# Patient Record
Sex: MALE | Race: WHITE | NOT HISPANIC OR LATINO | Employment: OTHER | URBAN - METROPOLITAN AREA
[De-identification: names, ages, dates, MRNs, and addresses within clinical notes are randomized per-mention and may not be internally consistent; named-entity substitution may affect disease eponyms.]

---

## 2020-12-16 ENCOUNTER — HOSPITAL ENCOUNTER (EMERGENCY)
Facility: HOSPITAL | Age: 70
Discharge: HOME | End: 2020-12-16
Attending: EMERGENCY MEDICINE | Admitting: EMERGENCY MEDICINE

## 2020-12-16 ENCOUNTER — APPOINTMENT (OUTPATIENT)
Dept: GENERAL RADIOLOGY | Facility: HOSPITAL | Age: 70
End: 2020-12-16

## 2020-12-16 VITALS
SYSTOLIC BLOOD PRESSURE: 162 MMHG | DIASTOLIC BLOOD PRESSURE: 95 MMHG | HEIGHT: 71 IN | RESPIRATION RATE: 18 BRPM | WEIGHT: 130 LBS | TEMPERATURE: 98.2 F | BODY MASS INDEX: 18.2 KG/M2 | HEART RATE: 66 BPM | OXYGEN SATURATION: 100 %

## 2020-12-16 DIAGNOSIS — E08.621 DIABETIC ULCER OF LEFT MIDFOOT ASSOCIATED WITH DIABETES MELLITUS DUE TO UNDERLYING CONDITION, LIMITED TO BREAKDOWN OF SKIN (HCC): ICD-10-CM

## 2020-12-16 DIAGNOSIS — R73.9 HYPERGLYCEMIA: ICD-10-CM

## 2020-12-16 DIAGNOSIS — L97.421 DIABETIC ULCER OF LEFT MIDFOOT ASSOCIATED WITH DIABETES MELLITUS DUE TO UNDERLYING CONDITION, LIMITED TO BREAKDOWN OF SKIN (HCC): ICD-10-CM

## 2020-12-16 DIAGNOSIS — Z91.199 MEDICAL NON-COMPLIANCE: Primary | ICD-10-CM

## 2020-12-16 LAB
ALBUMIN SERPL-MCNC: 4.4 G/DL (ref 3.5–5.2)
ALBUMIN/GLOB SERPL: 1.5 G/DL
ALP SERPL-CCNC: 110 U/L (ref 39–117)
ALT SERPL W P-5'-P-CCNC: 33 U/L (ref 1–41)
ANION GAP SERPL CALCULATED.3IONS-SCNC: 11 MMOL/L (ref 5–15)
AST SERPL-CCNC: 22 U/L (ref 1–40)
B-OH-BUTYR SERPL-SCNC: 0.1 MMOL/L (ref 0.02–0.27)
BASOPHILS # BLD AUTO: 0.03 10*3/MM3 (ref 0–0.2)
BASOPHILS NFR BLD AUTO: 0.4 % (ref 0–1.5)
BILIRUB SERPL-MCNC: 0.4 MG/DL (ref 0–1.2)
BILIRUB UR QL STRIP: NEGATIVE
BUN SERPL-MCNC: 14 MG/DL (ref 8–23)
BUN/CREAT SERPL: 15.6 (ref 7–25)
CALCIUM SPEC-SCNC: 9.8 MG/DL (ref 8.6–10.5)
CHLORIDE SERPL-SCNC: 96 MMOL/L (ref 98–107)
CLARITY UR: CLEAR
CO2 SERPL-SCNC: 26 MMOL/L (ref 22–29)
COLOR UR: YELLOW
CREAT SERPL-MCNC: 0.9 MG/DL (ref 0.76–1.27)
DEPRECATED RDW RBC AUTO: 43.8 FL (ref 37–54)
EOSINOPHIL # BLD AUTO: 0.13 10*3/MM3 (ref 0–0.4)
EOSINOPHIL NFR BLD AUTO: 1.6 % (ref 0.3–6.2)
ERYTHROCYTE [DISTWIDTH] IN BLOOD BY AUTOMATED COUNT: 13.2 % (ref 12.3–15.4)
GFR SERPL CREATININE-BSD FRML MDRD: 84 ML/MIN/1.73
GLOBULIN UR ELPH-MCNC: 3 GM/DL
GLUCOSE BLDC GLUCOMTR-MCNC: 284 MG/DL (ref 70–130)
GLUCOSE BLDC GLUCOMTR-MCNC: 398 MG/DL (ref 70–130)
GLUCOSE BLDC GLUCOMTR-MCNC: 404 MG/DL (ref 70–130)
GLUCOSE SERPL-MCNC: 306 MG/DL (ref 65–99)
GLUCOSE UR STRIP-MCNC: ABNORMAL MG/DL
HCT VFR BLD AUTO: 43.9 % (ref 37.5–51)
HGB BLD-MCNC: 14.5 G/DL (ref 13–17.7)
HGB UR QL STRIP.AUTO: NEGATIVE
HOLD SPECIMEN: NORMAL
HOLD SPECIMEN: NORMAL
IMM GRANULOCYTES # BLD AUTO: 0.06 10*3/MM3 (ref 0–0.05)
IMM GRANULOCYTES NFR BLD AUTO: 0.7 % (ref 0–0.5)
KETONES UR QL STRIP: NEGATIVE
LEUKOCYTE ESTERASE UR QL STRIP.AUTO: NEGATIVE
LYMPHOCYTES # BLD AUTO: 2.17 10*3/MM3 (ref 0.7–3.1)
LYMPHOCYTES NFR BLD AUTO: 26.2 % (ref 19.6–45.3)
MCH RBC QN AUTO: 30 PG (ref 26.6–33)
MCHC RBC AUTO-ENTMCNC: 33 G/DL (ref 31.5–35.7)
MCV RBC AUTO: 90.7 FL (ref 79–97)
MONOCYTES # BLD AUTO: 0.57 10*3/MM3 (ref 0.1–0.9)
MONOCYTES NFR BLD AUTO: 6.9 % (ref 5–12)
NEUTROPHILS NFR BLD AUTO: 5.32 10*3/MM3 (ref 1.7–7)
NEUTROPHILS NFR BLD AUTO: 64.2 % (ref 42.7–76)
NITRITE UR QL STRIP: NEGATIVE
NRBC BLD AUTO-RTO: 0 /100 WBC (ref 0–0.2)
PH UR STRIP.AUTO: 5.5 [PH] (ref 5–8)
PLATELET # BLD AUTO: 204 10*3/MM3 (ref 140–450)
PMV BLD AUTO: 9.9 FL (ref 6–12)
POTASSIUM SERPL-SCNC: 4 MMOL/L (ref 3.5–5.2)
PROT SERPL-MCNC: 7.4 G/DL (ref 6–8.5)
PROT UR QL STRIP: NEGATIVE
RBC # BLD AUTO: 4.84 10*6/MM3 (ref 4.14–5.8)
SODIUM SERPL-SCNC: 133 MMOL/L (ref 136–145)
SP GR UR STRIP: 1.03 (ref 1–1.03)
UROBILINOGEN UR QL STRIP: ABNORMAL
WBC # BLD AUTO: 8.28 10*3/MM3 (ref 3.4–10.8)
WHOLE BLOOD HOLD SPECIMEN: NORMAL
WHOLE BLOOD HOLD SPECIMEN: NORMAL

## 2020-12-16 PROCEDURE — 81003 URINALYSIS AUTO W/O SCOPE: CPT | Performed by: EMERGENCY MEDICINE

## 2020-12-16 PROCEDURE — 85025 COMPLETE CBC W/AUTO DIFF WBC: CPT | Performed by: EMERGENCY MEDICINE

## 2020-12-16 PROCEDURE — 82962 GLUCOSE BLOOD TEST: CPT

## 2020-12-16 PROCEDURE — 73630 X-RAY EXAM OF FOOT: CPT

## 2020-12-16 PROCEDURE — 99284 EMERGENCY DEPT VISIT MOD MDM: CPT

## 2020-12-16 PROCEDURE — 93005 ELECTROCARDIOGRAM TRACING: CPT | Performed by: EMERGENCY MEDICINE

## 2020-12-16 PROCEDURE — 80053 COMPREHEN METABOLIC PANEL: CPT

## 2020-12-16 PROCEDURE — 82010 KETONE BODYS QUAN: CPT

## 2020-12-16 RX ORDER — LIDOCAINE HYDROCHLORIDE 20 MG/ML
15 SOLUTION OROPHARYNGEAL ONCE
Status: COMPLETED | OUTPATIENT
Start: 2020-12-16 | End: 2020-12-16

## 2020-12-16 RX ORDER — SODIUM CHLORIDE 0.9 % (FLUSH) 0.9 %
10 SYRINGE (ML) INJECTION AS NEEDED
Status: DISCONTINUED | OUTPATIENT
Start: 2020-12-16 | End: 2020-12-16 | Stop reason: HOSPADM

## 2020-12-16 RX ORDER — ALUMINA, MAGNESIA, AND SIMETHICONE 2400; 2400; 240 MG/30ML; MG/30ML; MG/30ML
15 SUSPENSION ORAL ONCE
Status: COMPLETED | OUTPATIENT
Start: 2020-12-16 | End: 2020-12-16

## 2020-12-16 RX ADMIN — ALUMINUM HYDROXIDE, MAGNESIUM HYDROXIDE, AND DIMETHICONE 15 ML: 400; 400; 40 SUSPENSION ORAL at 20:20

## 2020-12-16 RX ADMIN — LIDOCAINE HYDROCHLORIDE 15 ML: 20 SOLUTION ORAL; TOPICAL at 20:20

## 2020-12-17 LAB
QT INTERVAL: 378 MS
QTC INTERVAL: 425 MS

## 2025-03-20 ENCOUNTER — APPOINTMENT (OUTPATIENT)
Dept: GENERAL RADIOLOGY | Facility: HOSPITAL | Age: 75
End: 2025-03-20
Payer: MEDICARE

## 2025-03-20 ENCOUNTER — HOSPITAL ENCOUNTER (INPATIENT)
Facility: HOSPITAL | Age: 75
LOS: 11 days | Discharge: HOME | End: 2025-04-02
Attending: EMERGENCY MEDICINE | Admitting: STUDENT IN AN ORGANIZED HEALTH CARE EDUCATION/TRAINING PROGRAM
Payer: MEDICARE

## 2025-03-20 DIAGNOSIS — R79.89 ELEVATED BRAIN NATRIURETIC PEPTIDE (BNP) LEVEL: ICD-10-CM

## 2025-03-20 DIAGNOSIS — N18.30 STAGE 3 CHRONIC KIDNEY DISEASE, UNSPECIFIED WHETHER STAGE 3A OR 3B CKD: ICD-10-CM

## 2025-03-20 DIAGNOSIS — J44.9 CHRONIC OBSTRUCTIVE PULMONARY DISEASE, UNSPECIFIED COPD TYPE: ICD-10-CM

## 2025-03-20 DIAGNOSIS — N28.9 ACUTE RENAL INSUFFICIENCY: ICD-10-CM

## 2025-03-20 DIAGNOSIS — I50.30 HEART FAILURE WITH PRESERVED LEFT VENTRICULAR FUNCTION (HFPEF): ICD-10-CM

## 2025-03-20 DIAGNOSIS — Z79.4 TYPE 2 DIABETES MELLITUS WITH HYPERGLYCEMIA, WITH LONG-TERM CURRENT USE OF INSULIN: ICD-10-CM

## 2025-03-20 DIAGNOSIS — I16.1 HYPERTENSIVE EMERGENCY: Primary | ICD-10-CM

## 2025-03-20 DIAGNOSIS — Z91.199 MEDICAL NON-COMPLIANCE: ICD-10-CM

## 2025-03-20 DIAGNOSIS — E44.0 MODERATE PROTEIN-CALORIE MALNUTRITION: ICD-10-CM

## 2025-03-20 DIAGNOSIS — Z72.0 TOBACCO ABUSE: ICD-10-CM

## 2025-03-20 DIAGNOSIS — E11.65 TYPE 2 DIABETES MELLITUS WITH HYPERGLYCEMIA, WITH LONG-TERM CURRENT USE OF INSULIN: ICD-10-CM

## 2025-03-20 DIAGNOSIS — I20.89 ATYPICAL ANGINA: ICD-10-CM

## 2025-03-20 DIAGNOSIS — I25.119 CORONARY ARTERY DISEASE INVOLVING NATIVE CORONARY ARTERY OF NATIVE HEART WITH ANGINA PECTORIS: ICD-10-CM

## 2025-03-20 PROBLEM — I16.0 HYPERTENSIVE URGENCY: Status: ACTIVE | Noted: 2025-03-20

## 2025-03-20 PROBLEM — E11.9 DM2 (DIABETES MELLITUS, TYPE 2): Status: ACTIVE | Noted: 2025-03-20

## 2025-03-20 PROBLEM — I25.10 CAD (CORONARY ARTERY DISEASE): Status: ACTIVE | Noted: 2025-03-20

## 2025-03-20 PROBLEM — R60.0 BILATERAL LOWER EXTREMITY EDEMA: Status: ACTIVE | Noted: 2025-03-20

## 2025-03-20 PROBLEM — N17.9 AKI (ACUTE KIDNEY INJURY): Status: ACTIVE | Noted: 2025-03-20

## 2025-03-20 PROBLEM — I73.9 PAD (PERIPHERAL ARTERY DISEASE): Status: ACTIVE | Noted: 2025-03-20

## 2025-03-20 PROBLEM — D64.9 ANEMIA: Status: ACTIVE | Noted: 2025-03-20

## 2025-03-20 PROBLEM — I10 HTN (HYPERTENSION): Status: ACTIVE | Noted: 2025-03-20

## 2025-03-20 LAB
ALBUMIN SERPL-MCNC: 4.1 G/DL (ref 3.5–5.2)
ALBUMIN/GLOB SERPL: 1.3 G/DL
ALP SERPL-CCNC: 148 U/L (ref 39–117)
ALT SERPL W P-5'-P-CCNC: 14 U/L (ref 1–41)
ANION GAP SERPL CALCULATED.3IONS-SCNC: 12 MMOL/L (ref 5–15)
AST SERPL-CCNC: 11 U/L (ref 1–40)
BACTERIA UR QL AUTO: ABNORMAL /HPF
BASOPHILS # BLD AUTO: 0.03 10*3/MM3 (ref 0–0.2)
BASOPHILS NFR BLD AUTO: 0.3 % (ref 0–1.5)
BILIRUB SERPL-MCNC: 0.2 MG/DL (ref 0–1.2)
BILIRUB UR QL STRIP: NEGATIVE
BUN SERPL-MCNC: 25 MG/DL (ref 8–23)
BUN/CREAT SERPL: 15 (ref 7–25)
CALCIUM SPEC-SCNC: 9 MG/DL (ref 8.6–10.5)
CHLORIDE SERPL-SCNC: 104 MMOL/L (ref 98–107)
CLARITY UR: CLEAR
CO2 SERPL-SCNC: 23 MMOL/L (ref 22–29)
COLOR UR: YELLOW
CREAT SERPL-MCNC: 1.67 MG/DL (ref 0.76–1.27)
DEPRECATED RDW RBC AUTO: 47.8 FL (ref 37–54)
EGFRCR SERPLBLD CKD-EPI 2021: 42.9 ML/MIN/1.73
EOSINOPHIL # BLD AUTO: 0.29 10*3/MM3 (ref 0–0.4)
EOSINOPHIL NFR BLD AUTO: 2.9 % (ref 0.3–6.2)
ERYTHROCYTE [DISTWIDTH] IN BLOOD BY AUTOMATED COUNT: 14.1 % (ref 12.3–15.4)
FERRITIN SERPL-MCNC: 315 NG/ML (ref 30–400)
GEN 5 1HR TROPONIN T REFLEX: 147 NG/L
GLOBULIN UR ELPH-MCNC: 3.1 GM/DL
GLUCOSE BLDC GLUCOMTR-MCNC: 250 MG/DL (ref 70–130)
GLUCOSE SERPL-MCNC: 326 MG/DL (ref 65–99)
GLUCOSE UR STRIP-MCNC: ABNORMAL MG/DL
HBA1C MFR BLD: 7.9 % (ref 4.8–5.6)
HCT VFR BLD AUTO: 34.2 % (ref 37.5–51)
HGB BLD-MCNC: 11 G/DL (ref 13–17.7)
HGB UR QL STRIP.AUTO: ABNORMAL
HOLD SPECIMEN: NORMAL
HYALINE CASTS UR QL AUTO: ABNORMAL /LPF
IMM GRANULOCYTES # BLD AUTO: 0.06 10*3/MM3 (ref 0–0.05)
IMM GRANULOCYTES NFR BLD AUTO: 0.6 % (ref 0–0.5)
IRON 24H UR-MRATE: 47 MCG/DL (ref 59–158)
IRON SATN MFR SERPL: 19 % (ref 20–50)
KETONES UR QL STRIP: NEGATIVE
LEUKOCYTE ESTERASE UR QL STRIP.AUTO: NEGATIVE
LYMPHOCYTES # BLD AUTO: 1.55 10*3/MM3 (ref 0.7–3.1)
LYMPHOCYTES NFR BLD AUTO: 15.3 % (ref 19.6–45.3)
MCH RBC QN AUTO: 29.4 PG (ref 26.6–33)
MCHC RBC AUTO-ENTMCNC: 32.2 G/DL (ref 31.5–35.7)
MCV RBC AUTO: 91.4 FL (ref 79–97)
MONOCYTES # BLD AUTO: 0.69 10*3/MM3 (ref 0.1–0.9)
MONOCYTES NFR BLD AUTO: 6.8 % (ref 5–12)
NEUTROPHILS NFR BLD AUTO: 7.5 10*3/MM3 (ref 1.7–7)
NEUTROPHILS NFR BLD AUTO: 74.1 % (ref 42.7–76)
NITRITE UR QL STRIP: NEGATIVE
NRBC BLD AUTO-RTO: 0 /100 WBC (ref 0–0.2)
NT-PROBNP SERPL-MCNC: 1321 PG/ML (ref 0–900)
PH UR STRIP.AUTO: 6.5 [PH] (ref 5–8)
PLATELET # BLD AUTO: 184 10*3/MM3 (ref 140–450)
PMV BLD AUTO: 10.3 FL (ref 6–12)
POTASSIUM SERPL-SCNC: 4.8 MMOL/L (ref 3.5–5.2)
PROT SERPL-MCNC: 7.2 G/DL (ref 6–8.5)
PROT UR QL STRIP: ABNORMAL
RBC # BLD AUTO: 3.74 10*6/MM3 (ref 4.14–5.8)
RBC # UR STRIP: ABNORMAL /HPF
REF LAB TEST METHOD: ABNORMAL
SODIUM SERPL-SCNC: 139 MMOL/L (ref 136–145)
SP GR UR STRIP: 1.02 (ref 1–1.03)
SQUAMOUS #/AREA URNS HPF: ABNORMAL /HPF
TIBC SERPL-MCNC: 243 MCG/DL (ref 298–536)
TRANSFERRIN SERPL-MCNC: 163 MG/DL (ref 200–360)
TROPONIN T % DELTA: -9
TROPONIN T NUMERIC DELTA: -15 NG/L
TROPONIN T SERPL HS-MCNC: 162 NG/L
UROBILINOGEN UR QL STRIP: ABNORMAL
WBC # UR STRIP: ABNORMAL /HPF
WBC NRBC COR # BLD AUTO: 10.12 10*3/MM3 (ref 3.4–10.8)
WHOLE BLOOD HOLD COAG: NORMAL
WHOLE BLOOD HOLD SPECIMEN: NORMAL

## 2025-03-20 PROCEDURE — 83540 ASSAY OF IRON: CPT | Performed by: PHYSICIAN ASSISTANT

## 2025-03-20 PROCEDURE — 71045 X-RAY EXAM CHEST 1 VIEW: CPT

## 2025-03-20 PROCEDURE — 82948 REAGENT STRIP/BLOOD GLUCOSE: CPT

## 2025-03-20 PROCEDURE — 36415 COLL VENOUS BLD VENIPUNCTURE: CPT

## 2025-03-20 PROCEDURE — 84466 ASSAY OF TRANSFERRIN: CPT | Performed by: PHYSICIAN ASSISTANT

## 2025-03-20 PROCEDURE — 80053 COMPREHEN METABOLIC PANEL: CPT | Performed by: EMERGENCY MEDICINE

## 2025-03-20 PROCEDURE — 99223 1ST HOSP IP/OBS HIGH 75: CPT | Performed by: PHYSICIAN ASSISTANT

## 2025-03-20 PROCEDURE — 82728 ASSAY OF FERRITIN: CPT | Performed by: PHYSICIAN ASSISTANT

## 2025-03-20 PROCEDURE — 25810000003 SODIUM CHLORIDE 0.9 % SOLUTION 250 ML FLEX CONT: Performed by: EMERGENCY MEDICINE

## 2025-03-20 PROCEDURE — G0378 HOSPITAL OBSERVATION PER HR: HCPCS

## 2025-03-20 PROCEDURE — 25010000002 HEPARIN (PORCINE) PER 1000 UNITS: Performed by: PHYSICIAN ASSISTANT

## 2025-03-20 PROCEDURE — 99291 CRITICAL CARE FIRST HOUR: CPT

## 2025-03-20 PROCEDURE — 63710000001 INSULIN LISPRO (HUMAN) PER 5 UNITS: Performed by: PHYSICIAN ASSISTANT

## 2025-03-20 PROCEDURE — 82746 ASSAY OF FOLIC ACID SERUM: CPT | Performed by: PHYSICIAN ASSISTANT

## 2025-03-20 PROCEDURE — 83036 HEMOGLOBIN GLYCOSYLATED A1C: CPT | Performed by: PHYSICIAN ASSISTANT

## 2025-03-20 PROCEDURE — 85025 COMPLETE CBC W/AUTO DIFF WBC: CPT | Performed by: EMERGENCY MEDICINE

## 2025-03-20 PROCEDURE — 81001 URINALYSIS AUTO W/SCOPE: CPT | Performed by: EMERGENCY MEDICINE

## 2025-03-20 PROCEDURE — 25010000002 NICARDIPINE 2.5 MG/ML SOLUTION 10 ML VIAL: Performed by: EMERGENCY MEDICINE

## 2025-03-20 PROCEDURE — 82607 VITAMIN B-12: CPT | Performed by: PHYSICIAN ASSISTANT

## 2025-03-20 PROCEDURE — 84484 ASSAY OF TROPONIN QUANT: CPT | Performed by: EMERGENCY MEDICINE

## 2025-03-20 PROCEDURE — 83880 ASSAY OF NATRIURETIC PEPTIDE: CPT | Performed by: EMERGENCY MEDICINE

## 2025-03-20 PROCEDURE — 93005 ELECTROCARDIOGRAM TRACING: CPT | Performed by: EMERGENCY MEDICINE

## 2025-03-20 RX ORDER — BISACODYL 5 MG/1
5 TABLET, DELAYED RELEASE ORAL DAILY PRN
Status: DISCONTINUED | OUTPATIENT
Start: 2025-03-20 | End: 2025-03-28 | Stop reason: SDUPTHER

## 2025-03-20 RX ORDER — OXYCODONE AND ACETAMINOPHEN 7.5; 325 MG/1; MG/1
1 TABLET ORAL 4 TIMES DAILY PRN
COMMUNITY

## 2025-03-20 RX ORDER — IBUPROFEN 600 MG/1
1 TABLET ORAL
Status: DISCONTINUED | OUTPATIENT
Start: 2025-03-20 | End: 2025-04-02 | Stop reason: HOSPADM

## 2025-03-20 RX ORDER — ACETAMINOPHEN 160 MG/5ML
650 SOLUTION ORAL EVERY 4 HOURS PRN
Status: DISCONTINUED | OUTPATIENT
Start: 2025-03-20 | End: 2025-04-02 | Stop reason: HOSPADM

## 2025-03-20 RX ORDER — SODIUM CHLORIDE 0.9 % (FLUSH) 0.9 %
10 SYRINGE (ML) INJECTION AS NEEDED
Status: DISCONTINUED | OUTPATIENT
Start: 2025-03-20 | End: 2025-04-02 | Stop reason: HOSPADM

## 2025-03-20 RX ORDER — HEPARIN SODIUM 5000 [USP'U]/ML
5000 INJECTION, SOLUTION INTRAVENOUS; SUBCUTANEOUS EVERY 12 HOURS SCHEDULED
Status: DISCONTINUED | OUTPATIENT
Start: 2025-03-20 | End: 2025-04-02 | Stop reason: HOSPADM

## 2025-03-20 RX ORDER — ATORVASTATIN CALCIUM 40 MG/1
40 TABLET, FILM COATED ORAL NIGHTLY
Status: ON HOLD | COMMUNITY
End: 2025-04-01

## 2025-03-20 RX ORDER — ONDANSETRON 2 MG/ML
4 INJECTION INTRAMUSCULAR; INTRAVENOUS EVERY 6 HOURS PRN
Status: DISCONTINUED | OUTPATIENT
Start: 2025-03-20 | End: 2025-04-02

## 2025-03-20 RX ORDER — FAMOTIDINE 20 MG/1
20 TABLET, FILM COATED ORAL 2 TIMES DAILY
COMMUNITY
End: 2025-04-02 | Stop reason: HOSPADM

## 2025-03-20 RX ORDER — GABAPENTIN 400 MG/1
400 CAPSULE ORAL 2 TIMES DAILY PRN
COMMUNITY

## 2025-03-20 RX ORDER — NICOTINE 21 MG/24HR
1 PATCH, TRANSDERMAL 24 HOURS TRANSDERMAL
Status: DISCONTINUED | OUTPATIENT
Start: 2025-03-20 | End: 2025-04-02 | Stop reason: HOSPADM

## 2025-03-20 RX ORDER — ASPIRIN 81 MG/1
81 TABLET, CHEWABLE ORAL DAILY
Status: ON HOLD | COMMUNITY
End: 2025-04-01

## 2025-03-20 RX ORDER — INSULIN GLARGINE 100 [IU]/ML
22 INJECTION, SOLUTION SUBCUTANEOUS NIGHTLY
Status: ON HOLD | COMMUNITY
End: 2025-04-01

## 2025-03-20 RX ORDER — DEXTROSE MONOHYDRATE 25 G/50ML
25 INJECTION, SOLUTION INTRAVENOUS
Status: DISCONTINUED | OUTPATIENT
Start: 2025-03-20 | End: 2025-04-02 | Stop reason: HOSPADM

## 2025-03-20 RX ORDER — NICOTINE POLACRILEX 4 MG
15 LOZENGE BUCCAL
Status: DISCONTINUED | OUTPATIENT
Start: 2025-03-20 | End: 2025-04-02 | Stop reason: HOSPADM

## 2025-03-20 RX ORDER — ACETAMINOPHEN 325 MG/1
650 TABLET ORAL EVERY 4 HOURS PRN
Status: DISCONTINUED | OUTPATIENT
Start: 2025-03-20 | End: 2025-04-02 | Stop reason: HOSPADM

## 2025-03-20 RX ORDER — SODIUM CHLORIDE 0.9 % (FLUSH) 0.9 %
10 SYRINGE (ML) INJECTION EVERY 12 HOURS SCHEDULED
Status: DISCONTINUED | OUTPATIENT
Start: 2025-03-20 | End: 2025-04-02 | Stop reason: HOSPADM

## 2025-03-20 RX ORDER — HYDRALAZINE HYDROCHLORIDE 25 MG/1
25 TABLET, FILM COATED ORAL 3 TIMES DAILY
COMMUNITY
End: 2025-04-02 | Stop reason: HOSPADM

## 2025-03-20 RX ORDER — ACETAMINOPHEN 650 MG/1
650 SUPPOSITORY RECTAL EVERY 4 HOURS PRN
Status: DISCONTINUED | OUTPATIENT
Start: 2025-03-20 | End: 2025-04-02 | Stop reason: HOSPADM

## 2025-03-20 RX ORDER — AMOXICILLIN 250 MG
2 CAPSULE ORAL 2 TIMES DAILY PRN
Status: DISCONTINUED | OUTPATIENT
Start: 2025-03-20 | End: 2025-03-28 | Stop reason: SDUPTHER

## 2025-03-20 RX ORDER — POLYETHYLENE GLYCOL 3350 17 G/17G
17 POWDER, FOR SOLUTION ORAL DAILY PRN
Status: DISCONTINUED | OUTPATIENT
Start: 2025-03-20 | End: 2025-03-28 | Stop reason: SDUPTHER

## 2025-03-20 RX ORDER — SODIUM CHLORIDE 9 MG/ML
40 INJECTION, SOLUTION INTRAVENOUS AS NEEDED
Status: DISCONTINUED | OUTPATIENT
Start: 2025-03-20 | End: 2025-04-02 | Stop reason: HOSPADM

## 2025-03-20 RX ORDER — INSULIN LISPRO 100 [IU]/ML
2-7 INJECTION, SOLUTION INTRAVENOUS; SUBCUTANEOUS
Status: DISCONTINUED | OUTPATIENT
Start: 2025-03-20 | End: 2025-04-02 | Stop reason: HOSPADM

## 2025-03-20 RX ORDER — BISACODYL 10 MG
10 SUPPOSITORY, RECTAL RECTAL DAILY PRN
Status: DISCONTINUED | OUTPATIENT
Start: 2025-03-20 | End: 2025-03-28 | Stop reason: SDUPTHER

## 2025-03-20 RX ADMIN — SODIUM CHLORIDE 5 MG/HR: 9 INJECTION, SOLUTION INTRAVENOUS at 18:26

## 2025-03-20 RX ADMIN — HEPARIN SODIUM 5000 UNITS: 5000 INJECTION INTRAVENOUS; SUBCUTANEOUS at 21:55

## 2025-03-20 RX ADMIN — INSULIN LISPRO 4 UNITS: 100 INJECTION, SOLUTION INTRAVENOUS; SUBCUTANEOUS at 21:54

## 2025-03-20 RX ADMIN — NICOTINE 1 PATCH: 21 PATCH TRANSDERMAL at 18:36

## 2025-03-20 NOTE — ED PROVIDER NOTES
Subjective   History of Present Illness  74-year-old male who presents to the ER for evaluation of elevated blood pressure.  The patient has a known history of high blood pressure.  He states that somebody attempted to improve his blood pressure with medication 1 month ago but he became pale, and therefore does not take that medication anymore.  He states that he receives weekly evaluation by home health.  Today's blood pressure was greater than 240 systolic he was advised to present here.  He denies any acute symptoms.  No chest pain or abdominal pain.  No nausea or vomiting.  No fever or infectious symptoms.  No other acute complaints.  He appears well with normal baseline mentation, and a normal neurologic exam      Review of Systems   Constitutional:  Negative for chills, fatigue and fever.   HENT:  Negative for congestion, ear pain, postnasal drip, sinus pressure and sore throat.    Eyes:  Negative for pain, redness and visual disturbance.   Respiratory:  Negative for cough, chest tightness and shortness of breath.    Cardiovascular:  Negative for chest pain, palpitations and leg swelling.   Gastrointestinal:  Negative for abdominal pain, anal bleeding, blood in stool, diarrhea, nausea and vomiting.   Endocrine: Negative for polydipsia and polyuria.   Genitourinary:  Negative for difficulty urinating, dysuria, frequency and urgency.   Musculoskeletal:  Negative for arthralgias, back pain and neck pain.   Skin:  Negative for pallor and rash.   Allergic/Immunologic: Negative for environmental allergies and immunocompromised state.   Neurological:  Negative for dizziness, weakness and headaches.   Hematological:  Negative for adenopathy.   Psychiatric/Behavioral:  Negative for confusion, self-injury and suicidal ideas. The patient is not nervous/anxious.    All other systems reviewed and are negative.      Past Medical History:   Diagnosis Date    Back injury     Diabetes mellitus     History of angina      Hypertension        Allergies   Allergen Reactions    Penicillins Hives    Valium [Diazepam] Hives       Past Surgical History:   Procedure Laterality Date    CORONARY STENT PLACEMENT      EMBOLECTOMY      FASCIOTOMY         History reviewed. No pertinent family history.    Social History     Socioeconomic History    Marital status:    Tobacco Use    Smoking status: Every Day     Current packs/day: 1.00     Types: Cigarettes    Smokeless tobacco: Never    Tobacco comments:     pt report smoking since he was [age]   Vaping Use    Vaping status: Never Used   Substance and Sexual Activity    Alcohol use: Never    Drug use: Never    Sexual activity: Defer           Objective   Physical Exam  Vitals and nursing note reviewed.   Constitutional:       General: He is not in acute distress.     Appearance: Normal appearance. He is well-developed. He is not toxic-appearing or diaphoretic.   HENT:      Head: Normocephalic and atraumatic.      Right Ear: External ear normal.      Left Ear: External ear normal.      Nose: Nose normal.   Eyes:      General: Lids are normal.      Pupils: Pupils are equal, round, and reactive to light.   Neck:      Trachea: No tracheal deviation.   Cardiovascular:      Rate and Rhythm: Normal rate and regular rhythm.      Pulses: No decreased pulses.      Heart sounds: Normal heart sounds. No murmur heard.     No friction rub. No gallop.   Pulmonary:      Effort: Pulmonary effort is normal. No respiratory distress.      Breath sounds: Normal breath sounds. No decreased breath sounds, wheezing, rhonchi or rales.   Abdominal:      General: Bowel sounds are normal.      Palpations: Abdomen is soft.      Tenderness: There is no abdominal tenderness. There is no guarding or rebound.   Musculoskeletal:         General: No deformity. Normal range of motion.      Cervical back: Normal range of motion and neck supple.   Lymphadenopathy:      Cervical: No cervical adenopathy.   Skin:     General: Skin  is warm and dry.      Findings: No rash.   Neurological:      Mental Status: He is alert and oriented to person, place, and time.      Cranial Nerves: No cranial nerve deficit.      Sensory: No sensory deficit.   Psychiatric:         Speech: Speech normal.         Behavior: Behavior normal.         Thought Content: Thought content normal.         Judgment: Judgment normal.         Critical Care    Performed by: Jackeline Dale MD  Authorized by: Jackeline Dale MD    Critical care provider statement:     Critical care time (minutes):  35    Critical care time was exclusive of:  Separately billable procedures and treating other patients    Critical care was necessary to treat or prevent imminent or life-threatening deterioration of the following conditions:  Cardiac failure    Critical care was time spent personally by me on the following activities:  Development of treatment plan with patient or surrogate, discussions with consultants, evaluation of patient's response to treatment, blood draw for specimens, examination of patient, obtaining history from patient or surrogate, ordering and performing treatments and interventions, ordering and review of laboratory studies, ordering and review of radiographic studies, pulse oximetry, re-evaluation of patient's condition and review of old charts    I assumed direction of critical care for this patient from another provider in my specialty: no      Care discussed with: admitting provider               ED Course  ED Course as of 03/21/25 0806   Thu Mar 20, 2025   1821 The patient does not have clinical signs of CHF exacerbation with no significant lower extremity edema and no changes on chest x-ray.  Troponin is elevated, but trending down.  Blood pressure consistently stayed greater than 200 systolic.  Mentation is normal.  He states that he is not taking any current blood pressure medication but he took some a month ago when he became very pale and therefore  is not on a regimen at this time.  I was hoping to admit for blood pressure improvement and initiation of an acceptable tolerated outpatient regimen. [NS]      ED Course User Index  [NS] Jackeline Dale MD                                                       Medical Decision Making  Differential includes essential hypertension hypertensive urgency, hypertensive emergency, other unspecified etiology.    Labs show elevated troponin, it was trending down on repeat evaluation.  Urine does not show infection.  Elevated creatinine consistent with acute on chronic renal insufficiency.  No significant electrolyte abnormalities.  Normal white count.  Elevated BNP.    Accommodation elevated BNP and troponin gives concern for hypertensive emergency given current elevated blood pressures that have remained greater than 200.    Chest x-ray shows no acute disease.    I have initiated Cardene and I discussed the patient with the hospitalist who will consult for admission.    Problems Addressed:  Acute renal insufficiency: complicated acute illness or injury with systemic symptoms  Elevated brain natriuretic peptide (BNP) level: complicated acute illness or injury with systemic symptoms that poses a threat to life or bodily functions  Hypertensive emergency: complicated acute illness or injury with systemic symptoms that poses a threat to life or bodily functions    Amount and/or Complexity of Data Reviewed  External Data Reviewed: labs, radiology and ECG.  Labs: ordered. Decision-making details documented in ED Course.  Radiology: ordered and independent interpretation performed. Decision-making details documented in ED Course.  ECG/medicine tests: ordered and independent interpretation performed. Decision-making details documented in ED Course.     Details: EKG performed 3/20/2025 at 1629 interpreted by me shows sinus rhythm, normal QTc, normal QRS, no acute ischemic changes.    Risk  OTC drugs.  Prescription drug  management.  Decision regarding hospitalization.        Final diagnoses:   Hypertensive emergency   Elevated brain natriuretic peptide (BNP) level   Acute renal insufficiency       ED Disposition  ED Disposition       ED Disposition   Decision to Admit    Condition   --    Comment   Level of Care: Telemetry [5]   Diagnosis: HTN (hypertension) [113145]   Admitting Physician: DANE KIDD [[id number]]                 No follow-up provider specified.       Medication List      No changes were made to your prescriptions during this visit.            Jackeline Dale MD  03/21/25 0884

## 2025-03-20 NOTE — H&P
"    [facility] Medicine Services  HISTORY AND PHYSICAL    Patient Name: Thomas Perez  : 1950  MRN: [id number]  Primary Care Physician: Provider, No Known  Date of admission: 3/20/2025    Subjective   Subjective     Chief Complaint:  Elevated blood pressure    HPI:  Thomas Perez is a 74 y.o. male with a past medical history significant for hypertension, HLD, CAD w/ RCA stent (), PAD s/p embolectomy and fasciotomy, COPD, poorly controlled DM, CKD, medical non compliance and chronic pain. Patient presents today with complaints of elevated blood pressure. States a home health nurse checked his BP today and it was elevated to > 250 systolic. States he usually runs between 160 to 180 systolic. Nurse was concerned and called EMS. Upon arrival, BP was still elevated to 210/100. Patient reports he is \"between\" blood pressure medications but whatever he's taking right now makes him sick. Apparently has not taken antihypertensives consistently for about 1 month. He currently complaints of progressively worsening BLE edema. States he cannot fit any of his shoes. Is otherwise asymptomatic.  Denies fever, cough, congestion, SOB, or chest pain. No Syncope. No headache or focal weakness/numbness/tingling. Will admit to observation.        Review of Systems   Constitutional:  Negative for chills, fatigue and fever.   HENT:  Negative for congestion and trouble swallowing.    Eyes:  Negative for photophobia and visual disturbance.   Respiratory:  Negative for cough and shortness of breath.    Cardiovascular:  Positive for leg swelling. Negative for chest pain.   Gastrointestinal:  Negative for abdominal pain, diarrhea, nausea and vomiting.   Endocrine: Negative for cold intolerance and heat intolerance.   Genitourinary:  Negative for dysuria and flank pain.   Musculoskeletal:  Negative for back pain and gait problem.   Skin:  Negative for pallor and rash.   Allergic/Immunologic: Positive for " immunocompromised state.   Neurological:  Positive for weakness. Negative for dizziness and headaches.   Hematological:  Negative for adenopathy.   Psychiatric/Behavioral:  Negative for agitation and confusion.             Personal History     Past Medical History:   Diagnosis Date    Back injury     Diabetes mellitus     History of angina     Hypertension              Past Surgical History:   Procedure Laterality Date    CORONARY STENT PLACEMENT      EMBOLECTOMY      FASCIOTOMY         Family History: family history is not on file.     Social History:  reports that he has been smoking cigarettes. He has never used smokeless tobacco. He reports that he does not drink alcohol and does not use drugs.  Social History     Social History Narrative    Not on file       Medications:       Allergies   Allergen Reactions    Penicillins Hives    Valium [Diazepam] Hives       Objective   Objective     Vital Signs:   Temp:  [97.7 °F (36.5 °C)] 97.7 °F (36.5 °C)  Heart Rate:  [60-68] 68  Resp:  [16] 16  BP: (167-250)/() 170/71    Physical Exam   Constitutional: Awake, alert  Eyes: PERRLA, sclerae anicteric, no conjunctival injection  HENT: NCAT, mucous membranes moist  Neck: Supple, no thyromegaly, no lymphadenopathy, trachea midline  Respiratory: Clear to auscultation bilaterally, nonlabored respirations   Cardiovascular: RRR, no murmurs, rubs, or gallops, palpable pedal pulses bilaterally  Gastrointestinal: Positive bowel sounds, soft, nontender, nondistended  Musculoskeletal: No bilateral ankle edema, no clubbing or cyanosis to extremities  BLE edema +1, no pitting. S/P embolectomy, fasciotomy scar left lower extremity  Psychiatric: Appropriate affect, cooperative  Neurologic: Oriented x 3, strength symmetric in all extremities, Cranial Nerves grossly intact to confrontation, speech clear  Skin: No rashes      Result Review:  I have personally reviewed the results from the time of this admission to 3/20/2025 19:45 EDT  and agree with these findings:  [x]  Laboratory list / accordion  []  Microbiology  []  Radiology  []  EKG/Telemetry   []  Cardiology/Vascular   []  Pathology  [x]  Old records  []  Other:  Most notable findings include: /100. Vitals otherwise stable. Trop 162 then 147. Cr 1.65, BUN 25. H/H 11 and 34.2. Cxr clear.    LAB RESULTS:      Lab 03/20/25  1626   WBC 10.12   HEMOGLOBIN 11.0*   HEMATOCRIT 34.2*   PLATELETS 184   NEUTROS ABS 7.50*   IMMATURE GRANS (ABS) 0.06*   LYMPHS ABS 1.55   MONOS ABS 0.69   EOS ABS 0.29   MCV 91.4         Lab 03/20/25  1626   SODIUM 139   POTASSIUM 4.8   CHLORIDE 104   CO2 23.0   ANION GAP 12.0   BUN 25*   CREATININE 1.67*   EGFR 42.9*   GLUCOSE 326*   CALCIUM 9.0   HEMOGLOBIN A1C 7.90*         Lab 03/20/25  1626 03/14/25  1711   TOTAL PROTEIN 7.2  --    ALBUMIN 4.1  --    GLOBULIN 3.1  --    ALT (SGPT) 14  --    AST (SGOT) 11  --    BILIRUBIN 0.2  --    ALK PHOS 148*  --    LIPASE  --  32         Lab 03/20/25  1726 03/20/25  1626   PROBNP  --  1,321.0*   HSTROP T 147* 162*                 Brief Urine Lab Results  (Last result in the past 365 days)        Color   Clarity   Blood   Leuk Est   Nitrite   Protein   CREAT   Urine HCG        03/20/25 1637 Yellow   Clear   Small (1+)   Negative   Negative   >=300 mg/dL (3+)                 Microbiology Results (last 10 days)       ** No results found for the last 240 hours. **            XR Chest 1 View  Result Date: 3/20/2025  XR CHEST 1 VW Date of Exam: 3/20/2025 5:50 PM EDT Indication: fatigue Comparison: None available. Findings: Lungs are adequately expanded and appear clear. Cardiomediastinal contours appear within normal limits.     Impression: Impression: No acute cardiopulmonary abnormality is identified. Electronically Signed: Madison Duque  3/20/2025 6:15 PM EDT  Workstation ID: [id number]          Assessment & Plan   Assessment & Plan       Hypertensive urgency    MAGUI (acute kidney injury)    Bilateral lower extremity edema     DM2 (diabetes mellitus, type 2)    CAD (coronary artery disease)    Anemia    PAD (peripheral artery disease)    Medical non-compliance    COPD (chronic obstructive pulmonary disease)    Tobacco abuse    74 y.o. male with a past medical history significant for hypertension, HLD, CAD w/ RCA stent (2010), PAD s/p embolectomy and fasciotomy, COPD, poorly controlled DM, CKD, medical non compliance and chronic pain here with hypertensive urgency 2/2 medication non compliance and BLE edema.    Hypertensive Emergency  - BP elevated as high as 250 on arrival  - supposed to be on Valsartan- HCTZ, Norvasc, Imdur  - plan to reconcile meds and discharge on new regimen  - started on Cardene gtt. Continue for now  - Trop 162 > 147. Suspect demand ischemia. Continue to trend. No EKG changes  - close monitor on telemetry  - am labs    MAGUI on CKD  - baseline Cr. 0.9 to 1.1  - elevated to 1.67, BUN 25 eGFR 42.9  - obtain UA  - strict I&O  - hold off on fluids for now with LE edema  - avoid additional nephrotoxins    BLE edema  - obtain echocardiogram  - doppler BLE     Anemia  - H/H 11 and 34.2 respectively  - check iron studies, b12. folate    Coronary Artery Disease  HLD  PAD s/p multiple interventions  - RCA stent 2010  - Not currently on antiplatelet, likely needs to start ASA  - continue statin    Insulin Dependent Diabetes Mellitus  - Hb A1c 3/205 7.9  - was on lantus 20 units daily plus lispro 7 units TID. Confirm with pharmacy  - CC2 diet  - SSI with scheduled accu checks    Chronic back pain and chronic BLE pain   - follows pain clinic on [address]  - continue home meds    COPD  Tobacco use  - not on supplemental oxygen  - stable  - as needed pulmonary toilet  - smokes 1-2 PPD for many decades  - Counseled on smoking cessation including high risk for heart attack and stroke. He is in pre-contemplative stage of change  - nicotine patch as needed    Impaired mobility and self care deficit   - walks with a walker due to  BLE foot drop, brace LLE  - PT/OT recommend home with assistance, outpatient PT/OT      DVT prophylaxis:  heparin    CODE STATUS:  full code  Code Status (Patient has no pulse and is not breathing): CPR (Attempt to Resuscitate)  Medical Interventions (Patient has pulse or is breathing): Full Support  Level Of Support Discussed With: Patient      Expected Discharge  TBD      This note has been completed as part of a split-shared workflow.     Signature: Electronically signed by Gregg Ramos PA-C, 03/20/25, 7:37 PM EDT

## 2025-03-20 NOTE — Clinical Note
Level of Care: Telemetry [5]   Diagnosis: HTN (hypertension) [293478]   Admitting Physician: DANE KIDD [[id number]]   Is patient appropriate for Inpatient Observation Unit?: Yes [1]

## 2025-03-20 NOTE — ED NOTES
Thomas Perez    Nursing Report ED to Floor:  Mental status: AOx4  Ambulatory status: Walker  Oxygen Therapy:  RA  Cardiac Rhythm: NSR  Admitted from: Home  Safety Concerns:  Fall risk  Precautions: None  Social Issues: None  ED Room #:  15    ED Nurse Phone Extension - [phone] or may call [phone].      HPI:   Chief Complaint   Patient presents with    Hypertension       Past Medical History:  Past Medical History:   Diagnosis Date    Back injury     Diabetes mellitus     History of angina     Hypertension         Past Surgical History:  Past Surgical History:   Procedure Laterality Date    CORONARY STENT PLACEMENT          Admitting Doctor:   Castro Bermudez MD    Consulting Provider(s):  Consults       No orders found from 2/19/2025 to 3/21/2025.             Admitting Diagnosis:   The primary encounter diagnosis was Hypertensive emergency. Diagnoses of Elevated brain natriuretic peptide (BNP) level and Acute renal insufficiency were also pertinent to this visit.    Most Recent Vitals:   Vitals:    03/20/25 1829 03/20/25 1858 03/20/25 1908 03/20/25 1918   BP: (!) 211/79 180/78 167/77 170/71   BP Location:       Patient Position:       Pulse: 61 66 63 68   Resp:       Temp:       TempSrc:       SpO2: 99% 98% 98% 98%   Weight:       Height:           Active LDAs/IV Access:   Lines, Drains & Airways       Active LDAs       Name Placement date Placement time Site Days    Peripheral IV 03/20/25 1629 Right Antecubital 03/20/25  1629  Antecubital  less than 1                    Labs (abnormal labs have a star):   Labs Reviewed   COMPREHENSIVE METABOLIC PANEL - Abnormal; Notable for the following components:       Result Value    Glucose 326 (*)     BUN 25 (*)     Creatinine 1.67 (*)     Alkaline Phosphatase 148 (*)     eGFR 42.9 (*)     All other components within normal limits    Narrative:     GFR Categories in Chronic Kidney Disease (CKD)      GFR Category          GFR (mL/min/1.73)    Interpretation  G1                      90 or greater         Normal or high (1)  G2                      60-89                Mild decrease (1)  G3a                   45-59                Mild to moderate decrease  G3b                   30-44                Moderate to severe decrease  G4                    15-29                Severe decrease  G5                    14 or less           Kidney failure          (1)In the absence of evidence of kidney disease, neither GFR category G1 or G2 fulfill the criteria for CKD.    eGFR calculation 2021 CKD-EPI creatinine equation, which does not include race as a factor   BNP (IN-HOUSE) - Abnormal; Notable for the following components:    proBNP 1,321.0 (*)     All other components within normal limits    Narrative:     This assay is used as an aid in the diagnosis of individuals suspected of having heart failure. It can be used as an aid in the diagnosis of acute decompensated heart failure (ADHF) in patients presenting with signs and symptoms of ADHF to the emergency department (ED). In addition, NT-proBNP of <300 pg/mL indicates ADHF is not likely.    Age Range Result Interpretation  NT-proBNP Concentration (pg/mL:      <50             Positive            >450                   Gray                 300-450                    Negative             <300    50-75           Positive            >900                  Gray                300-900                  Negative            <300      >75             Positive            >1800                  Gray                300-1800                  Negative            <300   TROPONIN - Abnormal; Notable for the following components:    HS Troponin T 162 (*)     All other components within normal limits    Narrative:     High Sensitive Troponin T Reference Range:  <14.0 ng/L- Negative Female for AMI  <22.0 ng/L- Negative Male for AMI  >=14 - Abnormal Female indicating possible myocardial injury.  >=22 - Abnormal Male indicating possible myocardial injury.   Clinicians would  have to utilize clinical acumen, EKG, Troponin, and serial changes to determine if it is an Acute Myocardial Infarction or myocardial injury due to an underlying chronic condition.        URINALYSIS W/ MICROSCOPIC IF INDICATED (NO CULTURE) - Abnormal; Notable for the following components:    Glucose, UA >=1000 mg/dL (3+) (*)     Blood, UA Small (1+) (*)     Protein, UA >=300 mg/dL (3+) (*)     All other components within normal limits   CBC WITH AUTO DIFFERENTIAL - Abnormal; Notable for the following components:    RBC 3.74 (*)     Hemoglobin 11.0 (*)     Hematocrit 34.2 (*)     Lymphocyte % 15.3 (*)     Immature Grans % 0.6 (*)     Neutrophils, Absolute 7.50 (*)     Immature Grans, Absolute 0.06 (*)     All other components within normal limits   URINALYSIS, MICROSCOPIC ONLY - Abnormal; Notable for the following components:    RBC, UA 6-10 (*)     All other components within normal limits   HIGH SENSITIVITIY TROPONIN T 1HR - Abnormal; Notable for the following components:    HS Troponin T 147 (*)     All other components within normal limits    Narrative:     High Sensitive Troponin T Reference Range:  <14.0 ng/L- Negative Female for AMI  <22.0 ng/L- Negative Male for AMI  >=14 - Abnormal Female indicating possible myocardial injury.  >=22 - Abnormal Male indicating possible myocardial injury.   Clinicians would have to utilize clinical acumen, EKG, Troponin, and serial changes to determine if it is an Acute Myocardial Infarction or myocardial injury due to an underlying chronic condition.        HEMOGLOBIN A1C - Abnormal; Notable for the following components:    Hemoglobin A1C 7.90 (*)     All other components within normal limits    Narrative:     Hemoglobin A1C Ranges:    Increased Risk for Diabetes  5.7% to 6.4%  Diabetes                     >= 6.5%  Diabetic Goal                < 7.0%   RAINBOW DRAW    Narrative:     The following orders were created for panel order Todd Draw.  Procedure                                Abnormality         Status                     ---------                               -----------         ------                     Green Top (Gel)[[id number]]                                  Final result               Lavender Top[[id number]]                                     Final result               Gold Top - SST[[id number]]                                   Final result               [place] Top[[id number]]                                         Final result               Light Blue Top[[id number]]                                   Final result                 Please view results for these tests on the individual orders.   IRON PROFILE   FERRITIN   FOLATE   VITAMIN B12   POCT GLUCOSE FINGERSTICK   POCT GLUCOSE FINGERSTICK   POCT GLUCOSE FINGERSTICK   POCT GLUCOSE FINGERSTICK   GREEN TOP   LAVENDER TOP   GOLD TOP - SST   GRAY TOP   LIGHT BLUE TOP   CBC AND DIFFERENTIAL    Narrative:     The following orders were created for panel order CBC & Differential.  Procedure                               Abnormality         Status                     ---------                               -----------         ------                     CBC Auto Differential[[id number]]        Abnormal            Final result                 Please view results for these tests on the individual orders.       Meds Given in ED:   Medications   sodium chloride 0.9 % flush 10 mL (has no administration in time range)   sodium chloride 0.9 % flush 10 mL (has no administration in time range)   niCARdipine (CARDENE) 25mg in 250mL NS infusion (5 mg/hr Intravenous Currently Infusing 3/20/25 1841)   nicotine (NICODERM CQ) 21 MG/24HR patch 1 patch (1 patch Transdermal Medication Applied 3/20/25 1836)   dextrose (GLUTOSE) oral gel 15 g (has no administration in time range)   dextrose (D50W) (25 g/50 mL) IV injection 25 g (has no administration in time range)   glucagon (GLUCAGEN) injection 1 mg (has no administration in time range)   Insulin  Lispro (humaLOG) injection 2-7 Units (has no administration in time range)     niCARdipine, 5-15 mg/hr, Last Rate: 5 mg/hr (03/20/25 1841)         Last NIH score:                                                          Dysphagia screening results:  Patient Factors Component (Dysphagia:Stroke or Rule-out)  Best Eye Response: 4-->(E4) spontaneous (03/20/25 1615)  Best Motor Response: 6-->(M6) obeys commands (03/20/25 1615)  Best Verbal Response: 5-->(V5) oriented (03/20/25 1615)  [place] Coma Scale Score: 15 (03/20/25 1615)     [place] Coma Scale:  No data recorded     CIWA:        Restraint Type:            Isolation Status:  No active isolations

## 2025-03-21 ENCOUNTER — APPOINTMENT (OUTPATIENT)
Dept: CARDIOLOGY | Facility: HOSPITAL | Age: 75
End: 2025-03-21
Payer: MEDICARE

## 2025-03-21 LAB
ALBUMIN SERPL-MCNC: 3.6 G/DL (ref 3.5–5.2)
ALBUMIN/GLOB SERPL: 1.4 G/DL
ALP SERPL-CCNC: 117 U/L (ref 39–117)
ALT SERPL W P-5'-P-CCNC: 12 U/L (ref 1–41)
ANION GAP SERPL CALCULATED.3IONS-SCNC: 12 MMOL/L (ref 5–15)
AORTIC DIMENSIONLESS INDEX: 0.81 (DI)
ASCENDING AORTA: 2.9 CM
AST SERPL-CCNC: 12 U/L (ref 1–40)
AV MEAN PRESS GRAD SYS DOP V1V2: 4 MMHG
AV VMAX SYS DOP: 126 CM/SEC
BASOPHILS # BLD AUTO: 0.02 10*3/MM3 (ref 0–0.2)
BASOPHILS NFR BLD AUTO: 0.2 % (ref 0–1.5)
BH CV ECHO MEAS - AO MAX PG: 6.4 MMHG
BH CV ECHO MEAS - AO ROOT DIAM: 3.3 CM
BH CV ECHO MEAS - AO V2 VTI: 28.9 CM
BH CV ECHO MEAS - AVA(I,D): 2.5 CM2
BH CV ECHO MEAS - EDV(CUBED): 79.5 ML
BH CV ECHO MEAS - EDV(MOD-SP2): 87.7 ML
BH CV ECHO MEAS - EDV(MOD-SP4): 75.7 ML
BH CV ECHO MEAS - EF(MOD-SP2): 62.7 %
BH CV ECHO MEAS - EF(MOD-SP4): 72.7 %
BH CV ECHO MEAS - ESV(CUBED): 27 ML
BH CV ECHO MEAS - ESV(MOD-SP2): 32.7 ML
BH CV ECHO MEAS - ESV(MOD-SP4): 20.7 ML
BH CV ECHO MEAS - FS: 30.2 %
BH CV ECHO MEAS - IVS/LVPW: 1 CM
BH CV ECHO MEAS - IVSD: 1.1 CM
BH CV ECHO MEAS - LA DIMENSION: 3.8 CM
BH CV ECHO MEAS - LAT PEAK E' VEL: 9.3 CM/SEC
BH CV ECHO MEAS - LV MASS(C)D: 162.9 GRAMS
BH CV ECHO MEAS - LV MAX PG: 4.6 MMHG
BH CV ECHO MEAS - LV MEAN PG: 2.5 MMHG
BH CV ECHO MEAS - LV V1 MAX: 107.5 CM/SEC
BH CV ECHO MEAS - LV V1 VTI: 23.5 CM
BH CV ECHO MEAS - LVIDD: 4.3 CM
BH CV ECHO MEAS - LVIDS: 3 CM
BH CV ECHO MEAS - LVOT AREA: 3.1 CM2
BH CV ECHO MEAS - LVOT DIAM: 2 CM
BH CV ECHO MEAS - LVPWD: 1.1 CM
BH CV ECHO MEAS - MED PEAK E' VEL: 6.1 CM/SEC
BH CV ECHO MEAS - MV A MAX VEL: 129 CM/SEC
BH CV ECHO MEAS - MV DEC SLOPE: 737 CM/SEC2
BH CV ECHO MEAS - MV DEC TIME: 0.15 SEC
BH CV ECHO MEAS - MV E MAX VEL: 111 CM/SEC
BH CV ECHO MEAS - MV E/A: 0.86
BH CV ECHO MEAS - MV MAX PG: 9 MMHG
BH CV ECHO MEAS - MV MEAN PG: 4 MMHG
BH CV ECHO MEAS - MV P1/2T: 58.4 MSEC
BH CV ECHO MEAS - MV V2 VTI: 34.4 CM
BH CV ECHO MEAS - MVA(P1/2T): 3.8 CM2
BH CV ECHO MEAS - MVA(VTI): 2.14 CM2
BH CV ECHO MEAS - PA ACC TIME: 0.12 SEC
BH CV ECHO MEAS - PA V2 MAX: 140.5 CM/SEC
BH CV ECHO MEAS - SV(LVOT): 73.7 ML
BH CV ECHO MEAS - SV(MOD-SP2): 55 ML
BH CV ECHO MEAS - SV(MOD-SP4): 55 ML
BH CV ECHO MEAS - TAPSE (>1.6): 2.34 CM
BH CV ECHO MEASUREMENTS AVERAGE E/E' RATIO: 14.42
BH CV LOWER VASCULAR LEFT COMMON FEMORAL AUGMENT: NORMAL
BH CV LOWER VASCULAR LEFT COMMON FEMORAL COMPRESS: NORMAL
BH CV LOWER VASCULAR LEFT COMMON FEMORAL PHASIC: NORMAL
BH CV LOWER VASCULAR LEFT COMMON FEMORAL SPONT: NORMAL
BH CV LOWER VASCULAR LEFT DISTAL FEMORAL AUGMENT: NORMAL
BH CV LOWER VASCULAR LEFT DISTAL FEMORAL COMPRESS: NORMAL
BH CV LOWER VASCULAR LEFT DISTAL FEMORAL PHASIC: NORMAL
BH CV LOWER VASCULAR LEFT DISTAL FEMORAL SPONT: NORMAL
BH CV LOWER VASCULAR LEFT GASTRONEMIUS COMPRESS: NORMAL
BH CV LOWER VASCULAR LEFT GREATER SAPH AK COMPRESS: NORMAL
BH CV LOWER VASCULAR LEFT GREATER SAPH BK COMPRESS: NORMAL
BH CV LOWER VASCULAR LEFT LESSER SAPH COMPRESS: NORMAL
BH CV LOWER VASCULAR LEFT MID FEMORAL AUGMENT: NORMAL
BH CV LOWER VASCULAR LEFT MID FEMORAL COMPRESS: NORMAL
BH CV LOWER VASCULAR LEFT MID FEMORAL PHASIC: NORMAL
BH CV LOWER VASCULAR LEFT MID FEMORAL SPONT: NORMAL
BH CV LOWER VASCULAR LEFT PERONEAL COMPRESS: NORMAL
BH CV LOWER VASCULAR LEFT POPLITEAL AUGMENT: NORMAL
BH CV LOWER VASCULAR LEFT POPLITEAL COMPRESS: NORMAL
BH CV LOWER VASCULAR LEFT POPLITEAL PHASIC: NORMAL
BH CV LOWER VASCULAR LEFT POPLITEAL SPONT: NORMAL
BH CV LOWER VASCULAR LEFT POSTERIOR TIBIAL COMPRESS: NORMAL
BH CV LOWER VASCULAR LEFT PROFUNDA FEMORAL COMPRESS: NORMAL
BH CV LOWER VASCULAR LEFT PROXIMAL FEMORAL AUGMENT: NORMAL
BH CV LOWER VASCULAR LEFT PROXIMAL FEMORAL COMPRESS: NORMAL
BH CV LOWER VASCULAR LEFT PROXIMAL FEMORAL PHASIC: NORMAL
BH CV LOWER VASCULAR LEFT PROXIMAL FEMORAL SPONT: NORMAL
BH CV LOWER VASCULAR LEFT SAPHENOFEMORAL JUNCTION AUGMENT: NORMAL
BH CV LOWER VASCULAR LEFT SAPHENOFEMORAL JUNCTION COMPRESS: NORMAL
BH CV LOWER VASCULAR LEFT SAPHENOFEMORAL JUNCTION PHASIC: NORMAL
BH CV LOWER VASCULAR LEFT SAPHENOFEMORAL JUNCTION SPONT: NORMAL
BH CV LOWER VASCULAR RIGHT COMMON FEMORAL AUGMENT: NORMAL
BH CV LOWER VASCULAR RIGHT COMMON FEMORAL COMPRESS: NORMAL
BH CV LOWER VASCULAR RIGHT COMMON FEMORAL PHASIC: NORMAL
BH CV LOWER VASCULAR RIGHT COMMON FEMORAL SPONT: NORMAL
BH CV LOWER VASCULAR RIGHT DISTAL FEMORAL AUGMENT: NORMAL
BH CV LOWER VASCULAR RIGHT DISTAL FEMORAL COMPRESS: NORMAL
BH CV LOWER VASCULAR RIGHT DISTAL FEMORAL PHASIC: NORMAL
BH CV LOWER VASCULAR RIGHT DISTAL FEMORAL SPONT: NORMAL
BH CV LOWER VASCULAR RIGHT GASTRONEMIUS COMPRESS: NORMAL
BH CV LOWER VASCULAR RIGHT GREATER SAPH AK COMPRESS: NORMAL
BH CV LOWER VASCULAR RIGHT GREATER SAPH BK COMPRESS: NORMAL
BH CV LOWER VASCULAR RIGHT LESSER SAPH COMPRESS: NORMAL
BH CV LOWER VASCULAR RIGHT MID FEMORAL AUGMENT: NORMAL
BH CV LOWER VASCULAR RIGHT MID FEMORAL COMPRESS: NORMAL
BH CV LOWER VASCULAR RIGHT MID FEMORAL PHASIC: NORMAL
BH CV LOWER VASCULAR RIGHT MID FEMORAL SPONT: NORMAL
BH CV LOWER VASCULAR RIGHT PERONEAL COMPRESS: NORMAL
BH CV LOWER VASCULAR RIGHT POPLITEAL AUGMENT: NORMAL
BH CV LOWER VASCULAR RIGHT POPLITEAL COMPRESS: NORMAL
BH CV LOWER VASCULAR RIGHT POPLITEAL PHASIC: NORMAL
BH CV LOWER VASCULAR RIGHT POPLITEAL SPONT: NORMAL
BH CV LOWER VASCULAR RIGHT POSTERIOR TIBIAL COMPRESS: NORMAL
BH CV LOWER VASCULAR RIGHT PROFUNDA FEMORAL COMPRESS: NORMAL
BH CV LOWER VASCULAR RIGHT PROXIMAL FEMORAL AUGMENT: NORMAL
BH CV LOWER VASCULAR RIGHT PROXIMAL FEMORAL COMPRESS: NORMAL
BH CV LOWER VASCULAR RIGHT PROXIMAL FEMORAL PHASIC: NORMAL
BH CV LOWER VASCULAR RIGHT PROXIMAL FEMORAL SPONT: NORMAL
BH CV LOWER VASCULAR RIGHT SAPHENOFEMORAL JUNCTION AUGMENT: NORMAL
BH CV LOWER VASCULAR RIGHT SAPHENOFEMORAL JUNCTION COMPRESS: NORMAL
BH CV LOWER VASCULAR RIGHT SAPHENOFEMORAL JUNCTION PHASIC: NORMAL
BH CV LOWER VASCULAR RIGHT SAPHENOFEMORAL JUNCTION SPONT: NORMAL
BH CV VAS BP RIGHT ARM: NORMAL MMHG
BH CV XLRA - RV BASE: 3.1 CM
BH CV XLRA - RV LENGTH: 8.4 CM
BH CV XLRA - RV MID: 2.3 CM
BH CV XLRA - TDI S': 17.3 CM/SEC
BILIRUB SERPL-MCNC: 0.2 MG/DL (ref 0–1.2)
BUN SERPL-MCNC: 19 MG/DL (ref 8–23)
BUN/CREAT SERPL: 10.4 (ref 7–25)
CALCIUM SPEC-SCNC: 8.9 MG/DL (ref 8.6–10.5)
CHLORIDE SERPL-SCNC: 107 MMOL/L (ref 98–107)
CO2 SERPL-SCNC: 21 MMOL/L (ref 22–29)
CREAT SERPL-MCNC: 1.83 MG/DL (ref 0.76–1.27)
DEPRECATED RDW RBC AUTO: 48.2 FL (ref 37–54)
EGFRCR SERPLBLD CKD-EPI 2021: 38.5 ML/MIN/1.73
EOSINOPHIL # BLD AUTO: 0.27 10*3/MM3 (ref 0–0.4)
EOSINOPHIL NFR BLD AUTO: 2.7 % (ref 0.3–6.2)
ERYTHROCYTE [DISTWIDTH] IN BLOOD BY AUTOMATED COUNT: 14.3 % (ref 12.3–15.4)
FOLATE SERPL-MCNC: 8.3 NG/ML (ref 4.78–24.2)
GLOBULIN UR ELPH-MCNC: 2.5 GM/DL
GLUCOSE BLDC GLUCOMTR-MCNC: 199 MG/DL (ref 70–130)
GLUCOSE BLDC GLUCOMTR-MCNC: 227 MG/DL (ref 70–130)
GLUCOSE BLDC GLUCOMTR-MCNC: 251 MG/DL (ref 70–130)
GLUCOSE BLDC GLUCOMTR-MCNC: 253 MG/DL (ref 70–130)
GLUCOSE SERPL-MCNC: 177 MG/DL (ref 65–99)
HCT VFR BLD AUTO: 30.4 % (ref 37.5–51)
HGB BLD-MCNC: 9.8 G/DL (ref 13–17.7)
IMM GRANULOCYTES # BLD AUTO: 0.06 10*3/MM3 (ref 0–0.05)
IMM GRANULOCYTES NFR BLD AUTO: 0.6 % (ref 0–0.5)
IVRT: 61 MS
LEFT ATRIUM VOLUME INDEX: 25.5 ML/M2
LV EF BIPLANE MOD: 62.7 %
LYMPHOCYTES # BLD AUTO: 1.68 10*3/MM3 (ref 0.7–3.1)
LYMPHOCYTES NFR BLD AUTO: 17.1 % (ref 19.6–45.3)
MCH RBC QN AUTO: 29.7 PG (ref 26.6–33)
MCHC RBC AUTO-ENTMCNC: 32.2 G/DL (ref 31.5–35.7)
MCV RBC AUTO: 92.1 FL (ref 79–97)
MONOCYTES # BLD AUTO: 0.68 10*3/MM3 (ref 0.1–0.9)
MONOCYTES NFR BLD AUTO: 6.9 % (ref 5–12)
NEUTROPHILS NFR BLD AUTO: 7.11 10*3/MM3 (ref 1.7–7)
NEUTROPHILS NFR BLD AUTO: 72.5 % (ref 42.7–76)
NRBC BLD AUTO-RTO: 0 /100 WBC (ref 0–0.2)
PLATELET # BLD AUTO: 177 10*3/MM3 (ref 140–450)
PMV BLD AUTO: 10.7 FL (ref 6–12)
POTASSIUM SERPL-SCNC: 5.2 MMOL/L (ref 3.5–5.2)
PROT SERPL-MCNC: 6.1 G/DL (ref 6–8.5)
RBC # BLD AUTO: 3.3 10*6/MM3 (ref 4.14–5.8)
SODIUM SERPL-SCNC: 140 MMOL/L (ref 136–145)
TSH SERPL DL<=0.05 MIU/L-ACNC: 1.79 UIU/ML (ref 0.27–4.2)
VIT B12 BLD-MCNC: 366 PG/ML (ref 211–946)
WBC NRBC COR # BLD AUTO: 9.82 10*3/MM3 (ref 3.4–10.8)

## 2025-03-21 PROCEDURE — 63710000001 INSULIN GLARGINE PER 5 UNITS: Performed by: STUDENT IN AN ORGANIZED HEALTH CARE EDUCATION/TRAINING PROGRAM

## 2025-03-21 PROCEDURE — 25810000003 SODIUM CHLORIDE 0.9 % SOLUTION 250 ML FLEX CONT: Performed by: EMERGENCY MEDICINE

## 2025-03-21 PROCEDURE — 63710000001 INSULIN LISPRO (HUMAN) PER 5 UNITS: Performed by: PHYSICIAN ASSISTANT

## 2025-03-21 PROCEDURE — 97162 PT EVAL MOD COMPLEX 30 MIN: CPT

## 2025-03-21 PROCEDURE — 25010000002 ONDANSETRON PER 1 MG: Performed by: PHYSICIAN ASSISTANT

## 2025-03-21 PROCEDURE — 93306 TTE W/DOPPLER COMPLETE: CPT | Performed by: INTERNAL MEDICINE

## 2025-03-21 PROCEDURE — 93970 EXTREMITY STUDY: CPT | Performed by: INTERNAL MEDICINE

## 2025-03-21 PROCEDURE — 85025 COMPLETE CBC W/AUTO DIFF WBC: CPT | Performed by: PHYSICIAN ASSISTANT

## 2025-03-21 PROCEDURE — 25010000002 HEPARIN (PORCINE) PER 1000 UNITS: Performed by: PHYSICIAN ASSISTANT

## 2025-03-21 PROCEDURE — 80053 COMPREHEN METABOLIC PANEL: CPT | Performed by: PHYSICIAN ASSISTANT

## 2025-03-21 PROCEDURE — 25810000003 LACTATED RINGERS SOLUTION: Performed by: STUDENT IN AN ORGANIZED HEALTH CARE EDUCATION/TRAINING PROGRAM

## 2025-03-21 PROCEDURE — 97535 SELF CARE MNGMENT TRAINING: CPT

## 2025-03-21 PROCEDURE — 93970 EXTREMITY STUDY: CPT

## 2025-03-21 PROCEDURE — G0378 HOSPITAL OBSERVATION PER HR: HCPCS

## 2025-03-21 PROCEDURE — 99232 SBSQ HOSP IP/OBS MODERATE 35: CPT | Performed by: STUDENT IN AN ORGANIZED HEALTH CARE EDUCATION/TRAINING PROGRAM

## 2025-03-21 PROCEDURE — 82948 REAGENT STRIP/BLOOD GLUCOSE: CPT

## 2025-03-21 PROCEDURE — 25010000002 NICARDIPINE 2.5 MG/ML SOLUTION 10 ML VIAL: Performed by: EMERGENCY MEDICINE

## 2025-03-21 PROCEDURE — 97166 OT EVAL MOD COMPLEX 45 MIN: CPT

## 2025-03-21 PROCEDURE — 84443 ASSAY THYROID STIM HORMONE: CPT | Performed by: PHYSICIAN ASSISTANT

## 2025-03-21 PROCEDURE — 93306 TTE W/DOPPLER COMPLETE: CPT

## 2025-03-21 RX ORDER — CALCIUM CARBONATE 500 MG/1
2 TABLET, CHEWABLE ORAL 3 TIMES DAILY PRN
Status: DISCONTINUED | OUTPATIENT
Start: 2025-03-21 | End: 2025-04-02 | Stop reason: HOSPADM

## 2025-03-21 RX ORDER — ASPIRIN 81 MG/1
81 TABLET ORAL DAILY
Status: DISCONTINUED | OUTPATIENT
Start: 2025-03-22 | End: 2025-04-02 | Stop reason: HOSPADM

## 2025-03-21 RX ORDER — HYDRALAZINE HYDROCHLORIDE 25 MG/1
25 TABLET, FILM COATED ORAL EVERY 8 HOURS SCHEDULED
Status: DISCONTINUED | OUTPATIENT
Start: 2025-03-21 | End: 2025-03-22

## 2025-03-21 RX ORDER — FAMOTIDINE 20 MG/1
20 TABLET, FILM COATED ORAL
Status: DISCONTINUED | OUTPATIENT
Start: 2025-03-21 | End: 2025-03-21

## 2025-03-21 RX ORDER — VALSARTAN 160 MG/1
160 TABLET ORAL
Status: DISCONTINUED | OUTPATIENT
Start: 2025-03-21 | End: 2025-04-02 | Stop reason: HOSPADM

## 2025-03-21 RX ORDER — HYDROCHLOROTHIAZIDE 25 MG/1
12.5 TABLET ORAL DAILY
Status: DISCONTINUED | OUTPATIENT
Start: 2025-03-21 | End: 2025-03-22

## 2025-03-21 RX ORDER — ISOSORBIDE DINITRATE 20 MG/1
10 TABLET ORAL
Status: DISCONTINUED | OUTPATIENT
Start: 2025-03-21 | End: 2025-03-22

## 2025-03-21 RX ORDER — FAMOTIDINE 20 MG/1
20 TABLET, FILM COATED ORAL DAILY
Status: DISCONTINUED | OUTPATIENT
Start: 2025-03-22 | End: 2025-03-22

## 2025-03-21 RX ADMIN — ONDANSETRON 4 MG: 2 INJECTION INTRAMUSCULAR; INTRAVENOUS at 01:51

## 2025-03-21 RX ADMIN — INSULIN GLARGINE 5 UNITS: 100 INJECTION, SOLUTION SUBCUTANEOUS at 20:25

## 2025-03-21 RX ADMIN — INSULIN LISPRO 3 UNITS: 100 INJECTION, SOLUTION INTRAVENOUS; SUBCUTANEOUS at 08:17

## 2025-03-21 RX ADMIN — HYDRALAZINE HYDROCHLORIDE 25 MG: 25 TABLET ORAL at 22:33

## 2025-03-21 RX ADMIN — NICOTINE 1 PATCH: 21 PATCH TRANSDERMAL at 08:17

## 2025-03-21 RX ADMIN — CALCIUM CARBONATE (ANTACID) CHEW TAB 500 MG 2 TABLET: 500 CHEW TAB at 03:57

## 2025-03-21 RX ADMIN — Medication 10 ML: at 20:26

## 2025-03-21 RX ADMIN — FAMOTIDINE 20 MG: 20 TABLET, FILM COATED ORAL at 08:33

## 2025-03-21 RX ADMIN — ISOSORBIDE DINITRATE 10 MG: 20 TABLET ORAL at 17:20

## 2025-03-21 RX ADMIN — HYDROCHLOROTHIAZIDE 12.5 MG: 25 TABLET ORAL at 11:55

## 2025-03-21 RX ADMIN — INSULIN LISPRO 4 UNITS: 100 INJECTION, SOLUTION INTRAVENOUS; SUBCUTANEOUS at 11:23

## 2025-03-21 RX ADMIN — CALCIUM CARBONATE (ANTACID) CHEW TAB 500 MG 2 TABLET: 500 CHEW TAB at 14:41

## 2025-03-21 RX ADMIN — HEPARIN SODIUM 5000 UNITS: 5000 INJECTION INTRAVENOUS; SUBCUTANEOUS at 08:17

## 2025-03-21 RX ADMIN — SODIUM CHLORIDE 2.5 MG/HR: 9 INJECTION, SOLUTION INTRAVENOUS at 03:42

## 2025-03-21 RX ADMIN — SODIUM CHLORIDE, POTASSIUM CHLORIDE, SODIUM LACTATE AND CALCIUM CHLORIDE 500 ML: 600; 310; 30; 20 INJECTION, SOLUTION INTRAVENOUS at 17:21

## 2025-03-21 RX ADMIN — HEPARIN SODIUM 5000 UNITS: 5000 INJECTION INTRAVENOUS; SUBCUTANEOUS at 20:25

## 2025-03-21 RX ADMIN — HYDRALAZINE HYDROCHLORIDE 25 MG: 25 TABLET ORAL at 14:41

## 2025-03-21 RX ADMIN — INSULIN LISPRO 2 UNITS: 100 INJECTION, SOLUTION INTRAVENOUS; SUBCUTANEOUS at 16:41

## 2025-03-21 RX ADMIN — INSULIN LISPRO 4 UNITS: 100 INJECTION, SOLUTION INTRAVENOUS; SUBCUTANEOUS at 20:26

## 2025-03-21 NOTE — PLAN OF CARE
Problem: Adult Inpatient Plan of Care  Goal: Plan of Care Review  Flowsheets  Taken 3/21/2025 1802 by Nicci Diaz RN  Plan of Care Reviewed    Goal Outcome Evaluation:BP meds began dcd gtt ivp sl bolus 500 ml x 1 pain controlled ACHS with sliding scale will continue to monitor

## 2025-03-21 NOTE — PROGRESS NOTES
[facility] Medicine Services  PROGRESS NOTE    Patient Name: Thomas Perez  : 1950  MRN: [id number]    Date of Admission: 3/20/2025  Primary Care Physician: Provider, No Known    Subjective   Subjective     CC:  HTN    HPI:  Pt feeling better this AM. Denies CP. Tired, did not sleep.       Objective   Objective     Vital Signs:   Temp:  [97.7 °F (36.5 °C)-98.2 °F (36.8 °C)] 98.2 °F (36.8 °C)  Heart Rate:  [60-87] 87  Resp:  [16-18] 18  BP: (131-250)/() 166/72     Physical Exam  Constitutional:       General: He is not in acute distress.  Cardiovascular:      Rate and Rhythm: Normal rate and regular rhythm.      Heart sounds: Normal heart sounds.   Pulmonary:      Effort: Pulmonary effort is normal.      Breath sounds: Normal breath sounds.   Abdominal:      General: There is no distension.      Palpations: Abdomen is soft.      Tenderness: There is no abdominal tenderness.   Neurological:      General: No focal deficit present.      Mental Status: He is alert.          Results Reviewed:  LAB RESULTS:      Lab 25  0905 25  1726 25  1626   WBC 9.82  --  10.12   HEMOGLOBIN 9.8*  --  11.0*   HEMATOCRIT 30.4*  --  34.2*   PLATELETS 177  --  184   NEUTROS ABS 7.11*  --  7.50*   IMMATURE GRANS (ABS) 0.06*  --  0.06*   LYMPHS ABS 1.68  --  1.55   MONOS ABS 0.68  --  0.69   EOS ABS 0.27  --  0.29   MCV 92.1  --  91.4   HSTROP T  --  147* 162*         Lab 25  0905 25  1626   SODIUM 140 139   POTASSIUM 5.2 4.8   CHLORIDE 107 104   CO2 21.0* 23.0   ANION GAP 12.0 12.0   BUN 19 25*   CREATININE 1.83* 1.67*   EGFR 38.5* 42.9*   GLUCOSE 177* 326*   CALCIUM 8.9 9.0   HEMOGLOBIN A1C  --  7.90*   TSH 1.790  --          Lab 25  0905 25  1626 25  1711   TOTAL PROTEIN 6.1 7.2  --    ALBUMIN 3.6 4.1  --    GLOBULIN 2.5 3.1  --    ALT (SGPT) 12 14  --    AST (SGOT) 12 11  --    BILIRUBIN 0.2 0.2  --    ALK PHOS 117 148*  --    LIPASE  --   --  32          Lab 03/20/25  1726 03/20/25  1626   PROBNP  --  1,321.0*   HSTROP T 147* 162*             Lab 03/20/25  1726 03/20/25  1626   IRON 47*  --    IRON SATURATION (TSAT) 19*  --    TIBC 243*  --    TRANSFERRIN 163*  --    FERRITIN 315.00  --    FOLATE  --  8.30   VITAMIN B 12  --  366         Brief Urine Lab Results  (Last result in the past 365 days)        Color   Clarity   Blood   Leuk Est   Nitrite   Protein   CREAT   Urine HCG        03/20/25 1637 Yellow   Clear   Small (1+)   Negative   Negative   >=300 mg/dL (3+)                   Microbiology Results Abnormal       None            Duplex Venous Lower Extremity - Bilateral CAR  Result Date: 3/21/2025    Normal bilateral lower extremity venous duplex scan.     XR Chest 1 View  Result Date: 3/20/2025  XR CHEST 1 VW Date of Exam: 3/20/2025 5:50 PM EDT Indication: fatigue Comparison: None available. Findings: Lungs are adequately expanded and appear clear. Cardiomediastinal contours appear within normal limits.     Impression: Impression: No acute cardiopulmonary abnormality is identified. Electronically Signed: Mdaison Duque  3/20/2025 6:15 PM EDT  Workstation ID: [id number]          Current medications:  Scheduled Meds:[START ON 3/22/2025] aspirin, 81 mg, Oral, Daily  [START ON 3/22/2025] famotidine, 20 mg, Oral, Daily  heparin (porcine), 5,000 Units, Subcutaneous, Q12H  hydrALAZINE, 25 mg, Oral, Q8H  hydroCHLOROthiazide Oral, 12.5 mg, Oral, Daily  insulin glargine, 5 Units, Subcutaneous, Nightly  insulin lispro, 2-7 Units, Subcutaneous, 4x Daily AC & at Bedtime  isosorbide dinitrate, 10 mg, Oral, BID - Nitrates  lactated ringers, 500 mL, Intravenous, Once  nicotine, 1 patch, Transdermal, Q24H  sodium chloride, 10 mL, Intravenous, Q12H  [Held by provider] valsartan, 160 mg, Oral, Q24H      Continuous Infusions:niCARdipine, 5-15 mg/hr, Last Rate: 2.5 mg/hr (03/21/25 1111)      PRN Meds:.  acetaminophen **OR** acetaminophen **OR** acetaminophen    senna-docusate  sodium **AND** polyethylene glycol **AND** bisacodyl **AND** bisacodyl    calcium carbonate    dextrose    dextrose    glucagon (human recombinant)    melatonin    ondansetron    sodium chloride    [COMPLETED] Insert Peripheral IV **AND** sodium chloride    sodium chloride    sodium chloride    Assessment & Plan   Assessment & Plan     Active Hospital Problems    Diagnosis  POA    **Hypertensive urgency [I16.0]  Yes    DM2 (diabetes mellitus, type 2) [E11.9]  Yes    Tobacco abuse [Z72.0]  Yes    CAD (coronary artery disease) [I25.10]  Yes    Anemia [D64.9]  Yes    MAGUI (acute kidney injury) [N17.9]  Yes    PAD (peripheral artery disease) [I73.9]  Yes    Medical non-compliance [Z91.199]  Not Applicable    COPD (chronic obstructive pulmonary disease) [J44.9]  Yes    Bilateral lower extremity edema [R60.0]  Yes      Resolved Hospital Problems   No resolved problems to display.        Brief Hospital Course to date:  Thomas Perez is a 74 y.o. male with hypertension, hyperlipidemia, CAD status post RCA stent in 2010, PAD status post embolectomy and fasciotomy, COPD, poorly controlled type 2 diabetes, CKD, medical noncompliance and chronic pain who presented due to elevated blood pressure found incidentally by home health nurse.    Hypertensive emergency  -Patient off of the Cardene drip this afternoon, have restarted his HCTZ and hydralazine  -denies CP  -Patient unsure what medications he takes.  Spoke to his daughter who knows that he was on valsartan but is unsure what other medications he is on.  -Based on fill history it appears patient was on valsartan-HCTZ, Norvasc and hydralazine  -Restart HCTZ, Norvasc and hydralazine.  Holding on valsartan due to worsening of his MAGUI  -titrate meds as appropriate     MAGUI on CKD  -baseline Cr around 0.9-1.1  -worsening MAGUI today to 1.8  -will trial small bolus of IVF   -holding nephrotoxic medications     BLE edema   -LE Dopplers negative   -ECHO pending     CAD s/p stent  Chronic  chest pain  HLD  PAD s/p embolectomy and fasciotomy   -daughter reports he is suppose to be on ASA but has been refusing all meds for some time, will restart   -no change in his baseline chest pain, currently pain free  -continue statin   -continue isordil     Insulin dependent T2DM  -lantus 20, lispro 7 TID at home  -currently on SSI, will add lantus 5 and adjust from there, pt not eating well      COPD  Ongoing tobacco use  -not in acute exacerbation   -NRT    Updated pts daughter over the phone today     Expected Discharge Location and Transportation: home with assist, lived with daughter   Expected Discharge   Expected Discharge Date: 3/22/2025; Expected Discharge Time:      VTE Prophylaxis:  Pharmacologic VTE prophylaxis orders are present.         AM-PAC 6 Clicks Score (PT): 22 (03/21/25 1204)    CODE STATUS:   Code Status and Medical Interventions: CPR (Attempt to Resuscitate); Full Support   Ordered at: 03/20/25 9634     Code Status (Patient has no pulse and is not breathing):    CPR (Attempt to Resuscitate)     Medical Interventions (Patient has pulse or is breathing):    Full Support     Level Of Support Discussed With:    Patient       Lilia Leroy MD  03/21/25

## 2025-03-21 NOTE — THERAPY DISCHARGE NOTE
Patient Name: Thomas Perez  : 1950    MRN: [id number]                              Today's Date: 3/21/2025       Admit Date: 3/20/2025    Visit Dx:     ICD-10-CM ICD-9-CM   1. Hypertensive emergency  I16.1 401.9   2. Elevated brain natriuretic peptide (BNP) level  R79.89 790.99   3. Acute renal insufficiency  N28.9 593.9     Patient Active Problem List   Diagnosis    Hypertensive urgency    DM2 (diabetes mellitus, type 2)    Tobacco abuse    CAD (coronary artery disease)    Anemia    MAGUI (acute kidney injury)    PAD (peripheral artery disease)    Medical non-compliance    COPD (chronic obstructive pulmonary disease)    Bilateral lower extremity edema     Past Medical History:   Diagnosis Date    Back injury     Diabetes mellitus     History of angina     Hypertension      Past Surgical History:   Procedure Laterality Date    CORONARY STENT PLACEMENT      EMBOLECTOMY      FASCIOTOMY        General Information       Row Name 25 1155          Physical Therapy Time and Intention    Document Type evaluation  -SC     Mode of Treatment physical therapy  -SC       Row Name 25 1155          General Information    Patient Profile Reviewed yes  -SC     Prior Level of Function independent:;gait;transfer;all household mobility;bed mobility  uses a walker at baseline  -SC     Existing Precautions/Restrictions other (see comments)  at baseline he has chronic foot drop B LE  -SC     Barriers to Rehab previous functional deficit  -SC       Row Name 25 1155          Living Environment    Current Living Arrangements home  -SC     People in Home child(petra), adult  -SC       Row Name 25 1155          Home Main Entrance    Number of Stairs, Main Entrance none  -SC       Row Name 25 1155          Stairs Within Home, Primary    Number of Stairs, Within Home, Primary none  -SC       Row Name 25 1155          Cognition    Orientation Status (Cognition) oriented x 4  -SC       Row Name 25 1159           Safety Issues/Impairments Affecting Functional Mobility    Impairments Affecting Function (Mobility) strength  -SC     Comment, Safety Issues/Impairments (Mobility) alert , following commands, chronic B foot drop, toe amputations B LE  -SC               User Key  (r) = Recorded By, (t) = Taken By, (c) = Cosigned By      Initials Name Provider Type    SC Juan Maldonado, PT Physical Therapist                   Mobility       Row Name 03/21/25 1157          Bed Mobility    Bed Mobility bed mobility (all) activities  -SC     All Activities, [place] (Bed Mobility) independent  -SC       Row Name 03/21/25 1157          Sit-Stand Transfer    Sit-Stand [place] (Transfers) modified independence  -SC     Assistive Device (Sit-Stand Transfers) walker, front-wheeled  -SC     Comment, (Sit-Stand Transfer) good technique observed  -[facility] Name 03/21/25 1157          Gait/Stairs (Locomotion)    [place] Level (Gait) standby assist;1 person assist  -SC     Assistive Device (Gait) walker, front-wheeled  -SC     Distance in Feet (Gait) 200  -SC     Deviations/Abnormal Patterns (Gait) gait speed decreased  -SC     Bilateral Gait Deviations foot drop/toe drag;forward flexed posture  -SC     Comment, (Gait/Stairs) Patient ambulated in hallway  with no heel strike due to Bilateral foot drop from weak dorsiflexion.  He demonstratd good control of walker without LOB.  States this is his baseline function  -SC               User Key  (r) = Recorded By, (t) = Taken By, (c) = Cosigned By      Initials Name Provider Type    SC Juan Maldonado, PT Physical Therapist                   Obj/Interventions       Row Name 03/21/25 1159          Range of Motion Comprehensive    General Range of Motion no range of motion deficits identified  -[facility] Name 03/21/25 1159          Strength Comprehensive (MMT)    General Manual Muscle Testing (MMT) Assessment lower extremity strength deficits identified  -SC     Comment,  General Manual Muscle Testing (MMT) Assessment Bilateral tib ant 0/5, EHL 0/5  B quads 4/5  -[facility] Name 03/21/25 1159          Balance    Balance Assessment standing dynamic balance  -SC     Dynamic Standing Balance 1-person assist;contact guard  -SC     Position/Device Used, Standing Balance walker, rolling  -SC     Comment, Balance nio LOB  -SC       Row Name 03/21/25 1159          Sensory Assessment (Somatosensory)    Sensory Assessment (Somatosensory) other (see comments)  B feet with dorsal numbness  -SC               User Key  (r) = Recorded By, (t) = Taken By, (c) = Cosigned By      Initials Name Provider Type    SC Juan Maldonado, PT Physical Therapist                   Goals/Plan    No documentation.                  Clinical Impression       [facility] Name 03/21/25 1201          Pain    Pretreatment Pain Rating 0/10 - no pain  -SC     Posttreatment Pain Rating 0/10 - no pain  -[facility] Name 03/21/25 1201          Plan of Care Review    Plan of Care Reviewed With patient  -SC     Outcome Evaluation Patient was able to ambulate in hallway with walker. He has chronic foot drop and is not a candidate for foot orthortics due to numbness and swelling in feet at baseline. His BP was stable at 142/63 after mobility . NO further skilled PT recommended at this point. He will be discharge home with his daughter's help when medicaly stable.  -[facility] Name 03/21/25 1201          Therapy Assessment/Plan (PT)    Patient/Family Therapy Goals Statement (PT) get better  -SC     Criteria for Skilled Interventions Met (PT) no problems identified which require skilled intervention  -SC     Therapy Frequency (PT) evaluation only  -SC       Row Name 03/21/25 1201          Vital Signs    Pre Systolic BP Rehab 130  -SC     Pre Treatment Diastolic BP 98  -SC     Post Systolic BP Rehab 142  -SC     Post Treatment Diastolic BP 63  -[facility] Name 03/21/25 1201          Positioning and Restraints    Pre-Treatment  Position in bed  -SC     Post Treatment Position bed  -SC     In Bed exit alarm on;sitting EOB  -SC               User Key  (r) = Recorded By, (t) = Taken By, (c) = Cosigned By      Initials Name Provider Type    Juan Lindo PT Physical Therapist                   Outcome Measures       Row Name 03/21/25 1204 03/21/25 0853       How much help from another person do you currently need...    Turning from your back to your side while in flat bed without using bedrails? 4  -SC 4  -MM    Moving from lying on back to sitting on the side of a flat bed without bedrails? 4  -SC 4  -MM    Moving to and from a bed to a chair (including a wheelchair)? 4  -SC 3  -MM    Standing up from a chair using your arms (e.g., wheelchair, bedside chair)? 4  -SC 3  -MM    Climbing 3-5 steps with a railing? 3  -SC 2  -MM    To walk in hospital room? 3  -SC 3  -MM    AM-PAC 6 Clicks Score (PT) 22  -SC 19  -MM    Highest Level of Mobility Goal 7 --> Walk 25 feet or more  -SC 6 --> Walk 10 steps or more  -MM      Row Name 03/21/25 1204          Functional Assessment    Outcome Measure Options AM-PAC 6 Clicks Basic Mobility (PT)  -SC               User Key  (r) = Recorded By, (t) = Taken By, (c) = Cosigned By      Initials Name Provider Type    Juan Lindo PT Physical Therapist    Nicci Jackson RN Registered Nurse                  Physical Therapy Education       Title: PT OT SLP Therapies (Done)       Topic: Physical Therapy (Done)       Point: Mobility training (Done)       Learning Progress Summary            Patient GARCIA Pressley VU by SC at 3/21/2025 1205    Comment: reviewed benefits of activity                      Point: Home exercise program (Done)       Learning Progress Summary            Patient Eager, E, VU by SC at 3/21/2025 1205    Comment: reviewed benefits of activity                      Point: Body mechanics (Done)       Learning Progress Summary            Patient Eager, E, VU by SC at 3/21/2025 1205     Comment: reviewed benefits of activity                      Point: Precautions (Done)       Learning Progress Summary            Patient GARCIA Pressley VU by SC at 3/21/2025 1205    Comment: reviewed benefits of activity                                      User Key       Initials Effective Dates Name Provider Type Discipline    SC 02/03/23 -  Juan Maldonado PT Physical Therapist PT                  PT Recommendation and Plan     Outcome Evaluation: Patient was able to ambulate in hallway with walker. He has chronic foot drop and is not a candidate for foot orthortics due to numbness and swelling in feet at baseline. His BP was stable at 142/63 after mobility . NO further skilled PT recommended at this point. He will be discharge home with his daughter's help when medicaly stable.     Time Calculation:   PT Evaluation Complexity  History, PT Evaluation Complexity: 3 or more personal factors and/or comorbidities  Examination of Body Systems (PT Eval Complexity): total of 4 or more elements  Clinical Presentation (PT Evaluation Complexity): evolving  Clinical Decision Making (PT Evaluation Complexity): moderate complexity  Overall Complexity (PT Evaluation Complexity): moderate complexity     PT Charges       Row Name 03/21/25 1123             Time Calculation    Start Time 1123  -SC      PT Received On 03/21/25  -SC         Untimed Charges    PT Eval/Re-eval Minutes 49  -SC         Total Minutes    Untimed Charges Total Minutes 49  -SC       Total Minutes 49  -SC                User Key  (r) = Recorded By, (t) = Taken By, (c) = Cosigned By      Initials Name Provider Type    SC Juan Maldonado PT Physical Therapist                  Therapy Charges for Today       Code Description Service Date Service Provider Modifiers Qty    46447519219 HC PT EVAL MOD COMPLEXITY 4 3/21/2025 Juan Maldonado, PT GP 1            PT G-Codes  Outcome Measure Options: AM-PAC 6 Clicks Basic Mobility (PT)  AM-PAC 6 Clicks Score (PT): 22    PT  Discharge Summary  Anticipated Discharge Disposition (PT): home with assist    Juan Maldonado, PT  3/21/2025

## 2025-03-21 NOTE — THERAPY DISCHARGE NOTE
Acute Care - Occupational Therapy Discharge  [facility]    Patient Name: Thomas Perez  : 1950    MRN: [id number]                              Today's Date: 3/21/2025       Admit Date: 3/20/2025    Visit Dx:     ICD-10-CM ICD-9-CM   1. Hypertensive emergency  I16.1 401.9   2. Elevated brain natriuretic peptide (BNP) level  R79.89 790.99   3. Acute renal insufficiency  N28.9 593.9     Patient Active Problem List   Diagnosis    Hypertensive urgency    DM2 (diabetes mellitus, type 2)    Tobacco abuse    CAD (coronary artery disease)    Anemia    MAGUI (acute kidney injury)    PAD (peripheral artery disease)    Medical non-compliance    COPD (chronic obstructive pulmonary disease)    Bilateral lower extremity edema     Past Medical History:   Diagnosis Date    Back injury     Diabetes mellitus     History of angina     Hypertension      Past Surgical History:   Procedure Laterality Date    CORONARY STENT PLACEMENT      EMBOLECTOMY      FASCIOTOMY        General Information       Row Name 25 1451          OT Time and Intention    Subjective Information no complaints  -TB     Document Type discharge evaluation/summary;therapy note (daily note)  -TB     Mode of Treatment occupational therapy;individual therapy  -TB     Patient Effort adequate  -TB     Symptoms Noted During/After Treatment none  -TB       Row Name 25 1456          General Information    Patient Profile Reviewed yes  -TB     Prior Level of Function independent:;all household mobility;transfer;bed mobility;ADL's  RW at baseline  -TB     Existing Precautions/Restrictions other (see comments)  Chronic B foot drop. Has AFO for LLE that he hasn't been able to use due to edema. Remote toe amputations.  -TB     Barriers to Rehab medically complex  -TB       Row Name 25 1456          Occupational Profile    Reason for Services/Referral (Occupational Profile) Occupational decline  -TB     Environmental Supports and Barriers (Occupational  Profile) Pt lives with his daughter in a single story home with no steps to enter. Tub/shower with TTB. BSC over his standard commode. Uses RW AAT at baseline. Denies falls. Independent with self-care. Does not drive - daughter transports to appointments.  -TB       Row Name 03/21/25 1451          Living Environment    Current Living Arrangements home  -TB     People in Home child(petra), adult  Daughter  -TB       Row Name 03/21/25 1451          Home Main Entrance    Number of Stairs, Main Entrance none  -TB       Row Name 03/21/25 1451          Stairs Within Home, Primary    Number of Stairs, Within Home, Primary none  -TB       Row Name 03/21/25 1451          Cognition    Orientation Status (Cognition) oriented x 4  -TB       Row Name 03/21/25 1451          Safety Issues/Impairments Affecting Functional Mobility    Impairments Affecting Function (Mobility) strength;sensation/sensory awareness;range of motion (ROM)  -TB     Comment, Safety Issues/Impairments (Mobility) B foot drop and h/o toe amputations  -TB               User Key  (r) = Recorded By, (t) = Taken By, (c) = Cosigned By      Initials Name Provider Type    TB Rosalia Smalls OT Occupational Therapist                   Mobility/ADL's       Row Name 03/21/25 1457          Bed Mobility    Bed Mobility bed mobility (all) activities  -TB     All Activities, [place] (Bed Mobility) independent  -TB     Comment, (Bed Mobility) Pt transitions in/out of bed from flat surface without assist.  -TB       Row Name 03/21/25 1457          Transfers    Transfers sit-stand transfer;stand-sit transfer  -TB       Row Name 03/21/25 1457          Sit-Stand Transfer    Sit-Stand [place] (Transfers) standby assist  -TB     Assistive Device (Sit-Stand Transfers) walker, front-wheeled  -TB       Row Name 03/21/25 1457          Stand-Sit Transfer    Stand-Sit [place] (Transfers) standby assist  -TB     Assistive Device (Stand-Sit Transfers) walker,  front-wheeled  -TB       Row Name 03/21/25 1457          Functional Mobility    Functional Mobility- Ind. Level standby assist  -TB     Functional Mobility- Device walker, front-wheeled  -TB     Functional Mobility- Comment Pt is up in roombathroom. No LOB.  -TB     Patient was able to Ambulate yes  -TB       Row Name 03/21/25 1457          Activities of Daily Living    BADL Assessment/Intervention bathing;lower body dressing  -TB       Row Name 03/21/25 1457          Bathing Assessment/Intervention    [place] Level (Bathing) set up;distal lower extremities/feet  -TB     Assistive Devices (Bathing) long-handled sponge  -TB     Position (Bathing) unsupported sitting  -TB       Row Name 03/21/25 1457          Lower Body Dressing Assessment/Training    [place] Level (Lower Body Dressing) set up;don;shoes/slippers  -TB     Assistive Devices (Lower Body Dressing) long-handled shoe horn  -TB     Position (Lower Body Dressing) edge of bed sitting  -TB               User Key  (r) = Recorded By, (t) = Taken By, (c) = Cosigned By      Initials Name Provider Type    TB Rosalia Smalls, OT Occupational Therapist                   Obj/Interventions       Row Name 03/21/25 1506          Sensory Assessment (Somatosensory)    Sensory Assessment (Somatosensory) UE sensation intact  -TB       Row Name 03/21/25 1506          Range of Motion Comprehensive    General Range of Motion bilateral upper extremity ROM WFL  -TB     Comment, General Range of Motion BUE AROM intact for self-care performance  -TB       Row Name 03/21/25 1506          Strength Comprehensive (MMT)    Comment, General Manual Muscle Testing (MMT) Assessment Generalized weakness/deconditioned. BUE 4/5. h/o chronic B food drop.  -TB       Row Name 03/21/25 1506          Balance    Balance Assessment sitting static balance;sitting dynamic balance  -TB     Static Sitting Balance independent  -TB     Dynamic Sitting Balance independent  -TB     Position,  Sitting Balance unsupported;sitting edge of bed  -TB     Position/Device Used, Standing Balance --  -TB     Balance Interventions occupation based/functional task;UE activity with balance activity  -TB     Comment, Balance No LOB  -TB               User Key  (r) = Recorded By, (t) = Taken By, (c) = Cosigned By      Initials Name Provider Type    TB Rosalia Smalls, OT Occupational Therapist                   Goals/Plan    No documentation.                  Clinical Impression       Row Name 03/21/25 1509          Pain Assessment    Pretreatment Pain Rating 0/10 - no pain  -TB     Posttreatment Pain Rating 0/10 - no pain  -TB       Row Name 03/21/25 1509          Plan of Care Review    Plan of Care Reviewed With patient  -TB     Progress --  IE  -TB     Outcome Evaluation OT IE completed. Pt is A/Ox4 and participates in assessement with encouragement. Pt is up with RW support and SBA. No LOB. BUE AROM, strength, and sensation are WFL for self-care performance. Appropriate AE issued and teaching completed for use. Pt is at his baseline for ADLs and his home bathroom is modified to meet his needs. OT will d/c at this time. Recommend home with daughter to assist as needed when pt is medically ready.  -TB       Row Name 03/21/25 1509          Therapy Assessment/Plan (OT)    Therapy Frequency (OT) evaluation only  -TB       Row Name 03/21/25 1509          Therapy Plan Review/Discharge Plan (OT)    Anticipated Discharge Disposition (OT) home with assist  -TB       Row Name 03/21/25 1505          Vital Signs    Pre Systolic BP Rehab --  RN cleared OT  -TB     Intra Systolic BP Rehab 166  -TB     Intra Treatment Diastolic BP 72  -TB     O2 Delivery Pre Treatment room air  -TB       Row Name 03/21/25 1509          Positioning and Restraints    Pre-Treatment Position in bed  -TB     Post Treatment Position bed  -TB     In Bed notified nsg;sitting EOB  -TB               User Key  (r) = Recorded By, (t) = Taken By, (c) =  Cosigned By      Initials Name Provider Type     Rosalia Smalls OT Occupational Therapist                   Outcome Measures       Row Name 03/21/25 1204 03/21/25 0853       How much help from another person do you currently need...    Turning from your back to your side while in flat bed without using bedrails? 4  -SC 4  -MM    Moving from lying on back to sitting on the side of a flat bed without bedrails? 4  -SC 4  -MM    Moving to and from a bed to a chair (including a wheelchair)? 4  -SC 3  -MM    Standing up from a chair using your arms (e.g., wheelchair, bedside chair)? 4  -SC 3  -MM    Climbing 3-5 steps with a railing? 3  -SC 2  -MM    To walk in hospital room? 3  -SC 3  -MM    AM-PAC 6 Clicks Score (PT) 22  -SC 19  -MM    Highest Level of Mobility Goal 7 --> Walk 25 feet or more  -SC 6 --> Walk 10 steps or more  -MM      Row Name 03/21/25 1204          Functional Assessment    Outcome Measure Options AM-PAC 6 Clicks Basic Mobility (PT)  -SC               User Key  (r) = Recorded By, (t) = Taken By, (c) = Cosigned By      Initials Name Provider Type    SC Juan Maldonado, PT Physical Therapist    Nicci Jackson RN Registered Nurse                  Occupational Therapy Education       Title: PT OT SLP Therapies (In Progress)       Topic: Occupational Therapy (In Progress)       Point: ADL training (Done)       Learning Progress Summary            Patient Acceptance, E, VU by  at 3/21/2025 1512                                      User Key       Initials Effective Dates Name Provider Type Discipline     07/11/23 -  Rosalia Smalls OT Occupational Therapist OT                  OT Recommendation and Plan  Therapy Frequency (OT): evaluation only  Plan of Care Review  Plan of Care Reviewed With: patient  Progress:  (IE)  Outcome Evaluation: OT IE completed. Pt is A/Ox4 and participates in assessement with encouragement. Pt is up with RW support and SBA. No LOB. BUE AROM, strength, and  sensation are WFL for self-care performance. Appropriate AE issued and teaching completed for use. Pt is at his baseline for ADLs and his home bathroom is modified to meet his needs. OT will d/c at this time. Recommend home with daughter to assist as needed when pt is medically ready.  Plan of Care Reviewed With: patient  Outcome Evaluation: OT IE completed. Pt is A/Ox4 and participates in assessement with encouragement. Pt is up with RW support and SBA. No LOB. BUE AROM, strength, and sensation are WFL for self-care performance. Appropriate AE issued and teaching completed for use. Pt is at his baseline for ADLs and his home bathroom is modified to meet his needs. OT will d/c at this time. Recommend home with daughter to assist as needed when pt is medically ready.     Time Calculation:   Evaluation Complexity (OT)  Review Occupational Profile/Medical/Therapy History Complexity: expanded/moderate complexity  Assessment, Occupational Performance/Identification of Deficit Complexity: 3-5 performance deficits  Clinical Decision Making Complexity (OT): detailed assessment/moderate complexity  Overall Complexity of Evaluation (OT): moderate complexity     Time Calculation- OT       Row Name 03/21/25 1415             Time Calculation- OT    OT Start Time 1415  -TB      OT Received On 03/21/25  -TB         Timed Charges    94848 - OT Self Care/Mgmt Minutes 13  -TB         Untimed Charges    OT Eval/Re-eval Minutes 40  -TB         Total Minutes    Timed Charges Total Minutes 13  -TB      Untimed Charges Total Minutes 40  -TB       Total Minutes 53  -TB                User Key  (r) = Recorded By, (t) = Taken By, (c) = Cosigned By      Initials Name Provider Type    TB Rosalia Smalls OT Occupational Therapist                  Therapy Charges for Today       Code Description Service Date Service Provider Modifiers Qty    52975278880  OT EVAL MOD COMPLEXITY 3 3/21/2025 Rosalia Smalsl OT GO 1    [id number]  HC OT SELF CARE/MGMT/TRAIN EA 15 MIN 3/21/2025 Rosalia Smalls OT GO 1               OT Discharge Summary  Anticipated Discharge Disposition (OT): home with assist    Rosalia Smalls OT  3/21/2025

## 2025-03-21 NOTE — PLAN OF CARE
Problem: Adult Inpatient Plan of Care  Goal: Plan of Care Review  Recent Flowsheet Documentation  Taken 3/21/2025 3119 by Rosalia Smalls OT  Progress: (IE) --  Outcome Evaluation: OT IE completed. Pt is A/Ox4 and participates in assessement with encouragement. Pt is up with RW support and SBA. No LOB. BUE AROM, strength, and sensation are WFL for self-care performance. Appropriate AE issued and teaching completed for use. Pt is at his baseline for ADLs and his home bathroom is modified to meet his needs. OT will d/c at this time. Recommend home with daughter to assist as needed when pt is medically ready.

## 2025-03-21 NOTE — PLAN OF CARE
Problem: Adult Inpatient Plan of Care  Goal: Plan of Care Review  Outcome: Progressing  Goal: Patient-Specific Goal (Individualized)  Outcome: Progressing  Goal: Absence of Hospital-Acquired Illness or Injury  Outcome: Progressing  Intervention: Identify and Manage Fall Risk  Recent Flowsheet Documentation  Taken 3/21/2025 0407 by Carlos Eduardo Nguyen RN  Safety Promotion/Fall Prevention:   activity supervised   assistive device/personal items within reach   clutter free environment maintained   fall prevention program maintained   gait belt   mobility aid in reach   nonskid shoes/slippers when out of bed   room organization consistent   safety round/check completed  Taken 3/21/2025 0214 by Carlos Eduardo Nguyen RN  Safety Promotion/Fall Prevention:   activity supervised   assistive device/personal items within reach   clutter free environment maintained   fall prevention program maintained   gait belt   mobility aid in reach   nonskid shoes/slippers when out of bed   room organization consistent   safety round/check completed  Taken 3/21/2025 0000 by Carlos Eduardo Nguyen RN  Safety Promotion/Fall Prevention:   activity supervised   assistive device/personal items within reach   clutter free environment maintained   fall prevention program maintained   gait belt   mobility aid in reach   nonskid shoes/slippers when out of bed   room organization consistent   safety round/check completed  Taken 3/20/2025 2200 by Carlos Eduardo Nguyen RN  Safety Promotion/Fall Prevention:   activity supervised   assistive device/personal items within reach   clutter free environment maintained   fall prevention program maintained   gait belt   mobility aid in reach   nonskid shoes/slippers when out of bed   room organization consistent   safety round/check completed  Intervention: Prevent Skin Injury  Recent Flowsheet Documentation  Taken 3/21/2025 0407 by Carlos Eduardo Nguyen RN  Body Position: position changed independently  Skin Protection: incontinence  pads utilized  Taken 3/21/2025 0214 by Carlos Eduardo Nguyen RN  Body Position: position changed independently  Skin Protection: incontinence pads utilized  Taken 3/21/2025 0000 by Carlos Eduardo Nguyen RN  Body Position: position changed independently  Skin Protection: incontinence pads utilized  Taken 3/20/2025 2200 by Carlos Eduardo Nguyen RN  Body Position: sitting up in bed  Skin Protection: incontinence pads utilized  Intervention: Prevent and Manage VTE (Venous Thromboembolism) Risk  Recent Flowsheet Documentation  Taken 3/21/2025 0214 by Carlos Eduardo Nguyen RN  VTE Prevention/Management: (sq heparin) other (see comments)  Taken 3/21/2025 0000 by Carlos Eduardo Nguyen RN  VTE Prevention/Management: (sq heparin) other (see comments)  Intervention: Prevent Infection  Recent Flowsheet Documentation  Taken 3/21/2025 0407 by Carlos Eduardo Nguyen RN  Infection Prevention:   rest/sleep promoted   single patient room provided  Taken 3/21/2025 0214 by Carlos Eduardo Nguyen RN  Infection Prevention:   rest/sleep promoted   single patient room provided  Taken 3/21/2025 0000 by Carlos Eduardo Nguyen RN  Infection Prevention:   environmental surveillance performed   rest/sleep promoted   single patient room provided  Taken 3/20/2025 2200 by Carlos Eduardo Nguyen RN  Infection Prevention:   environmental surveillance performed   rest/sleep promoted   single patient room provided  Goal: Optimal Comfort and Wellbeing  Outcome: Progressing  Intervention: Monitor Pain and Promote Comfort  Recent Flowsheet Documentation  Taken 3/21/2025 0407 by Carlos Eduardo Nguyen RN  Pain Management Interventions: quiet environment facilitated  Taken 3/21/2025 0214 by Carlos Eduardo Nguyen RN  Pain Management Interventions: quiet environment facilitated  Taken 3/20/2025 2200 by Carlos Eduardo Nguyen RN  Pain Management Interventions:   no interventions per patient request   quiet environment facilitated  Intervention: Provide Person-Centered Care  Recent Flowsheet Documentation  Taken 3/21/2025 0214 by  Carlos Eduardo Nguyen RN  Trust Relationship/Rapport: care explained  Taken 3/20/2025 2200 by Carlos Eduardo Nguyen RN  Trust Relationship/Rapport:   care explained   choices provided   questions answered   questions encouraged  Goal: Readiness for Transition of Care  Outcome: Progressing  Intervention: Mutually Develop Transition Plan  Recent Flowsheet Documentation  Taken 3/20/2025 2213 by Carlos Eduardo Nguyen RN  Equipment Currently Used at Home:   wheelchair   walker, standard   shower chair  Taken 3/20/2025 2210 by Carlos Eduardo Nguyen RN  Equipment Currently Used at Home:   shower chair   walker, standard   wheelchair     Problem: Comorbidity Management  Goal: Blood Glucose Level Within Target Range  Outcome: Progressing  Intervention: Monitor and Manage Glycemia  Recent Flowsheet Documentation  Taken 3/20/2025 2200 by Carlos Eduardo Nguyen RN  Medication Review/Management: medications reviewed  Goal: Blood Pressure in Desired Range  Outcome: Progressing  Intervention: Maintain Blood Pressure Management  Recent Flowsheet Documentation  Taken 3/20/2025 2200 by Carlos Eduardo Nguyen RN  Medication Review/Management: medications reviewed   Goal Outcome Evaluation:

## 2025-03-21 NOTE — PLAN OF CARE
Problem: Adult Inpatient Plan of Care  Goal: Plan of Care Review  Recent Flowsheet Documentation  Taken 3/21/2025 1201 by Juan Maldonado, PT  Outcome Evaluation: Patient was able to ambulate in hallway with walker. He has chronic foot drop and is not a candidate for foot orthortics due to numbness and swelling in feet at baseline. His BP was stable at 142/63 after mobility . NO further skilled PT recommended at this point. He will be discharge home with his daughter's help when medicaly stable.  Plan of Care Reviewed With: patient   Goal Outcome Evaluation:  Plan of Care Reviewed With: patient           Outcome Evaluation: Patient was able to ambulate in hallway with walker. He has chronic foot drop and is not a candidate for foot orthortics due to numbness and swelling in feet at baseline. His BP was stable at 142/63 after mobility . NO further skilled PT recommended at this point. He will be discharge home with his daughter's help when medicaly stable.    Anticipated Discharge Disposition (PT): home with assist

## 2025-03-21 NOTE — PLAN OF CARE
Goal Outcome Evaluation:  Plan of Care Reviewed With: patient           Outcome Evaluation: Patient was able to ambulate in hallway with walker. He has chronic foot drop and is not a candidate for foot orthortics due to numbness and swelling in feet at baseline. His BP was stable at 142/63 after mobility . NO further skilled PT recommended at this point    Anticipated Discharge Disposition (PT): home with assist

## 2025-03-21 NOTE — CASE MANAGEMENT/SOCIAL WORK
"Discharge Planning Assessment  [facility]     Patient Name: Thomas Perez  MRN: [id number]  Today's Date: 3/21/2025    Admit Date: 3/20/2025    Plan: Home with daughter's assistance, New PCP arranged          Discharge Plan       Row Name 03/21/25 1214       Plan    Plan Home with daughter's assistance, New PCP arranged    Patient/Family in Agreement with Plan yes    Plan Comments Met with Mr. Perez, at the bedside, for discharge planning.    Mr. Perez lives with his daughter, Colletta, in [facility].   He states that prior to admission, he ambulated with a rolling walker.  He has a rolling walker, wheelchair and shower chair at home, and denies any DME needs.  He has been evaluated by PT and per notes, \"Patient was able to ambulate in hallway with walker. He has chronic foot drop and is not a candidate for foot orthortics due to numbness and swelling in feet at baseline. His BP was stable at 142/63 after mobility . NO further skilled PT recommended at this point.\"     Mr. Soto states that he is current with [facility] for SN andPT.  Since the pateint is OBS, he will not need new home health orders written at discharge.    Arranged for new PCP, Irma Jaime, through the [facility] and appointment is noted in the AVS.  Insurance is Medicare and [employer].  No ACP documents.    DC plan is to return home with his daughter's assistance.  Mr. Perez will be transported home by his daughter when discharged.    CM will continue to follow.    Final Discharge Disposition Code 06 - home with home health care                  Continued Care and Services - Admitted Since 3/20/2025       Home Medical Care       Service Provider Request Status Services Address Phone Fax Patient Preferred    [facility]  [facility] Nursing [address] [place] [address], [place] [address] [phone] --                      Alessandra Martin RN    "

## 2025-03-22 ENCOUNTER — APPOINTMENT (OUTPATIENT)
Dept: GENERAL RADIOLOGY | Facility: HOSPITAL | Age: 75
End: 2025-03-22
Payer: MEDICARE

## 2025-03-22 PROBLEM — I25.119 CORONARY ARTERY DISEASE INVOLVING NATIVE CORONARY ARTERY OF NATIVE HEART WITH ANGINA PECTORIS: Status: ACTIVE | Noted: 2025-03-20

## 2025-03-22 PROBLEM — R79.89 ELEVATED TROPONIN: Status: ACTIVE | Noted: 2025-03-22

## 2025-03-22 PROBLEM — I21.4 NSTEMI (NON-ST ELEVATED MYOCARDIAL INFARCTION): Status: ACTIVE | Noted: 2025-03-22

## 2025-03-22 PROBLEM — E11.65 TYPE 2 DIABETES MELLITUS WITH HYPERGLYCEMIA: Status: ACTIVE | Noted: 2025-03-20

## 2025-03-22 PROBLEM — I50.30 HEART FAILURE WITH PRESERVED LEFT VENTRICULAR FUNCTION (HFPEF): Status: RESOLVED | Noted: 2025-03-22 | Resolved: 2025-03-22

## 2025-03-22 PROBLEM — I16.1 HYPERTENSIVE EMERGENCY: Status: ACTIVE | Noted: 2025-03-22

## 2025-03-22 PROBLEM — N18.30 CKD (CHRONIC KIDNEY DISEASE) STAGE 3, GFR 30-59 ML/MIN: Status: ACTIVE | Noted: 2025-03-20

## 2025-03-22 PROBLEM — Z79.4 TYPE 2 DIABETES MELLITUS WITH HYPERGLYCEMIA, WITH LONG-TERM CURRENT USE OF INSULIN: Status: ACTIVE | Noted: 2025-03-20

## 2025-03-22 PROBLEM — I50.30 HEART FAILURE WITH PRESERVED LEFT VENTRICULAR FUNCTION (HFPEF): Status: ACTIVE | Noted: 2025-03-22

## 2025-03-22 PROBLEM — I16.0 HYPERTENSIVE URGENCY: Status: RESOLVED | Noted: 2025-03-20 | Resolved: 2025-03-22

## 2025-03-22 LAB
ALBUMIN SERPL-MCNC: 3.6 G/DL (ref 3.5–5.2)
ALBUMIN/GLOB SERPL: 1.3 G/DL
ALP SERPL-CCNC: 104 U/L (ref 39–117)
ALT SERPL W P-5'-P-CCNC: 12 U/L (ref 1–41)
ANION GAP SERPL CALCULATED.3IONS-SCNC: 12 MMOL/L (ref 5–15)
ANION GAP SERPL CALCULATED.3IONS-SCNC: 12 MMOL/L (ref 5–15)
AST SERPL-CCNC: 15 U/L (ref 1–40)
BASOPHILS # BLD AUTO: 0.03 10*3/MM3 (ref 0–0.2)
BASOPHILS NFR BLD AUTO: 0.3 % (ref 0–1.5)
BILIRUB SERPL-MCNC: 0.2 MG/DL (ref 0–1.2)
BUN SERPL-MCNC: 26 MG/DL (ref 8–23)
BUN SERPL-MCNC: 26 MG/DL (ref 8–23)
BUN/CREAT SERPL: 14.4 (ref 7–25)
BUN/CREAT SERPL: 14.4 (ref 7–25)
CALCIUM SPEC-SCNC: 9 MG/DL (ref 8.6–10.5)
CALCIUM SPEC-SCNC: 9 MG/DL (ref 8.6–10.5)
CHLORIDE SERPL-SCNC: 102 MMOL/L (ref 98–107)
CHLORIDE SERPL-SCNC: 102 MMOL/L (ref 98–107)
CHOLEST SERPL-MCNC: 137 MG/DL (ref 0–200)
CO2 SERPL-SCNC: 22 MMOL/L (ref 22–29)
CO2 SERPL-SCNC: 22 MMOL/L (ref 22–29)
CREAT SERPL-MCNC: 1.8 MG/DL (ref 0.76–1.27)
CREAT SERPL-MCNC: 1.8 MG/DL (ref 0.76–1.27)
D-LACTATE SERPL-SCNC: 0.9 MMOL/L (ref 0.5–2)
DEPRECATED RDW RBC AUTO: 47.8 FL (ref 37–54)
EGFRCR SERPLBLD CKD-EPI 2021: 39.3 ML/MIN/1.73
EGFRCR SERPLBLD CKD-EPI 2021: 39.3 ML/MIN/1.73
EOSINOPHIL # BLD AUTO: 0.31 10*3/MM3 (ref 0–0.4)
EOSINOPHIL NFR BLD AUTO: 2.9 % (ref 0.3–6.2)
ERYTHROCYTE [DISTWIDTH] IN BLOOD BY AUTOMATED COUNT: 14.1 % (ref 12.3–15.4)
GEN 5 1HR TROPONIN T REFLEX: 174 NG/L
GLOBULIN UR ELPH-MCNC: 2.8 GM/DL
GLUCOSE BLDC GLUCOMTR-MCNC: 182 MG/DL (ref 70–130)
GLUCOSE BLDC GLUCOMTR-MCNC: 210 MG/DL (ref 70–130)
GLUCOSE BLDC GLUCOMTR-MCNC: 228 MG/DL (ref 70–130)
GLUCOSE BLDC GLUCOMTR-MCNC: 303 MG/DL (ref 70–130)
GLUCOSE SERPL-MCNC: 191 MG/DL (ref 65–99)
GLUCOSE SERPL-MCNC: 191 MG/DL (ref 65–99)
HCT VFR BLD AUTO: 29.1 % (ref 37.5–51)
HDLC SERPL-MCNC: 32 MG/DL (ref 40–60)
HGB BLD-MCNC: 9.4 G/DL (ref 13–17.7)
IMM GRANULOCYTES # BLD AUTO: 0.04 10*3/MM3 (ref 0–0.05)
IMM GRANULOCYTES NFR BLD AUTO: 0.4 % (ref 0–0.5)
LDLC SERPL CALC-MCNC: 50 MG/DL (ref 0–100)
LDLC/HDLC SERPL: 0.99 {RATIO}
LYMPHOCYTES # BLD AUTO: 1.74 10*3/MM3 (ref 0.7–3.1)
LYMPHOCYTES NFR BLD AUTO: 16.1 % (ref 19.6–45.3)
MAGNESIUM SERPL-MCNC: 1.9 MG/DL (ref 1.6–2.4)
MCH RBC QN AUTO: 29.5 PG (ref 26.6–33)
MCHC RBC AUTO-ENTMCNC: 32.3 G/DL (ref 31.5–35.7)
MCV RBC AUTO: 91.2 FL (ref 79–97)
MONOCYTES # BLD AUTO: 0.9 10*3/MM3 (ref 0.1–0.9)
MONOCYTES NFR BLD AUTO: 8.3 % (ref 5–12)
NEUTROPHILS NFR BLD AUTO: 7.8 10*3/MM3 (ref 1.7–7)
NEUTROPHILS NFR BLD AUTO: 72 % (ref 42.7–76)
NRBC BLD AUTO-RTO: 0 /100 WBC (ref 0–0.2)
PLATELET # BLD AUTO: 165 10*3/MM3 (ref 140–450)
PMV BLD AUTO: 10.8 FL (ref 6–12)
POTASSIUM SERPL-SCNC: 5.1 MMOL/L (ref 3.5–5.2)
POTASSIUM SERPL-SCNC: 5.1 MMOL/L (ref 3.5–5.2)
PROT SERPL-MCNC: 6.4 G/DL (ref 6–8.5)
RBC # BLD AUTO: 3.19 10*6/MM3 (ref 4.14–5.8)
SODIUM SERPL-SCNC: 136 MMOL/L (ref 136–145)
SODIUM SERPL-SCNC: 136 MMOL/L (ref 136–145)
TRIGL SERPL-MCNC: 367 MG/DL (ref 0–150)
TROPONIN T % DELTA: 3
TROPONIN T NUMERIC DELTA: 5 NG/L
TROPONIN T SERPL HS-MCNC: 169 NG/L
VLDLC SERPL-MCNC: 55 MG/DL (ref 5–40)
WBC NRBC COR # BLD AUTO: 10.82 10*3/MM3 (ref 3.4–10.8)

## 2025-03-22 PROCEDURE — 71045 X-RAY EXAM CHEST 1 VIEW: CPT

## 2025-03-22 PROCEDURE — 63710000001 INSULIN LISPRO (HUMAN) PER 5 UNITS: Performed by: PHYSICIAN ASSISTANT

## 2025-03-22 PROCEDURE — 25010000002 HEPARIN (PORCINE) PER 1000 UNITS: Performed by: PHYSICIAN ASSISTANT

## 2025-03-22 PROCEDURE — 25810000003 SODIUM CHLORIDE 0.9 % SOLUTION 250 ML FLEX CONT: Performed by: EMERGENCY MEDICINE

## 2025-03-22 PROCEDURE — 99232 SBSQ HOSP IP/OBS MODERATE 35: CPT | Performed by: STUDENT IN AN ORGANIZED HEALTH CARE EDUCATION/TRAINING PROGRAM

## 2025-03-22 PROCEDURE — 84484 ASSAY OF TROPONIN QUANT: CPT | Performed by: FAMILY MEDICINE

## 2025-03-22 PROCEDURE — 82948 REAGENT STRIP/BLOOD GLUCOSE: CPT

## 2025-03-22 PROCEDURE — 80061 LIPID PANEL: CPT | Performed by: INTERNAL MEDICINE

## 2025-03-22 PROCEDURE — 93010 ELECTROCARDIOGRAM REPORT: CPT | Performed by: INTERNAL MEDICINE

## 2025-03-22 PROCEDURE — 83735 ASSAY OF MAGNESIUM: CPT | Performed by: FAMILY MEDICINE

## 2025-03-22 PROCEDURE — 25010000002 NICARDIPINE 2.5 MG/ML SOLUTION 10 ML VIAL: Performed by: EMERGENCY MEDICINE

## 2025-03-22 PROCEDURE — 25010000002 HYDROMORPHONE PER 4 MG: Performed by: STUDENT IN AN ORGANIZED HEALTH CARE EDUCATION/TRAINING PROGRAM

## 2025-03-22 PROCEDURE — 25810000003 LACTATED RINGERS PER 1000 ML: Performed by: STUDENT IN AN ORGANIZED HEALTH CARE EDUCATION/TRAINING PROGRAM

## 2025-03-22 PROCEDURE — 83605 ASSAY OF LACTIC ACID: CPT | Performed by: FAMILY MEDICINE

## 2025-03-22 PROCEDURE — 93005 ELECTROCARDIOGRAM TRACING: CPT | Performed by: STUDENT IN AN ORGANIZED HEALTH CARE EDUCATION/TRAINING PROGRAM

## 2025-03-22 PROCEDURE — 63710000001 INSULIN GLARGINE PER 5 UNITS: Performed by: STUDENT IN AN ORGANIZED HEALTH CARE EDUCATION/TRAINING PROGRAM

## 2025-03-22 PROCEDURE — 85025 COMPLETE CBC W/AUTO DIFF WBC: CPT | Performed by: FAMILY MEDICINE

## 2025-03-22 PROCEDURE — 80053 COMPREHEN METABOLIC PANEL: CPT | Performed by: STUDENT IN AN ORGANIZED HEALTH CARE EDUCATION/TRAINING PROGRAM

## 2025-03-22 PROCEDURE — 25010000002 ONDANSETRON PER 1 MG: Performed by: PHYSICIAN ASSISTANT

## 2025-03-22 RX ORDER — PANTOPRAZOLE SODIUM 40 MG/1
40 TABLET, DELAYED RELEASE ORAL
Status: DISCONTINUED | OUTPATIENT
Start: 2025-03-23 | End: 2025-04-02 | Stop reason: HOSPADM

## 2025-03-22 RX ORDER — AMLODIPINE BESYLATE 10 MG/1
10 TABLET ORAL
Status: DISCONTINUED | OUTPATIENT
Start: 2025-03-22 | End: 2025-04-02 | Stop reason: HOSPADM

## 2025-03-22 RX ORDER — NICARDIPINE HYDROCHLORIDE 2.5 MG/ML
INJECTION INTRAVENOUS
Status: ACTIVE
Start: 2025-03-22 | End: 2025-03-22

## 2025-03-22 RX ORDER — LIDOCAINE 4 G/G
1 PATCH TOPICAL
Status: DISCONTINUED | OUTPATIENT
Start: 2025-03-22 | End: 2025-04-02 | Stop reason: HOSPADM

## 2025-03-22 RX ORDER — HYDROMORPHONE HYDROCHLORIDE 1 MG/ML
0.5 INJECTION, SOLUTION INTRAMUSCULAR; INTRAVENOUS; SUBCUTANEOUS ONCE
Status: COMPLETED | OUTPATIENT
Start: 2025-03-22 | End: 2025-03-22

## 2025-03-22 RX ORDER — CARVEDILOL 6.25 MG/1
6.25 TABLET ORAL 2 TIMES DAILY WITH MEALS
Status: DISCONTINUED | OUTPATIENT
Start: 2025-03-22 | End: 2025-03-23

## 2025-03-22 RX ORDER — NITROGLYCERIN 0.4 MG/1
0.4 TABLET SUBLINGUAL
Status: DISCONTINUED | OUTPATIENT
Start: 2025-03-22 | End: 2025-04-02 | Stop reason: HOSPADM

## 2025-03-22 RX ORDER — SODIUM CHLORIDE, SODIUM LACTATE, POTASSIUM CHLORIDE, CALCIUM CHLORIDE 600; 310; 30; 20 MG/100ML; MG/100ML; MG/100ML; MG/100ML
75 INJECTION, SOLUTION INTRAVENOUS CONTINUOUS
Status: ACTIVE | OUTPATIENT
Start: 2025-03-22 | End: 2025-03-22

## 2025-03-22 RX ORDER — HYDRALAZINE HYDROCHLORIDE 50 MG/1
50 TABLET, FILM COATED ORAL EVERY 8 HOURS SCHEDULED
Status: DISCONTINUED | OUTPATIENT
Start: 2025-03-22 | End: 2025-03-22

## 2025-03-22 RX ADMIN — CARVEDILOL 6.25 MG: 6.25 TABLET, FILM COATED ORAL at 16:54

## 2025-03-22 RX ADMIN — Medication 10 ML: at 20:35

## 2025-03-22 RX ADMIN — LIDOCAINE 1 PATCH: 4 PATCH TOPICAL at 05:20

## 2025-03-22 RX ADMIN — HEPARIN SODIUM 5000 UNITS: 5000 INJECTION INTRAVENOUS; SUBCUTANEOUS at 20:34

## 2025-03-22 RX ADMIN — NICOTINE 1 PATCH: 21 PATCH TRANSDERMAL at 08:35

## 2025-03-22 RX ADMIN — HYDROMORPHONE HYDROCHLORIDE 0.5 MG: 1 INJECTION, SOLUTION INTRAMUSCULAR; INTRAVENOUS; SUBCUTANEOUS at 04:17

## 2025-03-22 RX ADMIN — Medication 10 ML: at 04:17

## 2025-03-22 RX ADMIN — ONDANSETRON 4 MG: 2 INJECTION INTRAMUSCULAR; INTRAVENOUS at 03:25

## 2025-03-22 RX ADMIN — HYDRALAZINE HYDROCHLORIDE: 25 TABLET ORAL at 21:39

## 2025-03-22 RX ADMIN — CALCIUM CARBONATE (ANTACID) CHEW TAB 500 MG 2 TABLET: 500 CHEW TAB at 02:10

## 2025-03-22 RX ADMIN — INSULIN LISPRO 2 UNITS: 100 INJECTION, SOLUTION INTRAVENOUS; SUBCUTANEOUS at 20:34

## 2025-03-22 RX ADMIN — CARVEDILOL 6.25 MG: 6.25 TABLET, FILM COATED ORAL at 18:44

## 2025-03-22 RX ADMIN — Medication 5 MG: at 02:10

## 2025-03-22 RX ADMIN — ASPIRIN 81 MG: 81 TABLET, COATED ORAL at 08:34

## 2025-03-22 RX ADMIN — HYDRALAZINE HYDROCHLORIDE 25 MG: 25 TABLET ORAL at 05:19

## 2025-03-22 RX ADMIN — HYDROCHLOROTHIAZIDE 12.5 MG: 25 TABLET ORAL at 08:35

## 2025-03-22 RX ADMIN — SODIUM CHLORIDE 5 MG/HR: 9 INJECTION, SOLUTION INTRAVENOUS at 03:53

## 2025-03-22 RX ADMIN — INSULIN LISPRO 3 UNITS: 100 INJECTION, SOLUTION INTRAVENOUS; SUBCUTANEOUS at 11:44

## 2025-03-22 RX ADMIN — INSULIN LISPRO 5 UNITS: 100 INJECTION, SOLUTION INTRAVENOUS; SUBCUTANEOUS at 16:54

## 2025-03-22 RX ADMIN — HEPARIN SODIUM 5000 UNITS: 5000 INJECTION INTRAVENOUS; SUBCUTANEOUS at 08:35

## 2025-03-22 RX ADMIN — CARVEDILOL 6.25 MG: 6.25 TABLET, FILM COATED ORAL at 11:05

## 2025-03-22 RX ADMIN — FAMOTIDINE 20 MG: 20 TABLET, FILM COATED ORAL at 08:35

## 2025-03-22 RX ADMIN — HYDRALAZINE HYDROCHLORIDE: 25 TABLET ORAL at 15:07

## 2025-03-22 RX ADMIN — NITROGLYCERIN 0.4 MG: 0.4 TABLET SUBLINGUAL at 03:43

## 2025-03-22 RX ADMIN — ISOSORBIDE DINITRATE 10 MG: 20 TABLET ORAL at 08:34

## 2025-03-22 RX ADMIN — INSULIN GLARGINE 5 UNITS: 100 INJECTION, SOLUTION SUBCUTANEOUS at 20:34

## 2025-03-22 RX ADMIN — SODIUM CHLORIDE, SODIUM LACTATE, POTASSIUM CHLORIDE, CALCIUM CHLORIDE 100 ML/HR: 20; 30; 600; 310 INJECTION, SOLUTION INTRAVENOUS at 11:06

## 2025-03-22 RX ADMIN — AMLODIPINE BESYLATE 10 MG: 10 TABLET ORAL at 08:35

## 2025-03-22 RX ADMIN — Medication 10 ML: at 03:25

## 2025-03-22 NOTE — PLAN OF CARE
Problem: Adult Inpatient Plan of Care  Goal: Absence of Hospital-Acquired Illness or Injury  Intervention: Identify and Manage Fall Risk  Recent Flowsheet Documentation  Taken 3/22/2025 0647 by Honey Daiz RN  Safety Promotion/Fall Prevention:   activity supervised   assistive device/personal items within reach   clutter free environment maintained   lighting adjusted   nonskid shoes/slippers when out of bed   room organization consistent   safety round/check completed  Taken 3/22/2025 0408 by Honey Diaz RN  Safety Promotion/Fall Prevention:   activity supervised   assistive device/personal items within reach   clutter free environment maintained   lighting adjusted   nonskid shoes/slippers when out of bed   room organization consistent   safety round/check completed  Taken 3/22/2025 0335 by Honey Diaz RN  Safety Promotion/Fall Prevention:   activity supervised   assistive device/personal items within reach   clutter free environment maintained   lighting adjusted   nonskid shoes/slippers when out of bed   room organization consistent   safety round/check completed  Taken 3/22/2025 0207 by Honey Diaz RN  Safety Promotion/Fall Prevention:   activity supervised   assistive device/personal items within reach   clutter free environment maintained   lighting adjusted   nonskid shoes/slippers when out of bed   room organization consistent   safety round/check completed  Taken 3/22/2025 0047 by Honey Diaz RN  Safety Promotion/Fall Prevention:   activity supervised   assistive device/personal items within reach   clutter free environment maintained   lighting adjusted   nonskid shoes/slippers when out of bed   safety round/check completed   room organization consistent  Taken 3/21/2025 2233 by Honey Diaz RN  Safety Promotion/Fall Prevention:   activity supervised   assistive device/personal items within reach   clutter free environment maintained   lighting adjusted   nonskid shoes/slippers when out of  bed   room organization consistent   safety round/check completed  Taken 3/21/2025 2028 by Honey Diaz RN  Safety Promotion/Fall Prevention:   activity supervised   assistive device/personal items within reach   clutter free environment maintained   lighting adjusted   nonskid shoes/slippers when out of bed   room organization consistent   safety round/check completed  Intervention: Prevent Skin Injury  Recent Flowsheet Documentation  Taken 3/22/2025 0647 by Honey Diaz RN  Body Position: position changed independently  Skin Protection: incontinence pads utilized  Taken 3/22/2025 0408 by Honey Diaz RN  Skin Protection: incontinence pads utilized  Taken 3/22/2025 0335 by Honey Diaz RN  Body Position: position changed independently  Taken 3/22/2025 0207 by Honey Diaz RN  Body Position: position changed independently  Skin Protection: incontinence pads utilized  Taken 3/22/2025 0047 by Honey Diaz RN  Body Position: position changed independently  Skin Protection:   incontinence pads utilized   skin sealant/moisture barrier applied  Taken 3/21/2025 2233 by Honey Diaz RN  Body Position: position changed independently  Skin Protection:   incontinence pads utilized   skin sealant/moisture barrier applied  Taken 3/21/2025 2028 by Honey Diaz RN  Body Position: position changed independently  Skin Protection: incontinence pads utilized  Intervention: Prevent and Manage VTE (Venous Thromboembolism) Risk  Recent Flowsheet Documentation  Taken 3/22/2025 0647 by Honey Diaz RN  VTE Prevention/Management: (heparion) other (see comments)  Taken 3/22/2025 0408 by Honey Diaz RN  VTE Prevention/Management: (HEPARIN) other (see comments)  Taken 3/22/2025 0335 by Honey Diaz RN  VTE Prevention/Management: (HEPARIN) other (see comments)  Taken 3/22/2025 0207 by Honey Diaz RN  VTE Prevention/Management: (heparin) other (see comments)  Taken 3/22/2025 0047 by Honey Diaz RN  VTE Prevention/Management:  (heparin) other (see comments)  Taken 3/21/2025 2233 by Honey Diaz RN  VTE Prevention/Management: (heparin) other (see comments)  Taken 3/21/2025 2028 by Honey Diaz RN  VTE Prevention/Management: (HEPARIN) other (see comments)  Intervention: Prevent Infection  Recent Flowsheet Documentation  Taken 3/22/2025 0647 by Honey Diaz RN  Infection Prevention:   environmental surveillance performed   hand hygiene promoted   rest/sleep promoted   single patient room provided  Taken 3/22/2025 0408 by Honey Diaz RN  Infection Prevention:   environmental surveillance performed   hand hygiene promoted   rest/sleep promoted   single patient room provided  Taken 3/22/2025 0335 by Honey Diaz RN  Infection Prevention:   environmental surveillance performed   hand hygiene promoted   rest/sleep promoted   single patient room provided  Taken 3/22/2025 0207 by Honey Diaz RN  Infection Prevention:   environmental surveillance performed   hand hygiene promoted   rest/sleep promoted   single patient room provided  Taken 3/22/2025 0047 by Honey Diaz RN  Infection Prevention:   environmental surveillance performed   hand hygiene promoted   rest/sleep promoted   single patient room provided  Taken 3/21/2025 2233 by Honey Diaz RN  Infection Prevention:   environmental surveillance performed   hand hygiene promoted   rest/sleep promoted   single patient room provided  Taken 3/21/2025 2028 by Honey Diaz RN  Infection Prevention:   environmental surveillance performed   hand hygiene promoted   rest/sleep promoted   single patient room provided  Goal: Optimal Comfort and Wellbeing  Intervention: Provide Person-Centered Care  Recent Flowsheet Documentation  Taken 3/21/2025 2028 by Honey Diaz RN  Trust Relationship/Rapport:   care explained   choices provided   emotional support provided   empathic listening provided   questions answered   questions encouraged   reassurance provided   thoughts/feelings acknowledged    Goal Outcome Evaluation:      C/O CP, SOA, N/V and diuresis. Medications given per order. Labs,xRAY, and EKG obtained. Pt rest quietly but continues to report chest pain with litle to no change. Dr Smooth douglass.

## 2025-03-22 NOTE — PAYOR COMM NOTE
"Estevan Perez (74 y.o. Male)       Date of Birth   1950    Social Security Number       Address   [address] [place] [address]    Home Phone   [phone]    MRN   [id number]       Gnosticist   None    Marital Status                               Admission Date   3/20/2025    Admission Type   Emergency    Admitting Provider   Lilia Leroy MD    Attending Provider   Lilia Leroy MD    Department, Room/Bed   [facility] 3G, S355/1       Discharge Date       Discharge Disposition       Discharge Destination                                 Attending Provider: Lilia Leroy MD    Allergies: Penicillins, Valium [Diazepam]    Isolation: None   Infection: None   Code Status: CPR    Ht: 180.3 cm (70.98\")   Wt: 69.4 kg (153 lb)    Admission Cmt: None   Principal Problem: Hypertensive urgency [I16.0]                   Active Insurance as of 3/20/2025       Primary Coverage       Payor Plan Insurance Group Employer/Plan Group    MEDICARE MEDICARE B ONLY        Payor Plan Address Payor Plan Phone Number Payor Plan Fax Number Effective Dates    [address] [address] [phone]  3/1/2016 - None Entered    [facility] [id number]         Subscriber Name Subscriber Birth Date Member ID       ESTEVAN PEREZ 1950 [id number]               Secondary Coverage       Payor Plan Insurance Group Employer/Plan Group    [place] [id number]       Payor Plan Address Payor Plan Phone Number Payor Plan Fax Number Effective Dates    [employer] [address] [address] [phone]  3/1/2024 - None Entered    [facility] [id number]         Subscriber Name Subscriber Birth Date Member ID       ESTEVAN PEREZ 1950 [id number]                     Emergency Contacts        (Rel.) Home Phone Work Phone Mobile Phone    ASHER,COLLETTA (Daughter) [phone] -- [phone]              [place]: NPI [id number] Tax ID [id number]  Insurance " "Information                  MEDICARE/MEDICARE B ONLY Phone: [phone]    Subscriber: Thomas Perez Subscriber#: [id number]    Group#: -- Precert#: --    Authorization#: -- Effective Date: --        [place] Phone: [phone]    Subscriber: Thomas Perez Subscriber#: [id number]    Group#: [id number] Precert#: --    Authorization#: [id number] Effective Date: --             History & Physical        Gregg Ramos PA-C at 25       Attestation signed by Castro Bermudez MD at 25    I have reviewed this documentation and agree.                      [facility] Medicine Services  HISTORY AND PHYSICAL    Patient Name: Thomas Perez  : 1950  MRN: [id number]  Primary Care Physician: Provider, No Known  Date of admission: 3/20/2025    Subjective  Subjective     Chief Complaint:  Elevated blood pressure    HPI:  Thomas Perez is a 74 y.o. male with a past medical history significant for hypertension, HLD, CAD w/ RCA stent (), PAD s/p embolectomy and fasciotomy, COPD, poorly controlled DM, CKD, medical non compliance and chronic pain. Patient presents today with complaints of elevated blood pressure. States a home health nurse checked his BP today and it was elevated to > 250 systolic. States he usually runs between 160 to 180 systolic. Nurse was concerned and called EMS. Upon arrival, BP was still elevated to 210/100. Patient reports he is \"between\" blood pressure medications but whatever he's taking right now makes him sick. Apparently has not taken antihypertensives consistently for about 1 month. He currently complaints of progressively worsening BLE edema. States he cannot fit any of his shoes. Is otherwise asymptomatic.  Denies fever, cough, congestion, SOB, or chest pain. No Syncope. No headache or focal weakness/numbness/tingling. Will admit to observation.        Review of Systems   Constitutional:  Negative for chills, fatigue and " fever.   HENT:  Negative for congestion and trouble swallowing.    Eyes:  Negative for photophobia and visual disturbance.   Respiratory:  Negative for cough and shortness of breath.    Cardiovascular:  Positive for leg swelling. Negative for chest pain.   Gastrointestinal:  Negative for abdominal pain, diarrhea, nausea and vomiting.   Endocrine: Negative for cold intolerance and heat intolerance.   Genitourinary:  Negative for dysuria and flank pain.   Musculoskeletal:  Negative for back pain and gait problem.   Skin:  Negative for pallor and rash.   Allergic/Immunologic: Positive for immunocompromised state.   Neurological:  Positive for weakness. Negative for dizziness and headaches.   Hematological:  Negative for adenopathy.   Psychiatric/Behavioral:  Negative for agitation and confusion.             Personal History     Past Medical History:   Diagnosis Date    Back injury     Diabetes mellitus     History of angina     Hypertension              Past Surgical History:   Procedure Laterality Date    CORONARY STENT PLACEMENT      EMBOLECTOMY      FASCIOTOMY         Family History: family history is not on file.     Social History:  reports that he has been smoking cigarettes. He has never used smokeless tobacco. He reports that he does not drink alcohol and does not use drugs.  Social History     Social History Narrative    Not on file       Medications:       Allergies   Allergen Reactions    Penicillins Hives    Valium [Diazepam] Hives       Objective  Objective     Vital Signs:   Temp:  [97.7 °F (36.5 °C)] 97.7 °F (36.5 °C)  Heart Rate:  [60-68] 68  Resp:  [16] 16  BP: (167-250)/() 170/71    Physical Exam   Constitutional: Awake, alert  Eyes: PERRLA, sclerae anicteric, no conjunctival injection  HENT: NCAT, mucous membranes moist  Neck: Supple, no thyromegaly, no lymphadenopathy, trachea midline  Respiratory: Clear to auscultation bilaterally, nonlabored respirations   Cardiovascular: RRR, no murmurs,  rubs, or gallops, palpable pedal pulses bilaterally  Gastrointestinal: Positive bowel sounds, soft, nontender, nondistended  Musculoskeletal: No bilateral ankle edema, no clubbing or cyanosis to extremities  BLE edema +1, no pitting. S/P embolectomy, fasciotomy scar left lower extremity  Psychiatric: Appropriate affect, cooperative  Neurologic: Oriented x 3, strength symmetric in all extremities, Cranial Nerves grossly intact to confrontation, speech clear  Skin: No rashes      Result Review:  I have personally reviewed the results from the time of this admission to 3/20/2025 19:45 EDT and agree with these findings:  [x]  Laboratory list / accordion  []  Microbiology  []  Radiology  []  EKG/Telemetry   []  Cardiology/Vascular   []  Pathology  [x]  Old records  []  Other:  Most notable findings include: /100. Vitals otherwise stable. Trop 162 then 147. Cr 1.65, BUN 25. H/H 11 and 34.2. Cxr clear.    LAB RESULTS:      Lab 03/20/25  1626   WBC 10.12   HEMOGLOBIN 11.0*   HEMATOCRIT 34.2*   PLATELETS 184   NEUTROS ABS 7.50*   IMMATURE GRANS (ABS) 0.06*   LYMPHS ABS 1.55   MONOS ABS 0.69   EOS ABS 0.29   MCV 91.4         Lab 03/20/25  1626   SODIUM 139   POTASSIUM 4.8   CHLORIDE 104   CO2 23.0   ANION GAP 12.0   BUN 25*   CREATININE 1.67*   EGFR 42.9*   GLUCOSE 326*   CALCIUM 9.0   HEMOGLOBIN A1C 7.90*         Lab 03/20/25  1626 03/14/25  1711   TOTAL PROTEIN 7.2  --    ALBUMIN 4.1  --    GLOBULIN 3.1  --    ALT (SGPT) 14  --    AST (SGOT) 11  --    BILIRUBIN 0.2  --    ALK PHOS 148*  --    LIPASE  --  32         Lab 03/20/25  1726 03/20/25  1626   PROBNP  --  1,321.0*   HSTROP T 147* 162*                 Brief Urine Lab Results  (Last result in the past 365 days)        Color   Clarity   Blood   Leuk Est   Nitrite   Protein   CREAT   Urine HCG        03/20/25 1637 Yellow   Clear   Small (1+)   Negative   Negative   >=300 mg/dL (3+)                 Microbiology Results (last 10 days)       ** No results found for  the last 240 hours. **            XR Chest 1 View  Result Date: 3/20/2025  XR CHEST 1 VW Date of Exam: 3/20/2025 5:50 PM EDT Indication: fatigue Comparison: None available. Findings: Lungs are adequately expanded and appear clear. Cardiomediastinal contours appear within normal limits.     Impression: Impression: No acute cardiopulmonary abnormality is identified. Electronically Signed: Madison Duque  3/20/2025 6:15 PM EDT  Workstation ID: [id number]          Assessment & Plan  Assessment & Plan       Hypertensive urgency    MAGUI (acute kidney injury)    Bilateral lower extremity edema    DM2 (diabetes mellitus, type 2)    CAD (coronary artery disease)    Anemia    PAD (peripheral artery disease)    Medical non-compliance    COPD (chronic obstructive pulmonary disease)    Tobacco abuse    74 y.o. male with a past medical history significant for hypertension, HLD, CAD w/ RCA stent (2010), PAD s/p embolectomy and fasciotomy, COPD, poorly controlled DM, CKD, medical non compliance and chronic pain here with hypertensive urgency 2/2 medication non compliance and BLE edema.    Hypertensive Emergency  - BP elevated as high as 250 on arrival  - supposed to be on Valsartan- HCTZ, Norvasc, Imdur  - plan to reconcile meds and discharge on new regimen  - started on Cardene gtt. Continue for now  - Trop 162 > 147. Suspect demand ischemia. Continue to trend. No EKG changes  - close monitor on telemetry  - am labs    MAGUI on CKD  - baseline Cr. 0.9 to 1.1  - elevated to 1.67, BUN 25 eGFR 42.9  - obtain UA  - strict I&O  - hold off on fluids for now with LE edema  - avoid additional nephrotoxins    BLE edema  - obtain echocardiogram  - doppler BLE     Anemia  - H/H 11 and 34.2 respectively  - check iron studies, b12. folate    Coronary Artery Disease  HLD  PAD s/p multiple interventions  - RCA stent 2010  - Not currently on antiplatelet, likely needs to start ASA  - continue statin    Insulin Dependent Diabetes Mellitus  - Hb A1c  3/205 7.9  - was on lantus 20 units daily plus lispro 7 units TID. Confirm with pharmacy  - CC2 diet  - SSI with scheduled accu checks    Chronic back pain and chronic BLE pain   - follows pain clinic on [address]  - continue home meds    COPD  Tobacco use  - not on supplemental oxygen  - stable  - as needed pulmonary toilet  - smokes 1-2 PPD for many decades  - Counseled on smoking cessation including high risk for heart attack and stroke. He is in pre-contemplative stage of change  - nicotine patch as needed    Impaired mobility and self care deficit   - walks with a walker due to BLE foot drop, brace LLE  - PT/OT recommend home with assistance, outpatient PT/OT      DVT prophylaxis:  heparin    CODE STATUS:  full code  Code Status (Patient has no pulse and is not breathing): CPR (Attempt to Resuscitate)  Medical Interventions (Patient has pulse or is breathing): Full Support  Level Of Support Discussed With: Patient      Expected Discharge  TBD      This note has been completed as part of a split-shared workflow.     Signature: Electronically signed by Gregg Ramos PA-C, 03/20/25, 7:37 PM EDT                Electronically signed by Castro Bermudez MD at 03/20/25 2024       Current Facility-Administered Medications   Medication Dose Route Frequency Provider Last Rate Last Admin    acetaminophen (TYLENOL) tablet 650 mg  650 mg Oral Q4H PRN Gregg Ramos PA-C        Or    acetaminophen (TYLENOL) 160 MG/5ML oral solution 650 mg  650 mg Oral Q4H PRN Gregg Ramos PA-C        Or    acetaminophen (TYLENOL) suppository 650 mg  650 mg Rectal Q4H PRN Gregg Ramos PA-C        amLODIPine (NORVASC) tablet 10 mg  10 mg Oral Q24H Lilia Leroy MD   10 mg at 03/22/25 0835    aspirin EC tablet 81 mg  81 mg Oral Daily Lilia Leroy MD   81 mg at 03/22/25 0834    sennosides-docusate (PERICOLACE) 8.6-50 MG per tablet 2 tablet  2 tablet Oral BID PRN Gregg Ramos  RED        And    polyethylene glycol (MIRALAX) packet 17 g  17 g Oral Daily PRN Gregg Ramos PA-C        And    bisacodyl (DULCOLAX) EC tablet 5 mg  5 mg Oral Daily PRN Gregg Ramos PA-C        And    bisacodyl (DULCOLAX) suppository 10 mg  10 mg Rectal Daily PRN Gregg Ramos PA-C        calcium carbonate (TUMS) chewable tablet 500 mg (200 mg elemental)  2 tablet Oral TID PRN Marlene Ruiz, APRN   2 tablet at 03/22/25 0210    carvedilol (COREG) tablet 6.25 mg  6.25 mg Oral BID With Meals Andrea Warren IV, MD        dextrose (D50W) (25 g/50 mL) IV injection 25 g  25 g Intravenous Q15 Min PRN Gregg Ramos PA-C        dextrose (GLUTOSE) oral gel 15 g  15 g Oral Q15 Min PRN Gregg Ramos PA-C        famotidine (PEPCID) tablet 20 mg  20 mg Oral Daily Lilia Leroy MD   20 mg at 03/22/25 0835    glucagon (GLUCAGEN) injection 1 mg  1 mg Intramuscular Q15 Min PRN Gregg Ramos PA-C        heparin (porcine) 5000 UNIT/ML injection 5,000 Units  5,000 Units Subcutaneous Q12H Gregg Ramos PA-C   5,000 Units at 03/22/25 0835    insulin glargine (LANTUS, SEMGLEE) injection 5 Units  5 Units Subcutaneous Nightly Lilia Leroy MD   5 Units at 03/21/25 2025    Insulin Lispro (humaLOG) injection 2-7 Units  2-7 Units Subcutaneous 4x Daily AC & at Bedtime Gregg Ramos PA-C   4 Units at 03/21/25 2026    isosorbide dinitrate (ISORDIL) 20 mg, hydrALAZINE (APRESOLINE) 37.5 mg for BIDIL 20-37.5   Oral Q8H Andrea Warren IV, MD        lactated ringers infusion  100 mL/hr Intravenous Continuous Lilia Leroy MD        Lidocaine 4 % 1 patch  1 patch Transdermal Q24H Grupo Rebollar MD   1 patch at 03/22/25 0520    melatonin tablet 5 mg  5 mg Oral Nightly PRN Gregg Ramos PA-C   5 mg at 03/22/25 0210    niCARdipine (CARDENE) 25mg in 250mL NS infusion  5-15 mg/hr Intravenous Titrated Jackeline Dale MD    Stopped at 03/22/25 0949    niCARdipine (CARDENE) injection             nicotine (NICODERM CQ) 21 MG/24HR patch 1 patch  1 patch Transdermal Q24H Jackeline Dale MD   1 patch at 03/22/25 0835    nitroglycerin (NITROSTAT) SL tablet 0.4 mg  0.4 mg Sublingual Q5 Min PRN Truman Brothers MD   0.4 mg at 03/22/25 0343    ondansetron (ZOFRAN) injection 4 mg  4 mg Intravenous Q6H PRN Gregg Ramos, PA-C   4 mg at 03/22/25 0325    sodium chloride 0.9 % flush 10 mL  10 mL Intravenous PRN Jackeline Dale MD        sodium chloride 0.9 % flush 10 mL  10 mL Intravenous PRN Jackeline Dale MD   10 mL at 03/22/25 0417    sodium chloride 0.9 % flush 10 mL  10 mL Intravenous Q12H Gregg Ramos PA-C   10 mL at 03/21/25 2026    sodium chloride 0.9 % flush 10 mL  10 mL Intravenous PRN Gregg Ramos, PA-C   10 mL at 03/22/25 0325    sodium chloride 0.9 % infusion 40 mL  40 mL Intravenous PRN Gregg Ramos, PA-C        [Held by provider] valsartan (DIOVAN) tablet 160 mg  160 mg Oral Q24H Lilia Leroy MD         Lab Results (last 24 hours)       Procedure Component Value Units Date/Time    Lipid Panel [[id number]] Collected: 03/22/25 0345    Specimen: Blood Updated: 03/22/25 1035    High Sensitivity Troponin T 1Hr [[id number]]  (Abnormal) Collected: 03/22/25 0939    Specimen: Blood Updated: 03/22/25 1026     HS Troponin T 174 ng/L      Troponin T Numeric Delta 5 ng/L      Troponin T % Delta 3    Narrative:      High Sensitive Troponin T Reference Range:  <14.0 ng/L- Negative Female for AMI  <22.0 ng/L- Negative Male for AMI  >=14 - Abnormal Female indicating possible myocardial injury.  >=22 - Abnormal Male indicating possible myocardial injury.   Clinicians would have to utilize clinical acumen, EKG, Troponin, and serial changes to determine if it is an Acute Myocardial Infarction or myocardial injury due to an underlying chronic condition.         High Sensitivity Troponin T  [[id number]]  (Abnormal) Collected: 03/22/25 0345    Specimen: Blood Updated: 03/22/25 0433     HS Troponin T 169 ng/L      Comment: Confirmed/called       Narrative:      High Sensitive Troponin T Reference Range:  <14.0 ng/L- Negative Female for AMI  <22.0 ng/L- Negative Male for AMI  >=14 - Abnormal Female indicating possible myocardial injury.  >=22 - Abnormal Male indicating possible myocardial injury.   Clinicians would have to utilize clinical acumen, EKG, Troponin, and serial changes to determine if it is an Acute Myocardial Infarction or myocardial injury due to an underlying chronic condition.         Basic Metabolic Panel [[id number]]  (Abnormal) Collected: 03/22/25 0345    Specimen: Blood Updated: 03/22/25 0426     Glucose 191 mg/dL      BUN 26 mg/dL      Creatinine 1.80 mg/dL      Sodium 136 mmol/L      Potassium 5.1 mmol/L      Chloride 102 mmol/L      CO2 22.0 mmol/L      Calcium 9.0 mg/dL      BUN/Creatinine Ratio 14.4     Anion Gap 12.0 mmol/L      eGFR 39.3 mL/min/1.73     Narrative:      GFR Categories in Chronic Kidney Disease (CKD)      GFR Category          GFR (mL/min/1.73)    Interpretation  G1                     90 or greater         Normal or high (1)  G2                      60-89                Mild decrease (1)  G3a                   45-59                Mild to moderate decrease  G3b                   30-44                Moderate to severe decrease  G4                    15-29                Severe decrease  G5                    14 or less           Kidney failure          (1)In the absence of evidence of kidney disease, neither GFR category G1 or G2 fulfill the criteria for CKD.    eGFR calculation 2021 CKD-EPI creatinine equation, which does not include race as a factor    Comprehensive Metabolic Panel [[id number]]  (Abnormal) Collected: 03/22/25 0345    Specimen: Blood Updated: 03/22/25 0426     Glucose 191 mg/dL      BUN 26 mg/dL      Creatinine 1.80 mg/dL      Sodium 136 mmol/L       Potassium 5.1 mmol/L      Chloride 102 mmol/L      CO2 22.0 mmol/L      Calcium 9.0 mg/dL      Total Protein 6.4 g/dL      Albumin 3.6 g/dL      ALT (SGPT) 12 U/L      AST (SGOT) 15 U/L      Alkaline Phosphatase 104 U/L      Total Bilirubin 0.2 mg/dL      Globulin 2.8 gm/dL      Comment: Calculated Result        A/G Ratio 1.3 g/dL      BUN/Creatinine Ratio 14.4     Anion Gap 12.0 mmol/L      eGFR 39.3 mL/min/1.73     Narrative:      GFR Categories in Chronic Kidney Disease (CKD)      GFR Category          GFR (mL/min/1.73)    Interpretation  G1                     90 or greater         Normal or high (1)  G2                      60-89                Mild decrease (1)  G3a                   45-59                Mild to moderate decrease  G3b                   30-44                Moderate to severe decrease  G4                    15-29                Severe decrease  G5                    14 or less           Kidney failure          (1)In the absence of evidence of kidney disease, neither GFR category G1 or G2 fulfill the criteria for CKD.    eGFR calculation 2021 CKD-EPI creatinine equation, which does not include race as a factor    Magnesium [[id number]]  (Normal) Collected: 03/22/25 0345    Specimen: Blood Updated: 03/22/25 0426     Magnesium 1.9 mg/dL     Lactic Acid, Plasma [[id number]]  (Normal) Collected: 03/22/25 0345    Specimen: Blood Updated: 03/22/25 0424     Lactate 0.9 mmol/L      Comment: Falsely depressed results may occur on samples drawn from patients receiving N-Acetylcysteine (NAC) or Metamizole.       CBC & Differential [[id number]]  (Abnormal) Collected: 03/22/25 0345    Specimen: Blood Updated: 03/22/25 0411    Narrative:      The following orders were created for panel order CBC & Differential.  Procedure                               Abnormality         Status                     ---------                               -----------         ------                     CBC Auto  Differential[[id number]]        Abnormal            Final result                 Please view results for these tests on the individual orders.    CBC Auto Differential [[id number]]  (Abnormal) Collected: 03/22/25 0345    Specimen: Blood Updated: 03/22/25 0411     WBC 10.82 10*3/mm3      RBC 3.19 10*6/mm3      Hemoglobin 9.4 g/dL      Hematocrit 29.1 %      MCV 91.2 fL      MCH 29.5 pg      MCHC 32.3 g/dL      RDW 14.1 %      RDW-SD 47.8 fl      MPV 10.8 fL      Platelets 165 10*3/mm3      Neutrophil % 72.0 %      Lymphocyte % 16.1 %      Monocyte % 8.3 %      Eosinophil % 2.9 %      Basophil % 0.3 %      Immature Grans % 0.4 %      Neutrophils, Absolute 7.80 10*3/mm3      Lymphocytes, Absolute 1.74 10*3/mm3      Monocytes, Absolute 0.90 10*3/mm3      Eosinophils, Absolute 0.31 10*3/mm3      Basophils, Absolute 0.03 10*3/mm3      Immature Grans, Absolute 0.04 10*3/mm3      nRBC 0.0 /100 WBC     POC Glucose Once [[id number]]  (Abnormal) Collected: 03/22/25 0341    Specimen: Blood Updated: 03/22/25 0343     Glucose 210 mg/dL     POC Glucose Once [[id number]]  (Abnormal) Collected: 03/21/25 1926    Specimen: Blood Updated: 03/21/25 1927     Glucose 251 mg/dL     POC Glucose Once [[id number]]  (Abnormal) Collected: 03/21/25 1605    Specimen: Blood Updated: 03/21/25 1606     Glucose 199 mg/dL     POC Glucose Once [[id number]]  (Abnormal) Collected: 03/21/25 1111    Specimen: Blood Updated: 03/21/25 1112     Glucose 253 mg/dL           Imaging Results (Last 24 Hours)       Procedure Component Value Units Date/Time    XR Chest 1 View [[id number]] Collected: 03/22/25 0502     Updated: 03/22/25 0506    Narrative:      XR CHEST 1 VW    Date of Exam: 3/22/2025 3:46 AM EDT    Indication: chest pain    Comparison: 3/20/2025.    Findings:  Questionable mild infiltrates within the bilateral lower lobes. No pleural fluid. No pneumothorax. The pulmonary vasculature appears within normal limits. The cardiac and mediastinal silhouette appear  unremarkable. No acute osseous abnormality   identified.      Impression:      Impression:  Questionable mild infiltrates within the bilateral lower lobes which which may be related to a mild pneumonia.          Electronically Signed: Olga Vicente MD    3/22/2025 5:02 AM EDT    Workstation ID: [id number]          Orders (last 24 hrs)        Start     Ordered    03/22/25 1400  hydrALAZINE (APRESOLINE) tablet 50 mg  Every 8 Hours Scheduled,   Status:  Discontinued         03/22/25 0752    03/22/25 1400  isosorbide dinitrate (ISORDIL) 20 mg, hydrALAZINE (APRESOLINE) 37.5 mg for BIDIL 20-37.5  Every 8 Hours Scheduled         03/22/25 0912    03/22/25 1100  lactated ringers infusion  Continuous         03/22/25 1000    03/22/25 1015  carvedilol (COREG) tablet 6.25 mg  2 Times Daily With Meals         03/22/25 0920    03/22/25 0914  Lipid Panel  Once         03/22/25 0913    03/22/25 0900  famotidine (PEPCID) tablet 20 mg  Daily         03/21/25 0750    03/22/25 0900  aspirin EC tablet 81 mg  Daily         03/21/25 1436    03/22/25 0900  amLODIPine (NORVASC) tablet 10 mg  Every 24 Hours Scheduled         03/22/25 0752    03/22/25 0823  Inpatient Admission  Once         03/22/25 0823    03/22/25 0754  Inpatient Cardiology Consult  Once        Specialty:  Cardiology  Provider:  Andrea Warren IV, MD    03/22/25 0753    03/22/25 0600  CBC & Differential  Morning Draw,   Status:  Canceled         03/21/25 1444    03/22/25 0600  Basic Metabolic Panel  Morning Draw         03/21/25 1444    03/22/25 0600  Magnesium  Morning Draw,   Status:  Canceled         03/21/25 1444    03/22/25 0600  CBC Auto Differential  PROCEDURE ONCE,   Status:  Canceled         03/21/25 2202    03/22/25 0545  Lidocaine 4 % 1 patch  Every 24 Hours Scheduled         03/22/25 0445    03/22/25 0445  HYDROmorphone (DILAUDID) injection 0.5 mg  Once         03/22/25 0350    03/22/25 0445  High Sensitivity Troponin T 1Hr  PROCEDURE ONCE          03/22/25 0433    03/22/25 0431  Initiate & Follow Hypercapnic Monitoring Guideline for Opioid Administration via EtCO2 and / or SpO2  Continuous        Comments: Follow Hypercapnic Monitoring Guideline As Outlined in Process Instructions (Open Order Report to View Full Instructions)    03/22/25 0431    03/22/25 0431  Opioid Administration - Document EtCO2 and / or SpO2 With Each Set of Vitals & Any Change in Patient Status  Per Order Details        Comments: With Each Set of Vitals & Any Change in Patient Status    03/22/25 0431    03/22/25 0431  Opioid Administration - Notify Provider Hypercapnic Monitoring  Continuous        Comments: Open Order Report to View Parameters Requiring Provider Notification    03/22/25 0431    03/22/25 0431  Opioid Administration - Continuous Pulse Oximetry (SpO2)  Continuous         03/22/25 0431    03/22/25 0352  ECG 12 Lead Chest Pain  Once         03/22/25 0353    03/22/25 0345  niCARdipine (CARDENE) injection        Note to Pharmacy: Created by cabinet override    03/22/25 0345    03/22/25 0344  POC Glucose Once  PROCEDURE ONCE        Comments: Complete no more than 45 minutes prior to patient eating      03/22/25 0341    03/22/25 0344  XR Chest 1 View  1 Time Imaging         03/22/25 0344    03/22/25 0342  nitroglycerin (NITROSTAT) SL tablet 0.4 mg  Every 5 Minutes PRN         03/22/25 0342    03/22/25 0341  High Sensitivity Troponin T  STAT         03/22/25 0341    03/22/25 0341  Comprehensive Metabolic Panel  STAT         03/22/25 0341    03/22/25 0341  CBC & Differential  STAT         03/22/25 0341    03/22/25 0341  Lactic Acid, Plasma  STAT         03/22/25 0341    03/22/25 0341  Magnesium  STAT         03/22/25 0341    03/22/25 0341  CBC Auto Differential  PROCEDURE ONCE         03/22/25 0341    03/22/25 0333  ECG 12 Lead Chest Pain  Once         03/22/25 0332    03/21/25 2100  insulin glargine (LANTUS, SEMGLEE) injection 5 Units  Nightly         03/21/25 1438    03/21/25  1928  POC Glucose Once  PROCEDURE ONCE        Comments: Complete no more than 45 minutes prior to patient eating      25 1926    25 1800  isosorbide dinitrate (ISORDIL) tablet 10 mg  2 Times Daily (Nitrates),   Status:  Discontinued         25 1442    25 1607  POC Glucose Once  PROCEDURE ONCE        Comments: Complete no more than 45 minutes prior to patient eating      25 1605    25 1516  OT Plan of Care Cert / Re-Cert  Once        Comments: Occupational Therapy Plan of Care  Initial Certification  Certification Period: 3/21/2025 - 2025    Patient Name: Thomas Perez  : 1950    (I16.1) Hypertensive emergency  (primary encounter diagnosis)  (R79.89) Elevated brain natriuretic peptide (BNP) level  (N28.9) Acute renal insufficiency                Assessment                     Plan    OT Plan  Therapy Frequency (OT): evaluation only  Outcome Evaluation: OT IE completed. Pt is A/Ox4 and participates in assessement with encouragement. Pt is up with RW support and SBA. No LOB. BUE AROM, strength, and sensation are WFL for self-care performance. Appropriate AE issued and teaching completed for use. Pt is at his baseline for ADLs and his home bathroom is modified to meet his needs. OT will d/c at this time. Recommend home with daughter to assist as needed when pt is medically ready.      Rosalia Smalls, OT  3/21/2025        By cosigning this order, either electronically or physically, I certify that the therapy services are furnished while this patient is under my care, the services outline above are required by this patient, and will be reviewed every 90 days.        M.D.:__________________________________________ Date: ______________    25 1515    25 1515  lactated ringers bolus 500 mL  Once         25 1420    25 1400  hydrALAZINE (APRESOLINE) tablet 25 mg  Every 8 Hours Scheduled,   Status:  Discontinued         25 1139    25 1230   [Held by provider]  valsartan (DIOVAN) tablet 160 mg  Every 24 Hours Scheduled        (On hold since yesterday at 1139 until manually unheld; held by Lilia Leroy MDHold Reason: Other (Comment Required))    25 1133    25 1230  hydroCHLOROthiazide tablet 12.5 mg  Daily,   Status:  Discontinued         25 1133    25 1209  PT Plan of Care Cert / Re-Cert  Once        Comments: Physical Therapy Plan of Care  Initial Certification  Certification Period: 3/21/2025 - 2025    Patient Name: Thomas Preez  : 1950    (I16.1) Hypertensive emergency  (primary encounter diagnosis)  (R79.89) Elevated brain natriuretic peptide (BNP) level  (N28.9) Acute renal insufficiency                  Assessment  PT Assessment  Criteria for Skilled Interventions Met (PT): no problems identified which require skilled intervention                    Plan  PT Plan  Therapy Frequency (PT): evaluation only  Outcome Evaluation: Patient was able to ambulate in hallway with walker. He has chronic foot drop and is not a candidate for foot orthortics due to numbness and swelling in feet at baseline. His BP was stable at 142/63 after mobility . NO further skilled PT recommended at this point. He will be discharge home with his daughter's help when medicaly stable.        Juan Maldonado, PT  3/21/2025            By cosigning this order, either electronically or physically, I certify that the therapy services are furnished while this patient is under my care, the services outline above are required by this patient, and will be reviewed every 90 days.        M.D.:__________________________________________ Date: ______________    25 1208    25 1113  POC Glucose Once  PROCEDURE ONCE        Comments: Complete no more than 45 minutes prior to patient eating      25 1111    25 0800  Oral Care  2 Times Daily       25 2118    25 0353  calcium carbonate (TUMS) chewable tablet  "500 mg (200 mg elemental)  3 Times Daily PRN         03/21/25 0353    03/21/25 0000  Vital Signs  Every 4 Hours       03/20/25 2118 03/20/25 2215  sodium chloride 0.9 % flush 10 mL  Every 12 Hours Scheduled         03/20/25 2118 03/20/25 2215  heparin (porcine) 5000 UNIT/ML injection 5,000 Units  Every 12 Hours Scheduled         03/20/25 2118 03/20/25 2200  Strict Intake & Output  Every Hour       03/20/25 2118 03/20/25 2200  POC Glucose 4x Daily Before Meals & at Bedtime  4 Times Daily Before Meals & at Bedtime      Comments: Complete no more than 45 minutes prior to patient eating      03/20/25 1932    03/20/25 2119  Intake & Output  Every Shift       03/20/25 2118 03/20/25 2119  Daily Weights  Daily       03/20/25 2118 03/20/25 2118  sodium chloride 0.9 % flush 10 mL  As Needed         03/20/25 2118 03/20/25 2118  sodium chloride 0.9 % infusion 40 mL  As Needed         03/20/25 2118 03/20/25 2118  acetaminophen (TYLENOL) tablet 650 mg  Every 4 Hours PRN        Placed in \"Or\" Linked Group    03/20/25 2118 03/20/25 2118  acetaminophen (TYLENOL) 160 MG/5ML oral solution 650 mg  Every 4 Hours PRN        Placed in \"Or\" Linked Group    03/20/25 2118 03/20/25 2118  acetaminophen (TYLENOL) suppository 650 mg  Every 4 Hours PRN        Placed in \"Or\" Linked Group    03/20/25 2118 03/20/25 2118  melatonin tablet 5 mg  Nightly PRN         03/20/25 2118 03/20/25 2118  sennosides-docusate (PERICOLACE) 8.6-50 MG per tablet 2 tablet  2 Times Daily PRN        Placed in \"And\" Linked Group    03/20/25 2118 03/20/25 2118  polyethylene glycol (MIRALAX) packet 17 g  Daily PRN        Placed in \"And\" Linked Group    03/20/25 2118 03/20/25 2118  bisacodyl (DULCOLAX) EC tablet 5 mg  Daily PRN        Placed in \"And\" Linked Group    03/20/25 2118 03/20/25 2118  bisacodyl (DULCOLAX) suppository 10 mg  Daily PRN        Placed in \"And\" Linked Group    03/20/25 2118 03/20/25 2118  ondansetron " "(ZOFRAN) injection 4 mg  Every 6 Hours PRN         03/20/25 2118 03/20/25 2100  Insulin Lispro (humaLOG) injection 2-7 Units  4 Times Daily Before Meals & Nightly         03/20/25 1932 03/20/25 1932  dextrose (GLUTOSE) oral gel 15 g  Every 15 Minutes PRN         03/20/25 1932 03/20/25 1932  dextrose (D50W) (25 g/50 mL) IV injection 25 g  Every 15 Minutes PRN         03/20/25 1932 03/20/25 1932  glucagon (GLUCAGEN) injection 1 mg  Every 15 Minutes PRN         03/20/25 1932 03/20/25 1837  nicotine (NICODERM CQ) 21 MG/24HR patch 1 patch  Every 24 Hours Scheduled         03/20/25 1821 03/20/25 1833  niCARdipine (CARDENE) 25mg in 250mL NS infusion  Titrated         03/20/25 1817    03/20/25 1620  sodium chloride 0.9 % flush 10 mL  As Needed        Placed in \"And\" Linked Group    03/20/25 1621    03/20/25 1616  sodium chloride 0.9 % flush 10 mL  As Needed         03/20/25 1616    Unscheduled  Follow Hypoglycemia Standing Orders For Blood Glucose <70 & Notify Provider of Treatment  As Needed      Comments: Follow Hypoglycemia Orders As Outlined in Process Instructions (Open Order Report to View Full Instructions)  Notify Provider Any Time Hypoglycemia Treatment is Administered    03/20/25 1932    --  insulin regular (humuLIN R,novoLIN R) 100 UNIT/ML injection  3 Times Daily Before Meals         03/20/25 2147    --  insulin glargine (LANTUS, SEMGLEE) 100 UNIT/ML injection  Nightly         03/20/25 2147    --  famotidine (PEPCID) 20 MG tablet  2 Times Daily         03/20/25 2147    --  melatonin 5 MG tablet tablet  Nightly PRN         03/20/25 2147    --  gabapentin (NEURONTIN) 400 MG capsule  2 Times Daily PRN         03/20/25 2147    --  oxyCODONE-acetaminophen (PERCOCET) 7.5-325 MG per tablet  4 Times Daily PRN         03/20/25 2147    --  hydrALAZINE (APRESOLINE) 25 MG tablet  3 Times Daily         03/20/25 2147    --  aspirin 81 MG chewable tablet  Daily         03/20/25 2147    --  atorvastatin " (LIPITOR) 40 MG tablet  Nightly         25 5373                     Physician Progress Notes (last 24 hours)        Lilia Leroy MD at 25 1418              [facility] Medicine Services  PROGRESS NOTE    Patient Name: Thomas Perez  : 1950  MRN: [id number]    Date of Admission: 3/20/2025  Primary Care Physician: Provider, No Known    Subjective   Subjective     CC:  HTN    HPI:  Pt feeling better this AM. Denies CP. Tired, did not sleep.       Objective   Objective     Vital Signs:   Temp:  [97.7 °F (36.5 °C)-98.2 °F (36.8 °C)] 98.2 °F (36.8 °C)  Heart Rate:  [60-87] 87  Resp:  [16-18] 18  BP: (131-250)/() 166/72     Physical Exam  Constitutional:       General: He is not in acute distress.  Cardiovascular:      Rate and Rhythm: Normal rate and regular rhythm.      Heart sounds: Normal heart sounds.   Pulmonary:      Effort: Pulmonary effort is normal.      Breath sounds: Normal breath sounds.   Abdominal:      General: There is no distension.      Palpations: Abdomen is soft.      Tenderness: There is no abdominal tenderness.   Neurological:      General: No focal deficit present.      Mental Status: He is alert.          Results Reviewed:  LAB RESULTS:      Lab 25  0905 25  1726 25  1626   WBC 9.82  --  10.12   HEMOGLOBIN 9.8*  --  11.0*   HEMATOCRIT 30.4*  --  34.2*   PLATELETS 177  --  184   NEUTROS ABS 7.11*  --  7.50*   IMMATURE GRANS (ABS) 0.06*  --  0.06*   LYMPHS ABS 1.68  --  1.55   MONOS ABS 0.68  --  0.69   EOS ABS 0.27  --  0.29   MCV 92.1  --  91.4   HSTROP T  --  147* 162*         Lab 25  0905 25  1626   SODIUM 140 139   POTASSIUM 5.2 4.8   CHLORIDE 107 104   CO2 21.0* 23.0   ANION GAP 12.0 12.0   BUN 19 25*   CREATININE 1.83* 1.67*   EGFR 38.5* 42.9*   GLUCOSE 177* 326*   CALCIUM 8.9 9.0   HEMOGLOBIN A1C  --  7.90*   TSH 1.790  --          Lab 25  0905 25  1626 25  1711   TOTAL PROTEIN  6.1 7.2  --    ALBUMIN 3.6 4.1  --    GLOBULIN 2.5 3.1  --    ALT (SGPT) 12 14  --    AST (SGOT) 12 11  --    BILIRUBIN 0.2 0.2  --    ALK PHOS 117 148*  --    LIPASE  --   --  32         Lab 03/20/25  1726 03/20/25  1626   PROBNP  --  1,321.0*   HSTROP T 147* 162*             Lab 03/20/25  1726 03/20/25  1626   IRON 47*  --    IRON SATURATION (TSAT) 19*  --    TIBC 243*  --    TRANSFERRIN 163*  --    FERRITIN 315.00  --    FOLATE  --  8.30   VITAMIN B 12  --  366         Brief Urine Lab Results  (Last result in the past 365 days)        Color   Clarity   Blood   Leuk Est   Nitrite   Protein   CREAT   Urine HCG        03/20/25 1637 Yellow   Clear   Small (1+)   Negative   Negative   >=300 mg/dL (3+)                   Microbiology Results Abnormal       None            Duplex Venous Lower Extremity - Bilateral CAR  Result Date: 3/21/2025    Normal bilateral lower extremity venous duplex scan.     XR Chest 1 View  Result Date: 3/20/2025  XR CHEST 1 VW Date of Exam: 3/20/2025 5:50 PM EDT Indication: fatigue Comparison: None available. Findings: Lungs are adequately expanded and appear clear. Cardiomediastinal contours appear within normal limits.     Impression: Impression: No acute cardiopulmonary abnormality is identified. Electronically Signed: Madison Duque  3/20/2025 6:15 PM EDT  Workstation ID: [id number]          Current medications:  Scheduled Meds:[START ON 3/22/2025] aspirin, 81 mg, Oral, Daily  [START ON 3/22/2025] famotidine, 20 mg, Oral, Daily  heparin (porcine), 5,000 Units, Subcutaneous, Q12H  hydrALAZINE, 25 mg, Oral, Q8H  hydroCHLOROthiazide Oral, 12.5 mg, Oral, Daily  insulin glargine, 5 Units, Subcutaneous, Nightly  insulin lispro, 2-7 Units, Subcutaneous, 4x Daily AC & at Bedtime  isosorbide dinitrate, 10 mg, Oral, BID - Nitrates  lactated ringers, 500 mL, Intravenous, Once  nicotine, 1 patch, Transdermal, Q24H  sodium chloride, 10 mL, Intravenous, Q12H  [Held by provider] valsartan, 160 mg, Oral,  Q24H      Continuous Infusions:niCARdipine, 5-15 mg/hr, Last Rate: 2.5 mg/hr (03/21/25 1111)      PRN Meds:.  acetaminophen **OR** acetaminophen **OR** acetaminophen    senna-docusate sodium **AND** polyethylene glycol **AND** bisacodyl **AND** bisacodyl    calcium carbonate    dextrose    dextrose    glucagon (human recombinant)    melatonin    ondansetron    sodium chloride    [COMPLETED] Insert Peripheral IV **AND** sodium chloride    sodium chloride    sodium chloride    Assessment & Plan   Assessment & Plan     Active Hospital Problems    Diagnosis  POA    **Hypertensive urgency [I16.0]  Yes    DM2 (diabetes mellitus, type 2) [E11.9]  Yes    Tobacco abuse [Z72.0]  Yes    CAD (coronary artery disease) [I25.10]  Yes    Anemia [D64.9]  Yes    MAGUI (acute kidney injury) [N17.9]  Yes    PAD (peripheral artery disease) [I73.9]  Yes    Medical non-compliance [Z91.199]  Not Applicable    COPD (chronic obstructive pulmonary disease) [J44.9]  Yes    Bilateral lower extremity edema [R60.0]  Yes      Resolved Hospital Problems   No resolved problems to display.        Brief Hospital Course to date:  Thomas Perez is a 74 y.o. male with hypertension, hyperlipidemia, CAD status post RCA stent in 2010, PAD status post embolectomy and fasciotomy, COPD, poorly controlled type 2 diabetes, CKD, medical noncompliance and chronic pain who presented due to elevated blood pressure found incidentally by home health nurse.    Hypertensive emergency  -Patient off of the Cardene drip this afternoon, have restarted his HCTZ and hydralazine  -denies CP  -Patient unsure what medications he takes.  Spoke to his daughter who knows that he was on valsartan but is unsure what other medications he is on.  -Based on fill history it appears patient was on valsartan-HCTZ, Norvasc and hydralazine  -Restart HCTZ, Norvasc and hydralazine.  Holding on valsartan due to worsening of his MAGUI  -titrate meds as appropriate     MAGUI on CKD  -baseline Cr around  0.9-1.1  -worsening MAGUI today to 1.8  -will trial small bolus of IVF   -holding nephrotoxic medications     BLE edema   -LE Dopplers negative   -ECHO pending     CAD s/p stent  Chronic chest pain  HLD  PAD s/p embolectomy and fasciotomy   -daughter reports he is suppose to be on ASA but has been refusing all meds for some time, will restart   -no change in his baseline chest pain, currently pain free  -continue statin   -continue isordil     Insulin dependent T2DM  -lantus 20, lispro 7 TID at home  -currently on SSI, will add lantus 5 and adjust from there, pt not eating well      COPD  Ongoing tobacco use  -not in acute exacerbation   -NRT    Updated pts daughter over the phone today     Expected Discharge Location and Transportation: home with assist, lived with daughter   Expected Discharge   Expected Discharge Date: 3/22/2025; Expected Discharge Time:      VTE Prophylaxis:  Pharmacologic VTE prophylaxis orders are present.         AM-PAC 6 Clicks Score (PT): 22 (03/21/25 1204)    CODE STATUS:   Code Status and Medical Interventions: CPR (Attempt to Resuscitate); Full Support   Ordered at: 03/20/25 1903     Code Status (Patient has no pulse and is not breathing):    CPR (Attempt to Resuscitate)     Medical Interventions (Patient has pulse or is breathing):    Full Support     Level Of Support Discussed With:    Patient       Lilia Leroy MD  03/21/25       Electronically signed by Lilia Leroy MD at 03/21/25 1444       Consult Notes (last 24 hours)  Notes from 03/21/25 1041 through 03/22/25 1041   No notes of this type exist for this encounter.

## 2025-03-22 NOTE — PROGRESS NOTES
[facility] Medicine Services  PROGRESS NOTE    Patient Name: Thomas Perez  : 1950  MRN: [id number]    Date of Admission: 3/20/2025  Primary Care Physician: Provider, No Known    Subjective   Subjective     CC:  HTN    HPI:  Overnight rapid response called for chest pain.  Patient's blood pressure elevated again was put back on the Cardene.  This morning patient back off the Cardene due to lower pressure.  His chest pain has resolved.       Objective   Objective     Vital Signs:   Temp:  [98.2 °F (36.8 °C)-98.3 °F (36.8 °C)] 98.3 °F (36.8 °C)  Heart Rate:  [66-87] 78  Resp:  [18-20] 20  BP: (114-227)/(54-99) 114/58  Flow (L/min) (Oxygen Therapy):  [2-3] 2     Physical Exam  Constitutional:       General: He is not in acute distress.  Cardiovascular:      Rate and Rhythm: Normal rate and regular rhythm.      Heart sounds: Normal heart sounds.   Pulmonary:      Effort: Pulmonary effort is normal.      Breath sounds: Normal breath sounds.   Abdominal:      General: There is no distension.      Palpations: Abdomen is soft.      Tenderness: There is no abdominal tenderness.   Neurological:      General: No focal deficit present.      Mental Status: He is alert.          Results Reviewed:  LAB RESULTS:      Lab 25  03425  0925  1726 25  1626   WBC 10.82* 9.82  --  10.12   HEMOGLOBIN 9.4* 9.8*  --  11.0*   HEMATOCRIT 29.1* 30.4*  --  34.2*   PLATELETS 165 177  --  184   NEUTROS ABS 7.80* 7.11*  --  7.50*   IMMATURE GRANS (ABS) 0.04 0.06*  --  0.06*   LYMPHS ABS 1.74 1.68  --  1.55   MONOS ABS 0.90 0.68  --  0.69   EOS ABS 0.31 0.27  --  0.29   MCV 91.2 92.1  --  91.4   LACTATE 0.9  --   --   --    HSTROP T 169*  --  147* 162*         Lab 25  0345 25  0905 25  1626   SODIUM 136  136 140 139   POTASSIUM 5.1  5.1 5.2 4.8   CHLORIDE 102  102 107 104   CO2 22.0  22.0 21.0* 23.0   ANION GAP 12.0  12.0 12.0 12.0   BUN 26*  26* 19 25*    CREATININE 1.80*  1.80* 1.83* 1.67*   EGFR 39.3*  39.3* 38.5* 42.9*   GLUCOSE 191*  191* 177* 326*   CALCIUM 9.0  9.0 8.9 9.0   MAGNESIUM 1.9  --   --    HEMOGLOBIN A1C  --   --  7.90*   TSH  --  1.790  --          Lab 03/22/25  0345 03/21/25  0905 03/20/25  1626   TOTAL PROTEIN 6.4 6.1 7.2   ALBUMIN 3.6 3.6 4.1   GLOBULIN 2.8 2.5 3.1   ALT (SGPT) 12 12 14   AST (SGOT) 15 12 11   BILIRUBIN 0.2 0.2 0.2   ALK PHOS 104 117 148*         Lab 03/22/25  0345 03/20/25  1726 03/20/25  1626   PROBNP  --   --  1,321.0*   HSTROP T 169* 147* 162*             Lab 03/20/25  1726 03/20/25  1626   IRON 47*  --    IRON SATURATION (TSAT) 19*  --    TIBC 243*  --    TRANSFERRIN 163*  --    FERRITIN 315.00  --    FOLATE  --  8.30   VITAMIN B 12  --  366         Brief Urine Lab Results  (Last result in the past 365 days)        Color   Clarity   Blood   Leuk Est   Nitrite   Protein   CREAT   Urine HCG        03/20/25 1637 Yellow   Clear   Small (1+)   Negative   Negative   >=300 mg/dL (3+)                   Microbiology Results Abnormal       None            XR Chest 1 View  Result Date: 3/22/2025  XR CHEST 1 VW Date of Exam: 3/22/2025 3:46 AM EDT Indication: chest pain Comparison: 3/20/2025. Findings: Questionable mild infiltrates within the bilateral lower lobes. No pleural fluid. No pneumothorax. The pulmonary vasculature appears within normal limits. The cardiac and mediastinal silhouette appear unremarkable. No acute osseous abnormality identified.     Impression: Impression: Questionable mild infiltrates within the bilateral lower lobes which which may be related to a mild pneumonia. Electronically Signed: Olga Vicente MD  3/22/2025 5:02 AM EDT  Workstation ID: [id number]    Duplex Venous Lower Extremity - Bilateral CAR  Result Date: 3/21/2025    Normal bilateral lower extremity venous duplex scan.     XR Chest 1 View  Result Date: 3/20/2025  XR CHEST 1 VW Date of Exam: 3/20/2025 5:50 PM EDT Indication: fatigue Comparison: None  available. Findings: Lungs are adequately expanded and appear clear. Cardiomediastinal contours appear within normal limits.     Impression: Impression: No acute cardiopulmonary abnormality is identified. Electronically Signed: Madison Duque  3/20/2025 6:15 PM EDT  Workstation ID: [id number]      Results for orders placed during the hospital encounter of 03/20/25    Adult Transthoracic Echo Complete w/ Color, Spectral and Contrast if necessary per protocol    Interpretation Summary    Left ventricular systolic function is normal. Calculated left ventricular EF = 62.7% Left ventricular ejection fraction appears to be 61 - 65%.    Normal left atrial size and volume noted.      Current medications:  Scheduled Meds:amLODIPine, 10 mg, Oral, Q24H  aspirin, 81 mg, Oral, Daily  famotidine, 20 mg, Oral, Daily  heparin (porcine), 5,000 Units, Subcutaneous, Q12H  hydrALAZINE, 50 mg, Oral, Q8H  hydroCHLOROthiazide Oral, 12.5 mg, Oral, Daily  insulin glargine, 5 Units, Subcutaneous, Nightly  insulin lispro, 2-7 Units, Subcutaneous, 4x Daily AC & at Bedtime  isosorbide dinitrate, 10 mg, Oral, BID - Nitrates  Lidocaine, 1 patch, Transdermal, Q24H  niCARdipine, , ,   nicotine, 1 patch, Transdermal, Q24H  sodium chloride, 10 mL, Intravenous, Q12H  [Held by provider] valsartan, 160 mg, Oral, Q24H      Continuous Infusions:niCARdipine, 5-15 mg/hr, Last Rate: 2.5 mg/hr (03/22/25 0708)      PRN Meds:.  acetaminophen **OR** acetaminophen **OR** acetaminophen    senna-docusate sodium **AND** polyethylene glycol **AND** bisacodyl **AND** bisacodyl    calcium carbonate    dextrose    dextrose    glucagon (human recombinant)    melatonin    niCARdipine    nitroglycerin    ondansetron    sodium chloride    [COMPLETED] Insert Peripheral IV **AND** sodium chloride    sodium chloride    sodium chloride    Assessment & Plan   Assessment & Plan     Active Hospital Problems    Diagnosis  POA    **Hypertensive urgency [I16.0]  Yes    DM2 (diabetes  mellitus, type 2) [E11.9]  Yes    Tobacco abuse [Z72.0]  Yes    CAD (coronary artery disease) [I25.10]  Yes    Anemia [D64.9]  Yes    MAGUI (acute kidney injury) [N17.9]  Yes    PAD (peripheral artery disease) [I73.9]  Yes    Medical non-compliance [Z91.199]  Not Applicable    COPD (chronic obstructive pulmonary disease) [J44.9]  Yes    Bilateral lower extremity edema [R60.0]  Yes      Resolved Hospital Problems   No resolved problems to display.        Brief Hospital Course to date:  Thomas Perez is a 74 y.o. male with hypertension, hyperlipidemia, CAD status post RCA stent in 2010, PAD status post embolectomy and fasciotomy, COPD, poorly controlled type 2 diabetes, CKD, medical noncompliance and chronic pain who presented due to elevated blood pressure found incidentally by home health nurse.    Hypertensive emergency  Acute on chronic chest pain  -Patient weaned back off the Cardene drip this morning  -Patient has chronic chest pain on Isordil.  Had acute episode overnight with diaphoresis and shortness of breath.  -Troponins elevated again this morning and trending up, likely secondary to hypertension however with the addition of chest pain and dyspnea will ask cardiology to evaluate  -Patient back on his amlodipine, will increase his hydralazine this morning, continue HCTZ.   -Renal function stable from yesterday but still elevated above baseline so continue holding valsartan for now  -Patient unsure what medications he takes.  Spoke to his daughter who knows that he was on valsartan but is unsure what other medications he is on.    MAGUI on CKD  -baseline Cr around 0.9-1.1  - MAGUI stable today to 1.8  -will trial some more fluids today    -holding nephrotoxic medications     BLE edema   -LE Dopplers negative   -ECHO unremarkable    CAD s/p stent  Chronic chest pain  HLD  PAD s/p embolectomy and fasciotomy   -daughter reports he is suppose to be on ASA but has been refusing all meds for some time, will restart    -continue statin   -continue isordil     Insulin dependent T2DM  -lantus 20, lispro 7 TID at home  -currently on SSI, will add lantus 5 and adjust from there, pt not eating well      COPD  Ongoing tobacco use  -not in acute exacerbation   -NRT        Expected Discharge Location and Transportation: home with assist, lived with daughter   Expected Discharge   Expected Discharge Date: 3/22/2025; Expected Discharge Time:      VTE Prophylaxis:  Pharmacologic VTE prophylaxis orders are present.         AM-PAC 6 Clicks Score (PT): 22 (03/21/25 2028)    CODE STATUS:   Code Status and Medical Interventions: CPR (Attempt to Resuscitate); Full Support   Ordered at: 03/20/25 1906     Code Status (Patient has no pulse and is not breathing):    CPR (Attempt to Resuscitate)     Medical Interventions (Patient has pulse or is breathing):    Full Support     Level Of Support Discussed With:    Patient       Lilia Leroy MD  03/22/25

## 2025-03-22 NOTE — PLAN OF CARE
Problem: Adult Inpatient Plan of Care  Goal: Absence of Hospital-Acquired Illness or Injury  Intervention: Identify and Manage Fall Risk  Recent Flowsheet Documentation  Taken 3/22/2025 0748 by Nicci Diaz RN  Safety Promotion/Fall Prevention:   activity supervised   fall prevention program maintained   gait belt   lighting adjusted   muscle strengthening facilitated   nonskid shoes/slippers when out of bed     Problem: Adult Inpatient Plan of Care  Goal: Absence of Hospital-Acquired Illness or Injury  Intervention: Prevent Skin Injury  Recent Flowsheet Documentation  Taken 3/22/2025 1500 by Nicci Diaz, RN  Body Position: position maintained  Taken 3/22/2025 1308 by Nicci Diaz RN  Body Position:   turned   left  Taken 3/22/2025 0748 by Nicci Diaz RN  Body Position:   position changed independently   position maintained  Skin Protection: incontinence pads utilized   Goal Outcome Evaluation:  Plan of Care Reviewed With: patient        Progress: no change

## 2025-03-22 NOTE — CONSULTS
"Inpatient Cardiology Consult  Consult performed by: Andrea Warren IV, MD  Consult ordered by: Lilia Leroy MD  Reason for consult: Hypertensive emergency, elevated troponin            Cardiology Consult         IDENTIFICATION: 74-year-old gentleman who resides in [facility] Problems    Diagnosis  POA    **Hypertensive emergency [I16.1]  Yes     Priority: High     Echo (3/21/2025): LVEF 62%.  No significant valvular abnormality  Renal artery duplex (3/23/2025): No renal artery stenosis      Atypical angina [I20.89]  Yes     Priority: High    Elevated troponin [R79.89]  Yes     Priority: Medium     Echo (3/21/2025): LVEF 62%.  No significant valvular abnormality      Medical non-compliance [Z91.199]  Not Applicable     Priority: Medium    Type 2 diabetes mellitus with hyperglycemia, with long-term current use of insulin [E11.65, Z79.4]  Not Applicable    Tobacco abuse [Z72.0]  Yes    Coronary artery disease involving native coronary artery of native heart with angina pectoris [I25.119]  Yes     PCI of RCA, 2010  S/p coronary angiography in May 2012, which demonstrated disease best managed medically.  Nuclear stress test (1/23/2023): Normal perfusion.  Normal LVEF      Anemia [D64.9]  Yes    CKD (chronic kidney disease) stage 3, GFR 30-59 ml/min [N18.30]  Yes    PAD (peripheral artery disease) [I73.9]  Yes    COPD (chronic obstructive pulmonary disease) [J44.9]  Yes    Bilateral lower extremity edema [R60.0]  Yes              74-year-old gentleman who was referred by home health nurse to [facility] for severely elevated blood pressure (reported >250 mmHg).  On arrival to ER, /100 mmHg.  He has been noncompliant with multiple medications including his antihypertensives.  He states that one of his blood pressure medicines \"makes him sick\".    Since admission, he has been receiving amlodipine, BiDil, and carvedilol.  He states that 1 of these medicines seems to be " making him sick.    The patient reports left-sided chest pain which is worse when he pushes on his chest wall.  He states that he had a heart stent placed years ago for the same chest pain and the pain did not improve.  He also reports chest and back discomfort that occurs when he is walking but also occurs at rest.    Allergies   Allergen Reactions    Penicillins Hives    Valium [Diazepam] Hives       Prior to Admission medications    Medication Sig Start Date End Date Taking? Authorizing Provider   aspirin 81 MG chewable tablet Chew 1 tablet Daily. OTC   Yes Leeanne Jo MD   atorvastatin (LIPITOR) 40 MG tablet Take 1 tablet by mouth Every Night.   Yes Leeanne Jo MD   famotidine (PEPCID) 20 MG tablet Take 1 tablet by mouth 2 (Two) Times a Day. OTC   Yes Leeanne Jo MD   gabapentin (NEURONTIN) 400 MG capsule Take 1 capsule by mouth 2 (Two) Times a Day As Needed (As Needed).   Yes Leeanne Jo MD   hydrALAZINE (APRESOLINE) 25 MG tablet Take 1 tablet by mouth 3 (Three) Times a Day.   Yes Leeanne Jo MD   insulin glargine (LANTUS, SEMGLEE) 100 UNIT/ML injection Inject 22 Units under the skin into the appropriate area as directed Every Night.   Yes Leeanne Jo MD   insulin regular (humuLIN R,novoLIN R) 100 UNIT/ML injection Inject 7 Units under the skin into the appropriate area as directed 3 (Three) Times a Day Before Meals.   Yes Leeanne Jo MD   melatonin 5 MG tablet tablet Take 2 tablets by mouth At Night As Needed (Sleep). OTC   Yes Leeanne Jo MD   oxyCODONE-acetaminophen (PERCOCET) 7.5-325 MG per tablet Take 1 tablet by mouth 4 (Four) Times a Day As Needed for Moderate Pain (For Pain).   Yes Leeanne Jo MD       Past Medical History:   Diagnosis Date    Back injury     Diabetes mellitus     History of angina     Hypertension        Past Surgical History:   Procedure Laterality Date    CORONARY STENT PLACEMENT       EMBOLECTOMY      FASCIOTOMY         History reviewed. No pertinent family history.    Social History     Tobacco Use   Smoking Status Every Day    Current packs/day: 1.00    Types: Cigarettes   Smokeless Tobacco Never   Tobacco Comments    pt report smoking since he was [age]       Social History     Substance and Sexual Activity   Alcohol Use Never         Review of Systems:   Review of Systems   Cardiovascular:  Positive for chest pain and leg swelling.            Vital Sign Min/Max for last 24 hours  Temp  Min: 98.2 °F (36.8 °C)  Max: 98.8 °F (37.1 °C)   BP  Min: 126/71  Max: 168/65   Pulse  Min: 69  Max: 79   Resp  Min: 16  Max: 18   SpO2  Min: 90 %  Max: 96 %   No data recorded      Intake/Output Summary (Last 24 hours) at 3/23/2025 1335  Last data filed at 3/23/2025 1245  Gross per 24 hour   Intake --   Output 1250 ml   Net -1250 ml           Tele: Sinus    Constitutional:       Appearance: Chronically ill-appearing.   Eyes:      General: No scleral icterus.  Neck:      Thyroid: No thyroid mass.      Vascular: No carotid bruit or JVD. JVD normal.   Pulmonary:      Effort: Pulmonary effort is normal.      Breath sounds: Normal breath sounds.   Cardiovascular:      Normal rate. Regular rhythm.      Murmurs: There is no murmur.      No gallop.    Edema:     Peripheral edema present.  Skin:     General: Skin is warm. There is no cyanosis.   Neurological:      General: No focal deficit present.      Mental Status: Alert.   Psychiatric:         Attention and Perception: Attention normal.              DATA REVIEW:    EKG (3/20/2025): Sinus rhythm.  Nonspecific ST and T wave abnormality    Echo (3/21/2025): LVEF 62%.  No significant valvular abnormality    Renal duplex (3/23/2025): No renal artery stenosis      Results from last 7 days   Lab Units 03/23/25  0736 03/22/25  0345 03/21/25  0905 03/20/25  1626   SODIUM mmol/L 138 136  136 140 139   POTASSIUM mmol/L 5.0 5.1  5.1 5.2 4.8   CHLORIDE mmol/L 105 102  102  107 104   BUN mg/dL 31* 26*  26* 19 25*   CREATININE mg/dL 2.10* 1.80*  1.80* 1.83* 1.67*   MAGNESIUM mg/dL 1.9 1.9  --   --      Results from last 7 days   Lab Units 03/22/25  0939 03/22/25  0345 03/20/25  1726   HSTROP T ng/L 174* 169* 147*     Results from last 7 days   Lab Units 03/23/25  0736 03/22/25  0345 03/21/25  0905   WBC 10*3/mm3 9.76 10.82* 9.82   HEMOGLOBIN g/dL 8.3* 9.4* 9.8*   HEMATOCRIT % 25.4* 29.1* 30.4*   PLATELETS 10*3/mm3 144 165 177     Lab Results   Component Value Date    HGBA1C 7.90 (H) 03/20/2025     Lab Results   Component Value Date    CHOL 137 03/22/2025    TRIG 367 (H) 03/22/2025    HDL 32 (L) 03/22/2025    LDL 50 03/22/2025    AST 15 03/22/2025    ALT 12 03/22/2025     Lab Results   Component Value Date    TROPONINT 174 (C) 03/22/2025    TROPONINT 169 (C) 03/22/2025    TROPONINT 147 (C) 03/20/2025         Lab 03/20/25  1626   PROBNP 1,321.0*            Atypical chest pain  History of RCA PCI  Normal ischemic evaluation 2023    Hypertensive emergency  Has not been taking BP meds  Blood pressure better controlled  Start carvedilol 6.25 mg twice daily given history of CAD  Attempt to reduce amlodipine dosing due to lower extremity swelling  Continue hydralazine and isosorbide in BiDil formulation  No ACE/ARB/ARNI due to renal insufficiency    Type II NSTEMI due to hypertensive emergency    Peripheral arterial disease  Status post previous interventions  No critical limb ischemia    Renal insufficiency  Unclear whether acute or chronic  Renal artery duplex shows no evidence of renal artery stenosis    Hyperlipidemia with target LDL <50  Obtain lipid profile    Medical noncompliance    Type 2 diabetes mellitus with hyperglycemia             Increase carvedilol to 12.5 mg  Change hydralazine and isosorbide to BiDil formulation  Continue amlodipine  Nuclear stress in a.m.      H. C. Cam Warren MD [facility], [facility]  Interventional and General Cardiology

## 2025-03-22 NOTE — PLAN OF CARE
Problem: Adult Inpatient Plan of Care  Goal: Absence of Hospital-Acquired Illness or Injury  Intervention: Identify and Manage Fall Risk  Recent Flowsheet Documentation  Taken 3/22/2025 0408 by Honey Diaz RN  Safety Promotion/Fall Prevention:   activity supervised   assistive device/personal items within reach   clutter free environment maintained   lighting adjusted   nonskid shoes/slippers when out of bed   room organization consistent   safety round/check completed  Taken 3/22/2025 0335 by Honey Diaz RN  Safety Promotion/Fall Prevention:   activity supervised   assistive device/personal items within reach   clutter free environment maintained   lighting adjusted   nonskid shoes/slippers when out of bed   room organization consistent   safety round/check completed  Taken 3/22/2025 0207 by Honey Diaz RN  Safety Promotion/Fall Prevention:   activity supervised   assistive device/personal items within reach   clutter free environment maintained   lighting adjusted   nonskid shoes/slippers when out of bed   room organization consistent   safety round/check completed  Taken 3/22/2025 0047 by Honey Diaz RN  Safety Promotion/Fall Prevention:   activity supervised   assistive device/personal items within reach   clutter free environment maintained   lighting adjusted   nonskid shoes/slippers when out of bed   safety round/check completed   room organization consistent  Taken 3/21/2025 2233 by Honey Diaz RN  Safety Promotion/Fall Prevention:   activity supervised   assistive device/personal items within reach   clutter free environment maintained   lighting adjusted   nonskid shoes/slippers when out of bed   room organization consistent   safety round/check completed  Taken 3/21/2025 2028 by Honey Diaz RN  Safety Promotion/Fall Prevention:   activity supervised   assistive device/personal items within reach   clutter free environment maintained   lighting adjusted   nonskid shoes/slippers when out of  bed   room organization consistent   safety round/check completed  Intervention: Prevent Skin Injury  Recent Flowsheet Documentation  Taken 3/22/2025 0408 by Honey Diaz RN  Skin Protection: incontinence pads utilized  Taken 3/22/2025 0335 by Honey Diaz RN  Body Position: position changed independently  Taken 3/22/2025 0207 by Honey Diaz RN  Body Position: position changed independently  Skin Protection: incontinence pads utilized  Taken 3/22/2025 0047 by Honey Diaz RN  Body Position: position changed independently  Skin Protection:   incontinence pads utilized   skin sealant/moisture barrier applied  Taken 3/21/2025 2233 by Honey Diaz RN  Body Position: position changed independently  Skin Protection:   incontinence pads utilized   skin sealant/moisture barrier applied  Taken 3/21/2025 2028 by Honey Diaz RN  Body Position: position changed independently  Skin Protection: incontinence pads utilized  Intervention: Prevent and Manage VTE (Venous Thromboembolism) Risk  Recent Flowsheet Documentation  Taken 3/22/2025 0408 by Honey Diaz RN  VTE Prevention/Management: (HEPARIN) other (see comments)  Taken 3/22/2025 0335 by Honey Diaz RN  VTE Prevention/Management: (HEPARIN) other (see comments)  Taken 3/22/2025 0207 by Honey Diaz RN  VTE Prevention/Management: (heparin) other (see comments)  Taken 3/22/2025 0047 by Honey Diaz RN  VTE Prevention/Management: (heparin) other (see comments)  Taken 3/21/2025 2233 by Honey Diaz RN  VTE Prevention/Management: (heparin) other (see comments)  Taken 3/21/2025 2028 by Honey Diaz RN  VTE Prevention/Management: (HEPARIN) other (see comments)  Intervention: Prevent Infection  Recent Flowsheet Documentation  Taken 3/22/2025 0408 by Honey Diaz RN  Infection Prevention:   environmental surveillance performed   hand hygiene promoted   rest/sleep promoted   single patient room provided  Taken 3/22/2025 0335 by Honey Diaz RN  Infection Prevention:    environmental surveillance performed   hand hygiene promoted   rest/sleep promoted   single patient room provided  Taken 3/22/2025 0207 by Honey Diaz, CORETTA  Infection Prevention:   environmental surveillance performed   hand hygiene promoted   rest/sleep promoted   single patient room provided  Taken 3/22/2025 0047 by Honey Diaz RN  Infection Prevention:   environmental surveillance performed   hand hygiene promoted   rest/sleep promoted   single patient room provided  Taken 3/21/2025 2233 by Honey Diaz RN  Infection Prevention:   environmental surveillance performed   hand hygiene promoted   rest/sleep promoted   single patient room provided  Taken 3/21/2025 2028 by Honey Diaz RN  Infection Prevention:   environmental surveillance performed   hand hygiene promoted   rest/sleep promoted   single patient room provided  Goal: Optimal Comfort and Wellbeing  Intervention: Provide Person-Centered Care  Recent Flowsheet Documentation  Taken 3/21/2025 2028 by Honey Diaz RN  Trust Relationship/Rapport:   care explained   choices provided   emotional support provided   empathic listening provided   questions answered   questions encouraged   reassurance provided   thoughts/feelings acknowledged   Goal Outcome Evaluation:

## 2025-03-22 NOTE — SIGNIFICANT NOTE
RRT chest pain    9/10 sharp pain left sternum and shoulder. States similar to prior. Cardene was weaned recently. Hypertensive 230s systolic. No acute changes on EKG. Did vomit x1. Marginally better with nitro. Will give dose of dilaudid, restart cardene, check CXR. Repeat EKG in 10-15 min to make sure no evolving changes. Lab panel drawn and pending.    Grupo Rebollar MD

## 2025-03-23 ENCOUNTER — APPOINTMENT (OUTPATIENT)
Dept: CARDIOLOGY | Facility: HOSPITAL | Age: 75
End: 2025-03-23
Payer: MEDICARE

## 2025-03-23 PROBLEM — I20.89 ATYPICAL ANGINA: Status: ACTIVE | Noted: 2025-03-23

## 2025-03-23 LAB
ANION GAP SERPL CALCULATED.3IONS-SCNC: 11 MMOL/L (ref 5–15)
BH CV ECHO MEAS - DIST REN A EDV LEFT: 14.2 CM/S
BH CV ECHO MEAS - DIST REN A PSV LEFT: 85.1 CM/S
BH CV ECHO MEAS - MID REN A EDV LEFT: 12.8 CM/S
BH CV ECHO MEAS - MID REN A PSV LEFT: 105.7 CM/S
BH CV ECHO MEAS - PROX REN A EDV LEFT: 15.1 CM/S
BH CV ECHO MEAS - PROX REN A PSV LEFT: 102 CM/S
BH CV VAS BP RIGHT ARM: NORMAL MMHG
BH CV VAS RENAL AORTIC MID PSV: 62.8 CM/S
BH CV VAS RENAL HILUM LEFT EDV: 14.9 CM/S
BH CV VAS RENAL HILUM LEFT PSV: 139.7 CM/S
BH CV VAS RENAL HILUM RIGHT EDV: 11.8 CM/S
BH CV VAS RENAL HILUM RIGHT PSV: 62.2 CM/S
BH CV XLRA MEAS - KID L LEFT: 11.6 CM
BH CV XLRA MEAS DIST REN A EDV RIGHT: 20.4 CM/S
BH CV XLRA MEAS DIST REN A PSV RIGHT: 80.4 CM/S
BH CV XLRA MEAS KID L RIGHT: 10.8 CM
BH CV XLRA MEAS MID REN A EDV RIGHT: 12.9 CM/S
BH CV XLRA MEAS MID REN A PSV RIGHT: 75.1 CM/S
BH CV XLRA MEAS PROX REN A EDV RIGHT: 17.2 CM/S
BH CV XLRA MEAS PROX REN A PSV RIGHT: 71.9 CM/S
BH CV XLRA MEAS RAR LEFT: 1.68
BH CV XLRA MEAS RAR RIGHT: 1.28
BH CV XLRA MEAS RENAL A ORG EDV LEFT: 16.9 CM/S
BH CV XLRA MEAS RENAL A ORG EDV RIGHT: 13 CM/S
BH CV XLRA MEAS RENAL A ORG PSV LEFT: 102.6 CM/S
BH CV XLRA MEAS RENAL A ORG PSV RIGHT: 67.8 CM/S
BUN SERPL-MCNC: 31 MG/DL (ref 8–23)
BUN/CREAT SERPL: 14.8 (ref 7–25)
CALCIUM SPEC-SCNC: 8.5 MG/DL (ref 8.6–10.5)
CHLORIDE SERPL-SCNC: 105 MMOL/L (ref 98–107)
CO2 SERPL-SCNC: 22 MMOL/L (ref 22–29)
CREAT SERPL-MCNC: 2.1 MG/DL (ref 0.76–1.27)
DEPRECATED RDW RBC AUTO: 47.9 FL (ref 37–54)
EGFRCR SERPLBLD CKD-EPI 2021: 32.6 ML/MIN/1.73
ERYTHROCYTE [DISTWIDTH] IN BLOOD BY AUTOMATED COUNT: 14.2 % (ref 12.3–15.4)
GLUCOSE BLDC GLUCOMTR-MCNC: 136 MG/DL (ref 70–130)
GLUCOSE BLDC GLUCOMTR-MCNC: 140 MG/DL (ref 70–130)
GLUCOSE BLDC GLUCOMTR-MCNC: 205 MG/DL (ref 70–130)
GLUCOSE BLDC GLUCOMTR-MCNC: 269 MG/DL (ref 70–130)
GLUCOSE SERPL-MCNC: 123 MG/DL (ref 65–99)
HCT VFR BLD AUTO: 25.4 % (ref 37.5–51)
HGB BLD-MCNC: 8.3 G/DL (ref 13–17.7)
LEFT RENAL UPPER PARENCHYMA MAX: 14.6 CM/S
LEFT RENAL UPPER PARENCHYMA MIN: 4.2 CM/S
LEFT RENAL UPPER PARENCHYMA RI: 0.71
MAGNESIUM SERPL-MCNC: 1.9 MG/DL (ref 1.6–2.4)
MCH RBC QN AUTO: 30 PG (ref 26.6–33)
MCHC RBC AUTO-ENTMCNC: 32.7 G/DL (ref 31.5–35.7)
MCV RBC AUTO: 91.7 FL (ref 79–97)
PLATELET # BLD AUTO: 144 10*3/MM3 (ref 140–450)
PMV BLD AUTO: 10.1 FL (ref 6–12)
POTASSIUM SERPL-SCNC: 5 MMOL/L (ref 3.5–5.2)
RBC # BLD AUTO: 2.77 10*6/MM3 (ref 4.14–5.8)
RIGHT RENAL UPPER PARENCHYMA MAX: 15.6 CM/S
RIGHT RENAL UPPER PARENCHYMA MIN: 5.8 CM/S
RIGHT RENAL UPPER PARENCHYMA RI: 0.63
SODIUM SERPL-SCNC: 138 MMOL/L (ref 136–145)
WBC NRBC COR # BLD AUTO: 9.76 10*3/MM3 (ref 3.4–10.8)

## 2025-03-23 PROCEDURE — 85027 COMPLETE CBC AUTOMATED: CPT | Performed by: STUDENT IN AN ORGANIZED HEALTH CARE EDUCATION/TRAINING PROGRAM

## 2025-03-23 PROCEDURE — 25010000002 ONDANSETRON PER 1 MG: Performed by: PHYSICIAN ASSISTANT

## 2025-03-23 PROCEDURE — 25010000002 HEPARIN (PORCINE) PER 1000 UNITS: Performed by: PHYSICIAN ASSISTANT

## 2025-03-23 PROCEDURE — 63710000001 INSULIN GLARGINE PER 5 UNITS: Performed by: STUDENT IN AN ORGANIZED HEALTH CARE EDUCATION/TRAINING PROGRAM

## 2025-03-23 PROCEDURE — 99232 SBSQ HOSP IP/OBS MODERATE 35: CPT | Performed by: STUDENT IN AN ORGANIZED HEALTH CARE EDUCATION/TRAINING PROGRAM

## 2025-03-23 PROCEDURE — 82948 REAGENT STRIP/BLOOD GLUCOSE: CPT

## 2025-03-23 PROCEDURE — 63710000001 INSULIN LISPRO (HUMAN) PER 5 UNITS: Performed by: PHYSICIAN ASSISTANT

## 2025-03-23 PROCEDURE — 93975 VASCULAR STUDY: CPT

## 2025-03-23 PROCEDURE — 80048 BASIC METABOLIC PNL TOTAL CA: CPT | Performed by: STUDENT IN AN ORGANIZED HEALTH CARE EDUCATION/TRAINING PROGRAM

## 2025-03-23 PROCEDURE — 99222 1ST HOSP IP/OBS MODERATE 55: CPT | Performed by: INTERNAL MEDICINE

## 2025-03-23 PROCEDURE — 83735 ASSAY OF MAGNESIUM: CPT | Performed by: STUDENT IN AN ORGANIZED HEALTH CARE EDUCATION/TRAINING PROGRAM

## 2025-03-23 RX ORDER — CARVEDILOL 12.5 MG/1
12.5 TABLET ORAL 2 TIMES DAILY WITH MEALS
Status: DISCONTINUED | OUTPATIENT
Start: 2025-03-23 | End: 2025-03-24

## 2025-03-23 RX ORDER — FERROUS SULFATE 325(65) MG
325 TABLET ORAL
Status: DISCONTINUED | OUTPATIENT
Start: 2025-03-23 | End: 2025-04-02 | Stop reason: HOSPADM

## 2025-03-23 RX ADMIN — CARVEDILOL 12.5 MG: 12.5 TABLET, FILM COATED ORAL at 16:46

## 2025-03-23 RX ADMIN — HEPARIN SODIUM 5000 UNITS: 5000 INJECTION INTRAVENOUS; SUBCUTANEOUS at 20:53

## 2025-03-23 RX ADMIN — AMLODIPINE BESYLATE 10 MG: 10 TABLET ORAL at 08:59

## 2025-03-23 RX ADMIN — INSULIN LISPRO 3 UNITS: 100 INJECTION, SOLUTION INTRAVENOUS; SUBCUTANEOUS at 20:52

## 2025-03-23 RX ADMIN — HYDRALAZINE HYDROCHLORIDE: 25 TABLET ORAL at 21:06

## 2025-03-23 RX ADMIN — HYDRALAZINE HYDROCHLORIDE: 25 TABLET ORAL at 12:49

## 2025-03-23 RX ADMIN — INSULIN LISPRO 4 UNITS: 100 INJECTION, SOLUTION INTRAVENOUS; SUBCUTANEOUS at 16:46

## 2025-03-23 RX ADMIN — CARVEDILOL 6.25 MG: 6.25 TABLET, FILM COATED ORAL at 08:59

## 2025-03-23 RX ADMIN — NICOTINE 1 PATCH: 21 PATCH TRANSDERMAL at 09:04

## 2025-03-23 RX ADMIN — ASPIRIN 81 MG: 81 TABLET, COATED ORAL at 09:11

## 2025-03-23 RX ADMIN — INSULIN GLARGINE 5 UNITS: 100 INJECTION, SOLUTION SUBCUTANEOUS at 20:53

## 2025-03-23 RX ADMIN — ONDANSETRON 4 MG: 2 INJECTION INTRAMUSCULAR; INTRAVENOUS at 12:45

## 2025-03-23 RX ADMIN — Medication 10 ML: at 21:06

## 2025-03-23 RX ADMIN — PANTOPRAZOLE SODIUM 40 MG: 40 TABLET, DELAYED RELEASE ORAL at 05:15

## 2025-03-23 RX ADMIN — Medication 10 ML: at 09:04

## 2025-03-23 RX ADMIN — HEPARIN SODIUM 5000 UNITS: 5000 INJECTION INTRAVENOUS; SUBCUTANEOUS at 12:53

## 2025-03-23 RX ADMIN — HYDRALAZINE HYDROCHLORIDE: 25 TABLET ORAL at 05:15

## 2025-03-23 RX ADMIN — FERROUS SULFATE TAB 325 MG (65 MG ELEMENTAL FE) 325 MG: 325 (65 FE) TAB at 12:45

## 2025-03-23 NOTE — PLAN OF CARE
Problem: Adult Inpatient Plan of Care  Goal: Absence of Hospital-Acquired Illness or Injury  Outcome: Progressing  Intervention: Identify and Manage Fall Risk  Recent Flowsheet Documentation  Taken 3/22/2025 2030 by Britany Centeno RN  Safety Promotion/Fall Prevention:   assistive device/personal items within reach   clutter free environment maintained   fall prevention program maintained   nonskid shoes/slippers when out of bed   room organization consistent   safety round/check completed  Intervention: Prevent Skin Injury  Recent Flowsheet Documentation  Taken 3/22/2025 2030 by Britany Centeno RN  Body Position: position changed independently  Skin Protection:   incontinence pads utilized   silicone foam dressing in place  Intervention: Prevent and Manage VTE (Venous Thromboembolism) Risk  Recent Flowsheet Documentation  Taken 3/22/2025 2030 by Britany Centeno RN  VTE Prevention/Management: (heparin) other (see comments)  Intervention: Prevent Infection  Recent Flowsheet Documentation  Taken 3/22/2025 2030 by Britany Centeno RN  Infection Prevention:   environmental surveillance performed   rest/sleep promoted   single patient room provided   Goal Outcome Evaluation:                                             normal...

## 2025-03-23 NOTE — PROGRESS NOTES
[facility] Medicine Services  PROGRESS NOTE    Patient Name: Thomas Perez  : 1950  MRN: [id number]    Date of Admission: 3/20/2025  Primary Care Physician: Provider, No Known    Subjective   Subjective     CC:  HTN    HPI:  Pt doing well this AM. BP better this AM. He had his renal duplex this AM.       Objective   Objective     Vital Signs:   Temp:  [98.2 °F (36.8 °C)-98.8 °F (37.1 °C)] 98.8 °F (37.1 °C)  Heart Rate:  [67-79] 77  Resp:  [16-18] 18  BP: (126-168)/(62-78) 163/62     Physical Exam  Constitutional:       General: He is not in acute distress.  Cardiovascular:      Rate and Rhythm: Normal rate and regular rhythm.      Heart sounds: Normal heart sounds.   Pulmonary:      Effort: Pulmonary effort is normal.      Breath sounds: Normal breath sounds.   Abdominal:      General: There is no distension.      Palpations: Abdomen is soft.      Tenderness: There is no abdominal tenderness.   Neurological:      General: No focal deficit present.      Mental Status: He is alert.          Results Reviewed:  LAB RESULTS:      Lab 25  0736 25  0939 25  0345 25  0905 25  1726 25  1626   WBC 9.76  --  10.82* 9.82  --  10.12   HEMOGLOBIN 8.3*  --  9.4* 9.8*  --  11.0*   HEMATOCRIT 25.4*  --  29.1* 30.4*  --  34.2*   PLATELETS 144  --  165 177  --  184   NEUTROS ABS  --   --  7.80* 7.11*  --  7.50*   IMMATURE GRANS (ABS)  --   --  0.04 0.06*  --  0.06*   LYMPHS ABS  --   --  1.74 1.68  --  1.55   MONOS ABS  --   --  0.90 0.68  --  0.69   EOS ABS  --   --  0.31 0.27  --  0.29   MCV 91.7  --  91.2 92.1  --  91.4   LACTATE  --   --  0.9  --   --   --    HSTROP T  --  174* 169*  --  147* 162*         Lab 25  0736 25  0345 25  0905 25  1626   SODIUM 138 136  136 140 139   POTASSIUM 5.0 5.1  5.1 5.2 4.8   CHLORIDE 105 102  102 107 104   CO2 22.0 22.0  22.0 21.0* 23.0   ANION GAP 11.0 12.0  12.0 12.0 12.0   BUN 31* 26*  26* 19 25*    CREATININE 2.10* 1.80*  1.80* 1.83* 1.67*   EGFR 32.6* 39.3*  39.3* 38.5* 42.9*   GLUCOSE 123* 191*  191* 177* 326*   CALCIUM 8.5* 9.0  9.0 8.9 9.0   MAGNESIUM 1.9 1.9  --   --    HEMOGLOBIN A1C  --   --   --  7.90*   TSH  --   --  1.790  --          Lab 03/22/25  0345 03/21/25  0905 03/20/25  1626   TOTAL PROTEIN 6.4 6.1 7.2   ALBUMIN 3.6 3.6 4.1   GLOBULIN 2.8 2.5 3.1   ALT (SGPT) 12 12 14   AST (SGOT) 15 12 11   BILIRUBIN 0.2 0.2 0.2   ALK PHOS 104 117 148*         Lab 03/22/25  0939 03/22/25  0345 03/20/25  1726 03/20/25  1626   PROBNP  --   --   --  1,321.0*   HSTROP T 174* 169* 147* 162*         Lab 03/22/25  0345   CHOLESTEROL 137   LDL CHOL 50   HDL CHOL 32*   TRIGLYCERIDES 367*         Lab 03/20/25  1726 03/20/25  1626   IRON 47*  --    IRON SATURATION (TSAT) 19*  --    TIBC 243*  --    TRANSFERRIN 163*  --    FERRITIN 315.00  --    FOLATE  --  8.30   VITAMIN B 12  --  366         Brief Urine Lab Results  (Last result in the past 365 days)        Color   Clarity   Blood   Leuk Est   Nitrite   Protein   CREAT   Urine HCG        03/20/25 1637 Yellow   Clear   Small (1+)   Negative   Negative   >=300 mg/dL (3+)                   Microbiology Results Abnormal       None            XR Chest 1 View  Result Date: 3/22/2025  XR CHEST 1 VW Date of Exam: 3/22/2025 3:46 AM EDT Indication: chest pain Comparison: 3/20/2025. Findings: Questionable mild infiltrates within the bilateral lower lobes. No pleural fluid. No pneumothorax. The pulmonary vasculature appears within normal limits. The cardiac and mediastinal silhouette appear unremarkable. No acute osseous abnormality identified.     Impression: Impression: Questionable mild infiltrates within the bilateral lower lobes which which may be related to a mild pneumonia. Electronically Signed: Olga Vicente MD  3/22/2025 5:02 AM EDT  Workstation ID: [id number]      Results for orders placed during the hospital encounter of 03/20/25    Adult Transthoracic Echo  Complete w/ Color, Spectral and Contrast if necessary per protocol    Interpretation Summary    Left ventricular systolic function is normal. Calculated left ventricular EF = 62.7% Left ventricular ejection fraction appears to be 61 - 65%.    Normal left atrial size and volume noted.      Current medications:  Scheduled Meds:amLODIPine, 10 mg, Oral, Q24H  aspirin, 81 mg, Oral, Daily  carvedilol, 6.25 mg, Oral, BID With Meals  heparin (porcine), 5,000 Units, Subcutaneous, Q12H  insulin glargine, 5 Units, Subcutaneous, Nightly  insulin lispro, 2-7 Units, Subcutaneous, 4x Daily AC & at Bedtime  isosorbide dinitrate (ISORDIL) 20 mg, hydrALAZINE (APRESOLINE) 37.5 mg for BIDIL 20-37.5, , Oral, Q8H  Lidocaine, 1 patch, Transdermal, Q24H  nicotine, 1 patch, Transdermal, Q24H  pantoprazole, 40 mg, Oral, Q AM  sodium chloride, 10 mL, Intravenous, Q12H  [Held by provider] valsartan, 160 mg, Oral, Q24H      Continuous Infusions:niCARdipine, 5-15 mg/hr, Last Rate: Stopped (03/22/25 0949)      PRN Meds:.  acetaminophen **OR** acetaminophen **OR** acetaminophen    senna-docusate sodium **AND** polyethylene glycol **AND** bisacodyl **AND** bisacodyl    calcium carbonate    dextrose    dextrose    glucagon (human recombinant)    melatonin    nitroglycerin    ondansetron    sodium chloride    [COMPLETED] Insert Peripheral IV **AND** sodium chloride    sodium chloride    sodium chloride    Assessment & Plan   Assessment & Plan     Active Hospital Problems    Diagnosis  POA    **Hypertensive emergency [I16.1]  Yes    Elevated troponin [R79.89]  Yes    Type 2 diabetes mellitus with hyperglycemia, with long-term current use of insulin [E11.65, Z79.4]  Not Applicable    Tobacco abuse [Z72.0]  Yes    Coronary artery disease involving native coronary artery of native heart with angina pectoris [I25.119]  Yes    Anemia [D64.9]  Yes    CKD (chronic kidney disease) stage 3, GFR 30-59 ml/min [N18.30]  Yes    PAD (peripheral artery disease)  [I73.9]  Yes    Medical non-compliance [Z91.199]  Not Applicable    COPD (chronic obstructive pulmonary disease) [J44.9]  Yes    Bilateral lower extremity edema [R60.0]  Yes      Resolved Hospital Problems    Diagnosis Date Resolved POA    Heart failure with preserved left ventricular function (HFpEF) [I50.30] 03/22/2025 Unknown    Hypertensive urgency [I16.0] 03/22/2025 Yes        Brief Hospital Course to date:  Thomas Perez is a 74 y.o. male with hypertension, hyperlipidemia, CAD status post RCA stent in 2010, PAD status post embolectomy and fasciotomy, COPD, poorly controlled type 2 diabetes, CKD, medical noncompliance and chronic pain who presented due to elevated blood pressure found incidentally by home health nurse.    Hypertensive emergency- resolved   Acute on chronic chest pain  NSTEMI II  -cardiology following, no acute ACS  -continue amlodipine and coreg  -switched to BiDil, will continue .   -Renal function worse from yesterday, continue holding valsartan for now  -Patient unsure what medications he takes. Pt nurse found out yesterday that he cannot read.  Spoke to his daughter who knows that he was on valsartan but is unsure what other medications he is on. He reports she helps manage his meds.    MAGUI on CKD  -baseline Cr around 0.9-1.1 in 2021  - could have a new baseline   -renal function worse today   -hold off on more fluids   -holding nephrotoxic medications   -trend  -may ask nephrology to evaluate in the AM.     Anemia   -worse today however pt has had IVF and all cell lines dropped so likely dilutional component  -no signs of bleeding   -iron studies most consistent with anemia of chronic disease, likely CKD  -will start iron supplementation    BLE edema   -LE Dopplers negative   -ECHO unremarkable  -stable     CAD s/p stent  Chronic chest pain  HLD  PAD s/p embolectomy and fasciotomy   -daughter reports he is suppose to be on ASA but has been refusing all meds for some time, will restart    -continue statin   -continue isordil     Insulin dependent T2DM  -lantus 20, lispro 7 TID at home  -currently on SSI, will add lantus 5 and adjust from there, pt not eating well      COPD  Ongoing tobacco use  -not in acute exacerbation   -NRT        Expected Discharge Location and Transportation: home with assist, lived with daughter   Expected Discharge   Expected Discharge Date: 3/22/2025; Expected Discharge Time:      VTE Prophylaxis:  Pharmacologic VTE prophylaxis orders are present.         AM-PAC 6 Clicks Score (PT): 22 (03/23/25 0904)    CODE STATUS:   Code Status and Medical Interventions: CPR (Attempt to Resuscitate); Full Support   Ordered at: 03/20/25 3263     Code Status (Patient has no pulse and is not breathing):    CPR (Attempt to Resuscitate)     Medical Interventions (Patient has pulse or is breathing):    Full Support     Level Of Support Discussed With:    Patient       Lilia Leroy MD  03/23/25

## 2025-03-23 NOTE — PLAN OF CARE
Goal Outcome Evaluation:  Plan of Care Reviewed With: patient        Progress: no change     AOx4, RA, VSS, NPO at midnight for stress test tomorrow      Problem: Adult Inpatient Plan of Care  Goal: Absence of Hospital-Acquired Illness or Injury  Intervention: Identify and Manage Fall Risk  Description: Perform standard risk assessment on admission using a validated tool or comprehensive approach appropriate to the patient; reassess fall risk frequently, with change in status or transfer to another level of care.Communicate risk to interprofessional healthcare team; ensure fall risk visible cue.Determine need for increased observation, equipment and environmental modification, as well as use of supportive, nonskid footwear.Adjust safety measures to individual needs and identified risk factors.Reinforce the importance of active participation with fall risk prevention, safety, and physical activity with the patient and family.Perform regular intentional rounding to assess need for position change, pain assessment and personal needs, including assistance with toileting.  Recent Flowsheet Documentation  Taken 3/23/2025 1607 by Lisa Acuña, RN  Safety Promotion/Fall Prevention:   safety round/check completed   room organization consistent   nonskid shoes/slippers when out of bed   muscle strengthening facilitated   mobility aid in reach   lighting adjusted   gait belt   fall prevention program maintained   elopement precautions   clutter free environment maintained   activity supervised   assistive device/personal items within reach  Taken 3/23/2025 1436 by Lisa Acuña, RN  Safety Promotion/Fall Prevention:   safety round/check completed   room organization consistent   toileting scheduled   nonskid shoes/slippers when out of bed   mobility aid in reach   lighting adjusted   gait belt   fall prevention program maintained   elopement precautions   clutter free environment maintained   activity supervised   assistive  device/personal items within reach  Taken 3/23/2025 1245 by Lisa Acuña, RN  Safety Promotion/Fall Prevention:   safety round/check completed   room organization consistent   toileting scheduled   nonskid shoes/slippers when out of bed   mobility aid in reach   lighting adjusted   gait belt   fall prevention program maintained   elopement precautions   clutter free environment maintained   activity supervised   assistive device/personal items within reach  Taken 3/23/2025 1030 by Lisa Acuña, RN  Safety Promotion/Fall Prevention:   safety round/check completed   toileting scheduled   room organization consistent   nonskid shoes/slippers when out of bed   mobility aid in reach   lighting adjusted   gait belt   fall prevention program maintained   elopement precautions   assistive device/personal items within reach   clutter free environment maintained   activity supervised  Taken 3/23/2025 0904 by Lisa Acuña, RN  Safety Promotion/Fall Prevention:   safety round/check completed   room organization consistent   toileting scheduled   nonskid shoes/slippers when out of bed   mobility aid in reach   muscle strengthening facilitated   lighting adjusted   elopement precautions   fall prevention program maintained   clutter free environment maintained   assistive device/personal items within reach   activity supervised

## 2025-03-24 ENCOUNTER — APPOINTMENT (OUTPATIENT)
Dept: CARDIOLOGY | Facility: HOSPITAL | Age: 75
End: 2025-03-24
Payer: MEDICARE

## 2025-03-24 PROBLEM — E44.0 MODERATE PROTEIN-CALORIE MALNUTRITION: Status: ACTIVE | Noted: 2025-03-24

## 2025-03-24 PROBLEM — R94.39 ABNORMAL NUCLEAR STRESS TEST: Status: ACTIVE | Noted: 2025-03-24

## 2025-03-24 PROBLEM — I20.89 ATYPICAL ANGINA: Status: RESOLVED | Noted: 2025-03-23 | Resolved: 2025-03-24

## 2025-03-24 LAB
ALBUMIN SERPL-MCNC: 3.6 G/DL (ref 3.5–5.2)
ALBUMIN SERPL-MCNC: 3.7 G/DL (ref 3.5–5.2)
ALBUMIN/GLOB SERPL: 1.4 G/DL
ALBUMIN/GLOB SERPL: 1.6 G/DL
ALP SERPL-CCNC: 100 U/L (ref 39–117)
ALP SERPL-CCNC: 97 U/L (ref 39–117)
ALT SERPL W P-5'-P-CCNC: 15 U/L (ref 1–41)
ALT SERPL W P-5'-P-CCNC: 17 U/L (ref 1–41)
ANION GAP SERPL CALCULATED.3IONS-SCNC: 12 MMOL/L (ref 5–15)
AST SERPL-CCNC: 16 U/L (ref 1–40)
AST SERPL-CCNC: 17 U/L (ref 1–40)
BH CV REST NUCLEAR ISOTOPE DOSE: 29.9 MCI
BH CV STRESS BP STAGE 2: NORMAL
BH CV STRESS BP STAGE 4: NORMAL
BH CV STRESS COMMENTS STAGE 1: NORMAL
BH CV STRESS DOSE REGADENOSON STAGE 1: 0.4
BH CV STRESS DURATION MIN STAGE 1: 1
BH CV STRESS DURATION MIN STAGE 2: 1
BH CV STRESS DURATION MIN STAGE 3: 1
BH CV STRESS DURATION MIN STAGE 4: 1
BH CV STRESS DURATION SEC STAGE 1: 0
BH CV STRESS DURATION SEC STAGE 2: 0
BH CV STRESS DURATION SEC STAGE 3: 0
BH CV STRESS DURATION SEC STAGE 4: 0
BH CV STRESS HR STAGE 1: 71
BH CV STRESS HR STAGE 2: 82
BH CV STRESS HR STAGE 3: 80
BH CV STRESS HR STAGE 4: 83
BH CV STRESS NUCLEAR ISOTOPE DOSE: 30 MCI
BH CV STRESS O2 STAGE 1: 99
BH CV STRESS O2 STAGE 2: 99
BH CV STRESS O2 STAGE 3: 96
BH CV STRESS O2 STAGE 4: 97
BH CV STRESS PROTOCOL 1: NORMAL
BH CV STRESS RECOVERY BP: NORMAL MMHG
BH CV STRESS RECOVERY HR: 79 BPM
BH CV STRESS RECOVERY O2: 97 %
BH CV STRESS STAGE 1: 1
BH CV STRESS STAGE 2: 2
BH CV STRESS STAGE 3: 3
BH CV STRESS STAGE 4: 4
BILIRUB SERPL-MCNC: 0.3 MG/DL (ref 0–1.2)
BILIRUB SERPL-MCNC: 0.3 MG/DL (ref 0–1.2)
BUN SERPL-MCNC: 31 MG/DL (ref 8–23)
BUN/CREAT SERPL: 13.5 (ref 7–25)
BUN/CREAT SERPL: 13.5 (ref 7–25)
BUN/CREAT SERPL: 14.4 (ref 7–25)
CALCIUM SPEC-SCNC: 8.3 MG/DL (ref 8.6–10.5)
CALCIUM SPEC-SCNC: 8.3 MG/DL (ref 8.6–10.5)
CALCIUM SPEC-SCNC: 8.7 MG/DL (ref 8.6–10.5)
CHLORIDE SERPL-SCNC: 104 MMOL/L (ref 98–107)
CHLORIDE SERPL-SCNC: 105 MMOL/L (ref 98–107)
CHLORIDE SERPL-SCNC: 105 MMOL/L (ref 98–107)
CO2 SERPL-SCNC: 21 MMOL/L (ref 22–29)
CO2 SERPL-SCNC: 21 MMOL/L (ref 22–29)
CO2 SERPL-SCNC: 23 MMOL/L (ref 22–29)
CREAT SERPL-MCNC: 2.16 MG/DL (ref 0.76–1.27)
CREAT SERPL-MCNC: 2.3 MG/DL (ref 0.76–1.27)
CREAT SERPL-MCNC: 2.3 MG/DL (ref 0.76–1.27)
DEPRECATED RDW RBC AUTO: 49 FL (ref 37–54)
EGFRCR SERPLBLD CKD-EPI 2021: 29.3 ML/MIN/1.73
EGFRCR SERPLBLD CKD-EPI 2021: 29.3 ML/MIN/1.73
EGFRCR SERPLBLD CKD-EPI 2021: 31.5 ML/MIN/1.73
ERYTHROCYTE [DISTWIDTH] IN BLOOD BY AUTOMATED COUNT: 14.5 % (ref 12.3–15.4)
GLOBULIN UR ELPH-MCNC: 2.3 GM/DL
GLOBULIN UR ELPH-MCNC: 2.7 GM/DL
GLUCOSE BLDC GLUCOMTR-MCNC: 160 MG/DL (ref 70–130)
GLUCOSE BLDC GLUCOMTR-MCNC: 161 MG/DL (ref 70–130)
GLUCOSE BLDC GLUCOMTR-MCNC: 189 MG/DL (ref 70–130)
GLUCOSE BLDC GLUCOMTR-MCNC: 208 MG/DL (ref 70–130)
GLUCOSE SERPL-MCNC: 143 MG/DL (ref 65–99)
GLUCOSE SERPL-MCNC: 143 MG/DL (ref 65–99)
GLUCOSE SERPL-MCNC: 145 MG/DL (ref 65–99)
HCT VFR BLD AUTO: 27.4 % (ref 37.5–51)
HGB BLD-MCNC: 8.9 G/DL (ref 13–17.7)
MAGNESIUM SERPL-MCNC: 2 MG/DL (ref 1.6–2.4)
MAXIMAL PREDICTED HEART RATE: 147 BPM
MCH RBC QN AUTO: 30 PG (ref 26.6–33)
MCHC RBC AUTO-ENTMCNC: 32.5 G/DL (ref 31.5–35.7)
MCV RBC AUTO: 92.3 FL (ref 79–97)
PERCENT MAX PREDICTED HR: 56.46 %
PLATELET # BLD AUTO: 160 10*3/MM3 (ref 140–450)
PMV BLD AUTO: 10.7 FL (ref 6–12)
POTASSIUM SERPL-SCNC: 4.5 MMOL/L (ref 3.5–5.2)
PROT SERPL-MCNC: 5.9 G/DL (ref 6–8.5)
PROT SERPL-MCNC: 6.4 G/DL (ref 6–8.5)
QT INTERVAL: 416 MS
QT INTERVAL: 428 MS
QTC INTERVAL: 455 MS
QTC INTERVAL: 471 MS
RBC # BLD AUTO: 2.97 10*6/MM3 (ref 4.14–5.8)
SODIUM SERPL-SCNC: 138 MMOL/L (ref 136–145)
SODIUM SERPL-SCNC: 138 MMOL/L (ref 136–145)
SODIUM SERPL-SCNC: 139 MMOL/L (ref 136–145)
SPECT HRT GATED+EF W RNC IV: 54 %
STRESS BASELINE BP: NORMAL MMHG
STRESS BASELINE HR: 67 BPM
STRESS O2 SAT REST: 93 %
STRESS PERCENT HR: 66 %
STRESS POST ESTIMATED WORKLOAD: 1 METS
STRESS POST EXERCISE DUR MIN: 4 MIN
STRESS POST EXERCISE DUR SEC: 0 SEC
STRESS POST O2 SAT PEAK: 99 %
STRESS POST PEAK BP: NORMAL MMHG
STRESS POST PEAK HR: 83 BPM
STRESS TARGET HR: 125 BPM
WBC NRBC COR # BLD AUTO: 9.24 10*3/MM3 (ref 3.4–10.8)

## 2025-03-24 PROCEDURE — 80053 COMPREHEN METABOLIC PANEL: CPT | Performed by: STUDENT IN AN ORGANIZED HEALTH CARE EDUCATION/TRAINING PROGRAM

## 2025-03-24 PROCEDURE — 93017 CV STRESS TEST TRACING ONLY: CPT

## 2025-03-24 PROCEDURE — 99232 SBSQ HOSP IP/OBS MODERATE 35: CPT

## 2025-03-24 PROCEDURE — 99232 SBSQ HOSP IP/OBS MODERATE 35: CPT | Performed by: STUDENT IN AN ORGANIZED HEALTH CARE EDUCATION/TRAINING PROGRAM

## 2025-03-24 PROCEDURE — 93018 CV STRESS TEST I&R ONLY: CPT | Performed by: INTERNAL MEDICINE

## 2025-03-24 PROCEDURE — 78431 MYOCRD IMG PET RST&STRS CT: CPT

## 2025-03-24 PROCEDURE — 78431 MYOCRD IMG PET RST&STRS CT: CPT | Performed by: INTERNAL MEDICINE

## 2025-03-24 PROCEDURE — 25010000002 HEPARIN (PORCINE) PER 1000 UNITS: Performed by: PHYSICIAN ASSISTANT

## 2025-03-24 PROCEDURE — A9555 RB82 RUBIDIUM: HCPCS | Performed by: INTERNAL MEDICINE

## 2025-03-24 PROCEDURE — 25010000002 ONDANSETRON PER 1 MG: Performed by: PHYSICIAN ASSISTANT

## 2025-03-24 PROCEDURE — 82948 REAGENT STRIP/BLOOD GLUCOSE: CPT

## 2025-03-24 PROCEDURE — 63710000001 INSULIN GLARGINE PER 5 UNITS: Performed by: STUDENT IN AN ORGANIZED HEALTH CARE EDUCATION/TRAINING PROGRAM

## 2025-03-24 PROCEDURE — 34310000005 RUBIDIUM CHLORIDE: Performed by: INTERNAL MEDICINE

## 2025-03-24 PROCEDURE — 93016 CV STRESS TEST SUPVJ ONLY: CPT | Performed by: INTERNAL MEDICINE

## 2025-03-24 PROCEDURE — 63710000001 INSULIN LISPRO (HUMAN) PER 5 UNITS: Performed by: PHYSICIAN ASSISTANT

## 2025-03-24 PROCEDURE — 25010000002 REGADENOSON 0.4 MG/5ML SOLUTION: Performed by: INTERNAL MEDICINE

## 2025-03-24 PROCEDURE — 85027 COMPLETE CBC AUTOMATED: CPT | Performed by: STUDENT IN AN ORGANIZED HEALTH CARE EDUCATION/TRAINING PROGRAM

## 2025-03-24 PROCEDURE — 83735 ASSAY OF MAGNESIUM: CPT | Performed by: STUDENT IN AN ORGANIZED HEALTH CARE EDUCATION/TRAINING PROGRAM

## 2025-03-24 RX ORDER — GABAPENTIN 400 MG/1
400 CAPSULE ORAL 2 TIMES DAILY PRN
Status: DISCONTINUED | OUTPATIENT
Start: 2025-03-24 | End: 2025-04-02 | Stop reason: HOSPADM

## 2025-03-24 RX ORDER — CARVEDILOL 12.5 MG/1
12.5 TABLET ORAL ONCE
Status: COMPLETED | OUTPATIENT
Start: 2025-03-24 | End: 2025-03-24

## 2025-03-24 RX ORDER — OXYCODONE AND ACETAMINOPHEN 7.5; 325 MG/1; MG/1
1 TABLET ORAL 4 TIMES DAILY PRN
Refills: 0 | Status: DISCONTINUED | OUTPATIENT
Start: 2025-03-24 | End: 2025-04-02 | Stop reason: HOSPADM

## 2025-03-24 RX ORDER — HYDRALAZINE HYDROCHLORIDE 25 MG/1
37.5 TABLET, FILM COATED ORAL ONCE
Status: COMPLETED | OUTPATIENT
Start: 2025-03-24 | End: 2025-03-24

## 2025-03-24 RX ORDER — CAFFEINE CITRATE 20 MG/ML
60 SOLUTION INTRAVENOUS ONCE
Status: COMPLETED | OUTPATIENT
Start: 2025-03-24 | End: 2025-03-24

## 2025-03-24 RX ORDER — ATORVASTATIN CALCIUM 40 MG/1
40 TABLET, FILM COATED ORAL NIGHTLY
Status: DISCONTINUED | OUTPATIENT
Start: 2025-03-24 | End: 2025-04-02 | Stop reason: HOSPADM

## 2025-03-24 RX ORDER — REGADENOSON 0.08 MG/ML
0.4 INJECTION, SOLUTION INTRAVENOUS ONCE
Status: COMPLETED | OUTPATIENT
Start: 2025-03-24 | End: 2025-03-24

## 2025-03-24 RX ORDER — CARVEDILOL 12.5 MG/1
25 TABLET ORAL 2 TIMES DAILY WITH MEALS
Status: DISCONTINUED | OUTPATIENT
Start: 2025-03-24 | End: 2025-04-02 | Stop reason: HOSPADM

## 2025-03-24 RX ADMIN — AMLODIPINE BESYLATE 10 MG: 10 TABLET ORAL at 13:45

## 2025-03-24 RX ADMIN — Medication 5 MG: at 22:18

## 2025-03-24 RX ADMIN — Medication 10 ML: at 08:30

## 2025-03-24 RX ADMIN — HYDRALAZINE HYDROCHLORIDE 37.5 MG: 25 TABLET ORAL at 06:15

## 2025-03-24 RX ADMIN — ACETAMINOPHEN 650 MG: 325 TABLET, FILM COATED ORAL at 22:18

## 2025-03-24 RX ADMIN — HEPARIN SODIUM 5000 UNITS: 5000 INJECTION INTRAVENOUS; SUBCUTANEOUS at 20:40

## 2025-03-24 RX ADMIN — NICOTINE 1 PATCH: 21 PATCH TRANSDERMAL at 13:44

## 2025-03-24 RX ADMIN — CARVEDILOL 12.5 MG: 12.5 TABLET, FILM COATED ORAL at 13:45

## 2025-03-24 RX ADMIN — RUBIDIUM CHLORIDE RB-82 1 DOSE: 150 INJECTION, SOLUTION INTRAVENOUS at 09:53

## 2025-03-24 RX ADMIN — INSULIN LISPRO 2 UNITS: 100 INJECTION, SOLUTION INTRAVENOUS; SUBCUTANEOUS at 20:41

## 2025-03-24 RX ADMIN — ONDANSETRON 4 MG: 2 INJECTION INTRAMUSCULAR; INTRAVENOUS at 17:03

## 2025-03-24 RX ADMIN — LIDOCAINE 1 PATCH: 4 PATCH TOPICAL at 08:30

## 2025-03-24 RX ADMIN — ATORVASTATIN CALCIUM 40 MG: 40 TABLET, FILM COATED ORAL at 20:40

## 2025-03-24 RX ADMIN — FERROUS SULFATE TAB 325 MG (65 MG ELEMENTAL FE) 325 MG: 325 (65 FE) TAB at 08:29

## 2025-03-24 RX ADMIN — RUBIDIUM CHLORIDE RB-82 1 DOSE: 150 INJECTION, SOLUTION INTRAVENOUS at 10:05

## 2025-03-24 RX ADMIN — ASPIRIN 81 MG: 81 TABLET, COATED ORAL at 08:46

## 2025-03-24 RX ADMIN — INSULIN GLARGINE 5 UNITS: 100 INJECTION, SOLUTION SUBCUTANEOUS at 20:41

## 2025-03-24 RX ADMIN — CAFFEINE CITRATE 60 MG: 20 INJECTION, SOLUTION INTRAVENOUS at 10:14

## 2025-03-24 RX ADMIN — REGADENOSON 0.4 MG: 0.08 INJECTION, SOLUTION INTRAVENOUS at 10:02

## 2025-03-24 RX ADMIN — INSULIN LISPRO 2 UNITS: 100 INJECTION, SOLUTION INTRAVENOUS; SUBCUTANEOUS at 17:04

## 2025-03-24 RX ADMIN — ISOSORBIDE DINITRATE: 20 TABLET ORAL at 20:40

## 2025-03-24 RX ADMIN — HEPARIN SODIUM 5000 UNITS: 5000 INJECTION INTRAVENOUS; SUBCUTANEOUS at 08:29

## 2025-03-24 RX ADMIN — CARVEDILOL 25 MG: 12.5 TABLET, FILM COATED ORAL at 17:04

## 2025-03-24 RX ADMIN — Medication 10 ML: at 20:42

## 2025-03-24 RX ADMIN — DOCUSATE SODIUM 50MG AND SENNOSIDES 8.6MG 2 TABLET: 8.6; 5 TABLET, FILM COATED ORAL at 08:29

## 2025-03-24 RX ADMIN — PANTOPRAZOLE SODIUM 40 MG: 40 TABLET, DELAYED RELEASE ORAL at 05:45

## 2025-03-24 RX ADMIN — GABAPENTIN 400 MG: 400 CAPSULE ORAL at 23:06

## 2025-03-24 RX ADMIN — OXYCODONE AND ACETAMINOPHEN 1 TABLET: 7.5; 325 TABLET ORAL at 23:06

## 2025-03-24 RX ADMIN — ISOSORBIDE DINITRATE: 20 TABLET ORAL at 13:44

## 2025-03-24 NOTE — CONSULTS
"          Clinical Nutrition Assessment   Patient Name: Thomas Perez  YOB: 1950  MRN: [id number]  Date of Encounter: 03/24/25 11:05 EDT  Admission date: 3/20/2025  Reason for Visit: Consult, Reduced oral intake    Assessment   Nutrition Assessment   Admission Diagnosis:  HTN (hypertension) [I10]  Hypertensive emergency [I16.1]    Problem List:    Hypertensive emergency    Type 2 diabetes mellitus with hyperglycemia, with long-term current use of insulin    Tobacco abuse    Coronary artery disease involving native coronary artery of native heart with angina pectoris    Anemia    CKD (chronic kidney disease) stage 3, GFR 30-59 ml/min    PAD (peripheral artery disease)    Medical non-compliance    COPD (chronic obstructive pulmonary disease)    Bilateral lower extremity edema    Elevated troponin    Atypical angina      PMH:   He  has a past medical history of Back injury, Diabetes mellitus, History of angina, and Hypertension.    PSH:  He  has a past surgical history that includes Coronary stent placement; Embolectomy; and Fasciotomy.    Applicable Nutrition History:   CKD3  COPD  PAD  CAD s/p RCA 2010  T2DM  Tobacco abuse  H/o medical noncompliance    Anthropometrics   Height: Height: 180.3 cm (70.98\")  Last Filed Weight: Weight: 71.1 kg (156 lb 11.2 oz) (03/24/25 0600)  Method: Weight Method: Bed scale  BMI: BMI (Calculated): 21.9    UBW:  1/11/25 135#  12/8/24 147#  Weight change:  UTD 2/2 fluid status  2-3+ edema noted    Nutrition Focused Physical Exam    Date: 03/24/25     Patient meets criteria for malnutrition diagnosis, see MSA note.     Subjective   Reported/Observed/Food/Nutrition Related History:   03/24/25  Nutrition consult received for chronic poor intake. Presented for elevated BP. Noted to have NSTEMI. Reported decrease in appetite and poor PO intake. Stated he has been eating lousy for the past 4 years 2/2 dysgeusia. However, noted PO intake has been adequate since admit. Denied any " recent weight loss. No weight loss noted in EMR, however, unable to determine as patient with edema. Declined any chewing or swallowing difficulties. However, reported he gets nauseous when eating meat? Agreeable to trial ONS. NKFA.    Current Nutrition Prescription   PO: NPO Diet NPO Type: Sips with Meds, Ice Chips  Oral Nutrition Supplement: n/a  Intake: 3/23: B 100%; 3/21: B 50, L/D 75% PO intake documented    Assessment & Plan   Nutrition Diagnosis   Date:  03/24/25  Updated:  Problem Malnutrition, chronic moderate   Etiology Inability to consume adequate energy intake 2/2 dysgeusia   Signs/Symptoms <75% of EEN x >/= 1 month, mild/moderate muscle wasting, mild/moderate subcutaneous fat loss, and severe edema   Status: New    Goal:   Nutrition to support treatment and Increase intake, Continue positive trend    Nutrition Intervention      Follow treatment progress, Care plan reviewed, Interview for preferences, Encourage intake, Supplement provided    Nutrition POC  Patient meets malnutrition criteria, see MSA for full assessment  Encourage adequate PO intake as able  Ordering boost plus BID    Monitoring/Evaluation:   Per protocol, PO intake, Supplement intake, Weight, Symptoms, POC/GOC    Julia Bennett, MS,RD,LD  Time Spent: 30min

## 2025-03-24 NOTE — PROGRESS NOTES
[facility] Medicine Services  PROGRESS NOTE    Patient Name: Thomas Perez  : 1950  MRN: [id number]    Date of Admission: 3/20/2025  Primary Care Physician: Provider, No Known    Subjective   Subjective     CC:  HTN    HPI:  Patient feeling better this morning.  Up at the side of the bed.  In good spirits.  Underwent stress test today.      Objective   Objective     Vital Signs:   Temp:  [97.7 °F (36.5 °C)-98.6 °F (37 °C)] 97.8 °F (36.6 °C)  Heart Rate:  [62-85] 85  Resp:  [16-18] 18  BP: (132-161)/(66-84) 150/69     Physical Exam  Constitutional:       General: He is not in acute distress.  Cardiovascular:      Rate and Rhythm: Normal rate and regular rhythm.      Heart sounds: Normal heart sounds.   Pulmonary:      Effort: Pulmonary effort is normal.      Breath sounds: Normal breath sounds.   Abdominal:      General: There is no distension.      Palpations: Abdomen is soft.      Tenderness: There is no abdominal tenderness.   Neurological:      General: No focal deficit present.      Mental Status: He is alert.          Results Reviewed:  LAB RESULTS:      Lab 25  1059 25  0736 25  0939 25  0345 25  0905 25  1726 25  1626   WBC 9.24 9.76  --  10.82* 9.82  --  10.12   HEMOGLOBIN 8.9* 8.3*  --  9.4* 9.8*  --  11.0*   HEMATOCRIT 27.4* 25.4*  --  29.1* 30.4*  --  34.2*   PLATELETS 160 144  --  165 177  --  184   NEUTROS ABS  --   --   --  7.80* 7.11*  --  7.50*   IMMATURE GRANS (ABS)  --   --   --  0.04 0.06*  --  0.06*   LYMPHS ABS  --   --   --  1.74 1.68  --  1.55   MONOS ABS  --   --   --  0.90 0.68  --  0.69   EOS ABS  --   --   --  0.31 0.27  --  0.29   MCV 92.3 91.7  --  91.2 92.1  --  91.4   LACTATE  --   --   --  0.9  --   --   --    HSTROP T  --   --  174* 169*  --  147* 162*         Lab 25  1239 25  1059 25  0736 25  0345 25  0905 25  1626   SODIUM 139 138  138 138 136  136 140 139   POTASSIUM  4.5 4.5  4.5 5.0 5.1  5.1 5.2 4.8   CHLORIDE 104 105  105 105 102  102 107 104   CO2 23.0 21.0*  21.0* 22.0 22.0  22.0 21.0* 23.0   ANION GAP 12.0 12.0  12.0 11.0 12.0  12.0 12.0 12.0   BUN 31* 31*  31* 31* 26*  26* 19 25*   CREATININE 2.16* 2.30*  2.30* 2.10* 1.80*  1.80* 1.83* 1.67*   EGFR 31.5* 29.3*  29.3* 32.6* 39.3*  39.3* 38.5* 42.9*   GLUCOSE 145* 143*  143* 123* 191*  191* 177* 326*   CALCIUM 8.7 8.3*  8.3* 8.5* 9.0  9.0 8.9 9.0   MAGNESIUM  --  2.0 1.9 1.9  --   --    HEMOGLOBIN A1C  --   --   --   --   --  7.90*   TSH  --   --   --   --  1.790  --          Lab 03/24/25  1239 03/24/25  1059 03/22/25  0345 03/21/25  0905 03/20/25  1626   TOTAL PROTEIN 6.4 5.9* 6.4 6.1 7.2   ALBUMIN 3.7 3.6 3.6 3.6 4.1   GLOBULIN 2.7 2.3 2.8 2.5 3.1   ALT (SGPT) 17 15 12 12 14   AST (SGOT) 17 16 15 12 11   BILIRUBIN 0.3 0.3 0.2 0.2 0.2   ALK PHOS 100 97 104 117 148*         Lab 03/22/25  0939 03/22/25  0345 03/20/25  1726 03/20/25  1626   PROBNP  --   --   --  1,321.0*   HSTROP T 174* 169* 147* 162*         Lab 03/22/25  0345   CHOLESTEROL 137   LDL CHOL 50   HDL CHOL 32*   TRIGLYCERIDES 367*         Lab 03/20/25  1726 03/20/25  1626   IRON 47*  --    IRON SATURATION (TSAT) 19*  --    TIBC 243*  --    TRANSFERRIN 163*  --    FERRITIN 315.00  --    FOLATE  --  8.30   VITAMIN B 12  --  366         Brief Urine Lab Results  (Last result in the past 365 days)        Color   Clarity   Blood   Leuk Est   Nitrite   Protein   CREAT   Urine HCG        03/20/25 1637 Yellow   Clear   Small (1+)   Negative   Negative   >=300 mg/dL (3+)                   Microbiology Results Abnormal       None            Stress Test With Pet Myocardial Perfusion  Result Date: 3/24/2025    Myocardial perfusion imaging indicates a small-to-moderate-sized, moderately severe area of ischemia located in the anterior wall and apex.   Left ventricular ejection fraction is normal (Calculated EF = 54%).   Heavy coronary artery calcification  noted on CT attenuation correction images     Duplex Renal Artery - Bilateral Complete CAR  Result Date: 3/23/2025  DUPLEX RENAL ARTERY BILATERAL COMPLETE CAR Date of Exam: 3/23/2025 10:17 AM EDT Indication: chronic kidney disease renovascular hypertension. Comparison: No comparisons available. Technique: Grayscale, color-flow, Doppler spectral waveform analysis was performed of the kidneys, renal arteries, and aorta. Findings: Exam was technically difficult due to bowel gas and difficulties in positioning. The abdominal aorta measures a maximum of 1.5 cm in the visualized proximal portion. Peak flow velocity in the suprarenal aorta 63 cm/s. On the right, there is no elevation flow velocity in the renal artery. Peak flow velocity is 80 cm/s. Acceleration time is 25 ms. Right renal aortic ratio is 1.3. Resistive index is within normal limits and up to 0.6. Right renal vein is patent. Limited grayscale images of the right kidney demonstrate no hydronephrosis or shadowing calculus. Right kidney measures 10.4 cm in length. On the left, there is no significant elevation flow velocity. Peak flow velocity is 106 cm/s. Left renal or aortic ratio is 1.7. Acceleration time is 31 ms. Resistive indices are within normal limits at up to 0.7. Left renal vein is patent. Limited grayscale images of the left kidney demonstrate no hydronephrosis or shadowing calculus. Left kidney measures 11.1 cm in length.     Impression: Impression: No evidence of hemodynamically significant renal artery stenosis on either side. Electronically Signed: Keanu Cool MD  3/23/2025 3:46 PM EDT  Workstation ID: [id number]      Results for orders placed during the hospital encounter of 03/20/25    Adult Transthoracic Echo Complete w/ Color, Spectral and Contrast if necessary per protocol    Interpretation Summary    Left ventricular systolic function is normal. Calculated left ventricular EF = 62.7% Left ventricular ejection fraction appears to be 61 -  65%.    Normal left atrial size and volume noted.      Current medications:  Scheduled Meds:amLODIPine, 10 mg, Oral, Q24H  aspirin, 81 mg, Oral, Daily  atorvastatin, 40 mg, Oral, Nightly  carvedilol, 12.5 mg, Oral, Once  carvedilol, 25 mg, Oral, BID With Meals  ferrous sulfate, 325 mg, Oral, Daily With Breakfast  heparin (porcine), 5,000 Units, Subcutaneous, Q12H  insulin glargine, 5 Units, Subcutaneous, Nightly  insulin lispro, 2-7 Units, Subcutaneous, 4x Daily AC & at Bedtime  isosorbide dinitrate (ISORDIL) 20 mg, hydrALAZINE (APRESOLINE) 37.5 mg for BIDIL 20-37.5, , Oral, Q8H  Lidocaine, 1 patch, Transdermal, Q24H  nicotine, 1 patch, Transdermal, Q24H  pantoprazole, 40 mg, Oral, Q AM  sodium chloride, 10 mL, Intravenous, Q12H  [Held by provider] valsartan, 160 mg, Oral, Q24H      Continuous Infusions:     PRN Meds:.  acetaminophen **OR** acetaminophen **OR** acetaminophen    senna-docusate sodium **AND** polyethylene glycol **AND** bisacodyl **AND** bisacodyl    calcium carbonate    dextrose    dextrose    glucagon (human recombinant)    melatonin    nitroglycerin    ondansetron    sodium chloride    [COMPLETED] Insert Peripheral IV **AND** sodium chloride    sodium chloride    sodium chloride    Assessment & Plan   Assessment & Plan     Active Hospital Problems    Diagnosis  POA    **Hypertensive emergency [I16.1]  Yes    Abnormal nuclear stress test [R94.39]  Yes    Elevated troponin [R79.89]  Yes    Type 2 diabetes mellitus with hyperglycemia, with long-term current use of insulin [E11.65, Z79.4]  Not Applicable    Tobacco abuse [Z72.0]  Yes    Coronary artery disease involving native coronary artery of native heart with angina pectoris [I25.119]  Yes    Anemia [D64.9]  Yes    CKD (chronic kidney disease) stage 3, GFR 30-59 ml/min [N18.30]  Yes    PAD (peripheral artery disease) [I73.9]  Yes    Medical non-compliance [Z91.199]  Not Applicable    COPD (chronic obstructive pulmonary disease) [J44.9]  Yes     Bilateral lower extremity edema [R60.0]  Yes      Resolved Hospital Problems    Diagnosis Date Resolved POA    Atypical angina [I20.89] 03/24/2025 Yes    Heart failure with preserved left ventricular function (HFpEF) [I50.30] 03/22/2025 Unknown    Hypertensive urgency [I16.0] 03/22/2025 Yes        Brief Hospital Course to date:  Thomas Perez is a 74 y.o. male with hypertension, hyperlipidemia, CAD status post RCA stent in 2010, PAD status post embolectomy and fasciotomy, COPD, poorly controlled type 2 diabetes, CKD, medical noncompliance and chronic pain who presented due to elevated blood pressure found incidentally by home health nurse.    Hypertensive emergency- resolved   Acute on chronic chest pain  NSTEMI II  -cardiology following, no acute ACS  -continue amlodipine and coreg, increased today   -switched to BiDil, will continue .   -Renal function grossly stable, continue holding valsartan for now  -Abnormal stress test done today  -N.p.o. at midnight for left heart cath tomorrow    MAGUI on CKD  -baseline Cr around 0.9-1.1 in 2021  - could have a new baseline   -renal function stable today at 2.16  -hold off on more fluids   -holding nephrotoxic medications   -trend    Anemia   -worse today however pt has had IVF and all cell lines dropped so likely dilutional component  -no signs of bleeding   -iron studies most consistent with anemia of chronic disease, likely CKD  -will start iron supplementation    BLE edema   -LE Dopplers negative   -ECHO unremarkable  -stable     CAD s/p stent  Chronic chest pain  HLD  PAD s/p embolectomy and fasciotomy   -daughter reports he is suppose to be on ASA but has been refusing all meds for some time, will restart   -continue statin   -continue isordil     Insulin dependent T2DM  -lantus 20, lispro 7 TID at home  -currently on SSI, will add lantus 5 and adjust from there, pt not eating well      COPD  Ongoing tobacco use  -not in acute exacerbation   -NRT    Medication  noncompliance  -Patient unsure what medications he takes.   -pt cannot read which complicates this.    -Spoke to his daughter who is also uncertain of exactly what he takes. He reports she helps manage his meds.    **Our pharmacist has arrange for him to get prepackaged pill packs from his pharmacy after discharge.  Medication should go through [facility] pharmacy at d/c as ours does not have prepackaged medications like this.    Expected Discharge Location and Transportation: home with assist, lived with daughter   Expected Discharge   Expected Discharge Date: 3/27/2025; Expected Discharge Time:      VTE Prophylaxis:  Pharmacologic VTE prophylaxis orders are present.         AM-PAC 6 Clicks Score (PT): 22 (03/24/25 8765)    CODE STATUS:   Code Status and Medical Interventions: CPR (Attempt to Resuscitate); Full Support   Ordered at: 03/20/25 9099     Code Status (Patient has no pulse and is not breathing):    CPR (Attempt to Resuscitate)     Medical Interventions (Patient has pulse or is breathing):    Full Support     Level Of Support Discussed With:    Patient       Lilia Leroy MD  03/24/25

## 2025-03-24 NOTE — PROGRESS NOTES
Malnutrition Severity Assessment    Patient Name:  Thomas Perez  YOB: 1950  MRN: [id number]  Admit Date:  3/20/2025    Patient meets criteria for : Moderate (non-severe) Malnutrition    Malnutrition Severity Assessment  Malnutrition Type: Chronic Disease - Related Malnutrition  Malnutrition Type (Last 8 Hours)       Malnutrition Severity Assessment       Row Name 03/24/25 1331       Malnutrition Severity Assessment    Malnutrition Type Chronic Disease - Related Malnutrition      Row Name 03/24/25 1331       Insufficient Energy Intake     Insufficient Energy Intake Findings Moderate    Insufficient Energy Intake  <75% of est. energy requirement for > or equal to 1 month      Row Name 03/24/25 1331       Muscle Loss    Loss of Muscle Mass Findings Moderate    Christianity Region Moderate - slight depression    Clavicle Bone Region Moderate - some protrusion in females, visible in males    Scapular Bone Region Moderate - mild depression, bones may show slightly    Dorsal Hand Region Moderate - slight depression    Patellar Region Moderate - patella more prominent, less muscle definition around patella    Anterior Thigh Region Moderate - mild depression on inner thigh    Posterior Calf Region Moderate - some roundness, slight firmness      Row Name 03/24/25 1331       Fat Loss    Subcutaneous Fat Loss Findings Moderate    Orbital Region  Moderate -  somewhat hollowness, slightly dark circles    Upper Arm Region Moderate - some fat tissue, not ample      Row Name 03/24/25 1331       Fluid Accumulation (Edema)    Fluid Acumulation Findings Moderate    Fluid Accumulation  Severe equals 3+ or 4+ pitting edema      Row Name 03/24/25 1331       Declining Functional Status    Declining Functional Status Findings N/A      Row Name 03/24/25 1331       Criteria Met (Must meet criteria for severity in at least 2 of these categories: M Wasting, Fat Loss, Fluid, Secondary Signs, Wt. Status, Intake)    Patient meets criteria  for  Moderate (non-severe) Malnutrition                    Electronically signed by:  Julia Bennett MS,RD,LD  03/24/25 13:33 EDT

## 2025-03-24 NOTE — PLAN OF CARE
Goal Outcome Evaluation:  Plan of Care Reviewed With: patient        Progress: no change     AOx4, RA, NPO @ midnight for [facility] 3/25, standby assist, multiple BM and adequate UOP         Problem: Adult Inpatient Plan of Care  Goal: Absence of Hospital-Acquired Illness or Injury  Intervention: Identify and Manage Fall Risk  Description: Perform standard risk assessment on admission using a validated tool or comprehensive approach appropriate to the patient; reassess fall risk frequently, with change in status or transfer to another level of care.Communicate risk to interprofessional healthcare team; ensure fall risk visible cue.Determine need for increased observation, equipment and environmental modification, as well as use of supportive, nonskid footwear.Adjust safety measures to individual needs and identified risk factors.Reinforce the importance of active participation with fall risk prevention, safety, and physical activity with the patient and family.Perform regular intentional rounding to assess need for position change, pain assessment and personal needs, including assistance with toileting.  Recent Flowsheet Documentation  Taken 3/24/2025 1815 by Lisa Acuña, RN  Safety Promotion/Fall Prevention:   safety round/check completed   room organization consistent   muscle strengthening facilitated   nonskid shoes/slippers when out of bed   mobility aid in reach   lighting adjusted   fall prevention program maintained   assistive device/personal items within reach   clutter free environment maintained   activity supervised  Taken 3/24/2025 1645 by Lisa Acuña, RN  Safety Promotion/Fall Prevention:   safety round/check completed   room organization consistent   nonskid shoes/slippers when out of bed   muscle strengthening facilitated   mobility aid in reach   lighting adjusted   gait belt   fall prevention program maintained   elopement precautions   clutter free environment maintained   assistive  device/personal items within reach   activity supervised  Taken 3/24/2025 1401 by Lisa Acuña, RN  Safety Promotion/Fall Prevention:   safety round/check completed   room organization consistent   toileting scheduled   nonskid shoes/slippers when out of bed   mobility aid in reach   lighting adjusted   gait belt   fall prevention program maintained   elopement precautions   clutter free environment maintained   assistive device/personal items within reach   activity supervised  Taken 3/24/2025 1200 by Lisa Acuña, RN  Safety Promotion/Fall Prevention:   safety round/check completed   room organization consistent   nonskid shoes/slippers when out of bed   mobility aid in reach   lighting adjusted   gait belt   fall prevention program maintained   elopement precautions   clutter free environment maintained   assistive device/personal items within reach   activity supervised  Taken 3/24/2025 1037 by Lisa Acuña, RN  Safety Promotion/Fall Prevention:   safety round/check completed   room organization consistent   nonskid shoes/slippers when out of bed   muscle strengthening facilitated   mobility aid in reach   lighting adjusted   gait belt   fall prevention program maintained   elopement precautions   clutter free environment maintained   assistive device/personal items within reach   activity supervised  Taken 3/24/2025 0826 by Lisa Acuña, RN  Safety Promotion/Fall Prevention:   safety round/check completed   room organization consistent   toileting scheduled   nonskid shoes/slippers when out of bed   mobility aid in reach   gait belt   lighting adjusted   fall prevention program maintained   elopement precautions   clutter free environment maintained   assistive device/personal items within reach   activity supervised

## 2025-03-24 NOTE — PROGRESS NOTES
"[facility] Progress Note     LOS: 2 days   Patient Care Team:  Provider, No Known as PCP - General  PCP:  Provider, No Known    Chief Complaint:  Hypertensive emergency, elevated troponin     Subjective: Patient sitting on edge of the bed playing on iPad this afternoon.  Took multiple attempts to engage with the patient and ultimately had to open the blinds and turned the overhead lights on.  Discussed patient's stress test results and ultimate need for LHC.  Discussed that patient's renal function was continuing to worsen and depending on patient's kidney function this afternoon would ultimately depend on if he were to go for an LHC today if he were to agree.  Patient ultimately agreed to an LHC if lab work was appropriate.  Unfortunately renal function is continuing to worsen therefore LHC will have to be postponed until renal function improves.  Discussed the importance of medical compliance and the reason for adequate blood pressure control.      OBJECTIVE  REVIEW OF SYSTEMS:  @Cardiovascular ROS: positive for - chest pain, dyspnea on exertion, and edema@        Vital Sign Min/Max for last 24 hours  Temp  Min: 97.7 °F (36.5 °C)  Max: 98.8 °F (37.1 °C)   BP  Min: 132/66  Max: 165/78   Pulse  Min: 62  Max: 77   Resp  Min: 16  Max: 18   SpO2  Min: 95 %  Max: 95 %   No data recorded   Weight  Min: 71.1 kg (156 lb 11.2 oz)  Max: 71.1 kg (156 lb 11.2 oz)     Telemetry: Normal sinus rhythm      I&O/ Weights:     Intake/Output Summary (Last 24 hours) at 3/24/2025 0807  Last data filed at 3/24/2025 0801  Gross per 24 hour   Intake 250 ml   Output 1200 ml   Net -950 ml         03/23/25  0500 03/24/25  0600   Weight: 71.7 kg (158 lb) 71.1 kg (156 lb 11.2 oz)     Flowsheet Rows      Flowsheet Row First Filed Value   Admission Height 180.3 cm (71\") Documented at 03/20/2025 1611   Admission Weight 67.1 kg (148 lb) Documented at 03/20/2025 1611               Physical " Exam:  Vitals reviewed.   Constitutional:       Appearance: Chronically ill-appearing.      Comments: Difficult to maintain patient's attention, was playing on iPad.  Overall appearance with poor hygiene   Neck:      Vascular: JVD normal.   Pulmonary:      Effort: Pulmonary effort is normal.      Breath sounds: Normal breath sounds.   Cardiovascular:      Normal rate. Regular rhythm.      Murmurs: There is no murmur.   Pulses:     Intact distal pulses.   Edema:     Peripheral edema present.     Ankle: bilateral 1+ pitting edema of the ankle.     Feet: bilateral 1+ pitting edema of the feet.  Skin:     General: Skin is warm and dry.   Neurological:      Comments: Easily distracted, had to reorient patient to conversation multiple times            Labs:   Results from last 7 days   Lab Units 03/23/25  0736 03/22/25  0345 03/21/25  0905   WBC 10*3/mm3 9.76 10.82* 9.82   HEMOGLOBIN g/dL 8.3* 9.4* 9.8*   HEMATOCRIT % 25.4* 29.1* 30.4*   PLATELETS 10*3/mm3 144 165 177     Lab Results   Lab Value Date/Time    TROPONINT 174 (C) 03/22/2025 0939    TROPONINT 169 (C) 03/22/2025 0345    TROPONINT 147 (C) 03/20/2025 1726    TROPONINT 162 (C) 03/20/2025 1626    TROPONINT 94 (H) 12/08/2024 1321    TROPONINT 101 (H) 12/15/2023 1848    TROPONINT 192 (C) 01/19/2023 1652    TROPONINT 1 01/19/2023 1652    TROPONINT 88 (H) 12/31/2022 1621    TROPONINT -15 12/31/2022 1621         Results from last 7 days   Lab Units 03/23/25  0736 03/22/25  0345 03/21/25  0905 03/20/25  1626   SODIUM mmol/L 138 136  136 140 139   POTASSIUM mmol/L 5.0 5.1  5.1 5.2 4.8   CHLORIDE mmol/L 105 102  102 107 104   CO2 mmol/L 22.0 22.0  22.0 21.0* 23.0   BUN mg/dL 31* 26*  26* 19 25*   CREATININE mg/dL 2.10* 1.80*  1.80* 1.83* 1.67*   CALCIUM mg/dL 8.5* 9.0  9.0 8.9 9.0   BILIRUBIN mg/dL  --  0.2 0.2 0.2   ALK PHOS U/L  --  104 117 148*   ALT (SGPT) U/L  --  12 12 14   AST (SGOT) U/L  --  15 12 11   GLUCOSE mg/dL 123* 191*  191* 177* 326*     Results  from last 7 days   Lab Units 03/20/25  1626   HEMOGLOBIN A1C % 7.90*     Results from last 7 days   Lab Units 03/22/25  0345   CHOLESTEROL mg/dL 137   TRIGLYCERIDES mg/dL 367*   HDL CHOL mg/dL 32*   LDL CHOL mg/dL 50     Results from last 7 days   Lab Units 03/21/25  0905   TSH uIU/mL 1.790             Imaging/Diagnostics:     MOST RECENT ECHO IMPRESSION: 3/21/25     Left ventricular systolic function is normal. Calculated left ventricular EF = 62.7% Left ventricular ejection fraction appears to be 61 - 65%.    Normal left atrial size and volume noted.    MOST RECENT CARDIAC STRESS TESTING IMPRESSION: 3/24/25     Myocardial perfusion imaging indicates a small-to-moderate-sized, moderately severe area of ischemia located in the anterior wall and apex.    Left ventricular ejection fraction is normal (Calculated EF = 54%).    Heavy coronary artery calcification noted on CT attenuation correction images    Medication Review:   amLODIPine, 10 mg, Oral, Q24H  aspirin, 81 mg, Oral, Daily  carvedilol, 12.5 mg, Oral, BID With Meals  ferrous sulfate, 325 mg, Oral, Daily With Breakfast  heparin (porcine), 5,000 Units, Subcutaneous, Q12H  insulin glargine, 5 Units, Subcutaneous, Nightly  insulin lispro, 2-7 Units, Subcutaneous, 4x Daily AC & at Bedtime  isosorbide dinitrate (ISORDIL) 20 mg, hydrALAZINE (APRESOLINE) 37.5 mg for BIDIL 20-37.5, , Oral, Q8H  Lidocaine, 1 patch, Transdermal, Q24H  nicotine, 1 patch, Transdermal, Q24H  pantoprazole, 40 mg, Oral, Q AM  sodium chloride, 10 mL, Intravenous, Q12H  [Held by provider] valsartan, 160 mg, Oral, Q24H             Problem List:     Active Hospital Problems:  Active Hospital Problems    Diagnosis  POA    **Hypertensive emergency [I16.1]  Yes    Atypical angina [I20.89]  Yes    Elevated troponin [R79.89]  Yes    Type 2 diabetes mellitus with hyperglycemia, with long-term current use of insulin [E11.65, Z79.4]  Not Applicable    Tobacco abuse [Z72.0]  Yes    Coronary artery  disease involving native coronary artery of native heart with angina pectoris [I25.119]  Yes    Anemia [D64.9]  Yes    CKD (chronic kidney disease) stage 3, GFR 30-59 ml/min [N18.30]  Yes    PAD (peripheral artery disease) [I73.9]  Yes    Medical non-compliance [Z91.199]  Not Applicable    COPD (chronic obstructive pulmonary disease) [J44.9]  Yes    Bilateral lower extremity edema [R60.0]  Yes      Resolved Hospital Problems    Diagnosis Date Resolved POA    Heart failure with preserved left ventricular function (HFpEF) [I50.30] 03/22/2025 Unknown    Hypertensive urgency [I16.0] 03/22/2025 Yes         Assessment:  Chronic atypical chest pain  CAD  History of RCA PCI  Normal ischemic evaluation 2023  ASA, statin     Type II NSTEMI due to hypertensive emergency  Hypertensive emergency secondary to medical noncompliance  Has not been taking BP meds  Carvedilol 25 BID   Attempt to reduce amlodipine dosing due to lower extremity swelling  Continue hydralazine and isosorbide in BiDil formulation  No ACE/ARB/ARNI due to renal insufficiency        Peripheral arterial disease  Status post previous interventions (embolectomy and fasciotomy)   No critical limb ischemia  Continue ASA, statin  Daughter reports patient has been refusing ASA     Renal insufficiency/ MAGUI, goal SBP less than 140  Unclear whether acute or chronic  Renal artery duplex shows no evidence of renal artery stenosis  Creatinine continuing to trend up with admission creatinine 1.67 to  2.10 today    Anemia  Daily iron supplementation  H&H continuing to downtrend with admission hemoglobin 11 and today 8.3   Suspect dilutional as all cell lines have dropped    Hyperlipidemia with target LDL <50  Hypertriglyceridemia  LDL 50, triglycerides 367, HDL 32  Would benefit from a fenofibrate and improved diet and lifestyle     Type 2 diabetes mellitus with hyperglycemia  A1c 7.9 on admission    COPD    Plan:   PET stress indicating severe area of ischemia located in  the anterior wall and apex, patient will ultimately need LHC.  Unfortunately creatinine continuing to trend up.  Will discuss with Dr. Warren regarding timing of LHC in regards to renal function.   + 1 pitting BLE, seems to be improving with lower doses of CCB  SBPs running in the 150s, will increase Coreg to 25 for BP reduction and improve renal blood flow.  Restarting home high intensity statin         TYLER Billy  I spent >35 minutes on this encounter before, during and after the visit, examining the patient, reviewing labs and imaging, writing orders and formulating my assessment and plan.

## 2025-03-24 NOTE — CASE MANAGEMENT/SOCIAL WORK
Discharge Planning Assessment  [facility]     Patient Name: Thomas Perez  MRN: [id number]  Today's Date: 3/24/2025    Admit Date: 3/20/2025    Plan: Home with daughter's assistance, [facility], and new PCP arranged through the [facility]          Discharge Plan       Row Name 03/24/25 1606       Plan    Plan Home with daughter's assistance, [facility], and new PCP arranged through the [facility]    Patient/Family in Agreement with Plan yes    Plan Comments Mr. Perez had a stress test today.    DC plan continues to be to return home with his daughter's assistance and [facility] for SN and PT.  Medications will  be acquired through [facility] to assist with medication management.    New PCP arranged through the [facility] and appointment noted in AVS.    The patient's daughter can transport him home when discharged.    CM will continue to follow.                  Continued Care and Services - Admitted Since 3/20/2025       Home Medical Care       Service Provider Request Status Services Address Phone Fax Patient Preferred    [facility]  [facility] Nursing [address] [place] [address], [place] [phone] --                  Expected Discharge Date and Time       Expected Discharge Date Expected Discharge Time    Mar 27, 2025             Alessandra Martin RN

## 2025-03-24 NOTE — PLAN OF CARE
Problem: Adult Inpatient Plan of Care  Goal: Absence of Hospital-Acquired Illness or Injury  Outcome: Progressing  Intervention: Identify and Manage Fall Risk  Recent Flowsheet Documentation  Taken 3/23/2025 2050 by Britany Centeno RN  Safety Promotion/Fall Prevention:   assistive device/personal items within reach   clutter free environment maintained   fall prevention program maintained   nonskid shoes/slippers when out of bed   room organization consistent   safety round/check completed  Intervention: Prevent Skin Injury  Recent Flowsheet Documentation  Taken 3/23/2025 2050 by Britany Centeno RN  Body Position: position changed independently  Skin Protection: incontinence pads utilized  Intervention: Prevent and Manage VTE (Venous Thromboembolism) Risk  Recent Flowsheet Documentation  Taken 3/23/2025 2050 by Britany Centeno RN  VTE Prevention/Management: (heparin) other (see comments)  Intervention: Prevent Infection  Recent Flowsheet Documentation  Taken 3/23/2025 2050 by Britany Centeno RN  Infection Prevention:   environmental surveillance performed   rest/sleep promoted   single patient room provided   Goal Outcome Evaluation:

## 2025-03-25 ENCOUNTER — APPOINTMENT (OUTPATIENT)
Dept: ULTRASOUND IMAGING | Facility: HOSPITAL | Age: 75
End: 2025-03-25
Payer: MEDICARE

## 2025-03-25 LAB
ALBUMIN SERPL-MCNC: 3.3 G/DL (ref 3.5–5.2)
ALBUMIN/GLOB SERPL: 1.3 G/DL
ALP SERPL-CCNC: 93 U/L (ref 39–117)
ALT SERPL W P-5'-P-CCNC: 14 U/L (ref 1–41)
ANION GAP SERPL CALCULATED.3IONS-SCNC: 11 MMOL/L (ref 5–15)
AST SERPL-CCNC: 15 U/L (ref 1–40)
BILIRUB SERPL-MCNC: 0.2 MG/DL (ref 0–1.2)
BUN SERPL-MCNC: 32 MG/DL (ref 8–23)
BUN/CREAT SERPL: 13 (ref 7–25)
CALCIUM SPEC-SCNC: 8 MG/DL (ref 8.6–10.5)
CHLORIDE SERPL-SCNC: 102 MMOL/L (ref 98–107)
CK SERPL-CCNC: 281 U/L (ref 20–200)
CO2 SERPL-SCNC: 22 MMOL/L (ref 22–29)
CREAT SERPL-MCNC: 2.47 MG/DL (ref 0.76–1.27)
CREAT UR-MCNC: 116.6 MG/DL
DEPRECATED RDW RBC AUTO: 49.5 FL (ref 37–54)
EGFRCR SERPLBLD CKD-EPI 2021: 26.9 ML/MIN/1.73
EOSINOPHIL SPEC QL MICRO: 0 % EOS/100 CELLS (ref 0–0)
ERYTHROCYTE [DISTWIDTH] IN BLOOD BY AUTOMATED COUNT: 14.5 % (ref 12.3–15.4)
GLOBULIN UR ELPH-MCNC: 2.5 GM/DL
GLUCOSE BLDC GLUCOMTR-MCNC: 171 MG/DL (ref 70–130)
GLUCOSE BLDC GLUCOMTR-MCNC: 218 MG/DL (ref 70–130)
GLUCOSE BLDC GLUCOMTR-MCNC: 263 MG/DL (ref 70–130)
GLUCOSE BLDC GLUCOMTR-MCNC: 267 MG/DL (ref 70–130)
GLUCOSE SERPL-MCNC: 152 MG/DL (ref 65–99)
HCT VFR BLD AUTO: 25.7 % (ref 37.5–51)
HGB BLD-MCNC: 8 G/DL (ref 13–17.7)
LDH SERPL-CCNC: 241 U/L (ref 135–225)
MCH RBC QN AUTO: 29.1 PG (ref 26.6–33)
MCHC RBC AUTO-ENTMCNC: 31.1 G/DL (ref 31.5–35.7)
MCV RBC AUTO: 93.5 FL (ref 79–97)
PLATELET # BLD AUTO: 156 10*3/MM3 (ref 140–450)
PMV BLD AUTO: 10.7 FL (ref 6–12)
POTASSIUM SERPL-SCNC: 4.6 MMOL/L (ref 3.5–5.2)
PROT ?TM UR-MCNC: 312.2 MG/DL
PROT SERPL-MCNC: 5.8 G/DL (ref 6–8.5)
RBC # BLD AUTO: 2.75 10*6/MM3 (ref 4.14–5.8)
SODIUM SERPL-SCNC: 135 MMOL/L (ref 136–145)
SODIUM UR-SCNC: 34 MMOL/L
URATE SERPL-MCNC: 5.8 MG/DL (ref 3.4–7)
WBC NRBC COR # BLD AUTO: 8.02 10*3/MM3 (ref 3.4–10.8)

## 2025-03-25 PROCEDURE — 82550 ASSAY OF CK (CPK): CPT | Performed by: INTERNAL MEDICINE

## 2025-03-25 PROCEDURE — 25010000002 HEPARIN (PORCINE) PER 1000 UNITS: Performed by: PHYSICIAN ASSISTANT

## 2025-03-25 PROCEDURE — 84550 ASSAY OF BLOOD/URIC ACID: CPT | Performed by: INTERNAL MEDICINE

## 2025-03-25 PROCEDURE — 82570 ASSAY OF URINE CREATININE: CPT | Performed by: INTERNAL MEDICINE

## 2025-03-25 PROCEDURE — 87205 SMEAR GRAM STAIN: CPT | Performed by: INTERNAL MEDICINE

## 2025-03-25 PROCEDURE — 80053 COMPREHEN METABOLIC PANEL: CPT | Performed by: STUDENT IN AN ORGANIZED HEALTH CARE EDUCATION/TRAINING PROGRAM

## 2025-03-25 PROCEDURE — 99232 SBSQ HOSP IP/OBS MODERATE 35: CPT | Performed by: NURSE PRACTITIONER

## 2025-03-25 PROCEDURE — 83615 LACTATE (LD) (LDH) ENZYME: CPT | Performed by: INTERNAL MEDICINE

## 2025-03-25 PROCEDURE — 84300 ASSAY OF URINE SODIUM: CPT | Performed by: INTERNAL MEDICINE

## 2025-03-25 PROCEDURE — 63710000001 INSULIN GLARGINE PER 5 UNITS: Performed by: STUDENT IN AN ORGANIZED HEALTH CARE EDUCATION/TRAINING PROGRAM

## 2025-03-25 PROCEDURE — 99232 SBSQ HOSP IP/OBS MODERATE 35: CPT | Performed by: INTERNAL MEDICINE

## 2025-03-25 PROCEDURE — 82948 REAGENT STRIP/BLOOD GLUCOSE: CPT

## 2025-03-25 PROCEDURE — 76775 US EXAM ABDO BACK WALL LIM: CPT

## 2025-03-25 PROCEDURE — 63710000001 INSULIN LISPRO (HUMAN) PER 5 UNITS: Performed by: PHYSICIAN ASSISTANT

## 2025-03-25 PROCEDURE — 85027 COMPLETE CBC AUTOMATED: CPT | Performed by: STUDENT IN AN ORGANIZED HEALTH CARE EDUCATION/TRAINING PROGRAM

## 2025-03-25 PROCEDURE — 84156 ASSAY OF PROTEIN URINE: CPT | Performed by: INTERNAL MEDICINE

## 2025-03-25 RX ADMIN — Medication 10 ML: at 20:58

## 2025-03-25 RX ADMIN — OXYCODONE AND ACETAMINOPHEN 1 TABLET: 7.5; 325 TABLET ORAL at 21:01

## 2025-03-25 RX ADMIN — INSULIN LISPRO 3 UNITS: 100 INJECTION, SOLUTION INTRAVENOUS; SUBCUTANEOUS at 09:22

## 2025-03-25 RX ADMIN — HEPARIN SODIUM 5000 UNITS: 5000 INJECTION INTRAVENOUS; SUBCUTANEOUS at 09:23

## 2025-03-25 RX ADMIN — FERROUS SULFATE TAB 325 MG (65 MG ELEMENTAL FE) 325 MG: 325 (65 FE) TAB at 09:23

## 2025-03-25 RX ADMIN — ISOSORBIDE DINITRATE: 20 TABLET ORAL at 09:23

## 2025-03-25 RX ADMIN — ISOSORBIDE DINITRATE: 20 TABLET ORAL at 21:04

## 2025-03-25 RX ADMIN — INSULIN GLARGINE 5 UNITS: 100 INJECTION, SOLUTION SUBCUTANEOUS at 20:57

## 2025-03-25 RX ADMIN — CARVEDILOL 25 MG: 12.5 TABLET, FILM COATED ORAL at 17:02

## 2025-03-25 RX ADMIN — PANTOPRAZOLE SODIUM 40 MG: 40 TABLET, DELAYED RELEASE ORAL at 09:23

## 2025-03-25 RX ADMIN — NICOTINE 1 PATCH: 21 PATCH TRANSDERMAL at 09:24

## 2025-03-25 RX ADMIN — AMLODIPINE BESYLATE 10 MG: 10 TABLET ORAL at 09:23

## 2025-03-25 RX ADMIN — HEPARIN SODIUM 5000 UNITS: 5000 INJECTION INTRAVENOUS; SUBCUTANEOUS at 20:58

## 2025-03-25 RX ADMIN — ISOSORBIDE DINITRATE: 20 TABLET ORAL at 13:42

## 2025-03-25 RX ADMIN — ASPIRIN 81 MG: 81 TABLET, COATED ORAL at 09:23

## 2025-03-25 RX ADMIN — OXYCODONE AND ACETAMINOPHEN 1 TABLET: 7.5; 325 TABLET ORAL at 11:51

## 2025-03-25 RX ADMIN — ATORVASTATIN CALCIUM 40 MG: 40 TABLET, FILM COATED ORAL at 20:58

## 2025-03-25 RX ADMIN — INSULIN LISPRO 4 UNITS: 100 INJECTION, SOLUTION INTRAVENOUS; SUBCUTANEOUS at 17:02

## 2025-03-25 RX ADMIN — INSULIN LISPRO 4 UNITS: 100 INJECTION, SOLUTION INTRAVENOUS; SUBCUTANEOUS at 20:57

## 2025-03-25 RX ADMIN — INSULIN LISPRO 2 UNITS: 100 INJECTION, SOLUTION INTRAVENOUS; SUBCUTANEOUS at 11:51

## 2025-03-25 RX ADMIN — CARVEDILOL 25 MG: 12.5 TABLET, FILM COATED ORAL at 09:23

## 2025-03-25 NOTE — CONSULTS
Referring Provider: Bina Cooper APRN   Reason for Consultation: MAGUI    Subjective     Chief complaint Elevated BP     History of present illness:  This This is a 74-year-old male with a medical history of hypertension, hyperlipidemia, coronary artery disease (CAD) status post right coronary artery (RCA) stent in 2010, peripheral artery disease (PAD) status post embolectomy and fasciotomy, chronic obstructive pulmonary disease (COPD), poorly controlled type 2 diabetes, and chronic kidney disease (CKD). He presents with elevated blood pressure (210/100) that was incidentally detected by his home health nurse, who was concerned and called EMS. The patient reports typically having a systolic BP between 160-180, but he has been noncompliant with antihypertensive medications for about a month due to side effects. He currently complains of progressively worsening bilateral lower extremity edema, stating he can no longer fit into his shoes. He denies fever, cough, congestion, shortness of breath, chest pain, syncope, headache, or any neurological symptoms such as focal weakness, numbness, or tingling.  Denies any fever, chills, rigors, rash, hematuria or hemoptysis. No family hx of kidney disease or autoimmune disease. Denies any hx of NSAID use or kidney stones. Nephrology service has been consulted for MAGUI/CKD management       History  Past Medical History:   Diagnosis Date    Back injury     Diabetes mellitus     History of angina     Hypertension    ,   Past Surgical History:   Procedure Laterality Date    CORONARY STENT PLACEMENT      EMBOLECTOMY      FASCIOTOMY     , History reviewed. No pertinent family history.,   Social History     Socioeconomic History    Marital status:    Tobacco Use    Smoking status: Every Day     Current packs/day: 1.00     Types: Cigarettes    Smokeless tobacco: Never    Tobacco comments:     pt report smoking since he was [age]   Vaping Use    Vaping status: Never Used    Substance and Sexual Activity    Alcohol use: Never    Drug use: Never    Sexual activity: Defer     E-cigarette/Vaping    E-cigarette/Vaping Use Never User      E-cigarette/Vaping Substances     E-cigarette/Vaping Devices         ,   Medications Prior to Admission   Medication Sig Dispense Refill Last Dose/Taking    aspirin 81 MG chewable tablet Chew 1 tablet Daily. OTC   3/20/2025 Morning    atorvastatin (LIPITOR) 40 MG tablet Take 1 tablet by mouth Every Night.   Past Week Bedtime    famotidine (PEPCID) 20 MG tablet Take 1 tablet by mouth 2 (Two) Times a Day. OTC   3/20/2025    gabapentin (NEURONTIN) 400 MG capsule Take 1 capsule by mouth 2 (Two) Times a Day As Needed (As Needed).   3/20/2025    hydrALAZINE (APRESOLINE) 25 MG tablet Take 1 tablet by mouth 3 (Three) Times a Day.   Past Month    insulin glargine (LANTUS, SEMGLEE) 100 UNIT/ML injection Inject 22 Units under the skin into the appropriate area as directed Every Night.   Past Week Bedtime    insulin regular (humuLIN R,novoLIN R) 100 UNIT/ML injection Inject 7 Units under the skin into the appropriate area as directed 3 (Three) Times a Day Before Meals.   3/20/2025    melatonin 5 MG tablet tablet Take 2 tablets by mouth At Night As Needed (Sleep). OTC   Past Week Bedtime    oxyCODONE-acetaminophen (PERCOCET) 7.5-325 MG per tablet Take 1 tablet by mouth 4 (Four) Times a Day As Needed for Moderate Pain (For Pain).   3/20/2025   , Scheduled Meds:  amLODIPine, 10 mg, Oral, Q24H  aspirin, 81 mg, Oral, Daily  atorvastatin, 40 mg, Oral, Nightly  carvedilol, 25 mg, Oral, BID With Meals  ferrous sulfate, 325 mg, Oral, Daily With Breakfast  heparin (porcine), 5,000 Units, Subcutaneous, Q12H  insulin glargine, 5 Units, Subcutaneous, Nightly  insulin lispro, 2-7 Units, Subcutaneous, 4x Daily AC & at Bedtime  isosorbide dinitrate (ISORDIL) 20 mg, hydrALAZINE (APRESOLINE) 37.5 mg for BIDIL 20-37.5, , Oral, Q8H  Lidocaine, 1 patch, Transdermal, Q24H  nicotine, 1  patch, Transdermal, Q24H  pantoprazole, 40 mg, Oral, Q AM  sodium chloride, 10 mL, Intravenous, Q12H  [Held by provider] valsartan, 160 mg, Oral, Q24H   , Continuous Infusions:   , PRN Meds:    acetaminophen **OR** acetaminophen **OR** acetaminophen    senna-docusate sodium **AND** polyethylene glycol **AND** bisacodyl **AND** bisacodyl    calcium carbonate    dextrose    dextrose    gabapentin    glucagon (human recombinant)    melatonin    nitroglycerin    ondansetron    oxyCODONE-acetaminophen    sodium chloride    [COMPLETED] Insert Peripheral IV **AND** sodium chloride    sodium chloride    sodium chloride, and Allergies:  Penicillins and Valium [diazepam]    Review of Systems  Pertinent items are noted in HPI    Objective     Vital Signs  Temp:  [97.6 °F (36.4 °C)-98.2 °F (36.8 °C)] 97.6 °F (36.4 °C)  Heart Rate:  [55-85] 55  Resp:  [16-18] 16  BP: (133-170)/(57-76) 144/70    No intake/output data recorded.  I/O last 3 completed shifts:  In: 360 [P.O.:360]  Out: 1200 [Urine:1200]    Physical Exam:  General Appearance:    Alert, cooperative, in no acute distress   Head:    Normocephalic, without obvious abnormality, atraumatic   Eyes:            Conjunctivae and sclerae normal, no   icterus, no pallor, corneas clear, PERRLA           Neck:   Supple, trachea midline, no thyromegaly,  no JVD       Lungs:     Clear to auscultation,respirations regular, even and               unlabored    Heart:    Regular rhythm and normal rate, normal S1 and S2, no       murmur, no gallop, no rub, no click       Abdomen:     Normal bowel sounds,soft, non-tender, non-distended, no guarding, no rebound tenderness       Extremities:   Moves all extremities well, + edema, no cyanosis, no         redness   Pulses:   Pulses palpable and equal bilaterally           Neurologic:   Cranial nerves 2 - 12 grossly intact, no focal deficit         Results Review:   I reviewed the patient's new clinical results.    WBC WBC   Date Value Ref  "Range Status   03/25/2025 8.02 3.40 - 10.80 10*3/mm3 Final   03/24/2025 9.24 3.40 - 10.80 10*3/mm3 Final   03/23/2025 9.76 3.40 - 10.80 10*3/mm3 Final      HGB Hemoglobin   Date Value Ref Range Status   03/25/2025 8.0 (L) 13.0 - 17.7 g/dL Final   03/24/2025 8.9 (L) 13.0 - 17.7 g/dL Final   03/23/2025 8.3 (L) 13.0 - 17.7 g/dL Final      HCT Hematocrit   Date Value Ref Range Status   03/25/2025 25.7 (L) 37.5 - 51.0 % Final   03/24/2025 27.4 (L) 37.5 - 51.0 % Final   03/23/2025 25.4 (L) 37.5 - 51.0 % Final      Platlets No results found for: \"LABPLAT\"   MCV MCV   Date Value Ref Range Status   03/25/2025 93.5 79.0 - 97.0 fL Final   03/24/2025 92.3 79.0 - 97.0 fL Final   03/23/2025 91.7 79.0 - 97.0 fL Final          Sodium Sodium   Date Value Ref Range Status   03/25/2025 135 (L) 136 - 145 mmol/L Final   03/24/2025 139 136 - 145 mmol/L Final   03/24/2025 138 136 - 145 mmol/L Final   03/24/2025 138 136 - 145 mmol/L Final   03/23/2025 138 136 - 145 mmol/L Final      Potassium Potassium   Date Value Ref Range Status   03/25/2025 4.6 3.5 - 5.2 mmol/L Final   03/24/2025 4.5 3.5 - 5.2 mmol/L Final   03/24/2025 4.5 3.5 - 5.2 mmol/L Final   03/24/2025 4.5 3.5 - 5.2 mmol/L Final   03/23/2025 5.0 3.5 - 5.2 mmol/L Final      Chloride Chloride   Date Value Ref Range Status   03/25/2025 102 98 - 107 mmol/L Final   03/24/2025 104 98 - 107 mmol/L Final   03/24/2025 105 98 - 107 mmol/L Final   03/24/2025 105 98 - 107 mmol/L Final   03/23/2025 105 98 - 107 mmol/L Final      CO2 CO2   Date Value Ref Range Status   03/25/2025 22.0 22.0 - 29.0 mmol/L Final   03/24/2025 23.0 22.0 - 29.0 mmol/L Final   03/24/2025 21.0 (L) 22.0 - 29.0 mmol/L Final   03/24/2025 21.0 (L) 22.0 - 29.0 mmol/L Final   03/23/2025 22.0 22.0 - 29.0 mmol/L Final      BUN BUN   Date Value Ref Range Status   03/25/2025 32 (H) 8 - 23 mg/dL Final   03/24/2025 31 (H) 8 - 23 mg/dL Final   03/24/2025 31 (H) 8 - 23 mg/dL Final   03/24/2025 31 (H) 8 - 23 mg/dL Final   03/23/2025 " "31 (H) 8 - 23 mg/dL Final      Creatinine Creatinine   Date Value Ref Range Status   03/25/2025 2.47 (H) 0.76 - 1.27 mg/dL Final   03/24/2025 2.16 (H) 0.76 - 1.27 mg/dL Final   03/24/2025 2.30 (H) 0.76 - 1.27 mg/dL Final   03/24/2025 2.30 (H) 0.76 - 1.27 mg/dL Final   03/23/2025 2.10 (H) 0.76 - 1.27 mg/dL Final      Calcium Calcium   Date Value Ref Range Status   03/25/2025 8.0 (L) 8.6 - 10.5 mg/dL Final   03/24/2025 8.7 8.6 - 10.5 mg/dL Final   03/24/2025 8.3 (L) 8.6 - 10.5 mg/dL Final   03/24/2025 8.3 (L) 8.6 - 10.5 mg/dL Final   03/23/2025 8.5 (L) 8.6 - 10.5 mg/dL Final      PO4 No results found for: \"CAPO4\"   Albumin Albumin   Date Value Ref Range Status   03/25/2025 3.3 (L) 3.5 - 5.2 g/dL Final   03/24/2025 3.7 3.5 - 5.2 g/dL Final   03/24/2025 3.6 3.5 - 5.2 g/dL Final      Magnesium Magnesium   Date Value Ref Range Status   03/24/2025 2.0 1.6 - 2.4 mg/dL Final   03/23/2025 1.9 1.6 - 2.4 mg/dL Final      Uric Acid No results found for: \"URICACID\"         amLODIPine, 10 mg, Oral, Q24H  aspirin, 81 mg, Oral, Daily  atorvastatin, 40 mg, Oral, Nightly  carvedilol, 25 mg, Oral, BID With Meals  ferrous sulfate, 325 mg, Oral, Daily With Breakfast  heparin (porcine), 5,000 Units, Subcutaneous, Q12H  insulin glargine, 5 Units, Subcutaneous, Nightly  insulin lispro, 2-7 Units, Subcutaneous, 4x Daily AC & at Bedtime  isosorbide dinitrate (ISORDIL) 20 mg, hydrALAZINE (APRESOLINE) 37.5 mg for BIDIL 20-37.5, , Oral, Q8H  Lidocaine, 1 patch, Transdermal, Q24H  nicotine, 1 patch, Transdermal, Q24H  pantoprazole, 40 mg, Oral, Q AM  sodium chloride, 10 mL, Intravenous, Q12H  [Held by provider] valsartan, 160 mg, Oral, Q24H      Results from last 7 days   Lab Units 03/25/25  0346 03/24/25  1239 03/24/25  1059 03/23/25  0736 03/22/25  0345   SODIUM mmol/L 135* 139 138  138 138 136  136   POTASSIUM mmol/L 4.6 4.5 4.5  4.5 5.0 5.1  5.1   CHLORIDE mmol/L 102 104 105  105 105 102  102   CO2 mmol/L 22.0 23.0 21.0*  21.0* 22.0 " 22.0  22.0   BUN mg/dL 32* 31* 31*  31* 31* 26*  26*   CREATININE mg/dL 2.47* 2.16* 2.30*  2.30* 2.10* 1.80*  1.80*   CALCIUM mg/dL 8.0* 8.7 8.3*  8.3* 8.5* 9.0  9.0   ALBUMIN g/dL 3.3* 3.7 3.6  --  3.6   WBC 10*3/mm3 8.02  --  9.24 9.76 10.82*   HEMOGLOBIN g/dL 8.0*  --  8.9* 8.3* 9.4*   PLATELETS 10*3/mm3 156  --  160 144 165   GLUCOSE mg/dL 152* 145* 143*  143* 123* 191*  191*       Intake/Output Summary (Last 24 hours) at 3/25/2025 1025  Last data filed at 3/25/2025 0300  Gross per 24 hour   Intake 360 ml   Output 500 ml   Net -140 ml                Assessment & Plan       Hypertensive emergency    Type 2 diabetes mellitus with hyperglycemia, with long-term current use of insulin    Tobacco abuse    Coronary artery disease involving native coronary artery of native heart with angina pectoris    Anemia    CKD (chronic kidney disease) stage 3, GFR 30-59 ml/min    PAD (peripheral artery disease)    Medical non-compliance    COPD (chronic obstructive pulmonary disease)    Bilateral lower extremity edema    Elevated troponin    Abnormal nuclear stress test    Moderate protein-calorie malnutrition      1- MAGUI on CKD (baseline Scr 1.0-1.2 range) - non oliguric - At presentation Scr 1.6 trending up to 2.4 today. Started on Valsartan and received couple of dose of chlorthalidone now off both.   2- Hypertension urgency - controlled   3- Anemia - Tsat 19%  4- DM   5-  Edema   6- hx of CAD   7- hx of PAD   8- COPD     Plan:  - Renal artery duplex   - Agree with holding Valsartan   - Continue with current antihypertensive meds - BP controlled.   - Monitor I/O   - Avoid nephrotoxic agents.   - Renal diet   - Adjust meds per renal function   - No emergent need of RRT   - Monitor H/H and transfuse for Hgb less than 7.0   - Urine lytes     I discussed the patients findings and my recommendations with patient and nursing staff    Jennifer Cotton MD  03/25/25

## 2025-03-25 NOTE — CONSULTS
Diabetes Education    Patient Name:  Thomas Perez  YOB: 1950  MRN: [id number]  Admit Date:  3/20/2025    Consult for diabetes education received per A1C >7%. Chart reviewed. Pt was seen at bedside today. Permission given for visit.     Attempted to see patient this morning but he was being attended to by staff. Attempted again around noon and his family asked to come back at a different time because he just fell asleep. I was able to speak with him this afternoon.    Patient's current A1C is 7.9%. Patient has had type 2 diabetes since 2001. Home medications consist of lantus 22 units and humulin R 7 units TID as per chart review. Patient states he does not know the dosage of his medications or the names of the medications. Patient has not been monitoring his blood sugar due to not having test strips. When asked what meter he uses patient states his daughter is the one who checks his blood sugar and administers his insulin injections. Discussed target range of  fasting and  postprandial and goal of A1C <7% to reduce complications like heart disease and stroke.    Permission granted to call his daughter coletta. Colletta states she has not been testing the last few weeks due to running out of strips. Stressed the importance of monitoring glucose especially while taking insulin. She states that he is going to be prescribed a dexcom CGM. Reviewed that having a working meter and supplies is still needed when using a CGM incase the CGM reads inaccurately high or low. Daughter verbalized understanding and states she will get test strips from [facility] or ask his PCP for testing strips.    Total time spent reviewing chart, preparing education/materials, providing education at bedside, and coordinating care approx 30 minutes.    Thank you for this consult.    Electronically signed by:  Neha Turner RN  03/25/25 14:24 EDT

## 2025-03-25 NOTE — PROGRESS NOTES
[facility] Medicine Services  PROGRESS NOTE    Patient Name: Thomas Perez  : 1950  MRN: [id number]    Date of Admission: 3/20/2025  Primary Care Physician: Provider, No Known    Subjective   Subjective     CC: f/u chest pain    HPI: Up in bed with daughter in room. C/O some chest pressure. Wants food.      Objective   Objective     Vital Signs:   Temp:  [97.6 °F (36.4 °C)-98.2 °F (36.8 °C)] 97.6 °F (36.4 °C)  Heart Rate:  [55-85] 55  Resp:  [16-18] 16  BP: (133-170)/(57-76) 144/70     Physical Exam:  Constitutional: No acute distress, awake, alert. Chronically ill appearing  HENT: NCAT, mucous membranes moist  Respiratory: Clear to auscultation bilaterally, respiratory effort normal   Cardiovascular: RRR, no murmurs, rubs, or gallops  Gastrointestinal: Positive bowel sounds, soft, nontender, nondistended  Musculoskeletal: No bilateral ankle edema  Psychiatric: Appropriate affect, cooperative  Neurologic: Oriented x 3, strength symmetric in all extremities, Cranial Nerves grossly intact to confrontation, speech clear  Skin: No rashes     Results Reviewed:  LAB RESULTS:      Lab 25  0346 25  1059 25  0736 25  0939 25  0345 25  0905 25  1726 25  1626   WBC 8.02 9.24 9.76  --  10.82* 9.82  --  10.12   HEMOGLOBIN 8.0* 8.9* 8.3*  --  9.4* 9.8*  --  11.0*   HEMATOCRIT 25.7* 27.4* 25.4*  --  29.1* 30.4*  --  34.2*   PLATELETS 156 160 144  --  165 177  --  184   NEUTROS ABS  --   --   --   --  7.80* 7.11*  --  7.50*   IMMATURE GRANS (ABS)  --   --   --   --  0.04 0.06*  --  0.06*   LYMPHS ABS  --   --   --   --  1.74 1.68  --  1.55   MONOS ABS  --   --   --   --  0.90 0.68  --  0.69   EOS ABS  --   --   --   --  0.31 0.27  --  0.29   MCV 93.5 92.3 91.7  --  91.2 92.1  --  91.4   LACTATE  --   --   --   --  0.9  --   --   --    HSTROP T  --   --   --  174* 169*  --  147* 162*         Lab 25  0346 25  1239 25  8983  03/23/25  0736 03/22/25  0345 03/21/25  0905 03/20/25  1626   SODIUM 135* 139 138  138 138 136  136 140 139   POTASSIUM 4.6 4.5 4.5  4.5 5.0 5.1  5.1 5.2 4.8   CHLORIDE 102 104 105  105 105 102  102 107 104   CO2 22.0 23.0 21.0*  21.0* 22.0 22.0  22.0 21.0* 23.0   ANION GAP 11.0 12.0 12.0  12.0 11.0 12.0  12.0 12.0 12.0   BUN 32* 31* 31*  31* 31* 26*  26* 19 25*   CREATININE 2.47* 2.16* 2.30*  2.30* 2.10* 1.80*  1.80* 1.83* 1.67*   EGFR 26.9* 31.5* 29.3*  29.3* 32.6* 39.3*  39.3* 38.5* 42.9*   GLUCOSE 152* 145* 143*  143* 123* 191*  191* 177* 326*   CALCIUM 8.0* 8.7 8.3*  8.3* 8.5* 9.0  9.0 8.9 9.0   MAGNESIUM  --   --  2.0 1.9 1.9  --   --    HEMOGLOBIN A1C  --   --   --   --   --   --  7.90*   TSH  --   --   --   --   --  1.790  --          Lab 03/25/25  0346 03/24/25  1239 03/24/25  1059 03/22/25  0345 03/21/25  0905   TOTAL PROTEIN 5.8* 6.4 5.9* 6.4 6.1   ALBUMIN 3.3* 3.7 3.6 3.6 3.6   GLOBULIN 2.5 2.7 2.3 2.8 2.5   ALT (SGPT) 14 17 15 12 12   AST (SGOT) 15 17 16 15 12   BILIRUBIN 0.2 0.3 0.3 0.2 0.2   ALK PHOS 93 100 97 104 117         Lab 03/22/25  0939 03/22/25  0345 03/20/25  1726 03/20/25  1626   PROBNP  --   --   --  1,321.0*   HSTROP T 174* 169* 147* 162*         Lab 03/22/25  0345   CHOLESTEROL 137   LDL CHOL 50   HDL CHOL 32*   TRIGLYCERIDES 367*         Lab 03/20/25  1726 03/20/25  1626   IRON 47*  --    IRON SATURATION (TSAT) 19*  --    TIBC 243*  --    TRANSFERRIN 163*  --    FERRITIN 315.00  --    FOLATE  --  8.30   VITAMIN B 12  --  366         Brief Urine Lab Results  (Last result in the past 365 days)        Color   Clarity   Blood   Leuk Est   Nitrite   Protein   CREAT   Urine HCG        03/20/25 1637 Yellow   Clear   Small (1+)   Negative   Negative   >=300 mg/dL (3+)                   Microbiology Results Abnormal       None            Stress Test With Pet Myocardial Perfusion  Result Date: 3/24/2025    Myocardial perfusion imaging indicates a small-to-moderate-sized,  moderately severe area of ischemia located in the anterior wall and apex.   Left ventricular ejection fraction is normal (Calculated EF = 54%).   Heavy coronary artery calcification noted on CT attenuation correction images     Duplex Renal Artery - Bilateral Complete CAR  Result Date: 3/23/2025  DUPLEX RENAL ARTERY BILATERAL COMPLETE CAR Date of Exam: 3/23/2025 10:17 AM EDT Indication: chronic kidney disease renovascular hypertension. Comparison: No comparisons available. Technique: Grayscale, color-flow, Doppler spectral waveform analysis was performed of the kidneys, renal arteries, and aorta. Findings: Exam was technically difficult due to bowel gas and difficulties in positioning. The abdominal aorta measures a maximum of 1.5 cm in the visualized proximal portion. Peak flow velocity in the suprarenal aorta 63 cm/s. On the right, there is no elevation flow velocity in the renal artery. Peak flow velocity is 80 cm/s. Acceleration time is 25 ms. Right renal aortic ratio is 1.3. Resistive index is within normal limits and up to 0.6. Right renal vein is patent. Limited grayscale images of the right kidney demonstrate no hydronephrosis or shadowing calculus. Right kidney measures 10.4 cm in length. On the left, there is no significant elevation flow velocity. Peak flow velocity is 106 cm/s. Left renal or aortic ratio is 1.7. Acceleration time is 31 ms. Resistive indices are within normal limits at up to 0.7. Left renal vein is patent. Limited grayscale images of the left kidney demonstrate no hydronephrosis or shadowing calculus. Left kidney measures 11.1 cm in length.     Impression: Impression: No evidence of hemodynamically significant renal artery stenosis on either side. Electronically Signed: Keanu Cool MD  3/23/2025 3:46 PM EDT  Workstation ID: [id number]      Results for orders placed during the hospital encounter of 03/20/25    Adult Transthoracic Echo Complete w/ Color, Spectral and Contrast if necessary  per protocol    Interpretation Summary    Left ventricular systolic function is normal. Calculated left ventricular EF = 62.7% Left ventricular ejection fraction appears to be 61 - 65%.    Normal left atrial size and volume noted.      Current medications:  Scheduled Meds:amLODIPine, 10 mg, Oral, Q24H  aspirin, 81 mg, Oral, Daily  atorvastatin, 40 mg, Oral, Nightly  carvedilol, 25 mg, Oral, BID With Meals  ferrous sulfate, 325 mg, Oral, Daily With Breakfast  heparin (porcine), 5,000 Units, Subcutaneous, Q12H  insulin glargine, 5 Units, Subcutaneous, Nightly  insulin lispro, 2-7 Units, Subcutaneous, 4x Daily AC & at Bedtime  isosorbide dinitrate (ISORDIL) 20 mg, hydrALAZINE (APRESOLINE) 37.5 mg for BIDIL 20-37.5, , Oral, Q8H  Lidocaine, 1 patch, Transdermal, Q24H  nicotine, 1 patch, Transdermal, Q24H  pantoprazole, 40 mg, Oral, Q AM  sodium chloride, 10 mL, Intravenous, Q12H  [Held by provider] valsartan, 160 mg, Oral, Q24H      Continuous Infusions:   PRN Meds:.  acetaminophen **OR** acetaminophen **OR** acetaminophen    senna-docusate sodium **AND** polyethylene glycol **AND** bisacodyl **AND** bisacodyl    calcium carbonate    dextrose    dextrose    gabapentin    glucagon (human recombinant)    melatonin    nitroglycerin    ondansetron    oxyCODONE-acetaminophen    sodium chloride    [COMPLETED] Insert Peripheral IV **AND** sodium chloride    sodium chloride    sodium chloride    Assessment & Plan   Assessment & Plan     Active Hospital Problems    Diagnosis  POA    **Hypertensive emergency [I16.1]  Yes    Abnormal nuclear stress test [R94.39]  Yes    Moderate protein-calorie malnutrition [E44.0]  Yes    Elevated troponin [R79.89]  Yes    Type 2 diabetes mellitus with hyperglycemia, with long-term current use of insulin [E11.65, Z79.4]  Not Applicable    Tobacco abuse [Z72.0]  Yes    Coronary artery disease involving native coronary artery of native heart with angina pectoris [I25.119]  Yes    Anemia [D64.9]  Yes     CKD (chronic kidney disease) stage 3, GFR 30-59 ml/min [N18.30]  Yes    PAD (peripheral artery disease) [I73.9]  Yes    Medical non-compliance [Z91.199]  Not Applicable    COPD (chronic obstructive pulmonary disease) [J44.9]  Yes    Bilateral lower extremity edema [R60.0]  Yes      Resolved Hospital Problems    Diagnosis Date Resolved POA    Atypical angina [I20.89] 03/24/2025 Yes    Heart failure with preserved left ventricular function (HFpEF) [I50.30] 03/22/2025 Unknown    Hypertensive urgency [I16.0] 03/22/2025 Yes        Brief Hospital Course to date:  Thomas Perez is a 74 y.o. male with hypertension, hyperlipidemia, CAD status post RCA stent in 2010, PAD status post embolectomy and fasciotomy, COPD, poorly controlled type 2 diabetes, CKD, medical noncompliance and chronic pain who presented due to elevated blood pressure found incidentally by home health nurse.     Hypertensive emergency- resolved   Acute on chronic chest pain  NSTEMI II  -cardiology following, no acute ACS - d/w APRN today, needs renal failure optimized prior to [facility].  -continue amlodipine and coreg, increased today   -switched to BiDil, will continue .   -continue holding valsartan for now     MAGUI on CKD  -baseline Cr around 0.9-1.1 in 2021 but no labs since then. SCr has worsened from 1.6 on arrival to now 2.5.  -continue holding valsartan.  -IVF  -urine lytes, renal US  -NAL consulted.  -trend     Anemia   -worse today however pt has had IVF and all cell lines dropped so likely dilutional component  -no signs of bleeding   -iron studies most consistent with anemia of chronic disease, likely CKD  -will start iron supplementation     BLE edema   -LE Dopplers negative   -ECHO unremarkable  -stable      CAD s/p stent  Chronic chest pain  HLD  PAD s/p embolectomy and fasciotomy   -daughter reports he is suppose to be on ASA but has been refusing all meds for some time, will restart   -continue statin   -continue isordil      Insulin dependent  T2DM  -lantus 20, lispro 7 TID at home  -currently on SSI, will add lantus 5 and adjust from there, pt not eating well       COPD  Ongoing tobacco use  -not in acute exacerbation   -NRT     Medication noncompliance  -Patient unsure what medications he takes.   -pt cannot read which complicates this.    -Spoke to his daughter who is also uncertain of exactly what he takes. He reports she helps manage his meds.     **Our pharmacist has arrange for him to get prepackaged pill packs from his pharmacy after discharge.  Medication should go through [facility] pharmacy at d/c as ours does not have prepackaged medications like this.    Expected Discharge Location and Transportation:   Expected Discharge   Expected Discharge Date: 3/27/2025; Expected Discharge Time:      VTE Prophylaxis:  Pharmacologic VTE prophylaxis orders are present.         AM-PAC 6 Clicks Score (PT): 22 (03/24/25 2055)    CODE STATUS:   Code Status and Medical Interventions: CPR (Attempt to Resuscitate); Full Support   Ordered at: 03/20/25 1903     Code Status (Patient has no pulse and is not breathing):    CPR (Attempt to Resuscitate)     Medical Interventions (Patient has pulse or is breathing):    Full Support     Level Of Support Discussed With:    Patient       Chaparrita Davila II, DO  03/25/25

## 2025-03-25 NOTE — PROGRESS NOTES
Cardiology Progress Note      Reason for visit:    Hypertensive emergency    IDENTIFICATION: 74-year-old gentleman who resides with his daughter in [place], [place].    Active Hospital Problems    Diagnosis  POA    **Hypertensive emergency [I16.1]  Yes     Priority: Low     Echo (3/21/2025): LVEF 62%.  No significant valvular abnormality  Renal artery duplex (3/23/2025): No renal artery stenosis      Abnormal nuclear stress test [R94.39]  Yes     Priority: Low     Nuclear stress (3/24/2025): Mild to moderate size, moderate ischemia and mid to distal anterior and apex.  LVEF normal.      Moderate protein-calorie malnutrition [E44.0]  Yes     Priority: Low    Elevated troponin [R79.89]  Yes     Priority: Low     Echo (3/21/2025): LVEF 62%.  No significant valvular abnormality      Type 2 diabetes mellitus with hyperglycemia, with long-term current use of insulin [E11.65, Z79.4]  Not Applicable     Priority: Low    Tobacco abuse [Z72.0]  Yes     Priority: Low    Coronary artery disease involving native coronary artery of native heart with angina pectoris [I25.119]  Yes     Priority: Low     PCI of RCA, 2010  S/p coronary angiography in May 2012, which demonstrated disease best managed medically.  Nuclear stress test (1/23/2023): Normal perfusion.  Normal LVEF  Nuclear stress (3/24/2025): Mild to moderate size, moderate ischemia and mid to distal anterior and apex.  LVEF normal.      Anemia [D64.9]  Yes     Priority: Low    CKD (chronic kidney disease) stage 3, GFR 30-59 ml/min [N18.30]  Yes     Priority: Low    PAD (peripheral artery disease) [I73.9]  Yes     Priority: Low    Medical non-compliance [Z91.199]  Not Applicable     Priority: Low    COPD (chronic obstructive pulmonary disease) [J44.9]  Yes     Priority: Low    Bilateral lower extremity edema [R60.0]  Yes     Priority: Low            Patient lying flat in bed sleeping and breathing easy on room air.  He has not had any arrhythmias.  He reports left-sided  discomfort when I press on his chest.  Blood pressures have improved           Vital Sign Min/Max for last 24 hours  Temp  Min: 97.6 °F (36.4 °C)  Max: 98.2 °F (36.8 °C)   BP  Min: 133/62  Max: 170/74   Pulse  Min: 56  Max: 85   Resp  Min: 16  Max: 18   SpO2  Min: 92 %  Max: 96 %   No data recorded      Intake/Output Summary (Last 24 hours) at 3/25/2025 0821  Last data filed at 3/25/2025 0300  Gross per 24 hour   Intake 360 ml   Output 700 ml   Net -340 ml           Physical Exam  Constitutional:       General: He is awake.   Cardiovascular:      Rate and Rhythm: Normal rate and regular rhythm.   Pulmonary:      Effort: Pulmonary effort is normal.      Breath sounds: Normal breath sounds.   Skin:     General: Skin is warm and dry.   Neurological:      Mental Status: He is alert and oriented to person, place, and time.   Psychiatric:         Behavior: Behavior is cooperative.         Tele: Normal sinus rhythm    Results Review (reviewed the patient's recent labs in the electronic medical record):     EKG (3/22/2025): Normal sinus rhythm    CXR (3/22/2025): Mild infiltrates within bilateral lower lobes may be mild pneumonia    Echo (3/21/2025): LVEF 62%    Results from last 7 days   Lab Units 03/25/25  0346 03/24/25  1239 03/24/25  1059 03/23/25  0736 03/22/25  0345 03/21/25  0905 03/20/25  1626   SODIUM mmol/L 135* 139 138  138 138 136  136 140 139   POTASSIUM mmol/L 4.6 4.5 4.5  4.5 5.0 5.1  5.1 5.2 4.8   CHLORIDE mmol/L 102 104 105  105 105 102  102 107 104   BUN mg/dL 32* 31* 31*  31* 31* 26*  26* 19 25*   CREATININE mg/dL 2.47* 2.16* 2.30*  2.30* 2.10* 1.80*  1.80* 1.83* 1.67*   MAGNESIUM mg/dL  --   --  2.0 1.9 1.9  --   --        Results from last 7 days   Lab Units 03/22/25  0939 03/22/25  0345 03/20/25  1726   HSTROP T ng/L 174* 169* 147*       Results from last 7 days   Lab Units 03/25/25  0346 03/24/25  1059 03/23/25  0736   WBC 10*3/mm3 8.02 9.24 9.76   HEMOGLOBIN g/dL 8.0* 8.9* 8.3*   HEMATOCRIT  % 25.7* 27.4* 25.4*   PLATELETS 10*3/mm3 156 160 144       Lab Results   Component Value Date    HGBA1C 7.90 (H) 03/20/2025       Lab Results   Component Value Date    CHOL 137 03/22/2025    CHLPL 189 12/09/2022    TRIG 367 (H) 03/22/2025    HDL 32 (L) 03/22/2025    LDL 50 03/22/2025                  Coronary artery disease with atypical angina  History of RCA PCI  Normal ischemic evaluation 2023  Stress test this admission abnormal  Aspirin 81 mg daily     Hypertensive emergency  Has not been taking BP meds  Renal duplex no significant renal artery stenosis bilaterally  Carvedilol 25 mg twice daily  Amlodipine 10 mg daily  Isordil 20/37.5 mg every 8 hours  Valsartan on hold due to renal insufficiency       Type II NSTEMI due to hypertensive emergency  Troponins elevated at 162, 147, 169 and 174.  EKGs no acute ischemia  Likely type II NSTEMI due to hypertensive emergency      Peripheral arterial disease  Status post previous interventions  No critical limb ischemia     Renal insufficiency  Unclear whether acute or chronic  Renal artery duplex shows no evidence of renal artery stenosis  Current creatinine 2.47     Hyperlipidemia with target LDL <50  Total cholesterol 137, triglycerides 367, HDL 32, LDL 50  Lipitor 40 mg daily     Medical noncompliance     Type 2 diabetes mellitus with hyperglycemia  No SGLT2 inhibitor due to elevated creatinine       Tobacco abuse  Nicotine patch         Defer cardiac catheterization allow patient to have renal diet  Consult nephrology for worsening creatinine  Aspirin and statin therapy for CAD      Electronically signed by TYLER Burgos, 03/25/25, 8:21 AM EDT.

## 2025-03-25 NOTE — PLAN OF CARE
Goal Outcome Evaluation:  Plan of Care Reviewed With: patient        Progress: no change     24hr urine initiated at 1330, otherwise no changes this shift, awaiting plan, AOX4, VSS, RA          Problem: Adult Inpatient Plan of Care  Goal: Absence of Hospital-Acquired Illness or Injury  Intervention: Identify and Manage Fall Risk  Description: Perform standard risk assessment on admission using a validated tool or comprehensive approach appropriate to the patient; reassess fall risk frequently, with change in status or transfer to another level of care.Communicate risk to interprofessional healthcare team; ensure fall risk visible cue.Determine need for increased observation, equipment and environmental modification, as well as use of supportive, nonskid footwear.Adjust safety measures to individual needs and identified risk factors.Reinforce the importance of active participation with fall risk prevention, safety, and physical activity with the patient and family.Perform regular intentional rounding to assess need for position change, pain assessment and personal needs, including assistance with toileting.  Recent Flowsheet Documentation  Taken 3/25/2025 1634 by Lisa Acuña, RN  Safety Promotion/Fall Prevention:   safety round/check completed   room organization consistent   nonskid shoes/slippers when out of bed   mobility aid in reach   lighting adjusted   gait belt   fall prevention program maintained   elopement precautions   clutter free environment maintained   assistive device/personal items within reach   activity supervised  Taken 3/25/2025 1151 by Lisa Acuña, RN  Safety Promotion/Fall Prevention:   safety round/check completed   room organization consistent   nonskid shoes/slippers when out of bed   mobility aid in reach   lighting adjusted   fall prevention program maintained   elopement precautions   clutter free environment maintained   assistive device/personal items within reach   activity  supervised  Taken 3/25/2025 1030 by Lisa Acuña, RN  Safety Promotion/Fall Prevention:   safety round/check completed   room organization consistent   nonskid shoes/slippers when out of bed   mobility aid in reach   lighting adjusted   gait belt   fall prevention program maintained   elopement precautions   clutter free environment maintained   assistive device/personal items within reach   activity supervised  Taken 3/25/2025 0908 by Lisa Acuña, RN  Safety Promotion/Fall Prevention:   safety round/check completed   room organization consistent   nonskid shoes/slippers when out of bed   mobility aid in reach   lighting adjusted   gait belt   fall prevention program maintained   elopement precautions   clutter free environment maintained   assistive device/personal items within reach   activity supervised

## 2025-03-26 LAB
ALBUMIN SERPL-MCNC: 3.3 G/DL (ref 3.5–5.2)
ALBUMIN/GLOB SERPL: 1.2 G/DL
ALP SERPL-CCNC: 102 U/L (ref 39–117)
ALT SERPL W P-5'-P-CCNC: 14 U/L (ref 1–41)
ANION GAP SERPL CALCULATED.3IONS-SCNC: 12 MMOL/L (ref 5–15)
AST SERPL-CCNC: 13 U/L (ref 1–40)
BILIRUB SERPL-MCNC: 0.2 MG/DL (ref 0–1.2)
BUN SERPL-MCNC: 35 MG/DL (ref 8–23)
BUN/CREAT SERPL: 15.4 (ref 7–25)
CALCIUM SPEC-SCNC: 8.1 MG/DL (ref 8.6–10.5)
CHLORIDE SERPL-SCNC: 101 MMOL/L (ref 98–107)
CO2 SERPL-SCNC: 21 MMOL/L (ref 22–29)
CREAT SERPL-MCNC: 2.27 MG/DL (ref 0.76–1.27)
DEPRECATED RDW RBC AUTO: 47.9 FL (ref 37–54)
EGFRCR SERPLBLD CKD-EPI 2021: 29.7 ML/MIN/1.73
ERYTHROCYTE [DISTWIDTH] IN BLOOD BY AUTOMATED COUNT: 14.4 % (ref 12.3–15.4)
GLOBULIN UR ELPH-MCNC: 2.7 GM/DL
GLUCOSE BLDC GLUCOMTR-MCNC: 189 MG/DL (ref 70–130)
GLUCOSE BLDC GLUCOMTR-MCNC: 201 MG/DL (ref 70–130)
GLUCOSE BLDC GLUCOMTR-MCNC: 249 MG/DL (ref 70–130)
GLUCOSE BLDC GLUCOMTR-MCNC: 268 MG/DL (ref 70–130)
GLUCOSE SERPL-MCNC: 214 MG/DL (ref 65–99)
HCT VFR BLD AUTO: 25 % (ref 37.5–51)
HGB BLD-MCNC: 8.1 G/DL (ref 13–17.7)
MCH RBC QN AUTO: 29.7 PG (ref 26.6–33)
MCHC RBC AUTO-ENTMCNC: 32.4 G/DL (ref 31.5–35.7)
MCV RBC AUTO: 91.6 FL (ref 79–97)
PLATELET # BLD AUTO: 154 10*3/MM3 (ref 140–450)
PMV BLD AUTO: 10.3 FL (ref 6–12)
POTASSIUM SERPL-SCNC: 5 MMOL/L (ref 3.5–5.2)
PROT SERPL-MCNC: 6 G/DL (ref 6–8.5)
QT INTERVAL: 438 MS
QT INTERVAL: 446 MS
QTC INTERVAL: 459 MS
QTC INTERVAL: 460 MS
RBC # BLD AUTO: 2.73 10*6/MM3 (ref 4.14–5.8)
SODIUM SERPL-SCNC: 134 MMOL/L (ref 136–145)
WBC NRBC COR # BLD AUTO: 7.11 10*3/MM3 (ref 3.4–10.8)

## 2025-03-26 PROCEDURE — 82570 ASSAY OF URINE CREATININE: CPT | Performed by: INTERNAL MEDICINE

## 2025-03-26 PROCEDURE — 81050 URINALYSIS VOLUME MEASURE: CPT | Performed by: INTERNAL MEDICINE

## 2025-03-26 PROCEDURE — 93010 ELECTROCARDIOGRAM REPORT: CPT | Performed by: INTERNAL MEDICINE

## 2025-03-26 PROCEDURE — 80053 COMPREHEN METABOLIC PANEL: CPT | Performed by: STUDENT IN AN ORGANIZED HEALTH CARE EDUCATION/TRAINING PROGRAM

## 2025-03-26 PROCEDURE — 63710000001 INSULIN GLARGINE PER 5 UNITS: Performed by: INTERNAL MEDICINE

## 2025-03-26 PROCEDURE — 63710000001 INSULIN LISPRO (HUMAN) PER 5 UNITS: Performed by: PHYSICIAN ASSISTANT

## 2025-03-26 PROCEDURE — 25010000002 ONDANSETRON PER 1 MG: Performed by: PHYSICIAN ASSISTANT

## 2025-03-26 PROCEDURE — 93005 ELECTROCARDIOGRAM TRACING: CPT | Performed by: INTERNAL MEDICINE

## 2025-03-26 PROCEDURE — 99232 SBSQ HOSP IP/OBS MODERATE 35: CPT | Performed by: NURSE PRACTITIONER

## 2025-03-26 PROCEDURE — 25010000002 HEPARIN (PORCINE) PER 1000 UNITS: Performed by: PHYSICIAN ASSISTANT

## 2025-03-26 PROCEDURE — 85027 COMPLETE CBC AUTOMATED: CPT | Performed by: STUDENT IN AN ORGANIZED HEALTH CARE EDUCATION/TRAINING PROGRAM

## 2025-03-26 PROCEDURE — 82948 REAGENT STRIP/BLOOD GLUCOSE: CPT

## 2025-03-26 PROCEDURE — 99232 SBSQ HOSP IP/OBS MODERATE 35: CPT | Performed by: INTERNAL MEDICINE

## 2025-03-26 RX ADMIN — Medication 10 ML: at 08:05

## 2025-03-26 RX ADMIN — CARVEDILOL 25 MG: 12.5 TABLET, FILM COATED ORAL at 17:06

## 2025-03-26 RX ADMIN — CARVEDILOL 25 MG: 12.5 TABLET, FILM COATED ORAL at 08:00

## 2025-03-26 RX ADMIN — AMLODIPINE BESYLATE 10 MG: 10 TABLET ORAL at 08:00

## 2025-03-26 RX ADMIN — NICOTINE 1 PATCH: 21 PATCH TRANSDERMAL at 08:02

## 2025-03-26 RX ADMIN — INSULIN GLARGINE 10 UNITS: 100 INJECTION, SOLUTION SUBCUTANEOUS at 20:38

## 2025-03-26 RX ADMIN — ISOSORBIDE DINITRATE: 20 TABLET ORAL at 14:16

## 2025-03-26 RX ADMIN — PANTOPRAZOLE SODIUM 40 MG: 40 TABLET, DELAYED RELEASE ORAL at 05:15

## 2025-03-26 RX ADMIN — INSULIN LISPRO 4 UNITS: 100 INJECTION, SOLUTION INTRAVENOUS; SUBCUTANEOUS at 17:05

## 2025-03-26 RX ADMIN — ATORVASTATIN CALCIUM 40 MG: 40 TABLET, FILM COATED ORAL at 20:37

## 2025-03-26 RX ADMIN — ISOSORBIDE DINITRATE: 20 TABLET ORAL at 05:15

## 2025-03-26 RX ADMIN — INSULIN LISPRO 3 UNITS: 100 INJECTION, SOLUTION INTRAVENOUS; SUBCUTANEOUS at 20:38

## 2025-03-26 RX ADMIN — ISOSORBIDE DINITRATE: 20 TABLET ORAL at 20:40

## 2025-03-26 RX ADMIN — ONDANSETRON 4 MG: 2 INJECTION INTRAMUSCULAR; INTRAVENOUS at 14:20

## 2025-03-26 RX ADMIN — FERROUS SULFATE TAB 325 MG (65 MG ELEMENTAL FE) 325 MG: 325 (65 FE) TAB at 08:00

## 2025-03-26 RX ADMIN — HEPARIN SODIUM 5000 UNITS: 5000 INJECTION INTRAVENOUS; SUBCUTANEOUS at 08:00

## 2025-03-26 RX ADMIN — INSULIN LISPRO 3 UNITS: 100 INJECTION, SOLUTION INTRAVENOUS; SUBCUTANEOUS at 08:00

## 2025-03-26 RX ADMIN — Medication 10 ML: at 20:39

## 2025-03-26 RX ADMIN — Medication 5 MG: at 20:37

## 2025-03-26 RX ADMIN — OXYCODONE AND ACETAMINOPHEN 1 TABLET: 7.5; 325 TABLET ORAL at 06:23

## 2025-03-26 RX ADMIN — ASPIRIN 81 MG: 81 TABLET, COATED ORAL at 08:00

## 2025-03-26 RX ADMIN — HEPARIN SODIUM 5000 UNITS: 5000 INJECTION INTRAVENOUS; SUBCUTANEOUS at 20:38

## 2025-03-26 NOTE — CASE MANAGEMENT/SOCIAL WORK
Continued Stay Note  [facility]     Patient Name: Thomas Perez  MRN: [id number]  Today's Date: 3/26/2025    Admit Date: 3/20/2025    Plan: Home with daughter's assistance, [facility] for SN & PT   Discharge Plan       Row Name 03/26/25 1316       Plan    Plan Home with daughter's assistance, [facility] for SN & PT    Patient/Family in Agreement with Plan yes    Plan Comments CM spoke with patient at bedside regarding Dc planning. Per cards PN, Will reevaluate creatinine in the a.m for potential heart cath. DC plan continues to be to return home with his daughter's assistance and [facility] for SN and PT. Daughter will provide transportation to home @ discharge. CM will continue to follow.    Final Discharge Disposition Code 06 - home with home health care                   Discharge Codes    No documentation.                 Expected Discharge Date and Time       Expected Discharge Date Expected Discharge Time    Mar 27, 2025               Michelle Michaud RN

## 2025-03-26 NOTE — PROGRESS NOTES
" LOS: 4 days   Patient Care Team:  Deysi, No Known as PCP - General    Chief Complaint: HTN emergency     Subjective     Stable renal function.       History taken from: patient    Objective     Vital Sign Min/Max for last 24 hours  Temp  Min: 96.6 °F (35.9 °C)  Max: 97.8 °F (36.6 °C)   BP  Min: 119/52  Max: 142/62   Pulse  Min: 54  Max: 60   Resp  Min: 16  Max: 16   SpO2  Min: 92 %  Max: 95 %   No data recorded   Weight  Min: 70.5 kg (155 lb 8 oz)  Max: 70.5 kg (155 lb 8 oz)     Flowsheet Rows      Flowsheet Row First Filed Value   Admission Height 180.3 cm (71\") Documented at 03/20/2025 1611   Admission Weight 67.1 kg (148 lb) Documented at 03/20/2025 1611            I/O this shift:  In: 325 [P.O.:325]  Out: 200 [Urine:200]  I/O last 3 completed shifts:  In: 705 [P.O.:705]  Out: 1000 [Urine:1000]    Objective:  General Appearance:  Comfortable.    Vital signs: (most recent): Blood pressure 119/52, pulse 60, temperature 97.3 °F (36.3 °C), temperature source Axillary, resp. rate 16, height 180.3 cm (70.98\"), weight 70.5 kg (155 lb 8 oz), SpO2 92%.  Vital signs are normal.    Output: Producing urine.    HEENT: Normal HEENT exam.    Lungs:  Normal effort and normal respiratory rate.  Breath sounds clear to auscultation.  He is not in respiratory distress.  No decreased breath sounds or wheezes.    Heart: Normal rate.  Regular rhythm.  S1 normal and S2 normal.  No murmur or gallop.   Abdomen: Abdomen is soft.  Bowel sounds are normal.     Extremities: Normal range of motion.  There is no effusion or dependent edema.                Results Review:     I reviewed the patient's new clinical results.    WBC WBC   Date Value Ref Range Status   03/26/2025 7.11 3.40 - 10.80 10*3/mm3 Final   03/25/2025 8.02 3.40 - 10.80 10*3/mm3 Final   03/24/2025 9.24 3.40 - 10.80 10*3/mm3 Final      HGB Hemoglobin   Date Value Ref Range Status   03/26/2025 8.1 (L) 13.0 - 17.7 g/dL Final   03/25/2025 8.0 (L) 13.0 - 17.7 g/dL Final " "  03/24/2025 8.9 (L) 13.0 - 17.7 g/dL Final      HCT Hematocrit   Date Value Ref Range Status   03/26/2025 25.0 (L) 37.5 - 51.0 % Final   03/25/2025 25.7 (L) 37.5 - 51.0 % Final   03/24/2025 27.4 (L) 37.5 - 51.0 % Final      Platlets No results found for: \"LABPLAT\"   MCV MCV   Date Value Ref Range Status   03/26/2025 91.6 79.0 - 97.0 fL Final   03/25/2025 93.5 79.0 - 97.0 fL Final   03/24/2025 92.3 79.0 - 97.0 fL Final          Sodium Sodium   Date Value Ref Range Status   03/26/2025 134 (L) 136 - 145 mmol/L Final   03/25/2025 135 (L) 136 - 145 mmol/L Final   03/24/2025 139 136 - 145 mmol/L Final   03/24/2025 138 136 - 145 mmol/L Final   03/24/2025 138 136 - 145 mmol/L Final      Potassium Potassium   Date Value Ref Range Status   03/26/2025 5.0 3.5 - 5.2 mmol/L Final   03/25/2025 4.6 3.5 - 5.2 mmol/L Final   03/24/2025 4.5 3.5 - 5.2 mmol/L Final   03/24/2025 4.5 3.5 - 5.2 mmol/L Final   03/24/2025 4.5 3.5 - 5.2 mmol/L Final      Chloride Chloride   Date Value Ref Range Status   03/26/2025 101 98 - 107 mmol/L Final   03/25/2025 102 98 - 107 mmol/L Final   03/24/2025 104 98 - 107 mmol/L Final   03/24/2025 105 98 - 107 mmol/L Final   03/24/2025 105 98 - 107 mmol/L Final      CO2 CO2   Date Value Ref Range Status   03/26/2025 21.0 (L) 22.0 - 29.0 mmol/L Final   03/25/2025 22.0 22.0 - 29.0 mmol/L Final   03/24/2025 23.0 22.0 - 29.0 mmol/L Final   03/24/2025 21.0 (L) 22.0 - 29.0 mmol/L Final   03/24/2025 21.0 (L) 22.0 - 29.0 mmol/L Final      BUN BUN   Date Value Ref Range Status   03/26/2025 35 (H) 8 - 23 mg/dL Final   03/25/2025 32 (H) 8 - 23 mg/dL Final   03/24/2025 31 (H) 8 - 23 mg/dL Final   03/24/2025 31 (H) 8 - 23 mg/dL Final   03/24/2025 31 (H) 8 - 23 mg/dL Final      Creatinine Creatinine   Date Value Ref Range Status   03/26/2025 2.27 (H) 0.76 - 1.27 mg/dL Final   03/25/2025 2.47 (H) 0.76 - 1.27 mg/dL Final   03/24/2025 2.16 (H) 0.76 - 1.27 mg/dL Final   03/24/2025 2.30 (H) 0.76 - 1.27 mg/dL Final   03/24/2025 " "2.30 (H) 0.76 - 1.27 mg/dL Final      Calcium Calcium   Date Value Ref Range Status   03/26/2025 8.1 (L) 8.6 - 10.5 mg/dL Final   03/25/2025 8.0 (L) 8.6 - 10.5 mg/dL Final   03/24/2025 8.7 8.6 - 10.5 mg/dL Final   03/24/2025 8.3 (L) 8.6 - 10.5 mg/dL Final   03/24/2025 8.3 (L) 8.6 - 10.5 mg/dL Final      PO4 No results found for: \"CAPO4\"   Albumin Albumin   Date Value Ref Range Status   03/26/2025 3.3 (L) 3.5 - 5.2 g/dL Final   03/25/2025 3.3 (L) 3.5 - 5.2 g/dL Final   03/24/2025 3.7 3.5 - 5.2 g/dL Final   03/24/2025 3.6 3.5 - 5.2 g/dL Final      Magnesium Magnesium   Date Value Ref Range Status   03/24/2025 2.0 1.6 - 2.4 mg/dL Final      Uric Acid Uric Acid   Date Value Ref Range Status   03/25/2025 5.8 3.4 - 7.0 mg/dL Final     Comment:     Falsely depressed results may occur on samples drawn from patients receiving N-Acetylcysteine (NAC) or Metamizole.        Medication Review: Yes    Assessment & Plan       Hypertensive emergency    Type 2 diabetes mellitus with hyperglycemia, with long-term current use of insulin    Tobacco abuse    Coronary artery disease involving native coronary artery of native heart with angina pectoris    Anemia    CKD (chronic kidney disease) stage 3, GFR 30-59 ml/min    PAD (peripheral artery disease)    Medical non-compliance    COPD (chronic obstructive pulmonary disease)    Bilateral lower extremity edema    Likely type II NSTEMI from hypertensive emergency    Abnormal nuclear stress test    Moderate protein-calorie malnutrition      Assessment & Plan    1-MAGUI on CKD stage III: Last known stable cr ~ 0.8-1.1mg/dl. Cr 1.6 on this admission with uptrend likely due to hemodynamic variation.  2- Hypertension urgency - controlled. Duplex negative for renal artery stenosis.   3- Anemia - Tsat 19%  4- DM   5-  Edema   6- hx of CAD   7- hx of PAD   8- COPD   9. Proteinuria: Suspect diabetic nephropathy     Plan:  Hemodynamic variation in renal function. So far renal function stable. ARB " currently on hold. In the long term patient will benefit from raas suppression  w ACEi/Arb, SGLT-2in or MRA. for sravan-protection. Change in serum cr with initiation of ACE/ARB is usually suggestive of underlying hyperfiltration injury noted in diabetics, expect improvement/stabilization of serum cr and GFR overtime.      I discussed the patients findings and my recommendations with patient and nursing staff       Lavell Ross MD  03/26/25  17:30 EDT

## 2025-03-26 NOTE — PROGRESS NOTES
Cardiology Progress Note      Reason for visit:    Hypertensive emergency  Elevated troponin/likely type II NSTEMI    IDENTIFICATION: 74-year-old gentleman who resides with his daughter in [place], [place]    Active Hospital Problems    Diagnosis  POA    **Hypertensive emergency [I16.1]  Yes     Priority: Medium     Echo (3/21/2025): LVEF 62%.  No significant valvular abnormality  Renal artery duplex (3/23/2025): No renal artery stenosis      Likely type II NSTEMI from hypertensive emergency [I21.4]  Yes     Priority: High     Echo (3/21/2025): LVEF 62%.  No significant valvular abnormality      Type 2 diabetes mellitus with hyperglycemia, with long-term current use of insulin [E11.65, Z79.4]  Not Applicable     Priority: High    Coronary artery disease involving native coronary artery of native heart with angina pectoris [I25.119]  Yes     Priority: High     PCI of RCA, 2010  S/p coronary angiography in May 2012, which demonstrated disease best managed medically.  Nuclear stress test (1/23/2023): Normal perfusion.  Normal LVEF  Nuclear stress (3/24/2025): Mild to moderate size, moderate ischemia and mid to distal anterior and apex.  LVEF normal.      CKD (chronic kidney disease) stage 3, GFR 30-59 ml/min [N18.30]  Yes     Priority: High    Abnormal nuclear stress test [R94.39]  Yes     Priority: Medium     Nuclear stress (3/24/2025): Mild to moderate size, moderate ischemia and mid to distal anterior and apex.  LVEF normal.      Anemia [D64.9]  Yes     Priority: Medium    Medical non-compliance [Z91.199]  Not Applicable     Priority: Medium    Moderate protein-calorie malnutrition [E44.0]  Yes     Priority: Low    Tobacco abuse [Z72.0]  Yes     Priority: Low    PAD (peripheral artery disease) [I73.9]  Yes     Priority: Low    COPD (chronic obstructive pulmonary disease) [J44.9]  Yes     Priority: Low    Bilateral lower extremity edema [R60.0]  Yes     Priority: Low            Patient lying flat in bed.  No  family is at bedside.  He is breathing easy on 2 L oxygen by nasal cannula.  He reports left-sided chest discomfort mainly provoked when palpating.  His creatinine is still elevated over 2 but down from yesterday.  Nephrology is consulting           Vital Sign Min/Max for last 24 hours  Temp  Min: 96.6 °F (35.9 °C)  Max: 98.3 °F (36.8 °C)   BP  Min: 120/61  Max: 149/65   Pulse  Min: 55  Max: 69   Resp  Min: 16  Max: 16   SpO2  Min: 93 %  Max: 97 %   No data recorded      Intake/Output Summary (Last 24 hours) at 3/26/2025 0814  Last data filed at 3/26/2025 0518  Gross per 24 hour   Intake 705 ml   Output 900 ml   Net -195 ml           Physical Exam  Constitutional:       General: He is sleeping.   Cardiovascular:      Rate and Rhythm: Normal rate and regular rhythm.   Pulmonary:      Effort: Pulmonary effort is normal.      Breath sounds: Decreased breath sounds present.   Skin:     General: Skin is warm and dry.   Psychiatric:         Behavior: Behavior is cooperative.         Tele: Sinus rhythm to sinus bradycardia    Results Review (reviewed the patient's recent labs in the electronic medical record):     EKG (3/26/2025): Sinus bradycardia at 59 bpm no acute ischemia    CXR (3/22/2025): Mild infiltrates bilateral lower lobes may be related to mild pneumonia    Echo (3/21/2025): LVEF 61-65%    Results from last 7 days   Lab Units 03/26/25  0603 03/25/25  0346 03/24/25  1239 03/24/25  1059 03/23/25  0736 03/22/25  0345 03/21/25  0905   SODIUM mmol/L 134* 135* 139 138  138 138 136  136 140   POTASSIUM mmol/L 5.0 4.6 4.5 4.5  4.5 5.0 5.1  5.1 5.2   CHLORIDE mmol/L 101 102 104 105  105 105 102  102 107   BUN mg/dL 35* 32* 31* 31*  31* 31* 26*  26* 19   CREATININE mg/dL 2.27* 2.47* 2.16* 2.30*  2.30* 2.10* 1.80*  1.80* 1.83*   MAGNESIUM mg/dL  --   --   --  2.0 1.9 1.9  --        Results from last 7 days   Lab Units 03/22/25  0939 03/22/25  0345 03/20/25  1726   HSTROP T ng/L 174* 169* 147*       Results from  last 7 days   Lab Units 03/26/25  0603 03/25/25  0346 03/24/25  1059   WBC 10*3/mm3 7.11 8.02 9.24   HEMOGLOBIN g/dL 8.1* 8.0* 8.9*   HEMATOCRIT % 25.0* 25.7* 27.4*   PLATELETS 10*3/mm3 154 156 160       Lab Results   Component Value Date    HGBA1C 7.90 (H) 03/20/2025       Lab Results   Component Value Date    CHOL 137 03/22/2025    CHLPL 189 12/09/2022    TRIG 367 (H) 03/22/2025    HDL 32 (L) 03/22/2025    LDL 50 03/22/2025                  Coronary artery disease with atypical angina  History of RCA PCI  Normal ischemic evaluation 2023  Stress test this admission abnormal with ischemia anterior wall and apex.  Heavy coronary artery calcification noted on CT correction imaging  Echo this admission normal LVEF and no valvular abnormality  Aspirin 81 mg daily     Hypertensive emergency  Has not been taking BP meds  Renal duplex no significant renal artery stenosis bilaterally  Carvedilol 25 mg twice daily  Amlodipine 10 mg daily  Isordil 20/37.5 mg every 8 hours  Valsartan on hold due to elevated creatinine  Current /60        Type II NSTEMI due to hypertensive emergency  Troponins elevated at 162, 147, 169 and 174.  EKGs no acute ischemia  Likely type II NSTEMI due to hypertensive emergency        Peripheral arterial disease  Status post previous interventions  No critical limb ischemia  Aspirin and statin     Renal insufficiency  Unclear whether acute or chronic  Renal artery duplex shows no evidence of renal artery stenosis  Current creatinine 2.27  Nephrology consulted     Hyperlipidemia with target LDL <50  Total cholesterol 137, triglycerides 367, HDL 32, LDL 50  Lipitor 40 mg daily     Medical noncompliance  Patient had been refusing his medications at home and has not been eating and has had significant weight loss    Type 2 diabetes mellitus with hyperglycemia  No SGLT2 inhibitor due to elevated creatinine        Tobacco abuse  Nicotine patch       Malnutrition/weight loss      Anemia  Likely anemia  of chronic kidney disease  H&H low but stable 8.1 and 25    Tobacco abuse  Nicotine patch         Blood pressures improved with current medicines.  Continue current medical therapy  Keep patient n.p.o. after midnight and we will reevaluate creatinine in the a.m for potential heart catheterization    Electronically signed by TYLER Burgos, 03/26/25, 8:13 AM EDT.

## 2025-03-26 NOTE — PROGRESS NOTES
[facility] Medicine Services  PROGRESS NOTE    Patient Name: Thomas Perez  : 1950  MRN: [id number]    Date of Admission: 3/20/2025  Primary Care Physician: Provider, No Known    Subjective   Subjective     CC: f/u raghav    HPI: Up in bed with O2 off satting in low 80s and complaining he feels poorly.       Objective   Objective     Vital Signs:   Temp:  [96.6 °F (35.9 °C)-98.3 °F (36.8 °C)] 96.6 °F (35.9 °C)  Heart Rate:  [55-69] 60  Resp:  [16] 16  BP: (120-149)/(49-68) 138/60     Physical Exam:  Constitutional: No acute distress, awake, alert  HENT: NCAT, mucous membranes moist  Respiratory: Clear to auscultation bilaterally, respiratory effort normal   Cardiovascular: RRR, no murmurs, rubs, or gallops  Gastrointestinal: Positive bowel sounds, soft, nontender, nondistended  Musculoskeletal: No bilateral ankle edema  Psychiatric: Appropriate affect, cooperative  Neurologic: Oriented x 3, strength symmetric in all extremities, Cranial Nerves grossly intact to confrontation, speech clear  Skin: No rashes     Results Reviewed:  LAB RESULTS:      Lab 25  0603 25  1422 25  0346 25  1059 25  0736 25  0939 25  0345 25  0905 25  1726 25  1626   WBC 7.11  --  8.02 9.24 9.76  --  10.82* 9.82  --  10.12   HEMOGLOBIN 8.1*  --  8.0* 8.9* 8.3*  --  9.4* 9.8*  --  11.0*   HEMATOCRIT 25.0*  --  25.7* 27.4* 25.4*  --  29.1* 30.4*  --  34.2*   PLATELETS 154  --  156 160 144  --  165 177  --  184   NEUTROS ABS  --   --   --   --   --   --  7.80* 7.11*  --  7.50*   IMMATURE GRANS (ABS)  --   --   --   --   --   --  0.04 0.06*  --  0.06*   LYMPHS ABS  --   --   --   --   --   --  1.74 1.68  --  1.55   MONOS ABS  --   --   --   --   --   --  0.90 0.68  --  0.69   EOS ABS  --   --   --   --   --   --  0.31 0.27  --  0.29   MCV 91.6  --  93.5 92.3 91.7  --  91.2 92.1  --  91.4   LACTATE  --   --   --   --   --   --  0.9  --   --   --    LDH  --   241*  --   --   --   --   --   --   --   --    HSTROP T  --   --   --   --   --  174* 169*  --  147* 162*         Lab 03/26/25  0603 03/25/25  0346 03/24/25  1239 03/24/25  1059 03/23/25  0736 03/22/25  0345 03/21/25  0905 03/20/25  1626   SODIUM 134* 135* 139 138  138 138 136  136 140 139   POTASSIUM 5.0 4.6 4.5 4.5  4.5 5.0 5.1  5.1 5.2 4.8   CHLORIDE 101 102 104 105  105 105 102  102 107 104   CO2 21.0* 22.0 23.0 21.0*  21.0* 22.0 22.0  22.0 21.0* 23.0   ANION GAP 12.0 11.0 12.0 12.0  12.0 11.0 12.0  12.0 12.0 12.0   BUN 35* 32* 31* 31*  31* 31* 26*  26* 19 25*   CREATININE 2.27* 2.47* 2.16* 2.30*  2.30* 2.10* 1.80*  1.80* 1.83* 1.67*   EGFR 29.7* 26.9* 31.5* 29.3*  29.3* 32.6* 39.3*  39.3* 38.5* 42.9*   GLUCOSE 214* 152* 145* 143*  143* 123* 191*  191* 177* 326*   CALCIUM 8.1* 8.0* 8.7 8.3*  8.3* 8.5* 9.0  9.0 8.9 9.0   MAGNESIUM  --   --   --  2.0 1.9 1.9  --   --    HEMOGLOBIN A1C  --   --   --   --   --   --   --  7.90*   TSH  --   --   --   --   --   --  1.790  --          Lab 03/26/25  0603 03/25/25  0346 03/24/25  1239 03/24/25  1059 03/22/25  0345   TOTAL PROTEIN 6.0 5.8* 6.4 5.9* 6.4   ALBUMIN 3.3* 3.3* 3.7 3.6 3.6   GLOBULIN 2.7 2.5 2.7 2.3 2.8   ALT (SGPT) 14 14 17 15 12   AST (SGOT) 13 15 17 16 15   BILIRUBIN 0.2 0.2 0.3 0.3 0.2   ALK PHOS 102 93 100 97 104         Lab 03/22/25  0939 03/22/25  0345 03/20/25  1726 03/20/25  1626   PROBNP  --   --   --  1,321.0*   HSTROP T 174* 169* 147* 162*         Lab 03/22/25  0345   CHOLESTEROL 137   LDL CHOL 50   HDL CHOL 32*   TRIGLYCERIDES 367*         Lab 03/20/25  1726 03/20/25  1626   IRON 47*  --    IRON SATURATION (TSAT) 19*  --    TIBC 243*  --    TRANSFERRIN 163*  --    FERRITIN 315.00  --    FOLATE  --  8.30   VITAMIN B 12  --  366         Brief Urine Lab Results  (Last result in the past 365 days)        Color   Clarity   Blood   Leuk Est   Nitrite   Protein   CREAT   Urine HCG        03/25/25 1403             116.6                  Microbiology Results Abnormal       None            US Renal Bilateral  Result Date: 3/25/2025  US RENAL BILATERAL Date of Exam: 3/25/2025 9:47 AM EDT Indication: raghav, r/o hydro. Comparison: Renal artery duplex ultrasound 3/23/2025 Technique: Grayscale and color Doppler ultrasound evaluation of the kidneys and urinary bladder was performed. Findings: Right kidney measures 10.4 x 4.4 x 5.0 cm. Left kidney measures 10.4 x 5.2 x 5.0 cm. Each kidney maintains normal cortical thickness and cortical echotexture. Small left renal cortical cysts are present. Index cyst in the left lower renal pole measures 1.6 cm. No suspicious solid renal lesion on either side. No shadowing stone or hydronephrosis on either side. Urinary bladder has an unremarkable sonographic appearance.     Impression: Impression: No hydronephrosis. Small left renal cysts. Electronically Signed: Vane Hancock MD  3/25/2025 10:56 AM EDT  Workstation ID: [id number]    Stress Test With Pet Myocardial Perfusion  Result Date: 3/24/2025    Myocardial perfusion imaging indicates a small-to-moderate-sized, moderately severe area of ischemia located in the anterior wall and apex.   Left ventricular ejection fraction is normal (Calculated EF = 54%).   Heavy coronary artery calcification noted on CT attenuation correction images       Results for orders placed during the hospital encounter of 03/20/25    Adult Transthoracic Echo Complete w/ Color, Spectral and Contrast if necessary per protocol    Interpretation Summary    Left ventricular systolic function is normal. Calculated left ventricular EF = 62.7% Left ventricular ejection fraction appears to be 61 - 65%.    Normal left atrial size and volume noted.      Current medications:  Scheduled Meds:amLODIPine, 10 mg, Oral, Q24H  aspirin, 81 mg, Oral, Daily  atorvastatin, 40 mg, Oral, Nightly  carvedilol, 25 mg, Oral, BID With Meals  ferrous sulfate, 325 mg, Oral, Daily With Breakfast  heparin (porcine), 5,000  Units, Subcutaneous, Q12H  insulin glargine, 10 Units, Subcutaneous, Nightly  insulin lispro, 2-7 Units, Subcutaneous, 4x Daily AC & at Bedtime  isosorbide dinitrate (ISORDIL) 20 mg, hydrALAZINE (APRESOLINE) 37.5 mg for BIDIL 20-37.5, , Oral, Q8H  Lidocaine, 1 patch, Transdermal, Q24H  nicotine, 1 patch, Transdermal, Q24H  pantoprazole, 40 mg, Oral, Q AM  sodium chloride, 10 mL, Intravenous, Q12H  [Held by provider] valsartan, 160 mg, Oral, Q24H      Continuous Infusions:   PRN Meds:.  acetaminophen **OR** acetaminophen **OR** acetaminophen    senna-docusate sodium **AND** polyethylene glycol **AND** bisacodyl **AND** bisacodyl    calcium carbonate    dextrose    dextrose    gabapentin    glucagon (human recombinant)    melatonin    nitroglycerin    ondansetron    oxyCODONE-acetaminophen    sodium chloride    [COMPLETED] Insert Peripheral IV **AND** sodium chloride    sodium chloride    sodium chloride    Assessment & Plan   Assessment & Plan     Active Hospital Problems    Diagnosis  POA    **Hypertensive emergency [I16.1]  Yes    Abnormal nuclear stress test [R94.39]  Yes    Moderate protein-calorie malnutrition [E44.0]  Yes    Likely type II NSTEMI from hypertensive emergency [I21.4]  Yes    Type 2 diabetes mellitus with hyperglycemia, with long-term current use of insulin [E11.65, Z79.4]  Not Applicable    Tobacco abuse [Z72.0]  Yes    Coronary artery disease involving native coronary artery of native heart with angina pectoris [I25.119]  Yes    Anemia [D64.9]  Yes    CKD (chronic kidney disease) stage 3, GFR 30-59 ml/min [N18.30]  Yes    PAD (peripheral artery disease) [I73.9]  Yes    Medical non-compliance [Z91.199]  Not Applicable    COPD (chronic obstructive pulmonary disease) [J44.9]  Yes    Bilateral lower extremity edema [R60.0]  Yes      Resolved Hospital Problems    Diagnosis Date Resolved POA    Atypical angina [I20.89] 03/24/2025 Yes    Heart failure with preserved left ventricular function (HFpEF)  [I50.30] 03/22/2025 Unknown    Hypertensive urgency [I16.0] 03/22/2025 Yes        Brief Hospital Course to date:  Thomas Perez is a 74 y.o. male with hypertension, hyperlipidemia, CAD status post RCA stent in 2010, PAD status post embolectomy and fasciotomy, COPD, poorly controlled type 2 diabetes, CKD, medical noncompliance and chronic pain who presented due to elevated blood pressure found incidentally by home health nurse.     Hypertensive emergency- resolved   Acute on chronic chest pain  NSTEMI II  -cardiology following, no acute ACS - d/w APRN today, needs renal failure optimized prior to ischemic eval.  -continue amlodipine and coreg, increased today   -switched to BiDil, will continue .   -continue holding valsartan for now     MAGUI on CKD  -baseline Cr around 0.9-1.1 in 2021 but no labs since then. SCr has worsened from 1.6 on arrival to now 2.5. Rec'd IVF 25th and Scr better today.  -continue holding valsartan.  -urine lytes, renal US  -NAL follows.  -trend     Anemia   -worse today however pt has had IVF and all cell lines dropped so likely dilutional component  -no signs of bleeding   -iron studies most consistent with anemia of chronic disease, likely CKD  -will start iron supplementation     BLE edema   -LE Dopplers negative   -ECHO unremarkable  -stable      CAD s/p stent  Chronic chest pain  HLD  PAD s/p embolectomy and fasciotomy   -daughter reports he is suppose to be on ASA but has been refusing all meds for some time, will restart   -continue statin   -continue isordil      Insulin dependent T2DM  -lantus 20, lispro 7 TID at home  -currently on SSI, will add lantus 5 and adjust from there, pt not eating well       COPD  Ongoing tobacco use  -not in acute exacerbation   -NRT     Medication noncompliance  -Patient unsure what medications he takes.   -pt cannot read which complicates this.    -Spoke to his daughter who is also uncertain of exactly what he takes. He reports she helps manage his  meds.     **Our pharmacist has arrange for him to get prepackaged pill packs from his pharmacy after discharge.  Medication should go through [facility] pharmacy at d/c as ours does not have prepackaged medications like this.    Expected Discharge Location and Transportation:   Expected Discharge   Expected Discharge Date: 3/27/2025; Expected Discharge Time:      VTE Prophylaxis:  Pharmacologic VTE prophylaxis orders are present.         AM-PAC 6 Clicks Score (PT): 22 (03/25/25 2200)    CODE STATUS:   Code Status and Medical Interventions: CPR (Attempt to Resuscitate); Full Support   Ordered at: 03/20/25 1903     Code Status (Patient has no pulse and is not breathing):    CPR (Attempt to Resuscitate)     Medical Interventions (Patient has pulse or is breathing):    Full Support     Level Of Support Discussed With:    Patient       Chaparrita Davila II, DO  03/26/25

## 2025-03-26 NOTE — PLAN OF CARE
Problem: Adult Inpatient Plan of Care  Goal: Plan of Care Review  Outcome: Progressing  Goal: Patient-Specific Goal (Individualized)  Outcome: Progressing  Goal: Absence of Hospital-Acquired Illness or Injury  Outcome: Progressing  Intervention: Identify and Manage Fall Risk  Recent Flowsheet Documentation  Taken 3/26/2025 0610 by Carlos Eduardo Nguyen RN  Safety Promotion/Fall Prevention:   activity supervised   assistive device/personal items within reach   clutter free environment maintained   fall prevention program maintained   gait belt   mobility aid in reach   nonskid shoes/slippers when out of bed   room organization consistent   safety round/check completed  Taken 3/26/2025 0400 by Carlos Eduardo Nguyen RN  Safety Promotion/Fall Prevention:   activity supervised   assistive device/personal items within reach   clutter free environment maintained   fall prevention program maintained   gait belt   mobility aid in reach   nonskid shoes/slippers when out of bed   room organization consistent   safety round/check completed  Taken 3/26/2025 0200 by Carlos Eduardo Nguyen RN  Safety Promotion/Fall Prevention:   activity supervised   assistive device/personal items within reach   clutter free environment maintained   fall prevention program maintained   gait belt   mobility aid in reach   nonskid shoes/slippers when out of bed   room organization consistent   safety round/check completed  Taken 3/26/2025 0000 by Carlos Eduardo Nguyen RN  Safety Promotion/Fall Prevention:   activity supervised   assistive device/personal items within reach   clutter free environment maintained   fall prevention program maintained   gait belt   mobility aid in reach   nonskid shoes/slippers when out of bed   room organization consistent   safety round/check completed  Taken 3/25/2025 2200 by Carlos Eduardo Nguyen RN  Safety Promotion/Fall Prevention:   activity supervised   assistive device/personal items within reach   clutter free environment maintained    fall prevention program maintained   gait belt   mobility aid in reach   nonskid shoes/slippers when out of bed   room organization consistent   safety round/check completed  Taken 3/25/2025 2000 by Carlos Eduardo Nguyen RN  Safety Promotion/Fall Prevention:   activity supervised   assistive device/personal items within reach   clutter free environment maintained   fall prevention program maintained   gait belt   mobility aid in reach   nonskid shoes/slippers when out of bed   room organization consistent   safety round/check completed  Intervention: Prevent Skin Injury  Recent Flowsheet Documentation  Taken 3/26/2025 0610 by Carlos Eduardo Nguyen RN  Body Position: position changed independently  Skin Protection: incontinence pads utilized  Taken 3/26/2025 0400 by Carlos Eduardo Nguyen RN  Body Position: position changed independently  Skin Protection: incontinence pads utilized  Taken 3/26/2025 0200 by Carlos Eduardo Nguyen RN  Body Position: position changed independently  Skin Protection: incontinence pads utilized  Taken 3/26/2025 0000 by Carlos Eduardo Nguyen RN  Body Position: position changed independently  Skin Protection: incontinence pads utilized  Taken 3/25/2025 2200 by Carlos Eduardo Nguyen RN  Body Position: position changed independently  Skin Protection: incontinence pads utilized  Taken 3/25/2025 2000 by Carlos Eduardo Nguyen RN  Body Position: position changed independently  Skin Protection: incontinence pads utilized  Intervention: Prevent and Manage VTE (Venous Thromboembolism) Risk  Recent Flowsheet Documentation  Taken 3/26/2025 0610 by Carlos Eduardo Nguyen RN  VTE Prevention/Management: (sq heparin) other (see comments)  Taken 3/26/2025 0400 by Carlos Eduardo Nguyen RN  VTE Prevention/Management: (sq heparin) other (see comments)  Taken 3/26/2025 0200 by Carlos Eduardo Nguyen RN  VTE Prevention/Management: (sq heparin) other (see comments)  Taken 3/26/2025 0000 by Carlos Eduardo Nguyen RN  VTE Prevention/Management: (sq heparin) other (see comments)  Taken  3/25/2025 2200 by Carlos Eduardo Nguyen RN  VTE Prevention/Management: (sq heparin) other (see comments)  Taken 3/25/2025 2000 by Carlos Eduardo Nguyen RN  VTE Prevention/Management: (sq heparin) other (see comments)  Intervention: Prevent Infection  Recent Flowsheet Documentation  Taken 3/26/2025 0610 by Carlos Eduardo Nguyen RN  Infection Prevention:   rest/sleep promoted   single patient room provided  Taken 3/26/2025 0400 by Carlos Eduardo Nguyen RN  Infection Prevention:   rest/sleep promoted   single patient room provided  Taken 3/25/2025 2200 by Carlos Eduardo Nguyen RN  Infection Prevention:   rest/sleep promoted   single patient room provided  Taken 3/25/2025 2000 by Carlos Eduardo Nguyen RN  Infection Prevention:   rest/sleep promoted   single patient room provided  Goal: Optimal Comfort and Wellbeing  Outcome: Progressing  Intervention: Monitor Pain and Promote Comfort  Recent Flowsheet Documentation  Taken 3/26/2025 0400 by Carlos Eduardo Nguyen RN  Pain Management Interventions: quiet environment facilitated  Taken 3/25/2025 2200 by Carlos Eduardo Nguyen RN  Pain Management Interventions:   pain medication given   quiet environment facilitated  Taken 3/25/2025 2000 by Carlos Eduardo Nguyen RN  Pain Management Interventions: quiet environment facilitated  Intervention: Provide Person-Centered Care  Recent Flowsheet Documentation  Taken 3/25/2025 2000 by Carlos Eduardo Nguyen RN  Trust Relationship/Rapport: care explained  Goal: Readiness for Transition of Care  Outcome: Progressing     Problem: Comorbidity Management  Goal: Blood Glucose Level Within Target Range  Outcome: Progressing  Intervention: Monitor and Manage Glycemia  Recent Flowsheet Documentation  Taken 3/25/2025 2000 by Carlos Eduardo Nguyen RN  Medication Review/Management: medications reviewed  Goal: Blood Pressure in Desired Range  Outcome: Progressing  Intervention: Maintain Blood Pressure Management  Recent Flowsheet Documentation  Taken 3/25/2025 2000 by Carlos Eduardo Nguyen RN  Medication  Review/Management: medications reviewed     Problem: Fall Injury Risk  Goal: Absence of Fall and Fall-Related Injury  Outcome: Progressing  Intervention: Identify and Manage Contributors  Recent Flowsheet Documentation  Taken 3/26/2025 0000 by Carlos Eduardo Nguyen RN  Self-Care Promotion: independence encouraged  Taken 3/25/2025 2200 by Carlos Eduardo Nguyen RN  Self-Care Promotion: independence encouraged  Taken 3/25/2025 2000 by Carlos Eduardo Nguyen RN  Medication Review/Management: medications reviewed  Self-Care Promotion: independence encouraged  Intervention: Promote Injury-Free Environment  Recent Flowsheet Documentation  Taken 3/26/2025 0610 by Carlos Eduardo Nguyen RN  Safety Promotion/Fall Prevention:   activity supervised   assistive device/personal items within reach   clutter free environment maintained   fall prevention program maintained   gait belt   mobility aid in reach   nonskid shoes/slippers when out of bed   room organization consistent   safety round/check completed  Taken 3/26/2025 0400 by Carlos Eduardo Nguyen RN  Safety Promotion/Fall Prevention:   activity supervised   assistive device/personal items within reach   clutter free environment maintained   fall prevention program maintained   gait belt   mobility aid in reach   nonskid shoes/slippers when out of bed   room organization consistent   safety round/check completed  Taken 3/26/2025 0200 by Carlos Eduardo Nguyen RN  Safety Promotion/Fall Prevention:   activity supervised   assistive device/personal items within reach   clutter free environment maintained   fall prevention program maintained   gait belt   mobility aid in reach   nonskid shoes/slippers when out of bed   room organization consistent   safety round/check completed  Taken 3/26/2025 0000 by Carlos Eduardo Nguyen RN  Safety Promotion/Fall Prevention:   activity supervised   assistive device/personal items within reach   clutter free environment maintained   fall prevention program maintained   gait belt    mobility aid in reach   nonskid shoes/slippers when out of bed   room organization consistent   safety round/check completed  Taken 3/25/2025 2200 by Carlos Eduardo Nguyen RN  Safety Promotion/Fall Prevention:   activity supervised   assistive device/personal items within reach   clutter free environment maintained   fall prevention program maintained   gait belt   mobility aid in reach   nonskid shoes/slippers when out of bed   room organization consistent   safety round/check completed  Taken 3/25/2025 2000 by Carlos Eduardo Nguyen RN  Safety Promotion/Fall Prevention:   activity supervised   assistive device/personal items within reach   clutter free environment maintained   fall prevention program maintained   gait belt   mobility aid in reach   nonskid shoes/slippers when out of bed   room organization consistent   safety round/check completed     Problem: Skin Injury Risk Increased  Goal: Skin Health and Integrity  Outcome: Progressing  Intervention: Optimize Skin Protection  Recent Flowsheet Documentation  Taken 3/26/2025 0610 by Carlos Eduardo Nguyen RN  Activity Management: activity encouraged  Pressure Reduction Techniques: frequent weight shift encouraged  Head of Bed (HOB) Positioning: HOB elevated  Pressure Reduction Devices: pressure-redistributing mattress utilized  Skin Protection: incontinence pads utilized  Taken 3/26/2025 0400 by Carlos Eduardo Nguyen RN  Activity Management: activity minimized  Pressure Reduction Techniques: frequent weight shift encouraged  Head of Bed (HOB) Positioning: HOB elevated  Pressure Reduction Devices: pressure-redistributing mattress utilized  Skin Protection: incontinence pads utilized  Taken 3/26/2025 0200 by Carlos Eduardo Nguyen RN  Activity Management: activity minimized  Pressure Reduction Techniques: frequent weight shift encouraged  Head of Bed (HOB) Positioning: HOB elevated  Pressure Reduction Devices: pressure-redistributing mattress utilized  Skin Protection: incontinence pads  utilized  Taken 3/26/2025 0000 by Carlos Eduardo Nguyen RN  Activity Management: activity minimized  Pressure Reduction Techniques: frequent weight shift encouraged  Head of Bed (HOB) Positioning: [facility] elevated  Pressure Reduction Devices: pressure-redistributing mattress utilized  Skin Protection: incontinence pads utilized  Taken 3/25/2025 2200 by Carlos Eduardo Nguyen RN  Activity Management: activity minimized  Pressure Reduction Techniques: frequent weight shift encouraged  Head of Bed (HOB) Positioning: [facility] elevated  Pressure Reduction Devices: pressure-redistributing mattress utilized  Skin Protection: incontinence pads utilized  Taken 3/25/2025 2000 by Carlos Eduardo Nguyen RN  Activity Management: activity encouraged  Pressure Reduction Techniques: frequent weight shift encouraged  Head of Bed (HOB) Positioning: [facility] elevated  Pressure Reduction Devices: pressure-redistributing mattress utilized  Skin Protection: incontinence pads utilized   Goal Outcome Evaluation:

## 2025-03-27 PROBLEM — I21.A1 TYPE 2 MYOCARDIAL INFARCTION: Status: ACTIVE | Noted: 2025-03-27

## 2025-03-27 LAB
ALBUMIN SERPL-MCNC: 3.6 G/DL (ref 3.5–5.2)
ALBUMIN/GLOB SERPL: 1.2 G/DL
ALP SERPL-CCNC: 93 U/L (ref 39–117)
ALT SERPL W P-5'-P-CCNC: 15 U/L (ref 1–41)
ANION GAP SERPL CALCULATED.3IONS-SCNC: 11 MMOL/L (ref 5–15)
AST SERPL-CCNC: 13 U/L (ref 1–40)
BILIRUB SERPL-MCNC: 0.2 MG/DL (ref 0–1.2)
BUN SERPL-MCNC: 40 MG/DL (ref 8–23)
BUN/CREAT SERPL: 15.2 (ref 7–25)
CALCIUM SPEC-SCNC: 8.3 MG/DL (ref 8.6–10.5)
CHLORIDE SERPL-SCNC: 101 MMOL/L (ref 98–107)
CO2 SERPL-SCNC: 22 MMOL/L (ref 22–29)
COLLECT DURATION TIME UR: 24 HRS
CREAT SERPL-MCNC: 2.64 MG/DL (ref 0.76–1.27)
CREAT UR-MCNC: 128.7 MG/DL
CREATINE 24H UR-MRATE: 0.64 G/24 HR (ref 1–2.4)
DEPRECATED RDW RBC AUTO: 49 FL (ref 37–54)
EGFRCR SERPLBLD CKD-EPI 2021: 24.8 ML/MIN/1.73
ERYTHROCYTE [DISTWIDTH] IN BLOOD BY AUTOMATED COUNT: 14.3 % (ref 12.3–15.4)
GLOBULIN UR ELPH-MCNC: 2.9 GM/DL
GLUCOSE BLDC GLUCOMTR-MCNC: 175 MG/DL (ref 70–130)
GLUCOSE BLDC GLUCOMTR-MCNC: 179 MG/DL (ref 70–130)
GLUCOSE BLDC GLUCOMTR-MCNC: 181 MG/DL (ref 70–130)
GLUCOSE BLDC GLUCOMTR-MCNC: 194 MG/DL (ref 70–130)
GLUCOSE SERPL-MCNC: 151 MG/DL (ref 65–99)
HCT VFR BLD AUTO: 25.8 % (ref 37.5–51)
HGB BLD-MCNC: 8.2 G/DL (ref 13–17.7)
MCH RBC QN AUTO: 29.5 PG (ref 26.6–33)
MCHC RBC AUTO-ENTMCNC: 31.8 G/DL (ref 31.5–35.7)
MCV RBC AUTO: 92.8 FL (ref 79–97)
PLATELET # BLD AUTO: 173 10*3/MM3 (ref 140–450)
PMV BLD AUTO: 10.9 FL (ref 6–12)
POTASSIUM SERPL-SCNC: 5 MMOL/L (ref 3.5–5.2)
PROT SERPL-MCNC: 6.5 G/DL (ref 6–8.5)
QT INTERVAL: 464 MS
QTC INTERVAL: 459 MS
RBC # BLD AUTO: 2.78 10*6/MM3 (ref 4.14–5.8)
SODIUM SERPL-SCNC: 134 MMOL/L (ref 136–145)
SPECIMEN VOL 24H UR: 500 ML
WBC NRBC COR # BLD AUTO: 8.46 10*3/MM3 (ref 3.4–10.8)

## 2025-03-27 PROCEDURE — 85027 COMPLETE CBC AUTOMATED: CPT | Performed by: STUDENT IN AN ORGANIZED HEALTH CARE EDUCATION/TRAINING PROGRAM

## 2025-03-27 PROCEDURE — 25010000002 HEPARIN (PORCINE) PER 1000 UNITS: Performed by: PHYSICIAN ASSISTANT

## 2025-03-27 PROCEDURE — 99232 SBSQ HOSP IP/OBS MODERATE 35: CPT | Performed by: NURSE PRACTITIONER

## 2025-03-27 PROCEDURE — 80053 COMPREHEN METABOLIC PANEL: CPT | Performed by: STUDENT IN AN ORGANIZED HEALTH CARE EDUCATION/TRAINING PROGRAM

## 2025-03-27 PROCEDURE — 99232 SBSQ HOSP IP/OBS MODERATE 35: CPT | Performed by: STUDENT IN AN ORGANIZED HEALTH CARE EDUCATION/TRAINING PROGRAM

## 2025-03-27 PROCEDURE — 63710000001 INSULIN LISPRO (HUMAN) PER 5 UNITS: Performed by: PHYSICIAN ASSISTANT

## 2025-03-27 PROCEDURE — 82948 REAGENT STRIP/BLOOD GLUCOSE: CPT

## 2025-03-27 RX ORDER — AMLODIPINE BESYLATE 10 MG/1
10 TABLET ORAL
Qty: 90 TABLET | Refills: 1 | Status: SHIPPED | OUTPATIENT
Start: 2025-03-27 | End: 2025-04-01

## 2025-03-27 RX ORDER — NITROGLYCERIN 0.4 MG/1
0.4 TABLET SUBLINGUAL
Qty: 25 TABLET | Refills: 1 | Status: SHIPPED | OUTPATIENT
Start: 2025-03-27 | End: 2025-04-01

## 2025-03-27 RX ORDER — CARVEDILOL 25 MG/1
25 TABLET ORAL 2 TIMES DAILY WITH MEALS
Qty: 180 TABLET | Refills: 1 | Status: SHIPPED | OUTPATIENT
Start: 2025-03-27 | End: 2025-04-01

## 2025-03-27 RX ORDER — ISOSORBIDE DINITRATE AND HYDRALAZINE HYDROCHLORIDE 37.5; 2 MG/1; MG/1
1 TABLET ORAL 3 TIMES DAILY
Qty: 240 TABLET | Refills: 1 | Status: SHIPPED | OUTPATIENT
Start: 2025-03-27 | End: 2025-04-01

## 2025-03-27 RX ORDER — NICOTINE 21 MG/24HR
1 PATCH, TRANSDERMAL 24 HOURS TRANSDERMAL
Qty: 14 EACH | Refills: 0 | Status: SHIPPED | OUTPATIENT
Start: 2025-03-27 | End: 2025-04-01

## 2025-03-27 RX ADMIN — ISOSORBIDE DINITRATE: 20 TABLET ORAL at 10:53

## 2025-03-27 RX ADMIN — FERROUS SULFATE TAB 325 MG (65 MG ELEMENTAL FE) 325 MG: 325 (65 FE) TAB at 10:55

## 2025-03-27 RX ADMIN — OXYCODONE AND ACETAMINOPHEN 1 TABLET: 7.5; 325 TABLET ORAL at 10:57

## 2025-03-27 RX ADMIN — ASPIRIN 81 MG: 81 TABLET, COATED ORAL at 10:55

## 2025-03-27 RX ADMIN — AMLODIPINE BESYLATE 10 MG: 10 TABLET ORAL at 10:55

## 2025-03-27 RX ADMIN — Medication 10 ML: at 10:56

## 2025-03-27 NOTE — PROGRESS NOTES
" LOS: 5 days   Patient Care Team:  Deysi, Lakeisha Known as PCP - General    Chief Complaint: HTN emergency     Subjective     Continued worsening of renal function. Cr  2.64. Appetite is low.Poor po intake.       History taken from: patient    Objective     Vital Sign Min/Max for last 24 hours  Temp  Min: 97.4 °F (36.3 °C)  Max: 98.5 °F (36.9 °C)   BP  Min: 119/52  Max: 152/72   Pulse  Min: 59  Max: 63   Resp  Min: 16  Max: 18   SpO2  Min: 91 %  Max: 94 %   No data recorded   Weight  Min: 69.9 kg (154 lb 1.6 oz)  Max: 69.9 kg (154 lb 1.6 oz)     Flowsheet Rows      Flowsheet Row First Filed Value   Admission Height 180.3 cm (71\") Documented at 03/20/2025 1611   Admission Weight 67.1 kg (148 lb) Documented at 03/20/2025 1611            I/O this shift:  In: 150 [P.O.:150]  Out: 300 [Urine:300]  I/O last 3 completed shifts:  In: 325 [P.O.:325]  Out: 1070 [Urine:1070]    Objective:  General Appearance:  Comfortable.    Vital signs: (most recent): Blood pressure 119/52, pulse 60, temperature 98.2 °F (36.8 °C), temperature source Oral, resp. rate 18, height 180.3 cm (70.98\"), weight 69.9 kg (154 lb 1.6 oz), SpO2 91%.  Vital signs are normal.    Output: Producing urine.    HEENT: Normal HEENT exam.    Lungs:  Normal effort and normal respiratory rate.  Breath sounds clear to auscultation.  He is not in respiratory distress.  No decreased breath sounds or wheezes.    Heart: Normal rate.  Regular rhythm.  S1 normal and S2 normal.  No murmur or gallop.   Abdomen: Abdomen is soft.  Bowel sounds are normal.     Extremities: Normal range of motion.  There is no effusion or dependent edema.                Results Review:     I reviewed the patient's new clinical results.    WBC WBC   Date Value Ref Range Status   03/27/2025 8.46 3.40 - 10.80 10*3/mm3 Final   03/26/2025 7.11 3.40 - 10.80 10*3/mm3 Final   03/25/2025 8.02 3.40 - 10.80 10*3/mm3 Final      HGB Hemoglobin   Date Value Ref Range Status   03/27/2025 8.2 (L) 13.0 - 17.7 " "g/dL Final   03/26/2025 8.1 (L) 13.0 - 17.7 g/dL Final   03/25/2025 8.0 (L) 13.0 - 17.7 g/dL Final      HCT Hematocrit   Date Value Ref Range Status   03/27/2025 25.8 (L) 37.5 - 51.0 % Final   03/26/2025 25.0 (L) 37.5 - 51.0 % Final   03/25/2025 25.7 (L) 37.5 - 51.0 % Final      Platlets No results found for: \"LABPLAT\"   MCV MCV   Date Value Ref Range Status   03/27/2025 92.8 79.0 - 97.0 fL Final   03/26/2025 91.6 79.0 - 97.0 fL Final   03/25/2025 93.5 79.0 - 97.0 fL Final          Sodium Sodium   Date Value Ref Range Status   03/27/2025 134 (L) 136 - 145 mmol/L Final   03/26/2025 134 (L) 136 - 145 mmol/L Final   03/25/2025 135 (L) 136 - 145 mmol/L Final      Potassium Potassium   Date Value Ref Range Status   03/27/2025 5.0 3.5 - 5.2 mmol/L Final   03/26/2025 5.0 3.5 - 5.2 mmol/L Final   03/25/2025 4.6 3.5 - 5.2 mmol/L Final      Chloride Chloride   Date Value Ref Range Status   03/27/2025 101 98 - 107 mmol/L Final   03/26/2025 101 98 - 107 mmol/L Final   03/25/2025 102 98 - 107 mmol/L Final      CO2 CO2   Date Value Ref Range Status   03/27/2025 22.0 22.0 - 29.0 mmol/L Final   03/26/2025 21.0 (L) 22.0 - 29.0 mmol/L Final   03/25/2025 22.0 22.0 - 29.0 mmol/L Final      BUN BUN   Date Value Ref Range Status   03/27/2025 40 (H) 8 - 23 mg/dL Final   03/26/2025 35 (H) 8 - 23 mg/dL Final   03/25/2025 32 (H) 8 - 23 mg/dL Final      Creatinine Creatinine   Date Value Ref Range Status   03/27/2025 2.64 (H) 0.76 - 1.27 mg/dL Final   03/26/2025 2.27 (H) 0.76 - 1.27 mg/dL Final   03/25/2025 2.47 (H) 0.76 - 1.27 mg/dL Final      Calcium Calcium   Date Value Ref Range Status   03/27/2025 8.3 (L) 8.6 - 10.5 mg/dL Final   03/26/2025 8.1 (L) 8.6 - 10.5 mg/dL Final   03/25/2025 8.0 (L) 8.6 - 10.5 mg/dL Final      PO4 No results found for: \"CAPO4\"   Albumin Albumin   Date Value Ref Range Status   03/27/2025 3.6 3.5 - 5.2 g/dL Final   03/26/2025 3.3 (L) 3.5 - 5.2 g/dL Final   03/25/2025 3.3 (L) 3.5 - 5.2 g/dL Final      Magnesium No " "results found for: \"MG\"     Uric Acid Uric Acid   Date Value Ref Range Status   03/25/2025 5.8 3.4 - 7.0 mg/dL Final     Comment:     Falsely depressed results may occur on samples drawn from patients receiving N-Acetylcysteine (NAC) or Metamizole.        Medication Review: Yes    Assessment & Plan       Hypertensive emergency    Type 2 diabetes mellitus with hyperglycemia, with long-term current use of insulin    Tobacco abuse    Coronary artery disease involving native coronary artery of native heart with angina pectoris    Anemia    CKD (chronic kidney disease) stage 3, GFR 30-59 ml/min    PAD (peripheral artery disease)    Medical non-compliance    COPD (chronic obstructive pulmonary disease)    Bilateral lower extremity edema    Likely type II NSTEMI from hypertensive emergency    Abnormal nuclear stress test    Moderate protein-calorie malnutrition    Type 2 myocardial infarction      Assessment & Plan    1-MAGUI on CKD stage III: Last known stable cr ~ 0.8-1.1mg/dl. Cr 1.6 on this admission with uptrend likely due to hemodynamic variation.  2- Hypertension urgency - controlled. Duplex negative for renal artery stenosis.   3- Anemia - Tsat 19%  4- DM   5-  Edema   6- hx of CAD   7- hx of PAD   8- COPD   9. Proteinuria: Suspect diabetic nephropathy     Plan:  Hemodynamic variation in renal function. ARB currently on hold. In the long term patient will benefit from raas suppression  w ACEi/Arb, SGLT-2in or MRA. for sravan-protection. Will try fluid challenge NS 500cc bolus to determine renal response.   Would recommend avoiding contrast exposure. Plan for C on hold for now.      I discussed the patients findings and my recommendations with patient and nursing staff       Lavell Ross MD  03/27/25  17:23 EDT            "

## 2025-03-27 NOTE — PROGRESS NOTES
[facility]  PROGRESS NOTE    Patient Name: Thomas Perez  : 1950  MRN: [id number]    Date of Admission: 3/20/2025  Primary Care Physician: Provider, No Known    Subjective   Subjective     CC:  Hypertension, MAGUI    HPI:  Patient seen resting comfortably in bed no acute distress.  Reports minimal sleep overnight.  Reports mild left chest pain that is reproducible to palpation.  Mild pedal edema.      Objective   Objective     Vital Signs:   Temp:  [97.3 °F (36.3 °C)-98.5 °F (36.9 °C)] 98.2 °F (36.8 °C)  Heart Rate:  [59-63] 63  Resp:  [16] 16  BP: (119-152)/(52-77) 139/55     Physical Exam  Constitutional:       General: He is sleeping. He is not in acute distress.  Cardiovascular:      Rate and Rhythm: Normal rate.      Pulses: Normal pulses.   Pulmonary:      Effort: Pulmonary effort is normal. No respiratory distress.   Abdominal:      General: There is no distension.      Palpations: Abdomen is soft.      Tenderness: There is no abdominal tenderness. There is no guarding or rebound.   Musculoskeletal:      Right lower leg: Edema present.      Left lower leg: Edema present.   Neurological:      Mental Status: He is easily aroused.   Psychiatric:         Mood and Affect: Mood normal.         Behavior: Behavior normal.           Results Reviewed:  LAB RESULTS:      Lab 25  0407 25  0603 25  1422 25  0346 25  1059 25  0736 25  0939 25  0345 25  0905 25  1726 25  1626   WBC 8.46 7.11  --  8.02 9.24 9.76  --  10.82* 9.82  --  10.12   HEMOGLOBIN 8.2* 8.1*  --  8.0* 8.9* 8.3*  --  9.4* 9.8*  --  11.0*   HEMATOCRIT 25.8* 25.0*  --  25.7* 27.4* 25.4*  --  29.1* 30.4*  --  34.2*   PLATELETS 173 154  --  156 160 144  --  165 177  --  184   NEUTROS ABS  --   --   --   --   --   --   --  7.80* 7.11*  --  7.50*   IMMATURE GRANS (ABS)  --   --   --   --   --   --   --  0.04 0.06*  --  0.06*   LYMPHS ABS  --   --    --   --   --   --   --  1.74 1.68  --  1.55   MONOS ABS  --   --   --   --   --   --   --  0.90 0.68  --  0.69   EOS ABS  --   --   --   --   --   --   --  0.31 0.27  --  0.29   MCV 92.8 91.6  --  93.5 92.3 91.7  --  91.2 92.1  --  91.4   LACTATE  --   --   --   --   --   --   --  0.9  --   --   --    LDH  --   --  241*  --   --   --   --   --   --   --   --    HSTROP T  --   --   --   --   --   --  174* 169*  --  147* 162*         Lab 03/27/25  0407 03/26/25  0603 03/25/25  0346 03/24/25  1239 03/24/25  1059 03/23/25  0736 03/22/25  0345 03/21/25  0905 03/20/25  1626   SODIUM 134* 134* 135* 139 138  138 138 136  136 140 139   POTASSIUM 5.0 5.0 4.6 4.5 4.5  4.5 5.0 5.1  5.1 5.2 4.8   CHLORIDE 101 101 102 104 105  105 105 102  102 107 104   CO2 22.0 21.0* 22.0 23.0 21.0*  21.0* 22.0 22.0  22.0 21.0* 23.0   ANION GAP 11.0 12.0 11.0 12.0 12.0  12.0 11.0 12.0  12.0 12.0 12.0   BUN 40* 35* 32* 31* 31*  31* 31* 26*  26* 19 25*   CREATININE 2.64* 2.27* 2.47* 2.16* 2.30*  2.30* 2.10* 1.80*  1.80* 1.83* 1.67*   EGFR 24.8* 29.7* 26.9* 31.5* 29.3*  29.3* 32.6* 39.3*  39.3* 38.5* 42.9*   GLUCOSE 151* 214* 152* 145* 143*  143* 123* 191*  191* 177* 326*   CALCIUM 8.3* 8.1* 8.0* 8.7 8.3*  8.3* 8.5* 9.0  9.0 8.9 9.0   MAGNESIUM  --   --   --   --  2.0 1.9 1.9  --   --    HEMOGLOBIN A1C  --   --   --   --   --   --   --   --  7.90*   TSH  --   --   --   --   --   --   --  1.790  --          Lab 03/27/25  0407 03/26/25  0603 03/25/25  0346 03/24/25  1239 03/24/25  1059   TOTAL PROTEIN 6.5 6.0 5.8* 6.4 5.9*   ALBUMIN 3.6 3.3* 3.3* 3.7 3.6   GLOBULIN 2.9 2.7 2.5 2.7 2.3   ALT (SGPT) 15 14 14 17 15   AST (SGOT) 13 13 15 17 16   BILIRUBIN 0.2 0.2 0.2 0.3 0.3   ALK PHOS 93 102 93 100 97         Lab 03/22/25  0939 03/22/25  0345 03/20/25  1726 03/20/25  1626   PROBNP  --   --   --  1,321.0*   HSTROP T 174* 169* 147* 162*         Lab 03/22/25  0345   CHOLESTEROL 137   LDL CHOL 50   HDL CHOL 32*   TRIGLYCERIDES 367*          Lab 03/20/25  1726 03/20/25  1626   IRON 47*  --    IRON SATURATION (TSAT) 19*  --    TIBC 243*  --    TRANSFERRIN 163*  --    FERRITIN 315.00  --    FOLATE  --  8.30   VITAMIN B 12  --  366         Brief Urine Lab Results  (Last result in the past 365 days)        Color   Clarity   Blood   Leuk Est   Nitrite   Protein   CREAT   Urine HCG        03/26/25 1339             128.7                 Microbiology Results Abnormal       None            No radiology results from the last 24 hrs    Results for orders placed during the hospital encounter of 03/20/25    Adult Transthoracic Echo Complete w/ Color, Spectral and Contrast if necessary per protocol    Interpretation Summary    Left ventricular systolic function is normal. Calculated left ventricular EF = 62.7% Left ventricular ejection fraction appears to be 61 - 65%.    Normal left atrial size and volume noted.      Current medications:  Scheduled Meds:amLODIPine, 10 mg, Oral, Q24H  aspirin, 81 mg, Oral, Daily  atorvastatin, 40 mg, Oral, Nightly  carvedilol, 25 mg, Oral, BID With Meals  ferrous sulfate, 325 mg, Oral, Daily With Breakfast  heparin (porcine), 5,000 Units, Subcutaneous, Q12H  insulin glargine, 10 Units, Subcutaneous, Nightly  insulin lispro, 2-7 Units, Subcutaneous, 4x Daily AC & at Bedtime  isosorbide dinitrate (ISORDIL) 20 mg, hydrALAZINE (APRESOLINE) 37.5 mg for BIDIL 20-37.5, , Oral, Q8H  Lidocaine, 1 patch, Transdermal, Q24H  nicotine, 1 patch, Transdermal, Q24H  pantoprazole, 40 mg, Oral, Q AM  sodium chloride, 10 mL, Intravenous, Q12H  [Held by provider] valsartan, 160 mg, Oral, Q24H      Continuous Infusions:   PRN Meds:.  acetaminophen **OR** acetaminophen **OR** acetaminophen    senna-docusate sodium **AND** polyethylene glycol **AND** bisacodyl **AND** bisacodyl    calcium carbonate    dextrose    dextrose    gabapentin    glucagon (human recombinant)    melatonin    nitroglycerin    ondansetron    oxyCODONE-acetaminophen    sodium  chloride    [COMPLETED] Insert Peripheral IV **AND** sodium chloride    sodium chloride    sodium chloride    Assessment & Plan   Assessment & Plan     Active Hospital Problems    Diagnosis  POA    **Hypertensive emergency [I16.1]  Yes    Type 2 myocardial infarction [I21.A1]  Yes    Abnormal nuclear stress test [R94.39]  Yes    Moderate protein-calorie malnutrition [E44.0]  Yes    Likely type II NSTEMI from hypertensive emergency [I21.4]  Yes    Type 2 diabetes mellitus with hyperglycemia, with long-term current use of insulin [E11.65, Z79.4]  Not Applicable    Tobacco abuse [Z72.0]  Yes    Coronary artery disease involving native coronary artery of native heart with angina pectoris [I25.119]  Yes    Anemia [D64.9]  Yes    CKD (chronic kidney disease) stage 3, GFR 30-59 ml/min [N18.30]  Yes    PAD (peripheral artery disease) [I73.9]  Yes    Medical non-compliance [Z91.199]  Not Applicable    COPD (chronic obstructive pulmonary disease) [J44.9]  Yes    Bilateral lower extremity edema [R60.0]  Yes      Resolved Hospital Problems    Diagnosis Date Resolved POA    Atypical angina [I20.89] 03/24/2025 Yes    Heart failure with preserved left ventricular function (HFpEF) [I50.30] 03/22/2025 Unknown    Hypertensive urgency [I16.0] 03/22/2025 Yes        Brief Hospital Course to date:  Thomas Perez is a 74 y.o. male with a PMHx of hypertension, hyperlipidemia, CAD status post RCA stent in 2010, PAD status post embolectomy and fasciotomy, COPD, poorly controlled type 2 diabetes, CKD, medical noncompliance and chronic pain who presented due to elevated blood pressure found incidentally by home health nurse.  Cardiology consulted for elevated troponins, planning on outpatient follow-up for consideration of cardiac catheterization at that time.  Patient also found to have MAGUI, nephrology following.     Hypertensive emergency- resolved   Acute on chronic chest pain  NSTEMI II  -Cardiology following, no acute ACS. Plan for  outpatient follow-up in 2 to 3 weeks to continue discussion of possible outpatient cardiac catheterization at that time.  -Continue aspirin, statin, carvedilol, BiDil, amlodipine.  -Deferring ARB and SGLT2 to nephrology  -continue holding valsartan for now     MAGUI on CKD  -baseline Cr around 0.9-1.1 in 2021 but no labs since then. SCr has worsened from 1.6 on arrival to now 2.64.   -Renal ultrasound without hydronephrosis, small left renal cyst  -Renal artery duplex without evidence of hemodynamically significant renal artery stenosis  -s/p IVF  -Nephrology following  -Repeat labs in AM     Normocytic Anemia   -Possible dilutional component as patient had drop in all cell lines after IV fluids  -no signs of bleeding   -iron studies most consistent with anemia of chronic disease, likely CKD  -will start iron supplementation     BLE edema   -LE Dopplers negative   -ECHO unremarkable  -stable      CAD s/p stent  Chronic chest pain  HLD  PAD s/p embolectomy and fasciotomy   -daughter reports he is suppose to be on ASA but has been refusing all meds for some time, will restart   -Continue aspirin, statin, carvedilol, BiDil, amlodipine.     Insulin dependent T2DM  -lantus 20, lispro 7 TID at home  -currently on SSI, will add lantus 10 and adjust from there, pt not eating well       COPD  Ongoing tobacco use  -not in acute exacerbation   -NRT     Medication noncompliance  -Patient unsure what medications he takes.   -pt cannot read which complicates this.    -Spoke to his daughter who is also uncertain of exactly what he takes. He reports she helps manage his meds.     **Our pharmacist has arrange for him to get prepackaged pill packs from his pharmacy after discharge.  Medication should go through [facility] pharmacy at d/c as ours does not have prepackaged medications like this.    Expected Discharge Location and Transportation: Home  Expected Discharge   Expected Discharge Date: 3/27/2025;  Expected Discharge Time:      VTE Prophylaxis:  Pharmacologic VTE prophylaxis orders are present.         AM-PAC 6 Clicks Score (PT): 22 (03/27/25 0000)    CODE STATUS:   Code Status and Medical Interventions: CPR (Attempt to Resuscitate); Full Support   Ordered at: 03/20/25 0250     Code Status (Patient has no pulse and is not breathing):    CPR (Attempt to Resuscitate)     Medical Interventions (Patient has pulse or is breathing):    Full Support     Level Of Support Discussed With:    Patient       Seb Naqvi DO  03/27/25

## 2025-03-27 NOTE — PLAN OF CARE
Problem: Adult Inpatient Plan of Care  Goal: Plan of Care Review  Outcome: Progressing  Goal: Patient-Specific Goal (Individualized)  Outcome: Progressing  Goal: Absence of Hospital-Acquired Illness or Injury  Outcome: Progressing  Intervention: Identify and Manage Fall Risk  Recent Flowsheet Documentation  Taken 3/27/2025 0610 by Carlos Eduardo Nguyen RN  Safety Promotion/Fall Prevention:   activity supervised   assistive device/personal items within reach   clutter free environment maintained   fall prevention program maintained   gait belt   lighting adjusted   mobility aid in reach   nonskid shoes/slippers when out of bed   room organization consistent   safety round/check completed  Taken 3/27/2025 0359 by Carlos Eduardo Nguyen RN  Safety Promotion/Fall Prevention:   activity supervised   assistive device/personal items within reach   clutter free environment maintained   fall prevention program maintained   gait belt   mobility aid in reach   nonskid shoes/slippers when out of bed   room organization consistent   safety round/check completed  Taken 3/27/2025 0200 by Carlos Eduardo Nguyen RN  Safety Promotion/Fall Prevention:   activity supervised   assistive device/personal items within reach   clutter free environment maintained   fall prevention program maintained   gait belt   mobility aid in reach   nonskid shoes/slippers when out of bed   room organization consistent   safety round/check completed  Taken 3/27/2025 0000 by Carlos Eduardo Nguyen RN  Safety Promotion/Fall Prevention:   activity supervised   clutter free environment maintained   assistive device/personal items within reach   fall prevention program maintained   gait belt   mobility aid in reach   nonskid shoes/slippers when out of bed   room organization consistent   safety round/check completed  Taken 3/26/2025 2200 by Carlos Eduardo Nguyen RN  Safety Promotion/Fall Prevention:   activity supervised   assistive device/personal items within reach   clutter free  environment maintained   fall prevention program maintained   gait belt   mobility aid in reach   nonskid shoes/slippers when out of bed   room organization consistent   safety round/check completed  Taken 3/26/2025 2000 by Carlos Eduardo Nguyen RN  Safety Promotion/Fall Prevention:   activity supervised   assistive device/personal items within reach   clutter free environment maintained   fall prevention program maintained   gait belt   mobility aid in reach   nonskid shoes/slippers when out of bed   room organization consistent   safety round/check completed  Intervention: Prevent Skin Injury  Recent Flowsheet Documentation  Taken 3/27/2025 0610 by Carlos Eduardo Nguyen RN  Body Position: position changed independently  Taken 3/27/2025 0359 by Carlos Eduardo Nguyen RN  Body Position: position changed independently  Taken 3/27/2025 0200 by Carlos Eduardo Nguyen RN  Body Position: position changed independently  Skin Protection: incontinence pads utilized  Taken 3/27/2025 0000 by Carlos Eduardo Nguyen RN  Body Position: position changed independently  Skin Protection: incontinence pads utilized  Taken 3/26/2025 2200 by Carlos Eduardo Nguyen RN  Body Position: position changed independently  Skin Protection: incontinence pads utilized  Taken 3/26/2025 2000 by Carlos Eduardo Nguyen RN  Body Position: position changed independently  Skin Protection: incontinence pads utilized  Intervention: Prevent and Manage VTE (Venous Thromboembolism) Risk  Recent Flowsheet Documentation  Taken 3/27/2025 0610 by Carlos Eduardo Nguyen RN  VTE Prevention/Management: (sq heparin) other (see comments)  Taken 3/27/2025 0359 by Carlos Eduardo Nguyen RN  VTE Prevention/Management: (sq heparin) other (see comments)  Taken 3/27/2025 0200 by Carlos Eduardo Nguyen RN  VTE Prevention/Management: (sq heparin) other (see comments)  Taken 3/27/2025 0000 by Carlos Eduardo Nguyen RN  VTE Prevention/Management: (sq heparin) other (see comments)  Taken 3/26/2025 2200 by Carlos Eduardo Nguyen RN  VTE Prevention/Management:  (sq heparin) other (see comments)  Taken 3/26/2025 2000 by Carlos Eduardo Nguyen RN  VTE Prevention/Management: (sq heparin) other (see comments)  Intervention: Prevent Infection  Recent Flowsheet Documentation  Taken 3/27/2025 0610 by Carlos Eduardo Nguyen RN  Infection Prevention:   environmental surveillance performed   rest/sleep promoted   single patient room provided   hand hygiene promoted  Taken 3/27/2025 0359 by Carlos Eduardo Nguyen RN  Infection Prevention:   environmental surveillance performed   rest/sleep promoted   single patient room provided  Taken 3/27/2025 0200 by Carlos Eduardo Nguyen RN  Infection Prevention:   environmental surveillance performed   rest/sleep promoted   single patient room provided  Taken 3/27/2025 0000 by Carlos Eduardo Nguyen RN  Infection Prevention:   environmental surveillance performed   rest/sleep promoted   single patient room provided  Taken 3/26/2025 2200 by Carlos Eduardo Nguyen RN  Infection Prevention:   environmental surveillance performed   rest/sleep promoted   single patient room provided  Taken 3/26/2025 2000 by Carlos Eduardo Nguyen RN  Infection Prevention:   rest/sleep promoted   single patient room provided  Goal: Optimal Comfort and Wellbeing  Outcome: Progressing  Intervention: Monitor Pain and Promote Comfort  Recent Flowsheet Documentation  Taken 3/27/2025 0610 by Carlos Eduardo Nguyen RN  Pain Management Interventions: quiet environment facilitated  Taken 3/27/2025 0359 by Carlos Eduardo Nguyen RN  Pain Management Interventions: quiet environment facilitated  Taken 3/27/2025 0000 by Carlos Eduardo Nguyen RN  Pain Management Interventions: quiet environment facilitated  Intervention: Provide Person-Centered Care  Recent Flowsheet Documentation  Taken 3/27/2025 0610 by Carlos Eduardo Nguyen RN  Trust Relationship/Rapport: care explained  Taken 3/27/2025 0359 by Carlos Eduardo Nguyen RN  Trust Relationship/Rapport: care explained  Taken 3/27/2025 0000 by Carlos Eduardo Nguyen RN  Trust Relationship/Rapport:   care explained    reassurance provided  Taken 3/26/2025 2200 by Carlos Eduardo Nguyen RN  Trust Relationship/Rapport: care explained  Taken 3/26/2025 2000 by Carlos Eduardo Nguyen RN  Trust Relationship/Rapport:   care explained   questions answered   questions encouraged  Goal: Readiness for Transition of Care  Outcome: Progressing     Problem: Comorbidity Management  Goal: Blood Glucose Level Within Target Range  Outcome: Progressing  Intervention: Monitor and Manage Glycemia  Recent Flowsheet Documentation  Taken 3/26/2025 2000 by Carlos Eduardo Nguyen RN  Medication Review/Management: medications reviewed  Goal: Blood Pressure in Desired Range  Outcome: Progressing  Intervention: Maintain Blood Pressure Management  Recent Flowsheet Documentation  Taken 3/26/2025 2000 by Carlos Eduardo Nguyen RN  Medication Review/Management: medications reviewed     Problem: Fall Injury Risk  Goal: Absence of Fall and Fall-Related Injury  Outcome: Progressing  Intervention: Identify and Manage Contributors  Recent Flowsheet Documentation  Taken 3/27/2025 0610 by Carlos Eduardo Nguyen RN  Self-Care Promotion: independence encouraged  Taken 3/26/2025 2200 by Carlos Eduardo Nguyen RN  Self-Care Promotion: independence encouraged  Taken 3/26/2025 2000 by Carlos Eduardo Nguyen RN  Medication Review/Management: medications reviewed  Self-Care Promotion: independence encouraged  Intervention: Promote Injury-Free Environment  Recent Flowsheet Documentation  Taken 3/27/2025 0610 by Carlos Eduardo Nguyen RN  Safety Promotion/Fall Prevention:   activity supervised   assistive device/personal items within reach   clutter free environment maintained   fall prevention program maintained   gait belt   lighting adjusted   mobility aid in reach   nonskid shoes/slippers when out of bed   room organization consistent   safety round/check completed  Taken 3/27/2025 0359 by Carlos Eduardo Nguyen RN  Safety Promotion/Fall Prevention:   activity supervised   assistive device/personal items within reach   clutter free  environment maintained   fall prevention program maintained   gait belt   mobility aid in reach   nonskid shoes/slippers when out of bed   room organization consistent   safety round/check completed  Taken 3/27/2025 0200 by Carlos Eduardo Nguyen RN  Safety Promotion/Fall Prevention:   activity supervised   assistive device/personal items within reach   clutter free environment maintained   fall prevention program maintained   gait belt   mobility aid in reach   nonskid shoes/slippers when out of bed   room organization consistent   safety round/check completed  Taken 3/27/2025 0000 by Carlos Eduardo Nguyen RN  Safety Promotion/Fall Prevention:   activity supervised   clutter free environment maintained   assistive device/personal items within reach   fall prevention program maintained   gait belt   mobility aid in reach   nonskid shoes/slippers when out of bed   room organization consistent   safety round/check completed  Taken 3/26/2025 2200 by Carlos Eduardo Nguyen RN  Safety Promotion/Fall Prevention:   activity supervised   assistive device/personal items within reach   clutter free environment maintained   fall prevention program maintained   gait belt   mobility aid in reach   nonskid shoes/slippers when out of bed   room organization consistent   safety round/check completed  Taken 3/26/2025 2000 by Carlos Eduardo Nguyen RN  Safety Promotion/Fall Prevention:   activity supervised   assistive device/personal items within reach   clutter free environment maintained   fall prevention program maintained   gait belt   mobility aid in reach   nonskid shoes/slippers when out of bed   room organization consistent   safety round/check completed     Problem: Skin Injury Risk Increased  Goal: Skin Health and Integrity  Outcome: Progressing  Intervention: Optimize Skin Protection  Recent Flowsheet Documentation  Taken 3/27/2025 0610 by Carlos Eduardo Nguyen RN  Activity Management: sitting, edge of bed  Pressure Reduction Techniques: frequent weight  shift encouraged  Head of Bed (HOB) Positioning: [facility] elevated  Pressure Reduction Devices: pressure-redistributing mattress utilized  Taken 3/27/2025 0359 by Carlos Eduardo Nguyen RN  Activity Management: sitting, edge of bed  Pressure Reduction Techniques: frequent weight shift encouraged  Head of Bed (HOB) Positioning: [facility] elevated  Pressure Reduction Devices: pressure-redistributing mattress utilized  Taken 3/27/2025 0200 by Carlos Eduardo Nguyen RN  Activity Management: activity minimized  Pressure Reduction Techniques: frequent weight shift encouraged  Head of Bed (HOB) Positioning: [facility] elevated  Pressure Reduction Devices:   pressure-redistributing mattress utilized   positioning supports utilized  Skin Protection: incontinence pads utilized  Taken 3/27/2025 0000 by Carlos Eduardo Nguyen RN  Activity Management: sitting, edge of bed  Pressure Reduction Techniques: frequent weight shift encouraged  Head of Bed (HOB) Positioning: [facility] elevated  Pressure Reduction Devices: pressure-redistributing mattress utilized  Skin Protection: incontinence pads utilized  Taken 3/26/2025 2200 by Carlos Eduardo Nguyen RN  Activity Management: sitting, edge of bed  Pressure Reduction Techniques: frequent weight shift encouraged  Head of Bed (HOB) Positioning: [facility] elevated  Pressure Reduction Devices:   pressure-redistributing mattress utilized   positioning supports utilized  Skin Protection: incontinence pads utilized  Taken 3/26/2025 2000 by Carlos Eduardo Nguyen RN  Activity Management: sitting, edge of bed  Pressure Reduction Techniques: frequent weight shift encouraged  Head of Bed (HOB) Positioning: [facility] elevated  Pressure Reduction Devices: pressure-redistributing mattress utilized  Skin Protection: incontinence pads utilized     Problem: Hypertension Acute  Goal: Blood Pressure Within Desired Range  Outcome: Progressing  Intervention: Normalize Blood Pressure  Recent Flowsheet Documentation  Taken 3/26/2025 2000 by Carlos Eduardo Nguyen RN  Medication  Review/Management: medications reviewed   Goal Outcome Evaluation:

## 2025-03-27 NOTE — PROGRESS NOTES
Cardiology Progress Note      Reason for visit:    Hypertensive emergency  Elevated troponin likely type II NSTEMI    IDENTIFICATION: 74-year-old gentleman who resides with his daughter in [place], [place]    Active Hospital Problems    Diagnosis  POA    **Hypertensive emergency [I16.1]  Yes     Priority: Medium     Echo (3/21/2025): LVEF 62%.  No significant valvular abnormality  Renal artery duplex (3/23/2025): No renal artery stenosis      Likely type II NSTEMI from hypertensive emergency [I21.4]  Yes     Priority: High     Echo (3/21/2025): LVEF 62%.  No significant valvular abnormality      Type 2 diabetes mellitus with hyperglycemia, with long-term current use of insulin [E11.65, Z79.4]  Not Applicable     Priority: High    Coronary artery disease involving native coronary artery of native heart with angina pectoris [I25.119]  Yes     Priority: High     PCI of RCA, 2010  S/p coronary angiography in May 2012, which demonstrated disease best managed medically.  Nuclear stress test (1/23/2023): Normal perfusion.  Normal LVEF  Nuclear stress (3/24/2025): Mild to moderate size, moderate ischemia and mid to distal anterior and apex.  LVEF normal.      CKD (chronic kidney disease) stage 3, GFR 30-59 ml/min [N18.30]  Yes     Priority: High    Abnormal nuclear stress test [R94.39]  Yes     Priority: Medium     Nuclear stress (3/24/2025): Mild to moderate size, moderate ischemia and mid to distal anterior and apex.  LVEF normal.      Anemia [D64.9]  Yes     Priority: Medium    Medical non-compliance [Z91.199]  Not Applicable     Priority: Medium    Moderate protein-calorie malnutrition [E44.0]  Yes     Priority: Low    Tobacco abuse [Z72.0]  Yes     Priority: Low    PAD (peripheral artery disease) [I73.9]  Yes     Priority: Low    COPD (chronic obstructive pulmonary disease) [J44.9]  Yes     Priority: Low    Bilateral lower extremity edema [R60.0]  Yes     Priority: Low            Patient sitting on the side of the  bed.  He denies any chest pain.  He is breathing easy on room air with O2 saturations mid 90s.           Vital Sign Min/Max for last 24 hours  Temp  Min: 97.3 °F (36.3 °C)  Max: 98.5 °F (36.9 °C)   BP  Min: 119/52  Max: 152/72   Pulse  Min: 54  Max: 62   Resp  Min: 16  Max: 16   SpO2  Min: 92 %  Max: 95 %   No data recorded      Intake/Output Summary (Last 24 hours) at 3/27/2025 0826  Last data filed at 3/27/2025 0710  Gross per 24 hour   Intake 325 ml   Output 670 ml   Net -345 ml           Physical Exam  Constitutional:       General: He is awake.   Cardiovascular:      Rate and Rhythm: Normal rate and regular rhythm.      Heart sounds: No murmur heard.     Comments: Trace lower extremity edema  Pulmonary:      Effort: Pulmonary effort is normal.      Breath sounds: Normal breath sounds.   Skin:     General: Skin is warm and dry.   Neurological:      Mental Status: He is alert and oriented to person, place, and time.   Psychiatric:         Behavior: Behavior is cooperative.         Tele: Sinus rhythm to sinus bradycardia     Results Review (reviewed the patient's recent labs in the electronic medical record):      EKG (3/26/2025): Sinus bradycardia at 59 bpm no acute ischemia     CXR (3/22/2025): Mild infiltrates bilateral lower lobes may be related to mild pneumonia     Echo (3/21/2025): LVEF 61-65%    Results from last 7 days   Lab Units 03/27/25  0407 03/26/25  0603 03/25/25  0346 03/24/25  1239 03/24/25  1059 03/23/25  0736 03/22/25  0345   SODIUM mmol/L 134* 134* 135* 139 138  138 138 136  136   POTASSIUM mmol/L 5.0 5.0 4.6 4.5 4.5  4.5 5.0 5.1  5.1   CHLORIDE mmol/L 101 101 102 104 105  105 105 102  102   BUN mg/dL 40* 35* 32* 31* 31*  31* 31* 26*  26*   CREATININE mg/dL 2.64* 2.27* 2.47* 2.16* 2.30*  2.30* 2.10* 1.80*  1.80*   MAGNESIUM mg/dL  --   --   --   --  2.0 1.9 1.9       Results from last 7 days   Lab Units 03/22/25  0939 03/22/25  0345 03/20/25  1726   HSTROP T ng/L 174* 169* 147*        Results from last 7 days   Lab Units 03/27/25  0407 03/26/25  0603 03/25/25  0346   WBC 10*3/mm3 8.46 7.11 8.02   HEMOGLOBIN g/dL 8.2* 8.1* 8.0*   HEMATOCRIT % 25.8* 25.0* 25.7*   PLATELETS 10*3/mm3 173 154 156       Lab Results   Component Value Date    HGBA1C 7.90 (H) 03/20/2025       Lab Results   Component Value Date    CHOL 137 03/22/2025    CHLPL 189 12/09/2022    TRIG 367 (H) 03/22/2025    HDL 32 (L) 03/22/2025    LDL 50 03/22/2025                  Coronary artery disease with atypical angina  History of RCA PCI  Normal ischemic evaluation 2023  Stress test this admission abnormal with ischemia anterior wall and apex.  Heavy coronary artery calcification noted on CT correction imaging  Echo this admission normal LVEF and no valvular abnormality  Aspirin 81 mg daily     Hypertensive emergency  Has not been taking BP meds  Renal duplex no significant renal artery stenosis bilaterally  Carvedilol 25 mg twice daily  Amlodipine 10 mg daily  Isordil 20/37.5 mg every 8 hours  Valsartan on hold due to elevated creatinine  Current /72        Type II NSTEMI due to hypertensive emergency  Troponins elevated at 162, 147, 169 and 174.  EKGs no acute ischemia  Likely type II NSTEMI due to hypertensive emergency  Cardiac cath has been postponed due to worsening creatinine and anemia        Peripheral arterial disease  Status post previous interventions  No critical limb ischemia  Aspirin and statin     Renal insufficiency/chronic kidney disease  Unclear whether acute or chronic  Renal artery duplex shows no evidence of renal artery stenosis  Nephrology consulted  Creatinine has worsened overnight to 2.64     Hyperlipidemia with target LDL <50  Total cholesterol 137, triglycerides 367, HDL 32, LDL 50  Lipitor 40 mg daily     Medical noncompliance  Patient had been refusing his medications at home and has not been eating and has had significant weight loss     Type 2 diabetes mellitus with hyperglycemia  No SGLT2  inhibitor due to elevated creatinine        Tobacco abuse  Nicotine patch        Malnutrition/weight loss        Anemia  Likely anemia of chronic kidney disease  H&H low but stable 8.2 and 25.8             Defer cardiac catheterization this admission and treat medically.  Will reassess patient in the outpatient setting at follow-up in 2 to 3 weeks.  If he continues to have symptoms after being medically optimized we will consider outpatient cardiac catheterization at that time.  From cardiology perspective he can be discharged at any time.    Home on aspirin, statin, carvedilol, BiDil and amlodipine.  Meds to bed to fill medications  Will defer decision regarding ARB and SGLT2 inhibitor to nephrology.  Follow-up in 2 to 3 weeks with me.  Please call with any further questions.    Electronically signed by TYLER Burgos, 03/27/25, 8:26 AM EDT.

## 2025-03-27 NOTE — PROGRESS NOTES
"          Clinical Nutrition Assessment   Patient Name: Thomas Perez  YOB: 1950  MRN: [id number]  Date of Encounter: 03/27/25 09:55 EDT  Admission date: 3/20/2025  Reason for Visit: Follow-up protocol    Assessment   Nutrition Assessment   Admission Diagnosis:  HTN (hypertension) [I10]  Hypertensive emergency [I16.1]    Problem List:    Hypertensive emergency    Type 2 diabetes mellitus with hyperglycemia, with long-term current use of insulin    Tobacco abuse    Coronary artery disease involving native coronary artery of native heart with angina pectoris    Anemia    CKD (chronic kidney disease) stage 3, GFR 30-59 ml/min    PAD (peripheral artery disease)    Medical non-compliance    COPD (chronic obstructive pulmonary disease)    Bilateral lower extremity edema    Likely type II NSTEMI from hypertensive emergency    Abnormal nuclear stress test    Moderate protein-calorie malnutrition      PMH:   He  has a past medical history of Back injury, Diabetes mellitus, History of angina, and Hypertension.    PSH:  He  has a past surgical history that includes Coronary stent placement; Embolectomy; and Fasciotomy.    Applicable Nutrition History:   CKD3  COPD  PAD  CAD s/p RCA 2010  T2DM  Tobacco abuse  H/o medical noncompliance    Anthropometrics   Height: Height: 180.3 cm (70.98\")  Last Filed Weight: Weight: 69.9 kg (154 lb 1.6 oz) (03/27/25 0610)  Method: Weight Method: Bed scale  BMI: BMI (Calculated): 21.5    UBW:  1/11/25 135#  12/8/24 147#  Weight change:  UTD 2/2 fluid status  2-3+ edema noted    Nutrition Focused Physical Exam    Date: 03/24/25     Patient meets criteria for malnutrition diagnosis, see MSA note.     Subjective   Reported/Observed/Food/Nutrition Related History:   03/27/25  Spoke with patient and daughter at bedside. Daughter reported patient continues with poor PO intake. However, documented PO shows adequate intake. Attempted discussing food preferences with patient, however, unable " to provide much information. Did state he likes corn chips and daughter stated patient likes oatmeal. Doesn't like his ONS. Daughter bringing in equate strawberry ONS for patient. Spoke with assigned RN who reported patient eats sporadically.    03/24/25  Nutrition consult received for chronic poor intake. Presented for elevated BP. Noted to have NSTEMI. Reported decrease in appetite and poor PO intake. Stated he has been eating lousy for the past 4 years 2/2 dysgeusia. However, noted PO intake has been adequate since admit. Denied any recent weight loss. No weight loss noted in EMR, however, unable to determine as patient with edema. Declined any chewing or swallowing difficulties. However, reported he gets nauseous when eating meat? Agreeable to trial ONS. NKFA.    Current Nutrition Prescription   PO: NPO Diet NPO Type: Strict NPO   Oral Nutrition Supplement: BG TID  Intake: 3/24: L 0, D 25, Supp 100%; 3/25 B 100, L 75, D 100%; 3/26 B 0, L 100/0% PO intake documented    Assessment & Plan   Nutrition Diagnosis   Date:  03/24/25  Updated:  Problem Malnutrition, chronic moderate   Etiology Inability to consume adequate energy intake 2/2 dysgeusia   Signs/Symptoms <75% of EEN x >/= 1 month, mild/moderate muscle wasting, mild/moderate subcutaneous fat loss, and severe edema   Status: New    Goal:   Nutrition to support treatment and Increase intake, Continue positive trend    Nutrition Intervention      Follow treatment progress, Care plan reviewed, Interview for preferences, Encourage intake, Supplement provided    Nutrition POC  Encourage adequate PO intake as able  Will d/c current ONS as patient doesn't like and daughter providing ONS     Monitoring/Evaluation:   Per protocol, PO intake, Supplement intake, Weight, Symptoms, POC/GOC    Julia Bennett, MS,RD,LD  Time Spent: 25min

## 2025-03-28 PROCEDURE — 97530 THERAPEUTIC ACTIVITIES: CPT

## 2025-03-28 PROCEDURE — 99232 SBSQ HOSP IP/OBS MODERATE 35: CPT | Performed by: STUDENT IN AN ORGANIZED HEALTH CARE EDUCATION/TRAINING PROGRAM

## 2025-03-28 PROCEDURE — 97116 GAIT TRAINING THERAPY: CPT

## 2025-03-28 PROCEDURE — 97164 PT RE-EVAL EST PLAN CARE: CPT

## 2025-03-28 PROCEDURE — 97161 PT EVAL LOW COMPLEX 20 MIN: CPT

## 2025-03-28 RX ORDER — BISACODYL 10 MG
10 SUPPOSITORY, RECTAL RECTAL DAILY PRN
Status: DISCONTINUED | OUTPATIENT
Start: 2025-03-28 | End: 2025-04-02 | Stop reason: HOSPADM

## 2025-03-28 RX ORDER — AMOXICILLIN 250 MG
2 CAPSULE ORAL 2 TIMES DAILY
Status: DISCONTINUED | OUTPATIENT
Start: 2025-03-28 | End: 2025-04-02 | Stop reason: HOSPADM

## 2025-03-28 RX ORDER — BISACODYL 5 MG/1
5 TABLET, DELAYED RELEASE ORAL DAILY PRN
Status: DISCONTINUED | OUTPATIENT
Start: 2025-03-28 | End: 2025-04-02 | Stop reason: HOSPADM

## 2025-03-28 RX ORDER — POLYETHYLENE GLYCOL 3350 17 G/17G
17 POWDER, FOR SOLUTION ORAL DAILY PRN
Status: DISCONTINUED | OUTPATIENT
Start: 2025-03-28 | End: 2025-04-02 | Stop reason: HOSPADM

## 2025-03-28 NOTE — DISCHARGE PLACEMENT REQUEST
"Thomas Anthony (74 y.o. Male)       Date of Birth   1950    Social Security Number       Address   [address] [place] [id number]    Home Phone   [phone]    MRN   [id number]       Church   None    Marital Status                               Admission Date   3/20/2025    Admission Type   Emergency    Admitting Provider   Seb Naqvi DO    Attending Provider   Seb Naqvi DO    Department, Room/Bed   [facility] 3G, S355/1       Discharge Date       Discharge Disposition       Discharge Destination                                 Attending Provider: Seb Naqvi DO    Allergies: Penicillins, Valium [Diazepam]    Isolation: None   Infection: None   Code Status: CPR    Ht: 180.3 cm (70.98\")   Wt: 73.3 kg (161 lb 8 oz)    Admission Cmt: None   Principal Problem: Hypertensive emergency [I16.1]                   Active Insurance as of 3/20/2025       Primary Coverage       Payor Plan Insurance Group Employer/Plan Group    MEDICARE MEDICARE B ONLY        Payor Plan Address Payor Plan Phone Number Payor Plan Fax Number Effective Dates    [address] [address] [phone]  3/1/2016 - None Entered    [facility] [id number]         Subscriber Name Subscriber Birth Date Member ID       THOMAS ANTHONY 1950 [id number]               Secondary Coverage       Payor Plan Insurance Group Employer/Plan Group    [place] [id number]       Payor Plan Address Payor Plan Phone Number Payor Plan Fax Number Effective Dates    [employer] [address] [address] [phone]  3/1/2024 - None Entered    [facility] [id number]         Subscriber Name Subscriber Birth Date Member ID       THOMAS ANTHONY 1950 [id number]                     Emergency Contacts        (Rel.) Home Phone Work Phone Mobile Phone    ASHER,COLLETTA (Daughter) [phone] -- [phone]            [facility]G  [address] [facility] [address]  Phone:  [phone]  Fax:   Date: " Mar 28, 2025      Ambulatory Referral to Home Health     Patient:  Thomas Perez MRN:  [id number]   [address] [facility] [address] :  1950  SSN:    Phone: [phone] Sex:  M      INSURANCE PAYOR PLAN GROUP # SUBSCRIBER ID   Primary:  Secondary:    MEDICARE  [place] [id number]  [id number]    [id number]  [id number]      Referring Provider Information:  IVETH FRANCOIS Phone: [phone] Fax: [phone]       Referral Information:   # Visits:  999 Referral Type: Home Health [42]   Urgency:  Routine Referral Reason: Specialty Services Required   Start Date: Mar 28, 2025 End Date:  To be determined by Insurer   Diagnosis: Hypertensive emergency (I16.1 [ICD-10-CM] 401.9 [ICD-9-CM])  Acute renal insufficiency (N28.9 [ICD-10-CM] 593.9 [ICD-9-CM])  Moderate protein-calorie malnutrition (E44.0 [ICD-10-CM] 263.0 [ICD-9-CM])  Coronary artery disease involving native coronary artery of native heart with angina pectoris (I25.119 [ICD-10-CM] 414.01,413.9 [ICD-9-CM])  Tobacco abuse (Z72.0 [ICD-10-CM] 305.1 [ICD-9-CM])  Type 2 diabetes mellitus with hyperglycemia, with long-term current use of insulin (E11.65,Z79.4 [ICD-10-CM] 250.00,790.29,V58.67 [ICD-9-CM])  Medical non-compliance (Z91.199 [ICD-10-CM] V15.81 [ICD-9-CM])  Chronic obstructive pulmonary disease, unspecified COPD type (J44.9 [ICD-10-CM] 496 [ICD-9-CM])  Atypical angina (I20.89 [ICD-10-CM] 413.9 [ICD-9-CM])  Heart failure with preserved left ventricular function (HFpEF) (I50.30 [ICD-10-CM] 428.9 [ICD-9-CM])      Refer to Dept:   Refer to Provider:   Refer to Provider Phone:   Refer to Facility:       Face to Face Visit Date: 3/28/2025  Follow-up provider for Plan of Care? I treated the patient in an acute care facility and will not continue treatment after discharge.  Follow-up provider: FUAD BARAHONA [[id number]]  Reason/Clinical Findings: s/p hospitalization for hypertensive emergency  Describe mobility limitations that make  leaving home difficult: Impaired functional mobilitly, balance, gait and endurance.  Nursing/Therapeutic Services Requested: Skilled Nursing  Nursing/Therapeutic Services Requested: Medical / Social Work  Skilled nursing orders: Medication education  Skilled nursing orders: Cardiopulmonary assessments  Social work orders: Long range planning  Frequency: Other     This document serves as a request of services and does not constitute Insurance authorization or approval of services.  To determine eligibility, please contact the members Insurance carrier to verify and review coverage.     If you have medical questions regarding this request for services. Please contact [address] at [phone] during normal business hours.        Authorizing Provider:Seb Naqvi DO  Authorizing Provider's NPI: [id number]  Order Entered By: Alessandra Martin RN 3/28/2025  3:45 PM     Electronically signed by: Seb Naqvi DO 3/28/2025  3:45 PM                  Physician Progress Notes (most recent note)        Seb Naqvi DO at 25 1421              [facility] Medicine Services  PROGRESS NOTE    Patient Name: Thomas Perez  : 1950  MRN: [id number]    Date of Admission: 3/20/2025  Primary Care Physician: Provider, No Known    Subjective   Subjective     CC:  Hypertension, MAGUI    HPI:  Patient seen resting comfortably in bed no acute distress.  Discussed importance of lab draws in order to evaluate kidney injury.  Patient noted understanding.    Addendum: Patient refusing lab draws this afternoon. Lengthy discussion had with patient about the importance of lab draws to monitor kidney function as patient currently has an MAGUI in the setting of CAD with ongoing cardiac workup. Discussed with patient that his kidneys are injured and without lab draws, we are unable to safely monitor if is kidney function is improving or worsening. Discussed that if his kidney function is worsening, this could  lead to kidney failure, electrolyte abnormalities, and/or ultimately death. Patient continued to refuse lab draws. Discussed with patient we can discuss again in the AM. Patient was sitting at bedside, alert and oriented to name, , hospital, city, situation, and year.       Objective   Objective     Vital Signs:   Temp:  [98 °F (36.7 °C)-98.2 °F (36.8 °C)] 98.2 °F (36.8 °C)  Heart Rate:  [57-64] 64  Resp:  [16-18] 16  BP: (119-154)/(52-85) 154/61     Physical Exam  Constitutional:       General: He is sleeping. He is not in acute distress.  Cardiovascular:      Rate and Rhythm: Normal rate.      Pulses: Normal pulses.   Pulmonary:      Effort: Pulmonary effort is normal. No respiratory distress.   Abdominal:      General: There is no distension.      Palpations: Abdomen is soft.      Tenderness: There is no abdominal tenderness. There is no guarding or rebound.   Musculoskeletal:      Right lower leg: Edema present.      Left lower leg: Edema present.   Neurological:      Mental Status: He is easily aroused.   Psychiatric:         Mood and Affect: Mood normal.         Behavior: Behavior normal.           Results Reviewed:  LAB RESULTS:      Lab 25  0407 25  0603 25  1422 25  0346 25  1059 25  0736 25  0939 25  0345   WBC 8.46 7.11  --  8.02 9.24 9.76  --  10.82*   HEMOGLOBIN 8.2* 8.1*  --  8.0* 8.9* 8.3*  --  9.4*   HEMATOCRIT 25.8* 25.0*  --  25.7* 27.4* 25.4*  --  29.1*   PLATELETS 173 154  --  156 160 144  --  165   NEUTROS ABS  --   --   --   --   --   --   --  7.80*   IMMATURE GRANS (ABS)  --   --   --   --   --   --   --  0.04   LYMPHS ABS  --   --   --   --   --   --   --  1.74   MONOS ABS  --   --   --   --   --   --   --  0.90   EOS ABS  --   --   --   --   --   --   --  0.31   MCV 92.8 91.6  --  93.5 92.3 91.7  --  91.2   LACTATE  --   --   --   --   --   --   --  0.9   LDH  --   --  241*  --   --   --   --   --    HSTROP T  --   --   --   --   --   --   174* 169*         Lab 03/27/25  0407 03/26/25  0603 03/25/25  0346 03/24/25  1239 03/24/25  1059 03/23/25  0736 03/22/25  0345   SODIUM 134* 134* 135* 139 138  138 138 136  136   POTASSIUM 5.0 5.0 4.6 4.5 4.5  4.5 5.0 5.1  5.1   CHLORIDE 101 101 102 104 105  105 105 102  102   CO2 22.0 21.0* 22.0 23.0 21.0*  21.0* 22.0 22.0  22.0   ANION GAP 11.0 12.0 11.0 12.0 12.0  12.0 11.0 12.0  12.0   BUN 40* 35* 32* 31* 31*  31* 31* 26*  26*   CREATININE 2.64* 2.27* 2.47* 2.16* 2.30*  2.30* 2.10* 1.80*  1.80*   EGFR 24.8* 29.7* 26.9* 31.5* 29.3*  29.3* 32.6* 39.3*  39.3*   GLUCOSE 151* 214* 152* 145* 143*  143* 123* 191*  191*   CALCIUM 8.3* 8.1* 8.0* 8.7 8.3*  8.3* 8.5* 9.0  9.0   MAGNESIUM  --   --   --   --  2.0 1.9 1.9         Lab 03/27/25  0407 03/26/25  0603 03/25/25  0346 03/24/25  1239 03/24/25  1059   TOTAL PROTEIN 6.5 6.0 5.8* 6.4 5.9*   ALBUMIN 3.6 3.3* 3.3* 3.7 3.6   GLOBULIN 2.9 2.7 2.5 2.7 2.3   ALT (SGPT) 15 14 14 17 15   AST (SGOT) 13 13 15 17 16   BILIRUBIN 0.2 0.2 0.2 0.3 0.3   ALK PHOS 93 102 93 100 97         Lab 03/22/25  0939 03/22/25  0345   HSTROP T 174* 169*         Lab 03/22/25  0345   CHOLESTEROL 137   LDL CHOL 50   HDL CHOL 32*   TRIGLYCERIDES 367*               Brief Urine Lab Results  (Last result in the past 365 days)        Color   Clarity   Blood   Leuk Est   Nitrite   Protein   CREAT   Urine HCG        03/26/25 1339             128.7                 Microbiology Results Abnormal       None            No radiology results from the last 24 hrs    Results for orders placed during the hospital encounter of 03/20/25    Adult Transthoracic Echo Complete w/ Color, Spectral and Contrast if necessary per protocol    Interpretation Summary    Left ventricular systolic function is normal. Calculated left ventricular EF = 62.7% Left ventricular ejection fraction appears to be 61 - 65%.    Normal left atrial size and volume noted.      Current medications:  Scheduled Meds:amLODIPine, 10  mg, Oral, Q24H  aspirin, 81 mg, Oral, Daily  atorvastatin, 40 mg, Oral, Nightly  carvedilol, 25 mg, Oral, BID With Meals  ferrous sulfate, 325 mg, Oral, Daily With Breakfast  heparin (porcine), 5,000 Units, Subcutaneous, Q12H  insulin glargine, 10 Units, Subcutaneous, Nightly  insulin lispro, 2-7 Units, Subcutaneous, 4x Daily AC & at Bedtime  isosorbide dinitrate (ISORDIL) 20 mg, hydrALAZINE (APRESOLINE) 37.5 mg for BIDIL 20-37.5, , Oral, Q8H  Lidocaine, 1 patch, Transdermal, Q24H  nicotine, 1 patch, Transdermal, Q24H  pantoprazole, 40 mg, Oral, Q AM  senna-docusate sodium, 2 tablet, Oral, BID  sodium chloride, 500 mL, Intravenous, Once  sodium chloride, 10 mL, Intravenous, Q12H  [Held by provider] valsartan, 160 mg, Oral, Q24H      Continuous Infusions:   PRN Meds:.  acetaminophen **OR** acetaminophen **OR** acetaminophen    senna-docusate sodium **AND** polyethylene glycol **AND** bisacodyl **AND** bisacodyl    calcium carbonate    dextrose    dextrose    gabapentin    glucagon (human recombinant)    melatonin    nitroglycerin    ondansetron    oxyCODONE-acetaminophen    sodium chloride    [COMPLETED] Insert Peripheral IV **AND** sodium chloride    sodium chloride    sodium chloride    Assessment & Plan   Assessment & Plan     Active Hospital Problems    Diagnosis  POA    **Hypertensive emergency [I16.1]  Yes    Type 2 myocardial infarction [I21.A1]  Yes    Abnormal nuclear stress test [R94.39]  Yes    Moderate protein-calorie malnutrition [E44.0]  Yes    Likely type II NSTEMI from hypertensive emergency [I21.4]  Yes    Type 2 diabetes mellitus with hyperglycemia, with long-term current use of insulin [E11.65, Z79.4]  Not Applicable    Tobacco abuse [Z72.0]  Yes    Coronary artery disease involving native coronary artery of native heart with angina pectoris [I25.119]  Yes    Anemia [D64.9]  Yes    CKD (chronic kidney disease) stage 3, GFR 30-59 ml/min [N18.30]  Yes    PAD (peripheral artery disease) [I73.9]  Yes     Medical non-compliance [Z91.199]  Not Applicable    COPD (chronic obstructive pulmonary disease) [J44.9]  Yes    Bilateral lower extremity edema [R60.0]  Yes      Resolved Hospital Problems    Diagnosis Date Resolved POA    Atypical angina [I20.89] 03/24/2025 Yes    Heart failure with preserved left ventricular function (HFpEF) [I50.30] 03/22/2025 Unknown    Hypertensive urgency [I16.0] 03/22/2025 Yes        Brief Hospital Course to date:  Thomas Perez is a 74 y.o. male with a PMHx of hypertension, hyperlipidemia, CAD status post RCA stent in 2010, PAD status post embolectomy and fasciotomy, COPD, poorly controlled type 2 diabetes, CKD, medical noncompliance and chronic pain who presented due to elevated blood pressure found incidentally by home health nurse.  Cardiology consulted for elevated troponins, planning on outpatient follow-up for consideration of cardiac catheterization at that time.  Patient also found to have MAGUI, nephrology following.     Hypertensive emergency- resolved   Acute on chronic chest pain  NSTEMI II  -Cardiology following, no acute ACS. Plan for outpatient follow-up in 2 to 3 weeks to continue discussion of possible outpatient cardiac catheterization at that time.  -Continue aspirin, statin, carvedilol, BiDil, amlodipine.  -Deferring ARB and SGLT2 to nephrology  -continue holding valsartan for now     MAGUI on CKD  -baseline Cr around 0.9-1.1 in 2021 but no labs since then. SCr has worsened from 1.6 on arrival to now 2.64.   -Renal ultrasound without hydronephrosis, small left renal cyst  -Renal artery duplex without evidence of hemodynamically significant renal artery stenosis  -IVF yesterday, labs still needing collection today  -Nephrology following  -Repeat labs in AM     Normocytic Anemia   -Possible dilutional component as patient had drop in all cell lines after IV fluids  -no signs of bleeding   -iron studies most consistent with anemia of chronic disease, likely CKD  -will start  iron supplementation     BLE edema   -LE Dopplers negative   -ECHO unremarkable  -stable      CAD s/p stent  Chronic chest pain  HLD  PAD s/p embolectomy and fasciotomy   -daughter reports he is suppose to be on ASA but has been refusing all meds for some time, will restart   -Continue aspirin, statin, carvedilol, BiDil, amlodipine.     Insulin dependent T2DM  -lantus 20, lispro 7 TID at home  -currently on SSI, will add lantus 10 and adjust from there, pt not eating well       COPD  Ongoing tobacco use  -not in acute exacerbation   -NRT     Medication noncompliance  -Patient unsure what medications he takes.   -pt cannot read which complicates this.    -Colleague spoke to his daughter who is also uncertain of exactly what he takes. He reports she helps manage his meds.  -PT re-consulted for discharge planning     **Our pharmacist has arrange for him to get prepackaged pill packs from his pharmacy after discharge.  Medication should go through [facility] pharmacy at d/c as ours does not have prepackaged medications like this.    Expected Discharge Location and Transportation: Home  Expected Discharge   Expected Discharge Date: 3/27/2025; Expected Discharge Time:      VTE Prophylaxis:  Pharmacologic VTE prophylaxis orders are present.         AM-PAC 6 Clicks Score (PT): 22 (03/28/25 4273)    CODE STATUS:   Code Status and Medical Interventions: CPR (Attempt to Resuscitate); Full Support   Ordered at: 03/20/25 5713     Code Status (Patient has no pulse and is not breathing):    CPR (Attempt to Resuscitate)     Medical Interventions (Patient has pulse or is breathing):    Full Support     Level Of Support Discussed With:    Patient       Seb Naqvi DO  03/28/25        Electronically signed by Seb Naqvi DO at 03/28/25 6419

## 2025-03-28 NOTE — PLAN OF CARE
Goal Outcome Evaluation:           Progress: no change                  Problem: Fall Injury Risk  Goal: Absence of Fall and Fall-Related Injury  Intervention: Promote Injury-Free Environment  Recent Flowsheet Documentation  Taken 3/28/2025 1800 by Jessica Christianson, RN  Safety Promotion/Fall Prevention:   activity supervised   safety round/check completed  Taken 3/28/2025 1600 by Jessica Christianson, RN  Safety Promotion/Fall Prevention:   activity supervised   safety round/check completed  Taken 3/28/2025 1400 by Jessica Christianson, RN  Safety Promotion/Fall Prevention:   activity supervised   safety round/check completed  Taken 3/28/2025 1201 by Jessica Christianson, RN  Safety Promotion/Fall Prevention:   activity supervised   safety round/check completed  Taken 3/28/2025 1030 by Jessica Christianson, RN  Safety Promotion/Fall Prevention:   activity supervised   safety round/check completed  Taken 3/28/2025 0852 by Jessica Christianson, RN  Safety Promotion/Fall Prevention:   activity supervised   safety round/check completed

## 2025-03-28 NOTE — DISCHARGE PLACEMENT REQUEST
"Thomas Anthony (74 y.o. Male)      Home Health orders.  From Alessandra HITCHCOCK BSN, Case Management, ph [phone].  Thank you.  Patient may be discharged   Over the weekend.     Date of Birth   1950    Social Security Number       Address   [address] [place] [address]    Home Phone   [phone]    MRN   [id number]       Hindu   None    Marital Status                               Admission Date   3/20/2025    Admission Type   Emergency    Admitting Provider   Seb Naqvi DO    Attending Provider   Seb Naqvi DO    Department, Room/Bed   [facility] 3G, S355/1       Discharge Date       Discharge Disposition       Discharge Destination                                 Attending Provider: Seb Naqvi DO    Allergies: Penicillins, Valium [Diazepam]    Isolation: None   Infection: None   Code Status: CPR    Ht: 180.3 cm (70.98\")   Wt: 73.3 kg (161 lb 8 oz)    Admission Cmt: None   Principal Problem: Hypertensive emergency [I16.1]                   Active Insurance as of 3/20/2025       Primary Coverage       Payor Plan Insurance Group Employer/Plan Group    MEDICARE MEDICARE B ONLY        Payor Plan Address Payor Plan Phone Number Payor Plan Fax Number Effective Dates    [address] [address] [phone]  3/1/2016 - None Entered    [facility] [id number]         Subscriber Name Subscriber Birth Date Member ID       THOMAS ANTHONY 1950 [id number]               Secondary Coverage       Payor Plan Insurance Group Employer/Plan Group    [place] [id number]       Payor Plan Address Payor Plan Phone Number Payor Plan Fax Number Effective Dates    [employer] [address] [address] [phone]  3/1/2024 - None Entered    [facility] [id number]         Subscriber Name Subscriber Birth Date Member ID       THOMAS ANTHONY 1950 [id number]                     Emergency Contacts        (Rel.) Home Phone Work Phone Mobile Phone    CL,COLLETTA (Daughter) [phone] -- " [phone]              [address]  [address] [facility] [address]  Phone:  [phone]  Fax:   Date: Mar 28, 2025      Ambulatory Referral to Home Health     Patient:  Thomas Perez MRN:  [id number]   [address] [facility] [address] :  1950  SSN:    Phone: [phone] Sex:  M      INSURANCE PAYOR PLAN GROUP # SUBSCRIBER ID   Primary:  Secondary:    MEDICARE  [place] [id number]  [id number]    [id number]  [id number]      Referring Provider Information:  IVETH FRANCOIS Phone: [phone] Fax: [phone]       Referral Information:   # Visits:  999 Referral Type: Home Health [42]   Urgency:  Routine Referral Reason: Specialty Services Required   Start Date: Mar 28, 2025 End Date:  To be determined by Insurer   Diagnosis: Hypertensive emergency (I16.1 [ICD-10-CM] 401.9 [ICD-9-CM])  Acute renal insufficiency (N28.9 [ICD-10-CM] 593.9 [ICD-9-CM])  Moderate protein-calorie malnutrition (E44.0 [ICD-10-CM] 263.0 [ICD-9-CM])  Coronary artery disease involving native coronary artery of native heart with angina pectoris (I25.119 [ICD-10-CM] 414.01,413.9 [ICD-9-CM])  Tobacco abuse (Z72.0 [ICD-10-CM] 305.1 [ICD-9-CM])  Type 2 diabetes mellitus with hyperglycemia, with long-term current use of insulin (E11.65,Z79.4 [ICD-10-CM] 250.00,790.29,V58.67 [ICD-9-CM])  Medical non-compliance (Z91.199 [ICD-10-CM] V15.81 [ICD-9-CM])  Chronic obstructive pulmonary disease, unspecified COPD type (J44.9 [ICD-10-CM] 496 [ICD-9-CM])  Atypical angina (I20.89 [ICD-10-CM] 413.9 [ICD-9-CM])  Heart failure with preserved left ventricular function (HFpEF) (I50.30 [ICD-10-CM] 428.9 [ICD-9-CM])      Refer to Dept:   Refer to Provider:   Refer to Provider Phone:   Refer to Facility:       Face to Face Visit Date: 3/28/2025  Follow-up provider for Plan of Care? I treated the patient in an acute care facility and will not continue treatment after discharge.  Follow-up provider: BROOKLYN  FUAD AKHIL [[id number]]  Reason/Clinical Findings: s/p hospitalization for hypertensive emergency  Describe mobility limitations that make leaving home difficult: Impaired functional mobilitly, balance, gait and endurance.  Nursing/Therapeutic Services Requested: Skilled Nursing  Nursing/Therapeutic Services Requested: Medical / Social Work  Skilled nursing orders: Medication education  Skilled nursing orders: Cardiopulmonary assessments  Social work orders: Long range planning  Frequency: Other     This document serves as a request of services and does not constitute Insurance authorization or approval of services.  To determine eligibility, please contact the members Insurance carrier to verify and review coverage.     If you have medical questions regarding this request for services. Please contact [address] at [phone] during normal business hours.        Authorizing Provider:Seb Naqvi DO  Authorizing Provider's NPI: [id number]  Order Entered By: Alessandra Martin RN 3/28/2025  3:45 PM     Electronically signed by: Seb Naqvi DO 3/28/2025  3:45 PM                 History & Physical        Gregg Ramos PA-C at 25       Attestation signed by Castro Bermudez MD at 25      I have reviewed this documentation and agree.                          [facility] Medicine Services  HISTORY AND PHYSICAL    Patient Name: Thomas Perez  : 1950  MRN: [id number]  Primary Care Physician: Provider, No Known  Date of admission: 3/20/2025    Subjective  Subjective     Chief Complaint:  Elevated blood pressure    HPI:  Thomas Perez is a 74 y.o. male with a past medical history significant for hypertension, HLD, CAD w/ RCA stent (), PAD s/p embolectomy and fasciotomy, COPD, poorly controlled DM, CKD, medical non compliance and chronic pain. Patient presents today with complaints of elevated blood pressure. States a home health nurse checked his BP today  "and it was elevated to > 250 systolic. States he usually runs between 160 to 180 systolic. Nurse was concerned and called EMS. Upon arrival, BP was still elevated to 210/100. Patient reports he is \"between\" blood pressure medications but whatever he's taking right now makes him sick. Apparently has not taken antihypertensives consistently for about 1 month. He currently complaints of progressively worsening BLE edema. States he cannot fit any of his shoes. Is otherwise asymptomatic.  Denies fever, cough, congestion, SOB, or chest pain. No Syncope. No headache or focal weakness/numbness/tingling. Will admit to observation.        Review of Systems   Constitutional:  Negative for chills, fatigue and fever.   HENT:  Negative for congestion and trouble swallowing.    Eyes:  Negative for photophobia and visual disturbance.   Respiratory:  Negative for cough and shortness of breath.    Cardiovascular:  Positive for leg swelling. Negative for chest pain.   Gastrointestinal:  Negative for abdominal pain, diarrhea, nausea and vomiting.   Endocrine: Negative for cold intolerance and heat intolerance.   Genitourinary:  Negative for dysuria and flank pain.   Musculoskeletal:  Negative for back pain and gait problem.   Skin:  Negative for pallor and rash.   Allergic/Immunologic: Positive for immunocompromised state.   Neurological:  Positive for weakness. Negative for dizziness and headaches.   Hematological:  Negative for adenopathy.   Psychiatric/Behavioral:  Negative for agitation and confusion.             Personal History     Past Medical History:   Diagnosis Date    Back injury     Diabetes mellitus     History of angina     Hypertension              Past Surgical History:   Procedure Laterality Date    CORONARY STENT PLACEMENT      EMBOLECTOMY      FASCIOTOMY         Family History: family history is not on file.     Social History:  reports that he has been smoking cigarettes. He has never used smokeless tobacco. He " reports that he does not drink alcohol and does not use drugs.  Social History     Social History Narrative    Not on file       Medications:       Allergies   Allergen Reactions    Penicillins Hives    Valium [Diazepam] Hives       Objective  Objective     Vital Signs:   Temp:  [97.7 °F (36.5 °C)] 97.7 °F (36.5 °C)  Heart Rate:  [60-68] 68  Resp:  [16] 16  BP: (167-250)/() 170/71    Physical Exam   Constitutional: Awake, alert  Eyes: PERRLA, sclerae anicteric, no conjunctival injection  HENT: NCAT, mucous membranes moist  Neck: Supple, no thyromegaly, no lymphadenopathy, trachea midline  Respiratory: Clear to auscultation bilaterally, nonlabored respirations   Cardiovascular: RRR, no murmurs, rubs, or gallops, palpable pedal pulses bilaterally  Gastrointestinal: Positive bowel sounds, soft, nontender, nondistended  Musculoskeletal: No bilateral ankle edema, no clubbing or cyanosis to extremities  BLE edema +1, no pitting. S/P embolectomy, fasciotomy scar left lower extremity  Psychiatric: Appropriate affect, cooperative  Neurologic: Oriented x 3, strength symmetric in all extremities, Cranial Nerves grossly intact to confrontation, speech clear  Skin: No rashes      Result Review:  I have personally reviewed the results from the time of this admission to 3/20/2025 19:45 EDT and agree with these findings:  [x]  Laboratory list / accordion  []  Microbiology  []  Radiology  []  EKG/Telemetry   []  Cardiology/Vascular   []  Pathology  [x]  Old records  []  Other:  Most notable findings include: /100. Vitals otherwise stable. Trop 162 then 147. Cr 1.65, BUN 25. H/H 11 and 34.2. Cxr clear.    LAB RESULTS:      Lab 03/20/25  1626   WBC 10.12   HEMOGLOBIN 11.0*   HEMATOCRIT 34.2*   PLATELETS 184   NEUTROS ABS 7.50*   IMMATURE GRANS (ABS) 0.06*   LYMPHS ABS 1.55   MONOS ABS 0.69   EOS ABS 0.29   MCV 91.4         Lab 03/20/25  1626   SODIUM 139   POTASSIUM 4.8   CHLORIDE 104   CO2 23.0   ANION GAP 12.0   BUN 25*    CREATININE 1.67*   EGFR 42.9*   GLUCOSE 326*   CALCIUM 9.0   HEMOGLOBIN A1C 7.90*         Lab 03/20/25  1626 03/14/25  1711   TOTAL PROTEIN 7.2  --    ALBUMIN 4.1  --    GLOBULIN 3.1  --    ALT (SGPT) 14  --    AST (SGOT) 11  --    BILIRUBIN 0.2  --    ALK PHOS 148*  --    LIPASE  --  32         Lab 03/20/25  1726 03/20/25  1626   PROBNP  --  1,321.0*   HSTROP T 147* 162*                 Brief Urine Lab Results  (Last result in the past 365 days)        Color   Clarity   Blood   Leuk Est   Nitrite   Protein   CREAT   Urine HCG        03/20/25 1637 Yellow   Clear   Small (1+)   Negative   Negative   >=300 mg/dL (3+)                 Microbiology Results (last 10 days)       ** No results found for the last 240 hours. **            XR Chest 1 View  Result Date: 3/20/2025  XR CHEST 1 VW Date of Exam: 3/20/2025 5:50 PM EDT Indication: fatigue Comparison: None available. Findings: Lungs are adequately expanded and appear clear. Cardiomediastinal contours appear within normal limits.     Impression: Impression: No acute cardiopulmonary abnormality is identified. Electronically Signed: Madison Duque  3/20/2025 6:15 PM EDT  Workstation ID: [id number]          Assessment & Plan  Assessment & Plan       Hypertensive urgency    MAGUI (acute kidney injury)    Bilateral lower extremity edema    DM2 (diabetes mellitus, type 2)    CAD (coronary artery disease)    Anemia    PAD (peripheral artery disease)    Medical non-compliance    COPD (chronic obstructive pulmonary disease)    Tobacco abuse    74 y.o. male with a past medical history significant for hypertension, HLD, CAD w/ RCA stent (2010), PAD s/p embolectomy and fasciotomy, COPD, poorly controlled DM, CKD, medical non compliance and chronic pain here with hypertensive urgency 2/2 medication non compliance and BLE edema.    Hypertensive Emergency  - BP elevated as high as 250 on arrival  - supposed to be on Valsartan- HCTZ, Norvasc, Imdur  - plan to reconcile meds and discharge  on new regimen  - started on Cardene gtt. Continue for now  - Trop 162 > 147. Suspect demand ischemia. Continue to trend. No EKG changes  - close monitor on telemetry  - am labs    MAGUI on CKD  - baseline Cr. 0.9 to 1.1  - elevated to 1.67, BUN 25 eGFR 42.9  - obtain UA  - strict I&O  - hold off on fluids for now with LE edema  - avoid additional nephrotoxins    BLE edema  - obtain echocardiogram  - doppler BLE     Anemia  - H/H 11 and 34.2 respectively  - check iron studies, b12. folate    Coronary Artery Disease  HLD  PAD s/p multiple interventions  - RCA stent 2010  - Not currently on antiplatelet, likely needs to start ASA  - continue statin    Insulin Dependent Diabetes Mellitus  - Hb A1c 3/205 7.9  - was on lantus 20 units daily plus lispro 7 units TID. Confirm with pharmacy  - CC2 diet  - SSI with scheduled accu checks    Chronic back pain and chronic BLE pain   - follows pain clinic on [address]  - continue home meds    COPD  Tobacco use  - not on supplemental oxygen  - stable  - as needed pulmonary toilet  - smokes 1-2 PPD for many decades  - Counseled on smoking cessation including high risk for heart attack and stroke. He is in pre-contemplative stage of change  - nicotine patch as needed    Impaired mobility and self care deficit   - walks with a walker due to BLE foot drop, brace LLE  - PT/OT recommend home with assistance, outpatient PT/OT      DVT prophylaxis:  heparin    CODE STATUS:  full code  Code Status (Patient has no pulse and is not breathing): CPR (Attempt to Resuscitate)  Medical Interventions (Patient has pulse or is breathing): Full Support  Level Of Support Discussed With: Patient      Expected Discharge  TBD      This note has been completed as part of a split-shared workflow.     Signature: Electronically signed by Gregg Ramos PA-C, 03/20/25, 7:37 PM EDT                Electronically signed by Castro Bermudez MD at 03/20/25 2024          Physician Progress Notes (most recent  note)        Seb Naqvi, DO at 25 1421              [facility] Medicine Services  PROGRESS NOTE    Patient Name: Thomas Perez  : 1950  MRN: [id number]    Date of Admission: 3/20/2025  Primary Care Physician: Provider, No Known    Subjective   Subjective     CC:  Hypertension, MAGUI    HPI:  Patient seen resting comfortably in bed no acute distress.  Discussed importance of lab draws in order to evaluate kidney injury.  Patient noted understanding.    Addendum: Patient again refusing lab draws.       Objective   Objective     Vital Signs:   Temp:  [98 °F (36.7 °C)-98.2 °F (36.8 °C)] 98.2 °F (36.8 °C)  Heart Rate:  [57-64] 64  Resp:  [16-18] 16  BP: (119-154)/(52-85) 154/61     Physical Exam  Constitutional:       General: He is sleeping. He is not in acute distress.  Cardiovascular:      Rate and Rhythm: Normal rate.      Pulses: Normal pulses.   Pulmonary:      Effort: Pulmonary effort is normal. No respiratory distress.   Abdominal:      General: There is no distension.      Palpations: Abdomen is soft.      Tenderness: There is no abdominal tenderness. There is no guarding or rebound.   Musculoskeletal:      Right lower leg: Edema present.      Left lower leg: Edema present.   Neurological:      Mental Status: He is easily aroused.   Psychiatric:         Mood and Affect: Mood normal.         Behavior: Behavior normal.           Results Reviewed:  LAB RESULTS:      Lab 25  0407 25  0603 25  1422 25  0346 25  1059 25  0736 25  0939 25  0345   WBC 8.46 7.11  --  8.02 9.24 9.76  --  10.82*   HEMOGLOBIN 8.2* 8.1*  --  8.0* 8.9* 8.3*  --  9.4*   HEMATOCRIT 25.8* 25.0*  --  25.7* 27.4* 25.4*  --  29.1*   PLATELETS 173 154  --  156 160 144  --  165   NEUTROS ABS  --   --   --   --   --   --   --  7.80*   IMMATURE GRANS (ABS)  --   --   --   --   --   --   --  0.04   LYMPHS ABS  --   --   --   --   --   --   --  1.74   MONOS ABS  --   --   --    --   --   --   --  0.90   EOS ABS  --   --   --   --   --   --   --  0.31   MCV 92.8 91.6  --  93.5 92.3 91.7  --  91.2   LACTATE  --   --   --   --   --   --   --  0.9   LDH  --   --  241*  --   --   --   --   --    HSTROP T  --   --   --   --   --   --  174* 169*         Lab 03/27/25 0407 03/26/25  0603 03/25/25  0346 03/24/25  1239 03/24/25  1059 03/23/25  0736 03/22/25  0345   SODIUM 134* 134* 135* 139 138  138 138 136  136   POTASSIUM 5.0 5.0 4.6 4.5 4.5  4.5 5.0 5.1  5.1   CHLORIDE 101 101 102 104 105  105 105 102  102   CO2 22.0 21.0* 22.0 23.0 21.0*  21.0* 22.0 22.0  22.0   ANION GAP 11.0 12.0 11.0 12.0 12.0  12.0 11.0 12.0  12.0   BUN 40* 35* 32* 31* 31*  31* 31* 26*  26*   CREATININE 2.64* 2.27* 2.47* 2.16* 2.30*  2.30* 2.10* 1.80*  1.80*   EGFR 24.8* 29.7* 26.9* 31.5* 29.3*  29.3* 32.6* 39.3*  39.3*   GLUCOSE 151* 214* 152* 145* 143*  143* 123* 191*  191*   CALCIUM 8.3* 8.1* 8.0* 8.7 8.3*  8.3* 8.5* 9.0  9.0   MAGNESIUM  --   --   --   --  2.0 1.9 1.9         Lab 03/27/25 0407 03/26/25  0603 03/25/25  0346 03/24/25  1239 03/24/25  1059   TOTAL PROTEIN 6.5 6.0 5.8* 6.4 5.9*   ALBUMIN 3.6 3.3* 3.3* 3.7 3.6   GLOBULIN 2.9 2.7 2.5 2.7 2.3   ALT (SGPT) 15 14 14 17 15   AST (SGOT) 13 13 15 17 16   BILIRUBIN 0.2 0.2 0.2 0.3 0.3   ALK PHOS 93 102 93 100 97         Lab 03/22/25  0939 03/22/25  0345   HSTROP T 174* 169*         Lab 03/22/25  0345   CHOLESTEROL 137   LDL CHOL 50   HDL CHOL 32*   TRIGLYCERIDES 367*               Brief Urine Lab Results  (Last result in the past 365 days)        Color   Clarity   Blood   Leuk Est   Nitrite   Protein   CREAT   Urine HCG        03/26/25 1339             128.7                 Microbiology Results Abnormal       None            No radiology results from the last 24 hrs    Results for orders placed during the hospital encounter of 03/20/25    Adult Transthoracic Echo Complete w/ Color, Spectral and Contrast if necessary per  protocol    Interpretation Summary    Left ventricular systolic function is normal. Calculated left ventricular EF = 62.7% Left ventricular ejection fraction appears to be 61 - 65%.    Normal left atrial size and volume noted.      Current medications:  Scheduled Meds:amLODIPine, 10 mg, Oral, Q24H  aspirin, 81 mg, Oral, Daily  atorvastatin, 40 mg, Oral, Nightly  carvedilol, 25 mg, Oral, BID With Meals  ferrous sulfate, 325 mg, Oral, Daily With Breakfast  heparin (porcine), 5,000 Units, Subcutaneous, Q12H  insulin glargine, 10 Units, Subcutaneous, Nightly  insulin lispro, 2-7 Units, Subcutaneous, 4x Daily AC & at Bedtime  isosorbide dinitrate (ISORDIL) 20 mg, hydrALAZINE (APRESOLINE) 37.5 mg for BIDIL 20-37.5, , Oral, Q8H  Lidocaine, 1 patch, Transdermal, Q24H  nicotine, 1 patch, Transdermal, Q24H  pantoprazole, 40 mg, Oral, Q AM  senna-docusate sodium, 2 tablet, Oral, BID  sodium chloride, 500 mL, Intravenous, Once  sodium chloride, 10 mL, Intravenous, Q12H  [Held by provider] valsartan, 160 mg, Oral, Q24H      Continuous Infusions:   PRN Meds:.  acetaminophen **OR** acetaminophen **OR** acetaminophen    senna-docusate sodium **AND** polyethylene glycol **AND** bisacodyl **AND** bisacodyl    calcium carbonate    dextrose    dextrose    gabapentin    glucagon (human recombinant)    melatonin    nitroglycerin    ondansetron    oxyCODONE-acetaminophen    sodium chloride    [COMPLETED] Insert Peripheral IV **AND** sodium chloride    sodium chloride    sodium chloride    Assessment & Plan   Assessment & Plan     Active Hospital Problems    Diagnosis  POA    **Hypertensive emergency [I16.1]  Yes    Type 2 myocardial infarction [I21.A1]  Yes    Abnormal nuclear stress test [R94.39]  Yes    Moderate protein-calorie malnutrition [E44.0]  Yes    Likely type II NSTEMI from hypertensive emergency [I21.4]  Yes    Type 2 diabetes mellitus with hyperglycemia, with long-term current use of insulin [E11.65, Z79.4]  Not Applicable     Tobacco abuse [Z72.0]  Yes    Coronary artery disease involving native coronary artery of native heart with angina pectoris [I25.119]  Yes    Anemia [D64.9]  Yes    CKD (chronic kidney disease) stage 3, GFR 30-59 ml/min [N18.30]  Yes    PAD (peripheral artery disease) [I73.9]  Yes    Medical non-compliance [Z91.199]  Not Applicable    COPD (chronic obstructive pulmonary disease) [J44.9]  Yes    Bilateral lower extremity edema [R60.0]  Yes      Resolved Hospital Problems    Diagnosis Date Resolved POA    Atypical angina [I20.89] 03/24/2025 Yes    Heart failure with preserved left ventricular function (HFpEF) [I50.30] 03/22/2025 Unknown    Hypertensive urgency [I16.0] 03/22/2025 Yes        Brief Hospital Course to date:  Thomas Perez is a 74 y.o. male with a PMHx of hypertension, hyperlipidemia, CAD status post RCA stent in 2010, PAD status post embolectomy and fasciotomy, COPD, poorly controlled type 2 diabetes, CKD, medical noncompliance and chronic pain who presented due to elevated blood pressure found incidentally by home health nurse.  Cardiology consulted for elevated troponins, planning on outpatient follow-up for consideration of cardiac catheterization at that time.  Patient also found to have MAGUI, nephrology following.     Hypertensive emergency- resolved   Acute on chronic chest pain  NSTEMI II  -Cardiology following, no acute ACS. Plan for outpatient follow-up in 2 to 3 weeks to continue discussion of possible outpatient cardiac catheterization at that time.  -Continue aspirin, statin, carvedilol, BiDil, amlodipine.  -Deferring ARB and SGLT2 to nephrology  -continue holding valsartan for now     MAGUI on CKD  -baseline Cr around 0.9-1.1 in 2021 but no labs since then. SCr has worsened from 1.6 on arrival to now 2.64.   -Renal ultrasound without hydronephrosis, small left renal cyst  -Renal artery duplex without evidence of hemodynamically significant renal artery stenosis  -IVF yesterday, labs still needing  collection today  -Nephrology following  -Repeat labs in AM     Normocytic Anemia   -Possible dilutional component as patient had drop in all cell lines after IV fluids  -no signs of bleeding   -iron studies most consistent with anemia of chronic disease, likely CKD  -will start iron supplementation     BLE edema   -LE Dopplers negative   -ECHO unremarkable  -stable      CAD s/p stent  Chronic chest pain  HLD  PAD s/p embolectomy and fasciotomy   -daughter reports he is suppose to be on ASA but has been refusing all meds for some time, will restart   -Continue aspirin, statin, carvedilol, BiDil, amlodipine.     Insulin dependent T2DM  -lantus 20, lispro 7 TID at home  -currently on SSI, will add lantus 10 and adjust from there, pt not eating well       COPD  Ongoing tobacco use  -not in acute exacerbation   -NRT     Medication noncompliance  -Patient unsure what medications he takes.   -pt cannot read which complicates this.    -Colleague spoke to his daughter who is also uncertain of exactly what he takes. He reports she helps manage his meds.  -PT re-consulted for discharge planning     **Our pharmacist has arrange for him to get prepackaged pill packs from his pharmacy after discharge.  Medication should go through [facility] pharmacy at d/c as ours does not have prepackaged medications like this.    Expected Discharge Location and Transportation: Home  Expected Discharge   Expected Discharge Date: 3/27/2025; Expected Discharge Time:      VTE Prophylaxis:  Pharmacologic VTE prophylaxis orders are present.         AM-PAC 6 Clicks Score (PT): 22 (03/28/25 2394)    CODE STATUS:   Code Status and Medical Interventions: CPR (Attempt to Resuscitate); Full Support   Ordered at: 03/20/25 0819     Code Status (Patient has no pulse and is not breathing):    CPR (Attempt to Resuscitate)     Medical Interventions (Patient has pulse or is breathing):    Full Support     Level Of Support Discussed With:     Patient       Seb DO Donya  03/28/25        Electronically signed by Seb Naqvi DO at 03/28/25 1506          Consult Notes (most recent note)        Jennifer Cotton MD at 03/25/25 1022        Consult Orders    1. Inpatient Nephrology Consult [[id number]] ordered by Bina Cooper APRN at 03/25/25 0914                   Referring Provider: Bina Cooper APRN   Reason for Consultation: MAGUI    Subjective     Chief complaint Elevated BP     History of present illness:  This This is a 74-year-old male with a medical history of hypertension, hyperlipidemia, coronary artery disease (CAD) status post right coronary artery (RCA) stent in 2010, peripheral artery disease (PAD) status post embolectomy and fasciotomy, chronic obstructive pulmonary disease (COPD), poorly controlled type 2 diabetes, and chronic kidney disease (CKD). He presents with elevated blood pressure (210/100) that was incidentally detected by his home health nurse, who was concerned and called EMS. The patient reports typically having a systolic BP between 160-180, but he has been noncompliant with antihypertensive medications for about a month due to side effects. He currently complains of progressively worsening bilateral lower extremity edema, stating he can no longer fit into his shoes. He denies fever, cough, congestion, shortness of breath, chest pain, syncope, headache, or any neurological symptoms such as focal weakness, numbness, or tingling.  Denies any fever, chills, rigors, rash, hematuria or hemoptysis. No family hx of kidney disease or autoimmune disease. Denies any hx of NSAID use or kidney stones. Nephrology service has been consulted for MAGUI/CKD management       History  Past Medical History:   Diagnosis Date    Back injury     Diabetes mellitus     History of angina     Hypertension    ,   Past Surgical History:   Procedure Laterality Date    CORONARY STENT PLACEMENT      EMBOLECTOMY      FASCIOTOMY     , History  reviewed. No pertinent family history.,   Social History     Socioeconomic History    Marital status:    Tobacco Use    Smoking status: Every Day     Current packs/day: 1.00     Types: Cigarettes    Smokeless tobacco: Never    Tobacco comments:     pt report smoking since he was [age]   Vaping Use    Vaping status: Never Used   Substance and Sexual Activity    Alcohol use: Never    Drug use: Never    Sexual activity: Defer     E-cigarette/Vaping    E-cigarette/Vaping Use Never User      E-cigarette/Vaping Substances     E-cigarette/Vaping Devices         ,   Medications Prior to Admission   Medication Sig Dispense Refill Last Dose/Taking    aspirin 81 MG chewable tablet Chew 1 tablet Daily. OTC   3/20/2025 Morning    atorvastatin (LIPITOR) 40 MG tablet Take 1 tablet by mouth Every Night.   Past Week Bedtime    famotidine (PEPCID) 20 MG tablet Take 1 tablet by mouth 2 (Two) Times a Day. OTC   3/20/2025    gabapentin (NEURONTIN) 400 MG capsule Take 1 capsule by mouth 2 (Two) Times a Day As Needed (As Needed).   3/20/2025    hydrALAZINE (APRESOLINE) 25 MG tablet Take 1 tablet by mouth 3 (Three) Times a Day.   Past Month    insulin glargine (LANTUS, SEMGLEE) 100 UNIT/ML injection Inject 22 Units under the skin into the appropriate area as directed Every Night.   Past Week Bedtime    insulin regular (humuLIN R,novoLIN R) 100 UNIT/ML injection Inject 7 Units under the skin into the appropriate area as directed 3 (Three) Times a Day Before Meals.   3/20/2025    melatonin 5 MG tablet tablet Take 2 tablets by mouth At Night As Needed (Sleep). OTC   Past Week Bedtime    oxyCODONE-acetaminophen (PERCOCET) 7.5-325 MG per tablet Take 1 tablet by mouth 4 (Four) Times a Day As Needed for Moderate Pain (For Pain).   3/20/2025   , Scheduled Meds:  amLODIPine, 10 mg, Oral, Q24H  aspirin, 81 mg, Oral, Daily  atorvastatin, 40 mg, Oral, Nightly  carvedilol, 25 mg, Oral, BID With Meals  ferrous sulfate, 325 mg, Oral, Daily With  Breakfast  heparin (porcine), 5,000 Units, Subcutaneous, Q12H  insulin glargine, 5 Units, Subcutaneous, Nightly  insulin lispro, 2-7 Units, Subcutaneous, 4x Daily AC & at Bedtime  isosorbide dinitrate (ISORDIL) 20 mg, hydrALAZINE (APRESOLINE) 37.5 mg for BIDIL 20-37.5, , Oral, Q8H  Lidocaine, 1 patch, Transdermal, Q24H  nicotine, 1 patch, Transdermal, Q24H  pantoprazole, 40 mg, Oral, Q AM  sodium chloride, 10 mL, Intravenous, Q12H  [Held by provider] valsartan, 160 mg, Oral, Q24H   , Continuous Infusions:   , PRN Meds:    acetaminophen **OR** acetaminophen **OR** acetaminophen    senna-docusate sodium **AND** polyethylene glycol **AND** bisacodyl **AND** bisacodyl    calcium carbonate    dextrose    dextrose    gabapentin    glucagon (human recombinant)    melatonin    nitroglycerin    ondansetron    oxyCODONE-acetaminophen    sodium chloride    [COMPLETED] Insert Peripheral IV **AND** sodium chloride    sodium chloride    sodium chloride, and Allergies:  Penicillins and Valium [diazepam]    Review of Systems  Pertinent items are noted in HPI    Objective     Vital Signs  Temp:  [97.6 °F (36.4 °C)-98.2 °F (36.8 °C)] 97.6 °F (36.4 °C)  Heart Rate:  [55-85] 55  Resp:  [16-18] 16  BP: (133-170)/(57-76) 144/70    No intake/output data recorded.  I/O last 3 completed shifts:  In: 360 [P.O.:360]  Out: 1200 [Urine:1200]    Physical Exam:  General Appearance:    Alert, cooperative, in no acute distress   Head:    Normocephalic, without obvious abnormality, atraumatic   Eyes:            Conjunctivae and sclerae normal, no   icterus, no pallor, corneas clear, PERRLA           Neck:   Supple, trachea midline, no thyromegaly,  no JVD       Lungs:     Clear to auscultation,respirations regular, even and               unlabored    Heart:    Regular rhythm and normal rate, normal S1 and S2, no       murmur, no gallop, no rub, no click       Abdomen:     Normal bowel sounds,soft, non-tender, non-distended, no guarding, no rebound  "tenderness       Extremities:   Moves all extremities well, + edema, no cyanosis, no         redness   Pulses:   Pulses palpable and equal bilaterally           Neurologic:   Cranial nerves 2 - 12 grossly intact, no focal deficit         Results Review:   I reviewed the patient's new clinical results.    WBC WBC   Date Value Ref Range Status   03/25/2025 8.02 3.40 - 10.80 10*3/mm3 Final   03/24/2025 9.24 3.40 - 10.80 10*3/mm3 Final   03/23/2025 9.76 3.40 - 10.80 10*3/mm3 Final      HGB Hemoglobin   Date Value Ref Range Status   03/25/2025 8.0 (L) 13.0 - 17.7 g/dL Final   03/24/2025 8.9 (L) 13.0 - 17.7 g/dL Final   03/23/2025 8.3 (L) 13.0 - 17.7 g/dL Final      HCT Hematocrit   Date Value Ref Range Status   03/25/2025 25.7 (L) 37.5 - 51.0 % Final   03/24/2025 27.4 (L) 37.5 - 51.0 % Final   03/23/2025 25.4 (L) 37.5 - 51.0 % Final      Platlets No results found for: \"LABPLAT\"   MCV MCV   Date Value Ref Range Status   03/25/2025 93.5 79.0 - 97.0 fL Final   03/24/2025 92.3 79.0 - 97.0 fL Final   03/23/2025 91.7 79.0 - 97.0 fL Final          Sodium Sodium   Date Value Ref Range Status   03/25/2025 135 (L) 136 - 145 mmol/L Final   03/24/2025 139 136 - 145 mmol/L Final   03/24/2025 138 136 - 145 mmol/L Final   03/24/2025 138 136 - 145 mmol/L Final   03/23/2025 138 136 - 145 mmol/L Final      Potassium Potassium   Date Value Ref Range Status   03/25/2025 4.6 3.5 - 5.2 mmol/L Final   03/24/2025 4.5 3.5 - 5.2 mmol/L Final   03/24/2025 4.5 3.5 - 5.2 mmol/L Final   03/24/2025 4.5 3.5 - 5.2 mmol/L Final   03/23/2025 5.0 3.5 - 5.2 mmol/L Final      Chloride Chloride   Date Value Ref Range Status   03/25/2025 102 98 - 107 mmol/L Final   03/24/2025 104 98 - 107 mmol/L Final   03/24/2025 105 98 - 107 mmol/L Final   03/24/2025 105 98 - 107 mmol/L Final   03/23/2025 105 98 - 107 mmol/L Final      CO2 CO2   Date Value Ref Range Status   03/25/2025 22.0 22.0 - 29.0 mmol/L Final   03/24/2025 23.0 22.0 - 29.0 mmol/L Final   03/24/2025 21.0 " "(L) 22.0 - 29.0 mmol/L Final   03/24/2025 21.0 (L) 22.0 - 29.0 mmol/L Final   03/23/2025 22.0 22.0 - 29.0 mmol/L Final      BUN BUN   Date Value Ref Range Status   03/25/2025 32 (H) 8 - 23 mg/dL Final   03/24/2025 31 (H) 8 - 23 mg/dL Final   03/24/2025 31 (H) 8 - 23 mg/dL Final   03/24/2025 31 (H) 8 - 23 mg/dL Final   03/23/2025 31 (H) 8 - 23 mg/dL Final      Creatinine Creatinine   Date Value Ref Range Status   03/25/2025 2.47 (H) 0.76 - 1.27 mg/dL Final   03/24/2025 2.16 (H) 0.76 - 1.27 mg/dL Final   03/24/2025 2.30 (H) 0.76 - 1.27 mg/dL Final   03/24/2025 2.30 (H) 0.76 - 1.27 mg/dL Final   03/23/2025 2.10 (H) 0.76 - 1.27 mg/dL Final      Calcium Calcium   Date Value Ref Range Status   03/25/2025 8.0 (L) 8.6 - 10.5 mg/dL Final   03/24/2025 8.7 8.6 - 10.5 mg/dL Final   03/24/2025 8.3 (L) 8.6 - 10.5 mg/dL Final   03/24/2025 8.3 (L) 8.6 - 10.5 mg/dL Final   03/23/2025 8.5 (L) 8.6 - 10.5 mg/dL Final      PO4 No results found for: \"CAPO4\"   Albumin Albumin   Date Value Ref Range Status   03/25/2025 3.3 (L) 3.5 - 5.2 g/dL Final   03/24/2025 3.7 3.5 - 5.2 g/dL Final   03/24/2025 3.6 3.5 - 5.2 g/dL Final      Magnesium Magnesium   Date Value Ref Range Status   03/24/2025 2.0 1.6 - 2.4 mg/dL Final   03/23/2025 1.9 1.6 - 2.4 mg/dL Final      Uric Acid No results found for: \"URICACID\"         amLODIPine, 10 mg, Oral, Q24H  aspirin, 81 mg, Oral, Daily  atorvastatin, 40 mg, Oral, Nightly  carvedilol, 25 mg, Oral, BID With Meals  ferrous sulfate, 325 mg, Oral, Daily With Breakfast  heparin (porcine), 5,000 Units, Subcutaneous, Q12H  insulin glargine, 5 Units, Subcutaneous, Nightly  insulin lispro, 2-7 Units, Subcutaneous, 4x Daily AC & at Bedtime  isosorbide dinitrate (ISORDIL) 20 mg, hydrALAZINE (APRESOLINE) 37.5 mg for BIDIL 20-37.5, , Oral, Q8H  Lidocaine, 1 patch, Transdermal, Q24H  nicotine, 1 patch, Transdermal, Q24H  pantoprazole, 40 mg, Oral, Q AM  sodium chloride, 10 mL, Intravenous, Q12H  [Held by provider] valsartan, " 160 mg, Oral, Q24H      Results from last 7 days   Lab Units 03/25/25  0346 03/24/25  1239 03/24/25  1059 03/23/25  0736 03/22/25  0345   SODIUM mmol/L 135* 139 138  138 138 136  136   POTASSIUM mmol/L 4.6 4.5 4.5  4.5 5.0 5.1  5.1   CHLORIDE mmol/L 102 104 105  105 105 102  102   CO2 mmol/L 22.0 23.0 21.0*  21.0* 22.0 22.0  22.0   BUN mg/dL 32* 31* 31*  31* 31* 26*  26*   CREATININE mg/dL 2.47* 2.16* 2.30*  2.30* 2.10* 1.80*  1.80*   CALCIUM mg/dL 8.0* 8.7 8.3*  8.3* 8.5* 9.0  9.0   ALBUMIN g/dL 3.3* 3.7 3.6  --  3.6   WBC 10*3/mm3 8.02  --  9.24 9.76 10.82*   HEMOGLOBIN g/dL 8.0*  --  8.9* 8.3* 9.4*   PLATELETS 10*3/mm3 156  --  160 144 165   GLUCOSE mg/dL 152* 145* 143*  143* 123* 191*  191*       Intake/Output Summary (Last 24 hours) at 3/25/2025 1025  Last data filed at 3/25/2025 0300  Gross per 24 hour   Intake 360 ml   Output 500 ml   Net -140 ml                Assessment & Plan       Hypertensive emergency    Type 2 diabetes mellitus with hyperglycemia, with long-term current use of insulin    Tobacco abuse    Coronary artery disease involving native coronary artery of native heart with angina pectoris    Anemia    CKD (chronic kidney disease) stage 3, GFR 30-59 ml/min    PAD (peripheral artery disease)    Medical non-compliance    COPD (chronic obstructive pulmonary disease)    Bilateral lower extremity edema    Elevated troponin    Abnormal nuclear stress test    Moderate protein-calorie malnutrition      1- MAGUI on CKD (baseline Scr 1.0-1.2 range) - non oliguric - At presentation Scr 1.6 trending up to 2.4 today. Started on Valsartan and received couple of dose of chlorthalidone now off both.   2- Hypertension urgency - controlled   3- Anemia - Tsat 19%  4- DM   5-  Edema   6- hx of CAD   7- hx of PAD   8- COPD     Plan:  - Renal artery duplex   - Agree with holding Valsartan   - Continue with current antihypertensive meds - BP controlled.   - Monitor I/O   - Avoid nephrotoxic agents.   -  "Renal diet   - Adjust meds per renal function   - No emergent need of RRT   - Monitor H/H and transfuse for Hgb less than 7.0   - Urine lytes     I discussed the patients findings and my recommendations with patient and nursing staff    Jennifer Cotton MD  03/25/25            Electronically signed by Jennifer Cotton MD at 03/25/25 1315          Nutrition Notes (most recent note)        Julia Bennett, MS,RD,LD at 03/27/25 0955                      Clinical Nutrition Assessment   Patient Name: Thomas Perez  YOB: 1950  MRN: [id number]  Date of Encounter: 03/27/25 09:55 EDT  Admission date: 3/20/2025  Reason for Visit: Follow-up protocol    Assessment   Nutrition Assessment   Admission Diagnosis:  HTN (hypertension) [I10]  Hypertensive emergency [I16.1]    Problem List:    Hypertensive emergency    Type 2 diabetes mellitus with hyperglycemia, with long-term current use of insulin    Tobacco abuse    Coronary artery disease involving native coronary artery of native heart with angina pectoris    Anemia    CKD (chronic kidney disease) stage 3, GFR 30-59 ml/min    PAD (peripheral artery disease)    Medical non-compliance    COPD (chronic obstructive pulmonary disease)    Bilateral lower extremity edema    Likely type II NSTEMI from hypertensive emergency    Abnormal nuclear stress test    Moderate protein-calorie malnutrition      PMH:   He  has a past medical history of Back injury, Diabetes mellitus, History of angina, and Hypertension.    PSH:  He  has a past surgical history that includes Coronary stent placement; Embolectomy; and Fasciotomy.    Applicable Nutrition History:   CKD3  COPD  PAD  CAD s/p RCA 2010  T2DM  Tobacco abuse  H/o medical noncompliance    Anthropometrics   Height: Height: 180.3 cm (70.98\")  Last Filed Weight: Weight: 69.9 kg (154 lb 1.6 oz) (03/27/25 0610)  Method: Weight Method: Bed scale  BMI: BMI (Calculated): 21.5    UBW:  1/11/25 135#  12/8/24 147#  Weight change:  UTD " 2/2 fluid status  2-3+ edema noted    Nutrition Focused Physical Exam    Date: 03/24/25     Patient meets criteria for malnutrition diagnosis, see MSA note.     Subjective   Reported/Observed/Food/Nutrition Related History:   03/27/25  Spoke with patient and daughter at bedside. Daughter reported patient continues with poor PO intake. However, documented PO shows adequate intake. Attempted discussing food preferences with patient, however, unable to provide much information. Did state he likes corn chips and daughter stated patient likes oatmeal. Doesn't like his ONS. Daughter bringing in equate strawberry ONS for patient. Spoke with assigned RN who reported patient eats sporadically.    03/24/25  Nutrition consult received for chronic poor intake. Presented for elevated BP. Noted to have NSTEMI. Reported decrease in appetite and poor PO intake. Stated he has been eating lousy for the past 4 years 2/2 dysgeusia. However, noted PO intake has been adequate since admit. Denied any recent weight loss. No weight loss noted in EMR, however, unable to determine as patient with edema. Declined any chewing or swallowing difficulties. However, reported he gets nauseous when eating meat? Agreeable to trial ONS. NKFA.    Current Nutrition Prescription   PO: NPO Diet NPO Type: Strict NPO   Oral Nutrition Supplement: BG TID  Intake: 3/24: L 0, D 25, Supp 100%; 3/25 B 100, L 75, D 100%; 3/26 B 0, L 100/0% PO intake documented    Assessment & Plan   Nutrition Diagnosis   Date:  03/24/25  Updated:  Problem Malnutrition, chronic moderate   Etiology Inability to consume adequate energy intake 2/2 dysgeusia   Signs/Symptoms <75% of EEN x >/= 1 month, mild/moderate muscle wasting, mild/moderate subcutaneous fat loss, and severe edema   Status: New    Goal:   Nutrition to support treatment and Increase intake, Continue positive trend    Nutrition Intervention      Follow treatment progress, Care plan reviewed, Interview for preferences,  Encourage intake, Supplement provided    Nutrition POC  Encourage adequate PO intake as able  Will d/c current ONS as patient doesn't like and daughter providing ONS     Monitoring/Evaluation:   Per protocol, PO intake, Supplement intake, Weight, Symptoms, POC/GOC    Julia Bennett MS,RD,LD  Time Spent: 25min    Electronically signed by Julia Bennett MS,RD,LD at 25 1436          Physical Therapy Notes (most recent note)        Juan Maldonado, PT at 25 1322  Version 1 of 1         Patient Name: Thomas Perez  : 1950    MRN: [id number]                              Today's Date: 3/28/2025       Admit Date: 3/20/2025    Visit Dx:     ICD-10-CM ICD-9-CM   1. Hypertensive emergency  I16.1 401.9   2. Elevated brain natriuretic peptide (BNP) level  R79.89 790.99   3. Acute renal insufficiency  N28.9 593.9     Patient Active Problem List   Diagnosis    Type 2 diabetes mellitus with hyperglycemia, with long-term current use of insulin    Tobacco abuse    Coronary artery disease involving native coronary artery of native heart with angina pectoris    Anemia    CKD (chronic kidney disease) stage 3, GFR 30-59 ml/min    PAD (peripheral artery disease)    Medical non-compliance    COPD (chronic obstructive pulmonary disease)    Bilateral lower extremity edema    Hypertensive emergency    Likely type II NSTEMI from hypertensive emergency    Abnormal nuclear stress test    Moderate protein-calorie malnutrition    Type 2 myocardial infarction     Past Medical History:   Diagnosis Date    Back injury     Diabetes mellitus     History of angina     Hypertension      Past Surgical History:   Procedure Laterality Date    CORONARY STENT PLACEMENT      EMBOLECTOMY      FASCIOTOMY        General Information       Row Name 25 1528          Physical Therapy Time and Intention    Document Type re-evaluation  -SC     Mode of Treatment physical therapy  -SC       Row Name 25 1528          General Information     Patient Profile Reviewed yes  -SC     Existing Precautions/Restrictions other (see comments)  chronic B foot drop, Chronic edema  -SC     Barriers to Rehab medically complex  -SC       Row Name 03/28/25 1528          Living Environment    Current Living Arrangements home  -SC     People in Home other (see comments)  daughter  -SC       Row Name 03/28/25 1528          Home Main Entrance    Number of Stairs, Main Entrance none  -SC       Row Name 03/28/25 1528          Stairs Within Home, Primary    Number of Stairs, Within Home, Primary none  -SC       Row Name 03/28/25 1528          Cognition    Orientation Status (Cognition) oriented x 4  -SC       Row Name 03/28/25 1528          Safety Issues/Impairments Affecting Functional Mobility    Impairments Affecting Function (Mobility) strength;sensation/sensory awareness;range of motion (ROM)  -SC     Comment, Safety Issues/Impairments (Mobility) alert, following commands  -SC               User Key  (r) = Recorded By, (t) = Taken By, (c) = Cosigned By      Initials Name Provider Type    SC uJan Maldonado PT Physical Therapist                   Mobility       Row Name 03/28/25 1529          Bed Mobility    Bed Mobility sit-supine;scooting/bridging  -SC     All Activities, [place] (Bed Mobility) independent  -SC     Scooting/Bridging [place] (Bed Mobility) independent  -SC       Row Name 03/28/25 1529          Sit-Stand Transfer    Sit-Stand [place] (Transfers) modified independence  -SC     Assistive Device (Sit-Stand Transfers) walker, front-wheeled  -SC     Comment, (Sit-Stand Transfer) demonstrated slow careful transfers  -SC       Row Name 03/28/25 1529          Gait/Stairs (Locomotion)    [place] Level (Gait) standby assist  -SC     Assistive Device (Gait) walker, front-wheeled  -SC     Distance in Feet (Gait) 350  -SC     Deviations/Abnormal Patterns (Gait) gait speed decreased  -SC     Bilateral Gait Deviations foot drop/toe drag;forward  flexed posture  -SC     Comment, (Gait/Stairs) Patient ambulated in hallway with not heel strike due to chronic bilateral foot drop from weakn dorsiflexion. He demonstrated good control of walker without LOB  -SC               User Key  (r) = Recorded By, (t) = Taken By, (c) = Cosigned By      Initials Name Provider Type    Juan Lindo PT Physical Therapist                   Obj/Interventions       Row Name 03/28/25 1530          Range of Motion Comprehensive    General Range of Motion bilateral upper extremity ROM WFL  -SC       Row Name 03/28/25 1530          Strength Comprehensive (MMT)    General Manual Muscle Testing (MMT) Assessment lower extremity strength deficits identified  -SC     Comment, General Manual Muscle Testing (MMT) Assessment B tib ant 0/5, B quads 4+/5  -SC       Row Name 03/28/25 1530          Balance    Balance Assessment standing dynamic balance  -SC     Dynamic Standing Balance modified independence  -SC     Position/Device Used, Standing Balance supported;walker, rolling  -SC     Comment, Balance No LOB  -SC       Row Name 03/28/25 1530          Sensory Assessment (Somatosensory)    Sensory Assessment (Somatosensory) other (see comments)  B feet with dorsal numbness  -SC               User Key  (r) = Recorded By, (t) = Taken By, (c) = Cosigned By      Initials Name Provider Type    SC Juan Maldonado, PT Physical Therapist                   Goals/Plan    No documentation.                  Clinical Impression       Row Name 03/28/25 1532          Pain    Pretreatment Pain Rating 0/10 - no pain  -SC     Posttreatment Pain Rating 0/10 - no pain  -SC       Row Name 03/28/25 1532          Plan of Care Review    Plan of Care Reviewed With patient  -SC     Outcome Evaluation Patient reevaluated today. He continues to be at his baseline function with good ability to mobilize housewhole distances with walker safely. He can benefit from home PT if willing to participate.  -SC       Row Name  03/28/25 1532          Therapy Assessment/Plan (PT)    Patient/Family Therapy Goals Statement (PT) go homew  -SC     Criteria for Skilled Interventions Met (PT) no problems identified which require skilled intervention  -SC     Therapy Frequency (PT) evaluation only  -SC       Row Name 03/28/25 1532          Vital Signs    Pre Systolic BP Rehab 152  -SC     Pre Treatment Diastolic BP 61  -SC     Post Systolic BP Rehab 178  -SC     Post Treatment Diastolic BP 68  -SC     Post SpO2 (%) 98  -SC       Row Name 03/28/25 1532          Positioning and Restraints    Pre-Treatment Position in bed  -SC     Post Treatment Position bed  -SC     In Bed sitting;sitting EOB;with family/caregiver;with nsg  -SC               User Key  (r) = Recorded By, (t) = Taken By, (c) = Cosigned By      Initials Name Provider Type    Juan Lindo, PT Physical Therapist                   Outcome Measures       Row Name 03/28/25 1535 03/28/25 0852       How much help from another person do you currently need...    Turning from your back to your side while in flat bed without using bedrails? 4  -SC 4  -AP    Moving from lying on back to sitting on the side of a flat bed without bedrails? 4  -SC 4  -AP    Moving to and from a bed to a chair (including a wheelchair)? 4  -SC 4  -AP    Standing up from a chair using your arms (e.g., wheelchair, bedside chair)? 4  -SC 4  -AP    Climbing 3-5 steps with a railing? 3  -SC 3  -AP    To walk in hospital room? 4  -SC 3  -AP    AM-PAC 6 Clicks Score (PT) 23  -SC 22  -AP    Highest Level of Mobility Goal 7 --> Walk 25 feet or more  -SC 7 --> Walk 25 feet or more  -AP      Row Name 03/28/25 1535          Functional Assessment    Outcome Measure Options AM-PAC 6 Clicks Basic Mobility (PT)  -SC               User Key  (r) = Recorded By, (t) = Taken By, (c) = Cosigned By      Initials Name Provider Type    Juan Lindo, PT Physical Therapist    AP Jessica Christianson, RN Registered Nurse                                  Physical Therapy Education       Title: PT OT SLP Therapies (In Progress)       Topic: Physical Therapy (Done)       Point: Mobility training (Done)       Learning Progress Summary            Patient Huan, E, VU,DU by SC at 3/28/2025 1535    Comment: reviewed benefits of walking    GARCIA Pressley, VU by SC at 3/21/2025 1205    Comment: reviewed benefits of activity                      Point: Home exercise program (Done)       Learning Progress Summary            Patient Eager, E, VU,DU by SC at 3/28/2025 1535    Comment: reviewed benefits of walking    Huan E, VU by SC at 3/21/2025 1205    Comment: reviewed benefits of activity                      Point: Body mechanics (Done)       Learning Progress Summary            Patient Eager, E, VU,DU by SC at 3/28/2025 1535    Comment: reviewed benefits of walking    Huan E, VU by SC at 3/21/2025 1205    Comment: reviewed benefits of activity                      Point: Precautions (Done)       Learning Progress Summary            Patient Jenniferer, E, VU,DU by SC at 3/28/2025 1535    Comment: reviewed benefits of walking    GARCIA Pressley VU by SC at 3/21/2025 1205    Comment: reviewed benefits of activity                                      User Key       Initials Effective Dates Name Provider Type Discipline    SC 02/03/23 -  Juan Maldonado, PT Physical Therapist PT                  PT Recommendation and Plan     Outcome Evaluation: Patient reevaluated today. He continues to be at his baseline function with good ability to mobilize housewhole distances with walker safely. He can benefit from home PT if willing to participate.     Time Calculation:   PT Evaluation Complexity  History, PT Evaluation Complexity: 3 or more personal factors and/or comorbidities  Examination of Body Systems (PT Eval Complexity): total of 3 or more elements  Clinical Presentation (PT Evaluation Complexity): evolving  Clinical Decision Making (PT Evaluation Complexity): moderate  complexity  Overall Complexity (PT Evaluation Complexity): moderate complexity     PT Charges       Row Name 03/28/25 1322             Time Calculation    Start Time 1322  -SC      PT Received On 03/28/25  -SC         Timed Charges    34266 - Gait Training Minutes  15  -SC      28100 - PT Therapeutic Activity Minutes 10  -SC         Untimed Charges    PT Eval/Re-eval Minutes 25  -SC         Total Minutes    Timed Charges Total Minutes 25  -SC      Untimed Charges Total Minutes 25  -SC       Total Minutes 50  -SC                User Key  (r) = Recorded By, (t) = Taken By, (c) = Cosigned By      Initials Name Provider Type    SC Juan Maldonado, PT Physical Therapist                  Therapy Charges for Today       Code Description Service Date Service Provider Modifiers Qty    03102322934 HC GAIT TRAINING EA 15 MIN 3/28/2025 Juan Maldonado, PT GP 1    07586622322 HC PT THERAPEUTIC ACT EA 15 MIN 3/28/2025 Juan Maldonado, PT GP 1    [id number]  PT RE-EVAL ESTABLISHED PLAN 2 3/28/2025 uJan Maldonado, PT GP 1            PT G-Codes  Outcome Measure Options: AM-PAC 6 Clicks Basic Mobility (PT)  AM-PAC 6 Clicks Score (PT): 23  PT Discharge Summary  Anticipated Discharge Disposition (PT): home with assist, home with home health    Juan Maldonado PT  3/28/2025      Electronically signed by Juan Maldonado PT at 03/28/25 8888

## 2025-03-28 NOTE — PROGRESS NOTES
"    [facility] Medicine Services  PROGRESS NOTE    Patient Name: Thomas Perez  : 1950  MRN: [id number]    Date of Admission: 3/20/2025  Primary Care Physician: Provider, No Known    Subjective   Subjective     CC:  Hypertension, MAGUI    HPI:  Patient seen resting comfortably in bed no acute distress.  Discussed importance of lab draws in order to evaluate kidney injury.  Patient noted understanding.    Addendum:     Patient refusing lab draws this afternoon. Lengthy discussion had with patient about the importance of lab draws to monitor kidney function as patient currently has an MAGUI in the setting of CAD with ongoing cardiac workup. Discussed with patient that his kidneys are injured and without lab draws, we are unable to safely monitor if is kidney function is improving or worsening. Discussed that if his kidney function is worsening, this could lead to kidney failure, electrolyte abnormalities, and/or ultimately death. Patient continued to refuse lab draws. Discussed with patient we can discuss again in the AM. Patient was sitting at bedside, alert and oriented to name, , hospital, city, situation, day of the week, and year.     Lengthy discussion had with daughter and patient this evening. Prior to admission, patient lived with daughter. Daughter states she will no longer care for patient at home. Daughter states patient does not know how to read/write/work an DEVORA and she feels he will be unsafe if he lives unassisted. Daughter requesting SW/CM assistance to help facilitate disposition. Again discussed importance of lab draws to monitor kidney function and that worsening kidney injury may result in renal failure or death. Patient stated \"I do not care, I do want to be poked and prodded. This is my body, my choice.\" CM made aware of daughter's refusal to care for patient, SW to follow up Monday. Patient remains alert and oriented but refuses to answer questions pertaining to " risks of abstaining from treatment/lab draws. Capacity is in question, though difficult to determine if patient is simply refusing to answer or if limited educational background could be playing a role in fully understanding the consequences of his current treatment course.         Objective   Objective     Vital Signs:   Temp:  [98 °F (36.7 °C)-98.2 °F (36.8 °C)] 98.2 °F (36.8 °C)  Heart Rate:  [57-64] 64  Resp:  [16-18] 16  BP: (119-154)/(52-85) 154/61     Physical Exam  Constitutional:       General: He is sleeping. He is not in acute distress.  Cardiovascular:      Rate and Rhythm: Normal rate.      Pulses: Normal pulses.   Pulmonary:      Effort: Pulmonary effort is normal. No respiratory distress.   Abdominal:      General: There is no distension.      Palpations: Abdomen is soft.      Tenderness: There is no abdominal tenderness. There is no guarding or rebound.   Musculoskeletal:      Right lower leg: Edema present.      Left lower leg: Edema present.   Neurological:      Mental Status: He is easily aroused.   Psychiatric:         Mood and Affect: Mood normal.         Behavior: Behavior normal.           Results Reviewed:  LAB RESULTS:      Lab 03/27/25  0407 03/26/25  0603 03/25/25  1422 03/25/25  0346 03/24/25  1059 03/23/25  0736 03/22/25  0939 03/22/25  0345   WBC 8.46 7.11  --  8.02 9.24 9.76  --  10.82*   HEMOGLOBIN 8.2* 8.1*  --  8.0* 8.9* 8.3*  --  9.4*   HEMATOCRIT 25.8* 25.0*  --  25.7* 27.4* 25.4*  --  29.1*   PLATELETS 173 154  --  156 160 144  --  165   NEUTROS ABS  --   --   --   --   --   --   --  7.80*   IMMATURE GRANS (ABS)  --   --   --   --   --   --   --  0.04   LYMPHS ABS  --   --   --   --   --   --   --  1.74   MONOS ABS  --   --   --   --   --   --   --  0.90   EOS ABS  --   --   --   --   --   --   --  0.31   MCV 92.8 91.6  --  93.5 92.3 91.7  --  91.2   LACTATE  --   --   --   --   --   --   --  0.9   LDH  --   --  241*  --   --   --   --   --    HSTROP T  --   --   --   --   --    --  174* 169*         Lab 03/27/25  0407 03/26/25  0603 03/25/25  0346 03/24/25  1239 03/24/25  1059 03/23/25  0736 03/22/25  0345   SODIUM 134* 134* 135* 139 138  138 138 136  136   POTASSIUM 5.0 5.0 4.6 4.5 4.5  4.5 5.0 5.1  5.1   CHLORIDE 101 101 102 104 105  105 105 102  102   CO2 22.0 21.0* 22.0 23.0 21.0*  21.0* 22.0 22.0  22.0   ANION GAP 11.0 12.0 11.0 12.0 12.0  12.0 11.0 12.0  12.0   BUN 40* 35* 32* 31* 31*  31* 31* 26*  26*   CREATININE 2.64* 2.27* 2.47* 2.16* 2.30*  2.30* 2.10* 1.80*  1.80*   EGFR 24.8* 29.7* 26.9* 31.5* 29.3*  29.3* 32.6* 39.3*  39.3*   GLUCOSE 151* 214* 152* 145* 143*  143* 123* 191*  191*   CALCIUM 8.3* 8.1* 8.0* 8.7 8.3*  8.3* 8.5* 9.0  9.0   MAGNESIUM  --   --   --   --  2.0 1.9 1.9         Lab 03/27/25  0407 03/26/25  0603 03/25/25  0346 03/24/25  1239 03/24/25  1059   TOTAL PROTEIN 6.5 6.0 5.8* 6.4 5.9*   ALBUMIN 3.6 3.3* 3.3* 3.7 3.6   GLOBULIN 2.9 2.7 2.5 2.7 2.3   ALT (SGPT) 15 14 14 17 15   AST (SGOT) 13 13 15 17 16   BILIRUBIN 0.2 0.2 0.2 0.3 0.3   ALK PHOS 93 102 93 100 97         Lab 03/22/25  0939 03/22/25  0345   HSTROP T 174* 169*         Lab 03/22/25  0345   CHOLESTEROL 137   LDL CHOL 50   HDL CHOL 32*   TRIGLYCERIDES 367*               Brief Urine Lab Results  (Last result in the past 365 days)        Color   Clarity   Blood   Leuk Est   Nitrite   Protein   CREAT   Urine HCG        03/26/25 1339             128.7                 Microbiology Results Abnormal       None            No radiology results from the last 24 hrs    Results for orders placed during the hospital encounter of 03/20/25    Adult Transthoracic Echo Complete w/ Color, Spectral and Contrast if necessary per protocol    Interpretation Summary    Left ventricular systolic function is normal. Calculated left ventricular EF = 62.7% Left ventricular ejection fraction appears to be 61 - 65%.    Normal left atrial size and volume noted.      Current medications:  Scheduled  Meds:amLODIPine, 10 mg, Oral, Q24H  aspirin, 81 mg, Oral, Daily  atorvastatin, 40 mg, Oral, Nightly  carvedilol, 25 mg, Oral, BID With Meals  ferrous sulfate, 325 mg, Oral, Daily With Breakfast  heparin (porcine), 5,000 Units, Subcutaneous, Q12H  insulin glargine, 10 Units, Subcutaneous, Nightly  insulin lispro, 2-7 Units, Subcutaneous, 4x Daily AC & at Bedtime  isosorbide dinitrate (ISORDIL) 20 mg, hydrALAZINE (APRESOLINE) 37.5 mg for BIDIL 20-37.5, , Oral, Q8H  Lidocaine, 1 patch, Transdermal, Q24H  nicotine, 1 patch, Transdermal, Q24H  pantoprazole, 40 mg, Oral, Q AM  senna-docusate sodium, 2 tablet, Oral, BID  sodium chloride, 500 mL, Intravenous, Once  sodium chloride, 10 mL, Intravenous, Q12H  [Held by provider] valsartan, 160 mg, Oral, Q24H      Continuous Infusions:   PRN Meds:.  acetaminophen **OR** acetaminophen **OR** acetaminophen    senna-docusate sodium **AND** polyethylene glycol **AND** bisacodyl **AND** bisacodyl    calcium carbonate    dextrose    dextrose    gabapentin    glucagon (human recombinant)    melatonin    nitroglycerin    ondansetron    oxyCODONE-acetaminophen    sodium chloride    [COMPLETED] Insert Peripheral IV **AND** sodium chloride    sodium chloride    sodium chloride    Assessment & Plan   Assessment & Plan     Active Hospital Problems    Diagnosis  POA    **Hypertensive emergency [I16.1]  Yes    Type 2 myocardial infarction [I21.A1]  Yes    Abnormal nuclear stress test [R94.39]  Yes    Moderate protein-calorie malnutrition [E44.0]  Yes    Likely type II NSTEMI from hypertensive emergency [I21.4]  Yes    Type 2 diabetes mellitus with hyperglycemia, with long-term current use of insulin [E11.65, Z79.4]  Not Applicable    Tobacco abuse [Z72.0]  Yes    Coronary artery disease involving native coronary artery of native heart with angina pectoris [I25.119]  Yes    Anemia [D64.9]  Yes    CKD (chronic kidney disease) stage 3, GFR 30-59 ml/min [N18.30]  Yes    PAD (peripheral artery  disease) [I73.9]  Yes    Medical non-compliance [Z91.199]  Not Applicable    COPD (chronic obstructive pulmonary disease) [J44.9]  Yes    Bilateral lower extremity edema [R60.0]  Yes      Resolved Hospital Problems    Diagnosis Date Resolved POA    Atypical angina [I20.89] 03/24/2025 Yes    Heart failure with preserved left ventricular function (HFpEF) [I50.30] 03/22/2025 Unknown    Hypertensive urgency [I16.0] 03/22/2025 Yes        Brief Hospital Course to date:  Thomas Perez is a 74 y.o. male with a PMHx of hypertension, hyperlipidemia, CAD status post RCA stent in 2010, PAD status post embolectomy and fasciotomy, COPD, poorly controlled type 2 diabetes, CKD, medical noncompliance and chronic pain who presented due to elevated blood pressure found incidentally by home health nurse.  Cardiology consulted for elevated troponins, planning on outpatient follow-up for consideration of cardiac catheterization at that time.  Patient also found to have MAGUI, nephrology following.     Hypertensive emergency- resolved   Acute on chronic chest pain  NSTEMI II  -Cardiology following, no acute ACS. Plan for outpatient follow-up in 2 to 3 weeks to continue discussion of possible outpatient cardiac catheterization at that time.  -Continue aspirin, statin, carvedilol, BiDil, amlodipine.  -Deferring ARB and SGLT2 to nephrology  -continue holding valsartan for now     MAGUI on CKD  -baseline Cr around 0.9-1.1 in 2021 but no labs since then. SCr has worsened from 1.6 on arrival to now 2.64.   -Renal ultrasound without hydronephrosis, small left renal cyst  -Renal artery duplex without evidence of hemodynamically significant renal artery stenosis  -IVF yesterday, labs still needing collection today  -Nephrology following  -Repeat labs in AM     Normocytic Anemia   -Possible dilutional component as patient had drop in all cell lines after IV fluids  -no signs of bleeding   -iron studies most consistent with anemia of chronic disease,  likely CKD  -will start iron supplementation     BLE edema   -LE Dopplers negative   -ECHO unremarkable  -stable      CAD s/p stent  Chronic chest pain  HLD  PAD s/p embolectomy and fasciotomy   -daughter reports he is suppose to be on ASA but has been refusing all meds for some time, will restart   -Continue aspirin, statin, carvedilol, BiDil, amlodipine.     Insulin dependent T2DM  -lantus 20, lispro 7 TID at home  -currently on SSI, will add lantus 10 and adjust from there, pt not eating well       COPD  Ongoing tobacco use  -not in acute exacerbation   -NRT     Medication noncompliance  -Patient unsure what medications he takes.   -pt cannot read which complicates this.    -Colleague spoke to his daughter who is also uncertain of exactly what he takes. He reports she helps manage his meds.  -PT re-consulted for discharge planning     **Our pharmacist has arrange for him to get prepackaged pill packs from his pharmacy after discharge.  Medication should go through [facility] pharmacy at d/c as ours does not have prepackaged medications like this.    Expected Discharge Location and Transportation: Home  Expected Discharge   Expected Discharge Date: 3/27/2025; Expected Discharge Time:      VTE Prophylaxis:  Pharmacologic VTE prophylaxis orders are present.         AM-PAC 6 Clicks Score (PT): 22 (03/28/25 5222)    CODE STATUS:   Code Status and Medical Interventions: CPR (Attempt to Resuscitate); Full Support   Ordered at: 03/20/25 4165     Code Status (Patient has no pulse and is not breathing):    CPR (Attempt to Resuscitate)     Medical Interventions (Patient has pulse or is breathing):    Full Support     Level Of Support Discussed With:    Patient       Seb Donya,   03/28/25

## 2025-03-28 NOTE — THERAPY RE-EVALUATION
Patient Name: Thomas Perez  : 1950    MRN: [id number]                              Today's Date: 3/28/2025       Admit Date: 3/20/2025    Visit Dx:     ICD-10-CM ICD-9-CM   1. Hypertensive emergency  I16.1 401.9   2. Elevated brain natriuretic peptide (BNP) level  R79.89 790.99   3. Acute renal insufficiency  N28.9 593.9     Patient Active Problem List   Diagnosis    Type 2 diabetes mellitus with hyperglycemia, with long-term current use of insulin    Tobacco abuse    Coronary artery disease involving native coronary artery of native heart with angina pectoris    Anemia    CKD (chronic kidney disease) stage 3, GFR 30-59 ml/min    PAD (peripheral artery disease)    Medical non-compliance    COPD (chronic obstructive pulmonary disease)    Bilateral lower extremity edema    Hypertensive emergency    Likely type II NSTEMI from hypertensive emergency    Abnormal nuclear stress test    Moderate protein-calorie malnutrition    Type 2 myocardial infarction     Past Medical History:   Diagnosis Date    Back injury     Diabetes mellitus     History of angina     Hypertension      Past Surgical History:   Procedure Laterality Date    CORONARY STENT PLACEMENT      EMBOLECTOMY      FASCIOTOMY        General Information       Row Name 25 1528          Physical Therapy Time and Intention    Document Type re-evaluation  -SC     Mode of Treatment physical therapy  -SC       Row Name 25 1528          General Information    Patient Profile Reviewed yes  -SC     Existing Precautions/Restrictions other (see comments)  chronic B foot drop, Chronic edema  -SC     Barriers to Rehab medically complex  -SC       Row Name 25 1528          Living Environment    Current Living Arrangements home  -SC     People in Home other (see comments)  daughter  -SC       Row Name 25 1528          Home Main Entrance    Number of Stairs, Main Entrance none  -SC       Row Name 25 1528          Stairs Within Home, Primary     Number of Stairs, Within Home, Primary none  -SC       Row Name 03/28/25 1528          Cognition    Orientation Status (Cognition) oriented x 4  -SC       Row Name 03/28/25 1528          Safety Issues/Impairments Affecting Functional Mobility    Impairments Affecting Function (Mobility) strength;sensation/sensory awareness;range of motion (ROM)  -SC     Comment, Safety Issues/Impairments (Mobility) alert, following commands  -SC               User Key  (r) = Recorded By, (t) = Taken By, (c) = Cosigned By      Initials Name Provider Type    SC Juan Maldonado, PT Physical Therapist                   Mobility       Row Name 03/28/25 1529          Bed Mobility    Bed Mobility sit-supine;scooting/bridging  -SC     All Activities, [place] (Bed Mobility) independent  -SC     Scooting/Bridging [place] (Bed Mobility) independent  -SC       Row Name 03/28/25 1529          Sit-Stand Transfer    Sit-Stand [place] (Transfers) modified independence  -SC     Assistive Device (Sit-Stand Transfers) walker, front-wheeled  -SC     Comment, (Sit-Stand Transfer) demonstrated slow careful transfers  -SC       Row Name 03/28/25 1529          Gait/Stairs (Locomotion)    [place] [address] Level (Gait) standby assist  -SC     Assistive Device (Gait) walker, front-wheeled  -SC     Distance in Feet (Gait) 350  -SC     Deviations/Abnormal Patterns (Gait) gait speed decreased  -SC     Bilateral Gait Deviations foot drop/toe drag;forward flexed posture  -SC     Comment, (Gait/Stairs) Patient ambulated in hallway with not heel strike due to chronic bilateral foot drop from weakn dorsiflexion. He demonstrated good control of walker without LOB  -SC               User Key  (r) = Recorded By, (t) = Taken By, (c) = Cosigned By      Initials Name Provider Type    SC Juan Maldonado PT Physical Therapist                   Obj/Interventions       Row Name 03/28/25 1530          Range of Motion Comprehensive    General Range of Motion  bilateral upper extremity ROM WFL  -SC       Row Name 03/28/25 1530          Strength Comprehensive (MMT)    General Manual Muscle Testing (MMT) Assessment lower extremity strength deficits identified  -SC     Comment, General Manual Muscle Testing (MMT) Assessment B tib ant 0/5, B quads 4+/5  -SC       Row Name 03/28/25 1530          Balance    Balance Assessment standing dynamic balance  -SC     Dynamic Standing Balance modified independence  -SC     Position/Device Used, Standing Balance supported;walker, rolling  -SC     Comment, Balance No LOB  -SC       Row Name 03/28/25 1530          Sensory Assessment (Somatosensory)    Sensory Assessment (Somatosensory) other (see comments)  B feet with dorsal numbness  -SC               User Key  (r) = Recorded By, (t) = Taken By, (c) = Cosigned By      Initials Name Provider Type    Juan Lindo, PT Physical Therapist                   Goals/Plan    No documentation.                  Clinical Impression       [facility] Name 03/28/25 1532          Pain    Pretreatment Pain Rating 0/10 - no pain  -SC     Posttreatment Pain Rating 0/10 - no pain  -[facility] Name 03/28/25 1532          Plan of Care Review    Plan of Care Reviewed With patient  -SC     Outcome Evaluation Patient reevaluated today. He continues to be at his baseline function with good ability to mobilize housewhole distances with walker safely. He can benefit from home PT if willing to participate.  -[facility] Name 03/28/25 1532          Therapy Assessment/Plan (PT)    Patient/Family Therapy Goals Statement (PT) go homew  -SC     Criteria for Skilled Interventions Met (PT) no problems identified which require skilled intervention  -SC     Therapy Frequency (PT) evaluation only  -SC       Row Name 03/28/25 1532          Vital Signs    Pre Systolic BP Rehab 152  -SC     Pre Treatment Diastolic BP 61  -SC     Post Systolic BP Rehab 178  -SC     Post Treatment Diastolic BP 68  -SC     Post SpO2 (%) 98  -SC        Row Name 03/28/25 1532          Positioning and Restraints    Pre-Treatment Position in bed  -SC     Post Treatment Position bed  -SC     In Bed sitting;sitting EOB;with family/caregiver;with nsg  -SC               User Key  (r) = Recorded By, (t) = Taken By, (c) = Cosigned By      Initials Name Provider Type    SC Juan Maldonado, PT Physical Therapist                   Outcome Measures       Row Name 03/28/25 1535 03/28/25 0852       How much help from another person do you currently need...    Turning from your back to your side while in flat bed without using bedrails? 4  -SC 4  -AP    Moving from lying on back to sitting on the side of a flat bed without bedrails? 4  -SC 4  -AP    Moving to and from a bed to a chair (including a wheelchair)? 4  -SC 4  -AP    Standing up from a chair using your arms (e.g., wheelchair, bedside chair)? 4  -SC 4  -AP    Climbing 3-5 steps with a railing? 3  -SC 3  -AP    To walk in hospital room? 4  -SC 3  -AP    AM-PAC 6 Clicks Score (PT) 23  -SC 22  -AP    Highest Level of Mobility Goal 7 --> Walk 25 feet or more  -SC 7 --> Walk 25 feet or more  -AP      Row Name 03/28/25 1535          Functional Assessment    Outcome Measure Options AM-PAC 6 Clicks Basic Mobility (PT)  -SC               User Key  (r) = Recorded By, (t) = Taken By, (c) = Cosigned By      Initials Name Provider Type    SC Juan Maldonado, PT Physical Therapist    Jessica Rajput, RN Registered Nurse                                 Physical Therapy Education       Title: PT OT SLP Therapies (In Progress)       Topic: Physical Therapy (Done)       Point: Mobility training (Done)       Learning Progress Summary            Patient GARCIA Pressley VU,MARIELA by SC at 3/28/2025 1535    Comment: reviewed benefits of walking    GARCIA Pressley VU by SC at 3/21/2025 1205    Comment: reviewed benefits of activity                      Point: Home exercise program (Done)       Learning Progress Summary            Patient GARCIA Pressley  ROMELIA,DU by SC at 3/28/2025 1535    Comment: reviewed benefits of walking    GARCIA Pressley VU by SC at 3/21/2025 1205    Comment: reviewed benefits of activity                      Point: Body mechanics (Done)       Learning Progress Summary            Patient GARCIA Pressley VU, DU by SC at 3/28/2025 1535    Comment: reviewed benefits of walking    GARCIA Pressley, VU by SC at 3/21/2025 1205    Comment: reviewed benefits of activity                      Point: Precautions (Done)       Learning Progress Summary            Patient GARCIA Pressley VUDU by SC at 3/28/2025 1535    Comment: reviewed benefits of walking    GARCIA Pressley VU by SC at 3/21/2025 1205    Comment: reviewed benefits of activity                                      User Key       Initials Effective Dates Name Provider Type Discipline    SC 02/03/23 -  Juan Maldonado, PT Physical Therapist PT                  PT Recommendation and Plan     Outcome Evaluation: Patient reevaluated today. He continues to be at his baseline function with good ability to mobilize housewhole distances with walker safely. He can benefit from home PT if willing to participate.     Time Calculation:   PT Evaluation Complexity  History, PT Evaluation Complexity: 3 or more personal factors and/or comorbidities  Examination of Body Systems (PT Eval Complexity): total of 3 or more elements  Clinical Presentation (PT Evaluation Complexity): evolving  Clinical Decision Making (PT Evaluation Complexity): moderate complexity  Overall Complexity (PT Evaluation Complexity): moderate complexity     PT Charges       Row Name 03/28/25 1322             Time Calculation    Start Time 1322  -SC      PT Received On 03/28/25  -SC         Timed Charges    66183 - Gait Training Minutes  15  -SC      87004 - PT Therapeutic Activity Minutes 10  -SC         Untimed Charges    PT Eval/Re-eval Minutes 25  -SC         Total Minutes    Timed Charges Total Minutes 25  -SC      Untimed Charges Total Minutes 25  -SC       Total  Minutes 50  -SC                User Key  (r) = Recorded By, (t) = Taken By, (c) = Cosigned By      Initials Name Provider Type    SC Juan Maldonado PT Physical Therapist                  Therapy Charges for Today       Code Description Service Date Service Provider Modifiers Qty    54842541230  GAIT TRAINING EA 15 MIN 3/28/2025 Juan Maldonado, PT GP 1    66723682602 HC PT THERAPEUTIC ACT EA 15 MIN 3/28/2025 Juan Maldonado, PT GP 1    [id number]  PT RE-EVAL ESTABLISHED PLAN 2 3/28/2025 Juan Maldonado PT GP 1            PT G-Codes  Outcome Measure Options: AM-PAC 6 Clicks Basic Mobility (PT)  AM-PAC 6 Clicks Score (PT): 23  PT Discharge Summary  Anticipated Discharge Disposition (PT): home with assist, home with home health    Juan Maldonado, PT  3/28/2025

## 2025-03-28 NOTE — PLAN OF CARE
Goal Outcome Evaluation:  Plan of Care Reviewed With: patient           Outcome Evaluation: Patient reevaluated today. He continues to be at his baseline function with good ability to mobilize housewhole distances with walker safely. He can benefit from home PT if willing to participate.    Anticipated Discharge Disposition (PT): home with assist, home with home health

## 2025-03-28 NOTE — CASE MANAGEMENT/SOCIAL WORK
Discharge Planning Assessment  [facility]     Patient Name: Thomas Sena  MRN: [id number]  Today's Date: 3/28/2025    Admit Date: 3/20/2025    Plan: Home with daughter's assistance, and [facility] for SN, PT and Social Work       Discharge Plan       Row Name 03/28/25 1604       Plan    Plan Home with daughter's assistance, and [facility] for SN, PT and Social Work    Patient/Family in Agreement with Plan yes    Plan Comments Met with Mr. Sena and his daughter, Colletta, at the bedside, for discharge planning.    Mr. Sena's DC plan continues to be to return home with home health.  Discussed rehab briefly, however the patient does not want to go to [facility] and per PT notes, he is appropriate to return home with some home health PT.    Contacted [facility] for skilled nursing, PT and social work.  The patient is current with the agency. New orders faxed to [facility],  [phone], fax [phone].    Mr. Sena will likely be discharged after he is agreeable to lab draws that are necessary to determine medical readiness.  The patient will be transported home by his daughter.    CM will continue to follow.    Final Discharge Disposition Code 06 - home with home health care               Continued Care and Services - Admitted Since 3/20/2025       Home Medical Care       Service Provider Request Status Services Address Phone Fax Patient Preferred    [facility]  Selected Home Rehabilitation, Home Nursing, Home Medical  [address] [place] [address], [place] [address] [phone] --                  Expected Discharge Date and Time       Expected Discharge Date Expected Discharge Time    Mar 28, 2025             Alessandra Martin RN

## 2025-03-28 NOTE — PLAN OF CARE
Problem: Adult Inpatient Plan of Care  Goal: Plan of Care Review  Outcome: Not Progressing  Goal: Patient-Specific Goal (Individualized)  Outcome: Not Progressing  Goal: Absence of Hospital-Acquired Illness or Injury  Outcome: Not Progressing  Intervention: Identify and Manage Fall Risk  Recent Flowsheet Documentation  Taken 3/28/2025 0553 by Carlos Eduardo Nguyen RN  Safety Promotion/Fall Prevention:   activity supervised   assistive device/personal items within reach   clutter free environment maintained   fall prevention program maintained   gait belt   mobility aid in reach   nonskid shoes/slippers when out of bed   room organization consistent   safety round/check completed  Taken 3/28/2025 0359 by Carlos Eduardo Nguyen RN  Safety Promotion/Fall Prevention:   activity supervised   assistive device/personal items within reach   clutter free environment maintained   fall prevention program maintained   gait belt   mobility aid in reach   nonskid shoes/slippers when out of bed   room organization consistent   safety round/check completed  Taken 3/28/2025 0148 by Carlos Eduardo Nguyen RN  Safety Promotion/Fall Prevention:   activity supervised   assistive device/personal items within reach   clutter free environment maintained   fall prevention program maintained   gait belt   mobility aid in reach   nonskid shoes/slippers when out of bed   room organization consistent   safety round/check completed  Taken 3/27/2025 2356 by Carlos Eduardo Nguyen RN  Safety Promotion/Fall Prevention:   activity supervised   assistive device/personal items within reach   clutter free environment maintained   fall prevention program maintained   gait belt   mobility aid in reach   nonskid shoes/slippers when out of bed   room organization consistent   safety round/check completed  Taken 3/27/2025 2200 by Carlos Eduardo Nguyen RN  Safety Promotion/Fall Prevention:   activity supervised   assistive device/personal items within reach   clutter free environment  maintained   fall prevention program maintained   gait belt   mobility aid in reach   nonskid shoes/slippers when out of bed   room organization consistent   safety round/check completed  Taken 3/27/2025 2000 by Carlos Eduardo Nguyen RN  Safety Promotion/Fall Prevention:   activity supervised   assistive device/personal items within reach   clutter free environment maintained   fall prevention program maintained   gait belt   mobility aid in reach   nonskid shoes/slippers when out of bed   room organization consistent   safety round/check completed  Intervention: Prevent Skin Injury  Recent Flowsheet Documentation  Taken 3/28/2025 0553 by Carlos Eduardo Nguyen RN  Body Position: position changed independently  Skin Protection: incontinence pads utilized  Taken 3/28/2025 0359 by Carlos Eduardo Nguyen RN  Body Position: position changed independently  Skin Protection: incontinence pads utilized  Taken 3/28/2025 0148 by Carlos Eduardo Nguyen RN  Body Position: position changed independently  Skin Protection: incontinence pads utilized  Taken 3/27/2025 2356 by Carlos Eduardo Nguyen RN  Body Position: position changed independently  Taken 3/27/2025 2200 by Carlos Eduardo Nguyen RN  Body Position: position changed independently  Skin Protection: incontinence pads utilized  Taken 3/27/2025 2000 by Carlos Eduardo Nguyen RN  Body Position: position changed independently  Intervention: Prevent and Manage VTE (Venous Thromboembolism) Risk  Recent Flowsheet Documentation  Taken 3/28/2025 0359 by Carlos Eduardo Nguyen RN  VTE Prevention/Management: (sq heparin) other (see comments)  Taken 3/27/2025 2356 by Carlos Eduardo Nguyen RN  VTE Prevention/Management: (sq heparin) other (see comments)  Taken 3/27/2025 2200 by Carlos Eduardo Nguyen RN  VTE Prevention/Management: (sq heparin) other (see comments)  Taken 3/27/2025 2000 by Carlos Eduardo Nguyen RN  VTE Prevention/Management: (sq heparin) other (see comments)  Intervention: Prevent Infection  Recent Flowsheet Documentation  Taken 3/28/2025  0553 by Carlos Eduardo Nguyen RN  Infection Prevention:   environmental surveillance performed   rest/sleep promoted   single patient room provided  Taken 3/28/2025 0359 by Carlos Eduardo Nguyen RN  Infection Prevention:   environmental surveillance performed   rest/sleep promoted   single patient room provided  Taken 3/28/2025 0148 by Carlos Eduardo Nguyen RN  Infection Prevention:   environmental surveillance performed   rest/sleep promoted   single patient room provided  Taken 3/27/2025 2356 by Carlos Eduardo Nguyen RN  Infection Prevention:   environmental surveillance performed   rest/sleep promoted   single patient room provided  Taken 3/27/2025 2200 by Carlos Eduardo Nguyen RN  Infection Prevention:   environmental surveillance performed   rest/sleep promoted   single patient room provided  Taken 3/27/2025 2000 by Carlos Eduardo Nguyen RN  Infection Prevention:   environmental surveillance performed   rest/sleep promoted   single patient room provided  Goal: Optimal Comfort and Wellbeing  Outcome: Not Progressing  Intervention: Monitor Pain and Promote Comfort  Recent Flowsheet Documentation  Taken 3/28/2025 0359 by Carlos Eduardo Nguyen RN  Pain Management Interventions: quiet environment facilitated  Taken 3/27/2025 2200 by Carlos Eduardo Nguyen RN  Pain Management Interventions: quiet environment facilitated  Taken 3/27/2025 2000 by Carlos Eduardo Nguyen RN  Pain Management Interventions: quiet environment facilitated  Intervention: Provide Person-Centered Care  Recent Flowsheet Documentation  Taken 3/28/2025 0553 by Carlos Eduardo Nguyen RN  Trust Relationship/Rapport:   care explained   thoughts/feelings acknowledged   questions encouraged  Taken 3/27/2025 2356 by Carlos Eduardo Nguyen RN  Trust Relationship/Rapport:   care explained   thoughts/feelings acknowledged   reassurance provided  Goal: Readiness for Transition of Care  Outcome: Not Progressing     Problem: Comorbidity Management  Goal: Blood Glucose Level Within Target Range  Outcome: Not  Progressing  Intervention: Monitor and Manage Glycemia  Recent Flowsheet Documentation  Taken 3/27/2025 2000 by Carlos Eduardo Nguyen RN  Medication Review/Management: medications reviewed  Goal: Blood Pressure in Desired Range  Outcome: Not Progressing  Intervention: Maintain Blood Pressure Management  Recent Flowsheet Documentation  Taken 3/27/2025 2000 by Carlos Eduardo Nguyen RN  Medication Review/Management: medications reviewed     Problem: Fall Injury Risk  Goal: Absence of Fall and Fall-Related Injury  Outcome: Not Progressing  Intervention: Identify and Manage Contributors  Recent Flowsheet Documentation  Taken 3/27/2025 2000 by Carlos Eduardo Nguyen RN  Medication Review/Management: medications reviewed  Intervention: Promote Injury-Free Environment  Recent Flowsheet Documentation  Taken 3/28/2025 0553 by Carlos Eduardo Nguyen RN  Safety Promotion/Fall Prevention:   activity supervised   assistive device/personal items within reach   clutter free environment maintained   fall prevention program maintained   gait belt   mobility aid in reach   nonskid shoes/slippers when out of bed   room organization consistent   safety round/check completed  Taken 3/28/2025 0359 by Carlos Eduardo Nguyen RN  Safety Promotion/Fall Prevention:   activity supervised   assistive device/personal items within reach   clutter free environment maintained   fall prevention program maintained   gait belt   mobility aid in reach   nonskid shoes/slippers when out of bed   room organization consistent   safety round/check completed  Taken 3/28/2025 0148 by Carlos Eduardo Nguyen RN  Safety Promotion/Fall Prevention:   activity supervised   assistive device/personal items within reach   clutter free environment maintained   fall prevention program maintained   gait belt   mobility aid in reach   nonskid shoes/slippers when out of bed   room organization consistent   safety round/check completed  Taken 3/27/2025 2356 by Carlos Eduardo Nguyen RN  Safety Promotion/Fall  Prevention:   activity supervised   assistive device/personal items within reach   clutter free environment maintained   fall prevention program maintained   gait belt   mobility aid in reach   nonskid shoes/slippers when out of bed   room organization consistent   safety round/check completed  Taken 3/27/2025 2200 by Carlos Eduardo Nguyen RN  Safety Promotion/Fall Prevention:   activity supervised   assistive device/personal items within reach   clutter free environment maintained   fall prevention program maintained   gait belt   mobility aid in reach   nonskid shoes/slippers when out of bed   room organization consistent   safety round/check completed  Taken 3/27/2025 2000 by Carlos Eduardo Nguyen RN  Safety Promotion/Fall Prevention:   activity supervised   assistive device/personal items within reach   clutter free environment maintained   fall prevention program maintained   gait belt   mobility aid in reach   nonskid shoes/slippers when out of bed   room organization consistent   safety round/check completed     Problem: Skin Injury Risk Increased  Goal: Skin Health and Integrity  Outcome: Not Progressing  Intervention: Optimize Skin Protection  Recent Flowsheet Documentation  Taken 3/28/2025 0553 by Carlos Eduardo Nguyen RN  Activity Management: sitting, edge of bed  Pressure Reduction Techniques: frequent weight shift encouraged  Head of Bed (HOB) Positioning: HOB elevated  Pressure Reduction Devices: pressure-redistributing mattress utilized  Skin Protection: incontinence pads utilized  Taken 3/28/2025 0359 by Carlos Eduardo Nguyen RN  Activity Management: activity minimized  Pressure Reduction Techniques: frequent weight shift encouraged  Head of Bed (HOB) Positioning: HOB elevated  Pressure Reduction Devices: pressure-redistributing mattress utilized  Skin Protection: incontinence pads utilized  Taken 3/28/2025 0148 by Carlos Eduardo Nguyen RN  Activity Management: activity minimized  Pressure Reduction Techniques: frequent weight  shift encouraged  Head of Bed (HOB) Positioning: [facility] elevated  Pressure Reduction Devices: pressure-redistributing mattress utilized  Skin Protection: incontinence pads utilized  Taken 3/27/2025 2356 by Carlos Eduardo Nguyen RN  Activity Management: activity encouraged  Pressure Reduction Techniques: frequent weight shift encouraged  Head of Bed (HOB) Positioning: HOB elevated  Pressure Reduction Devices: pressure-redistributing mattress utilized  Taken 3/27/2025 2200 by Carlos Eduardo Nguyen RN  Activity Management: activity minimized  Pressure Reduction Techniques: frequent weight shift encouraged  Head of Bed (HOB) Positioning: [facility] flat  Pressure Reduction Devices: pressure-redistributing mattress utilized  Skin Protection: incontinence pads utilized  Taken 3/27/2025 2000 by Carlos Eduardo Nguyen RN  Activity Management: sitting, edge of bed  Head of Bed (HOB) Positioning: [facility] elevated     Problem: Hypertension Acute  Goal: Blood Pressure Within Desired Range  Outcome: Not Progressing  Intervention: Normalize Blood Pressure  Recent Flowsheet Documentation  Taken 3/27/2025 2000 by Carlos Eduardo Nguyen RN  Medication Review/Management: medications reviewed   Goal Outcome Evaluation:

## 2025-03-28 NOTE — PROGRESS NOTES
" LOS: 6 days   Patient Care Team:  Brock Randolph PA as PCP - General (Physician Assistant)    Chief Complaint: HTN emergency     Subjective     Continued worsening of renal function as of yesterday. No new labs today. Refused Iv fluids.       History taken from: patient    Objective     Vital Sign Min/Max for last 24 hours  Temp  Min: 98 °F (36.7 °C)  Max: 98.2 °F (36.8 °C)   BP  Min: 149/85  Max: 178/68   Pulse  Min: 57  Max: 67   Resp  Min: 16  Max: 18   SpO2  Min: 92 %  Max: 98 %   No data recorded   Weight  Min: 73.3 kg (161 lb 8 oz)  Max: 73.3 kg (161 lb 8 oz)     Flowsheet Rows      Flowsheet Row First Filed Value   Admission Height 180.3 cm (71\") Documented at 03/20/2025 1611   Admission Weight 67.1 kg (148 lb) Documented at 03/20/2025 1611            I/O this shift:  In: -   Out: 400 [Urine:400]  I/O last 3 completed shifts:  In: 350 [P.O.:350]  Out: 1000 [Urine:1000]    Objective:  General Appearance:  Comfortable.    Vital signs: (most recent): Blood pressure 178/68, pulse 67, temperature 98.2 °F (36.8 °C), temperature source Oral, resp. rate 18, height 180.3 cm (70.98\"), weight 73.3 kg (161 lb 8 oz), SpO2 98%.  Vital signs are normal.    Output: Producing urine.    HEENT: Normal HEENT exam.    Lungs:  Normal effort and normal respiratory rate.  Breath sounds clear to auscultation.  He is not in respiratory distress.  No decreased breath sounds or wheezes.    Heart: Normal rate.  Regular rhythm.  S1 normal and S2 normal.  No murmur or gallop.   Abdomen: Abdomen is soft.  Bowel sounds are normal.     Extremities: Normal range of motion.  There is no effusion or dependent edema.                Results Review:     I reviewed the patient's new clinical results.    WBC WBC   Date Value Ref Range Status   03/27/2025 8.46 3.40 - 10.80 10*3/mm3 Final   03/26/2025 7.11 3.40 - 10.80 10*3/mm3 Final      HGB Hemoglobin   Date Value Ref Range Status   03/27/2025 8.2 (L) 13.0 - 17.7 g/dL Final   03/26/2025 8.1 " "(L) 13.0 - 17.7 g/dL Final      HCT Hematocrit   Date Value Ref Range Status   03/27/2025 25.8 (L) 37.5 - 51.0 % Final   03/26/2025 25.0 (L) 37.5 - 51.0 % Final      Platlets No results found for: \"LABPLAT\"   MCV MCV   Date Value Ref Range Status   03/27/2025 92.8 79.0 - 97.0 fL Final   03/26/2025 91.6 79.0 - 97.0 fL Final          Sodium Sodium   Date Value Ref Range Status   03/27/2025 134 (L) 136 - 145 mmol/L Final   03/26/2025 134 (L) 136 - 145 mmol/L Final      Potassium Potassium   Date Value Ref Range Status   03/27/2025 5.0 3.5 - 5.2 mmol/L Final   03/26/2025 5.0 3.5 - 5.2 mmol/L Final      Chloride Chloride   Date Value Ref Range Status   03/27/2025 101 98 - 107 mmol/L Final   03/26/2025 101 98 - 107 mmol/L Final      CO2 CO2   Date Value Ref Range Status   03/27/2025 22.0 22.0 - 29.0 mmol/L Final   03/26/2025 21.0 (L) 22.0 - 29.0 mmol/L Final      BUN BUN   Date Value Ref Range Status   03/27/2025 40 (H) 8 - 23 mg/dL Final   03/26/2025 35 (H) 8 - 23 mg/dL Final      Creatinine Creatinine   Date Value Ref Range Status   03/27/2025 2.64 (H) 0.76 - 1.27 mg/dL Final   03/26/2025 2.27 (H) 0.76 - 1.27 mg/dL Final      Calcium Calcium   Date Value Ref Range Status   03/27/2025 8.3 (L) 8.6 - 10.5 mg/dL Final   03/26/2025 8.1 (L) 8.6 - 10.5 mg/dL Final      PO4 No results found for: \"CAPO4\"   Albumin Albumin   Date Value Ref Range Status   03/27/2025 3.6 3.5 - 5.2 g/dL Final   03/26/2025 3.3 (L) 3.5 - 5.2 g/dL Final      Magnesium No results found for: \"MG\"     Uric Acid No results found for: \"URICACID\"       Medication Review: Yes    Assessment & Plan       Hypertensive emergency    Type 2 diabetes mellitus with hyperglycemia, with long-term current use of insulin    Tobacco abuse    Coronary artery disease involving native coronary artery of native heart with angina pectoris    Anemia    CKD (chronic kidney disease) stage 3, GFR 30-59 ml/min    PAD (peripheral artery disease)    Medical non-compliance    COPD " (chronic obstructive pulmonary disease)    Bilateral lower extremity edema    Likely type II NSTEMI from hypertensive emergency    Abnormal nuclear stress test    Moderate protein-calorie malnutrition    Type 2 myocardial infarction      Assessment & Plan    1-MAGUI on CKD stage III: Last known stable cr ~ 0.8-1.1mg/dl. Cr 1.6 on this admission with uptrend likely due to hemodynamic variation.  2- Hypertension urgency - controlled. Duplex negative for renal artery stenosis.   3- Anemia - Tsat 19%  4- DM   5-  Edema   6- hx of CAD   7- hx of PAD   8- COPD   9. Proteinuria: Suspect diabetic nephropathy     Plan:  Hemodynamic variation in renal function. ARB currently on hold. In the long term patient will benefit from raas suppression  w ACEi/Arb, SGLT-2in or MRA. for sravan-protection. Recommend IV fluids however patient declined 3/27/25  Would recommend avoiding contrast exposure. Plan for [facility] on hold for now.      I discussed the patients findings and my recommendations with patient and nursing staff       Lavell Ross MD  03/28/25  18:01 EDT

## 2025-03-29 PROCEDURE — 99232 SBSQ HOSP IP/OBS MODERATE 35: CPT | Performed by: STUDENT IN AN ORGANIZED HEALTH CARE EDUCATION/TRAINING PROGRAM

## 2025-03-29 NOTE — PLAN OF CARE
Problem: Comorbidity Management  Goal: Blood Pressure in Desired Range  Outcome: Progressing     Problem: Fall Injury Risk  Goal: Absence of Fall and Fall-Related Injury  Outcome: Progressing  Intervention: Promote Injury-Free Environment  Recent Flowsheet Documentation  Taken 3/29/2025 0200 by Curly Sosa RN  Safety Promotion/Fall Prevention: safety round/check completed  Taken 3/29/2025 0000 by Curly Sosa RN  Safety Promotion/Fall Prevention: safety round/check completed  Taken 3/28/2025 2200 by Curly Sosa RN  Safety Promotion/Fall Prevention: safety round/check completed  Taken 3/28/2025 2000 by Curly Sosa RN  Safety Promotion/Fall Prevention: toileting scheduled   Goal Outcome Evaluation:         Refusing medication, glucose monitoring, vital signs.   Plan of care ongoing,.

## 2025-03-29 NOTE — PLAN OF CARE
Goal Outcome Evaluation:           Progress: no change     Pt is aox4, continuing to refuse all care per nursing staff-MD aware and has spoke with pt-still refusing. Alarms on and audible. No changes at this time..     Problem: Fall Injury Risk  Goal: Absence of Fall and Fall-Related Injury  Intervention: Promote Injury-Free Environment  Recent Flowsheet Documentation  Taken 3/29/2025 1730 by Jessica Christianson, RN  Safety Promotion/Fall Prevention:   activity supervised   safety round/check completed  Taken 3/29/2025 1354 by Jessica Christianson, RN  Safety Promotion/Fall Prevention:   activity supervised   safety round/check completed  Taken 3/29/2025 1228 by Jessica Christianson, RN  Safety Promotion/Fall Prevention:   activity supervised   safety round/check completed  Taken 3/29/2025 1030 by Jessica Christianson, RN  Safety Promotion/Fall Prevention:   activity supervised   safety round/check completed  Taken 3/29/2025 0857 by Jessica Christianson, RN  Safety Promotion/Fall Prevention:   activity supervised   safety round/check completed                              Problem: Comorbidity Management  Goal: Blood Pressure in Desired Range  Outcome: Unable to Meet

## 2025-03-29 NOTE — PROGRESS NOTES
"   LOS: 7 days    Patient Care Team:  Brock Randolph PA as PCP - General (Physician Assistant)    Subjective     No new events.  Continues to refuse lab draws.    Objective     Vital Signs:  Blood pressure 178/69, pulse 67, temperature 98.2 °F (36.8 °C), temperature source Oral, resp. rate 16, height 180.3 cm (70.98\"), weight 73.3 kg (161 lb 8 oz), SpO2 98%.      Intake/Output Summary (Last 24 hours) at 3/29/2025 1236  Last data filed at 3/29/2025 0857  Gross per 24 hour   Intake 320 ml   Output 300 ml   Net 20 ml        03/28 0701 - 03/29 0700  In: 200 [P.O.:200]  Out: 700 [Urine:700]    Physical Exam:        GENERAL: WD WM NAD  NEURO: Patient lying quietly with eyes closed.  Did not respond to voice or shaking.  PSYCHIATRIC: Unable to examine  EYE: Closed  ENT: omm dry, dentition intact,    NECK: Supple , No JVD discernable,  Trachea midline  CV: No edema, RRR  LUNGS:  Quiet,  Nonlabored resp lying flat on room air.  Symmetrical expansion  ABDOMEN: Nondistended   : No Tabor  SKIN: Warm and dry without rash      Labs:  Results from last 7 days   Lab Units 03/27/25  0407 03/26/25  0603 03/25/25  0346   WBC 10*3/mm3 8.46 7.11 8.02   HEMOGLOBIN g/dL 8.2* 8.1* 8.0*   PLATELETS 10*3/mm3 173 154 156     Results from last 7 days   Lab Units 03/27/25  0407 03/26/25  0603 03/25/25  0346 03/24/25  1239 03/24/25  1059 03/23/25  0736 03/23/25  0736   SODIUM mmol/L 134* 134* 135* 139 138  138  --  138   POTASSIUM mmol/L 5.0 5.0 4.6 4.5 4.5  4.5  --  5.0   CHLORIDE mmol/L 101 101 102 104 105  105  --  105   CO2 mmol/L 22.0 21.0* 22.0 23.0 21.0*  21.0*  --  22.0   BUN mg/dL 40* 35* 32* 31* 31*  31*  --  31*   CREATININE mg/dL 2.64* 2.27* 2.47* 2.16* 2.30*  2.30*  --  2.10*   CALCIUM mg/dL 8.3* 8.1* 8.0* 8.7 8.3*  8.3*  --  8.5*   MAGNESIUM mg/dL  --   --   --   --  2.0  --  1.9   ALBUMIN g/dL 3.6 3.3* 3.3* 3.7 3.6   < >  --     < > = values in this interval not displayed.     Results from last 7 days   Lab Units " 03/27/25  0407   ALK PHOS U/L 93   BILIRUBIN mg/dL 0.2   ALT (SGPT) U/L 15   AST (SGOT) U/L 13                   Estimated Creatinine Clearance: 25.8 mL/min (A) (by C-G formula based on SCr of 2.64 mg/dL (H)).         A/P:      1-MAGUI on CKD stage III: Last known stable cr ~ 0.8-1.1mg/dl. Cr 1.6 on this admission with uptrend likely due to hemodynamic variation.  2- Hypertension urgency - controlled. Duplex negative for renal artery stenosis.   3- Anemia - Tsat 19%  4- DM   5-  Edema   6- hx of CAD   7- hx of PAD   8- COPD   9. Proteinuria: Suspect diabetic nephropathy  10.  Medical noncompliance: Patient refusing lab draws and IV fluids.  Makes renal evaluation difficult.  Patient understands risks.     Plan:  Hemodynamic variation in renal function. ARB currently on hold. In the long term patient will benefit from raas suppression  w ACEi/Arb, SGLT-2in or MRA. for sravan-protection. Recommend IV fluids however patient declined 3/27/25  Would recommend avoiding contrast exposure. Plan for [facility] on hold for now.  Patient continues to refuse labs.  Will monitor renal function as able.    High risk and complexity patient.      Blaze Dawkins MD  03/29/25  12:36 EDT

## 2025-03-29 NOTE — PROGRESS NOTES
[facility] Medicine Services  PROGRESS NOTE    Patient Name: Thomas Perez  : 1950  MRN: [id number]    Date of Admission: 3/20/2025  Primary Care Physician: Brock Randolph PA    Subjective   Subjective     CC:  Hypertension, MAGUI    HPI:  Patient seen resting comfortably in bed no acute distress.  Conversation with patient documented below. Denies any acute complaints/concerns today. Refuses lab draws again today.       Objective   Objective     Vital Signs:   Temp:  [98.2 °F (36.8 °C)] 98.2 °F (36.8 °C)  Heart Rate:  [67] 67  Resp:  [16-18] 16  BP: (145-178)/(47-69) 178/69     Physical Exam  Constitutional:       General: He is sleeping. He is not in acute distress.  Cardiovascular:      Rate and Rhythm: Normal rate.      Pulses: Normal pulses.   Pulmonary:      Effort: Pulmonary effort is normal. No respiratory distress.   Abdominal:      General: There is no distension.      Palpations: Abdomen is soft.      Tenderness: There is no abdominal tenderness. There is no guarding or rebound.   Musculoskeletal:      Right lower leg: Edema present.      Left lower leg: Edema present.   Neurological:      Mental Status: He is easily aroused.   Psychiatric:         Mood and Affect: Mood normal.         Behavior: Behavior normal.           Results Reviewed:  LAB RESULTS:      Lab 25  0407 25  0603 25  1422 25  0346 25  1059 25  0736   WBC 8.46 7.11  --  8.02 9.24 9.76   HEMOGLOBIN 8.2* 8.1*  --  8.0* 8.9* 8.3*   HEMATOCRIT 25.8* 25.0*  --  25.7* 27.4* 25.4*   PLATELETS 173 154  --  156 160 144   MCV 92.8 91.6  --  93.5 92.3 91.7   LDH  --   --  241*  --   --   --          Lab 25  0407 25  0603 25  0346 25  1239 25  1059 25  0736   SODIUM 134* 134* 135* 139 138  138 138   POTASSIUM 5.0 5.0 4.6 4.5 4.5  4.5 5.0   CHLORIDE 101 101 102 104 105  105 105   CO2 22.0 21.0* 22.0 23.0 21.0*  21.0* 22.0   ANION GAP 11.0  12.0 11.0 12.0 12.0  12.0 11.0   BUN 40* 35* 32* 31* 31*  31* 31*   CREATININE 2.64* 2.27* 2.47* 2.16* 2.30*  2.30* 2.10*   EGFR 24.8* 29.7* 26.9* 31.5* 29.3*  29.3* 32.6*   GLUCOSE 151* 214* 152* 145* 143*  143* 123*   CALCIUM 8.3* 8.1* 8.0* 8.7 8.3*  8.3* 8.5*   MAGNESIUM  --   --   --   --  2.0 1.9         Lab 03/27/25  0407 03/26/25  0603 03/25/25  0346 03/24/25  1239 03/24/25  1059   TOTAL PROTEIN 6.5 6.0 5.8* 6.4 5.9*   ALBUMIN 3.6 3.3* 3.3* 3.7 3.6   GLOBULIN 2.9 2.7 2.5 2.7 2.3   ALT (SGPT) 15 14 14 17 15   AST (SGOT) 13 13 15 17 16   BILIRUBIN 0.2 0.2 0.2 0.3 0.3   ALK PHOS 93 102 93 100 97                           Brief Urine Lab Results  (Last result in the past 365 days)        Color   Clarity   Blood   Leuk Est   Nitrite   Protein   CREAT   Urine HCG        03/26/25 1339             128.7                 Microbiology Results Abnormal       None            No radiology results from the last 24 hrs    Results for orders placed during the hospital encounter of 03/20/25    Adult Transthoracic Echo Complete w/ Color, Spectral and Contrast if necessary per protocol    Interpretation Summary    Left ventricular systolic function is normal. Calculated left ventricular EF = 62.7% Left ventricular ejection fraction appears to be 61 - 65%.    Normal left atrial size and volume noted.      Current medications:  Scheduled Meds:amLODIPine, 10 mg, Oral, Q24H  aspirin, 81 mg, Oral, Daily  atorvastatin, 40 mg, Oral, Nightly  carvedilol, 25 mg, Oral, BID With Meals  ferrous sulfate, 325 mg, Oral, Daily With Breakfast  heparin (porcine), 5,000 Units, Subcutaneous, Q12H  insulin glargine, 10 Units, Subcutaneous, Nightly  insulin lispro, 2-7 Units, Subcutaneous, 4x Daily AC & at Bedtime  isosorbide dinitrate (ISORDIL) 20 mg, hydrALAZINE (APRESOLINE) 37.5 mg for BIDIL 20-37.5, , Oral, Q8H  Lidocaine, 1 patch, Transdermal, Q24H  nicotine, 1 patch, Transdermal, Q24H  pantoprazole, 40 mg, Oral, Q AM  senna-docusate  sodium, 2 tablet, Oral, BID  sodium chloride, 500 mL, Intravenous, Once  sodium chloride, 10 mL, Intravenous, Q12H  [Held by provider] valsartan, 160 mg, Oral, Q24H      Continuous Infusions:   PRN Meds:.  acetaminophen **OR** acetaminophen **OR** acetaminophen    senna-docusate sodium **AND** polyethylene glycol **AND** bisacodyl **AND** bisacodyl    calcium carbonate    dextrose    dextrose    gabapentin    glucagon (human recombinant)    melatonin    nitroglycerin    ondansetron    oxyCODONE-acetaminophen    sodium chloride    [COMPLETED] Insert Peripheral IV **AND** sodium chloride    sodium chloride    sodium chloride    Assessment & Plan   Assessment & Plan     Active Hospital Problems    Diagnosis  POA    **Hypertensive emergency [I16.1]  Yes    Type 2 myocardial infarction [I21.A1]  Yes    Abnormal nuclear stress test [R94.39]  Yes    Moderate protein-calorie malnutrition [E44.0]  Yes    Likely type II NSTEMI from hypertensive emergency [I21.4]  Yes    Type 2 diabetes mellitus with hyperglycemia, with long-term current use of insulin [E11.65, Z79.4]  Not Applicable    Tobacco abuse [Z72.0]  Yes    Coronary artery disease involving native coronary artery of native heart with angina pectoris [I25.119]  Yes    Anemia [D64.9]  Yes    CKD (chronic kidney disease) stage 3, GFR 30-59 ml/min [N18.30]  Yes    PAD (peripheral artery disease) [I73.9]  Yes    Medical non-compliance [Z91.199]  Not Applicable    COPD (chronic obstructive pulmonary disease) [J44.9]  Yes    Bilateral lower extremity edema [R60.0]  Yes      Resolved Hospital Problems    Diagnosis Date Resolved POA    Atypical angina [I20.89] 03/24/2025 Yes    Heart failure with preserved left ventricular function (HFpEF) [I50.30] 03/22/2025 Unknown    Hypertensive urgency [I16.0] 03/22/2025 Yes        Brief Hospital Course to date:  Thomas Perez is a 74 y.o. male with a PMHx of hypertension, hyperlipidemia, CAD status post RCA stent in 2010, PAD status post  embolectomy and fasciotomy, COPD, poorly controlled type 2 diabetes, CKD, medical noncompliance and chronic pain who presented due to elevated blood pressure found incidentally by home health nurse.  Cardiology consulted for elevated troponins, planning on outpatient follow-up for consideration of cardiac catheterization at that time.  Patient also found to have MAGUI, nephrology following.     Hypertensive emergency- resolved   Acute on chronic chest pain  NSTEMI II  -Cardiology following, no acute ACS. Plan for outpatient follow-up in 2 to 3 weeks to continue discussion of possible outpatient cardiac catheterization at that time.  -Continue aspirin, statin, carvedilol, BiDil, amlodipine (though patient refusing while inpatient)  -Deferring ARB and SGLT2 to nephrology  -continue holding valsartan for now     MAGUI on CKD  -baseline Cr around 0.9-1.1 in 2021 but no labs since then. SCr has worsened from 1.6 on arrival to now 2.64 on last check.   -Renal ultrasound without hydronephrosis, small left renal cyst  -Renal artery duplex without evidence of hemodynamically significant renal artery stenosis  -Patient refusing labs, per below  -Nephrology following     Normocytic Anemia   -Possible dilutional component as patient had drop in all cell lines after IV fluids  -no signs of bleeding   -iron studies most consistent with anemia of chronic disease, likely CKD  -will start iron supplementation     BLE edema   -LE Dopplers negative   -ECHO unremarkable  -stable      CAD s/p stent  Chronic chest pain  HLD  PAD s/p embolectomy and fasciotomy   -daughter reports he is suppose to be on ASA but has been refusing all meds for some time, will restart   -Continue aspirin, statin, carvedilol, BiDil, amlodipine.     Insulin dependent T2DM  -lantus 20, lispro 7 TID at home  -currently on SSI, will add lantus 10 and adjust from there, pt not eating well       COPD  Ongoing tobacco use  -not in acute exacerbation   -NRT     Medication  "noncompliance  -Patient unsure what medications he takes.   -pt cannot read which complicates this.    -Colleague spoke to his daughter who is also uncertain of exactly what he takes. He reports she helps manage his meds.  -PT re-consulted for discharge planning    Refusal of care  - 3/28/25: Patient refusing lab draws this afternoon. Lengthy discussion had with patient about the importance of lab draws to monitor kidney function as patient currently has an MAGUI in the setting of CAD with ongoing cardiac workup. Discussed with patient that his kidneys are injured and without lab draws, we are unable to safely monitor if is kidney function is improving or worsening. Discussed that if his kidney function is worsening, this could lead to kidney failure, electrolyte abnormalities, and/or ultimately death. Patient continued to refuse lab draws. Discussed with patient we can discuss again in the AM. Patient was sitting at bedside, alert and oriented to name, , hospital, city, situation, day of the week, and year.   - 3/28/25: Lengthy discussion had with daughter and patient this evening. Prior to admission, patient lived with daughter. Daughter states she will no longer care for patient at home. Daughter states patient does not know how to read/write/work an DEVORA and she feels he will be unsafe if he lives unassisted. Daughter requesting SW/CM assistance to help facilitate disposition. Again discussed importance of lab draws to monitor kidney function and that worsening kidney injury may result in renal failure or death. Patient stated \"I do not care, I do want to be poked and prodded. This is my body, my choice.\" CM made aware of daughter's refusal to care for patient, SW to follow up Monday. Patient remains alert and oriented but refuses to answer questions pertaining to risks of abstaining from treatment/lab draws. Capacity is in question, though difficult to determine if patient is simply refusing to answer or if " limited educational background could be playing a role in fully understanding the consequences of his current treatment course.    - 3/29/25: Discussion had with patient again at bedside. Discussed importance of lab work for continued evaluation of kidney function. Discussed again that without lab draws, we are unable to determine if his kidneys are recovering or worsening. If his kidneys are worsening, discussed that this could lead to kidney failure or death. Patient voiced understanding and again stated he will refuse lab draws. Patient aware that  plans to follow up with him on Monday for disposition plans.      **Our pharmacist has arrange for him to get prepackaged pill packs from his pharmacy after discharge.  Medication should go through [facility] pharmacy at d/c as ours does not have prepackaged medications like this.    Expected Discharge Location and Transportation: Home  Expected Discharge   Expected Discharge Date: 3/29/2025; Expected Discharge Time:      VTE Prophylaxis:  Pharmacologic VTE prophylaxis orders are present.         AM-PAC 6 Clicks Score (PT): 23 (03/29/25 0857)    CODE STATUS:   Code Status and Medical Interventions: CPR (Attempt to Resuscitate); Full Support   Ordered at: 03/20/25 1903     Code Status (Patient has no pulse and is not breathing):    CPR (Attempt to Resuscitate)     Medical Interventions (Patient has pulse or is breathing):    Full Support     Level Of Support Discussed With:    Patient       Seb Naqvi DO  03/29/25

## 2025-03-30 LAB
ANION GAP SERPL CALCULATED.3IONS-SCNC: 12 MMOL/L (ref 5–15)
BUN SERPL-MCNC: 25 MG/DL (ref 8–23)
BUN/CREAT SERPL: 13.6 (ref 7–25)
CALCIUM SPEC-SCNC: 8.9 MG/DL (ref 8.6–10.5)
CHLORIDE SERPL-SCNC: 101 MMOL/L (ref 98–107)
CO2 SERPL-SCNC: 23 MMOL/L (ref 22–29)
CREAT SERPL-MCNC: 1.84 MG/DL (ref 0.76–1.27)
EGFRCR SERPLBLD CKD-EPI 2021: 38.2 ML/MIN/1.73
GLUCOSE SERPL-MCNC: 172 MG/DL (ref 65–99)
POTASSIUM SERPL-SCNC: 4.8 MMOL/L (ref 3.5–5.2)
SODIUM SERPL-SCNC: 136 MMOL/L (ref 136–145)

## 2025-03-30 PROCEDURE — 99232 SBSQ HOSP IP/OBS MODERATE 35: CPT | Performed by: STUDENT IN AN ORGANIZED HEALTH CARE EDUCATION/TRAINING PROGRAM

## 2025-03-30 PROCEDURE — 80048 BASIC METABOLIC PNL TOTAL CA: CPT | Performed by: INTERNAL MEDICINE

## 2025-03-30 PROCEDURE — 93005 ELECTROCARDIOGRAM TRACING: CPT | Performed by: STUDENT IN AN ORGANIZED HEALTH CARE EDUCATION/TRAINING PROGRAM

## 2025-03-30 PROCEDURE — 63710000001 INSULIN LISPRO (HUMAN) PER 5 UNITS: Performed by: PHYSICIAN ASSISTANT

## 2025-03-30 PROCEDURE — 93010 ELECTROCARDIOGRAM REPORT: CPT | Performed by: INTERNAL MEDICINE

## 2025-03-30 PROCEDURE — 63710000001 INSULIN GLARGINE PER 5 UNITS: Performed by: INTERNAL MEDICINE

## 2025-03-30 PROCEDURE — 82948 REAGENT STRIP/BLOOD GLUCOSE: CPT

## 2025-03-30 RX ADMIN — OXYCODONE AND ACETAMINOPHEN 1 TABLET: 7.5; 325 TABLET ORAL at 17:52

## 2025-03-30 RX ADMIN — INSULIN GLARGINE 10 UNITS: 100 INJECTION, SOLUTION SUBCUTANEOUS at 20:56

## 2025-03-30 RX ADMIN — ISOSORBIDE DINITRATE: 20 TABLET ORAL at 23:16

## 2025-03-30 RX ADMIN — ASPIRIN 81 MG: 81 TABLET, COATED ORAL at 16:04

## 2025-03-30 RX ADMIN — INSULIN LISPRO 5 UNITS: 100 INJECTION, SOLUTION INTRAVENOUS; SUBCUTANEOUS at 20:56

## 2025-03-30 RX ADMIN — CARVEDILOL 25 MG: 12.5 TABLET, FILM COATED ORAL at 17:52

## 2025-03-30 RX ADMIN — GABAPENTIN 400 MG: 400 CAPSULE ORAL at 17:52

## 2025-03-30 RX ADMIN — ISOSORBIDE DINITRATE: 20 TABLET ORAL at 16:04

## 2025-03-30 NOTE — PROGRESS NOTES
"   LOS: 8 days    Patient Care Team:  Brock Randolph PA as PCP - General (Physician Assistant)    Subjective     No new events.  Continues to refuse lab draws.  Refused to discuss his medical care today.    Objective     Vital Signs:  Blood pressure 174/67, pulse 67, temperature 98.8 °F (37.1 °C), temperature source Oral, resp. rate 16, height 180.3 cm (70.98\"), weight 73.3 kg (161 lb 8 oz), SpO2 98%.      Intake/Output Summary (Last 24 hours) at 3/30/2025 1237  Last data filed at 3/30/2025 0900  Gross per 24 hour   Intake 200 ml   Output 750 ml   Net -550 ml        03/29 0701 - 03/30 0700  In: 320 [P.O.:320]  Out: 1050 [Urine:1050]    Physical Exam:    Exam limited due to patient factor    GENERAL: WD WM NAD  NEURO: Awake and alert.  Appears oriented.  PSYCHIATRIC: Agitated.  Refusing to participate in discussion or physical exam.  EYE: Open  ENT: Unable to examine  NECK: Unable to examine  CV: Unable to examine  LUNGS:  Quiet,  Nonlabored resp lying flat on room air.    ABDOMEN: Does not appear distended  : No T[place]  SKIN: Unable to examine      Labs:  Results from last 7 days   Lab Units 03/27/25  0407 03/26/25  0603 03/25/25  0346   WBC 10*3/mm3 8.46 7.11 8.02   HEMOGLOBIN g/dL 8.2* 8.1* 8.0*   PLATELETS 10*3/mm3 173 154 156     Results from last 7 days   Lab Units 03/27/25  0407 03/26/25  0603 03/25/25  0346 03/24/25  1239 03/24/25  1059   SODIUM mmol/L 134* 134* 135* 139 138  138   POTASSIUM mmol/L 5.0 5.0 4.6 4.5 4.5  4.5   CHLORIDE mmol/L 101 101 102 104 105  105   CO2 mmol/L 22.0 21.0* 22.0 23.0 21.0*  21.0*   BUN mg/dL 40* 35* 32* 31* 31*  31*   CREATININE mg/dL 2.64* 2.27* 2.47* 2.16* 2.30*  2.30*   CALCIUM mg/dL 8.3* 8.1* 8.0* 8.7 8.3*  8.3*   MAGNESIUM mg/dL  --   --   --   --  2.0   ALBUMIN g/dL 3.6 3.3* 3.3* 3.7 3.6     Results from last 7 days   Lab Units 03/27/25  0407   ALK PHOS U/L 93   BILIRUBIN mg/dL 0.2   ALT (SGPT) U/L 15   AST (SGOT) U/L 13                   Estimated " Creatinine Clearance: 25.8 mL/min (A) (by C-G formula based on SCr of 2.64 mg/dL (H)).         A/P:      1-MAGUI on CKD stage III: Last known stable cr ~ 0.8-1.1mg/dl. Cr 1.6 on this admission with uptrend likely due to hemodynamic variation.  2- Hypertension urgency - controlled. Duplex negative for renal artery stenosis.   3- Anemia - Tsat 19%  4- DM   5-  Edema   6- hx of CAD   7- hx of PAD   8- COPD   9. Proteinuria: Suspect diabetic nephropathy  10.  Medical noncompliance: Patient refusing lab draws and IV fluids.  Makes renal evaluation difficult.  Patient understands risks.     Plan:  Hemodynamic variation in renal function. ARB currently on hold. In the long term patient will benefit from raas suppression  w ACEi/Arb, SGLT-2in or MRA. for sravan-protection. Recommend IV fluids however patient declined 3/27/25  Would recommend avoiding contrast exposure. Plan for [facility] on hold for now.  Patient continues to refuse labs.  Refuses to discuss his renal function.  Became agitated with attempted interview.  Unfortunately we are unable to provide much assistance without patient's participation.    High risk and complexity patient.      Blaze Dawkins MD  03/30/25  12:37 EDT

## 2025-03-30 NOTE — PLAN OF CARE
Goal Outcome Evaluation:           Progress: no change        Pt aox4, room air, pt initially refusing care but around 1600 decided to allow vitals to be taken and agreeable to labs and meds, md notified d/t htn-improving, NSR on EKG, prn pain meds given, ambulated x 1 assist, no changes..       Problem: Comorbidity Management  Goal: Blood Pressure in Desired Range  Intervention: Maintain Blood Pressure Management  Recent Flowsheet Documentation  Taken 3/30/2025 1752 by Jessica Christianson, RN  Medication Review/Management: medications reviewed  Taken 3/30/2025 1604 by Jessica Christianson, RN  Medication Review/Management: medications reviewed     Problem: Fall Injury Risk  Goal: Absence of Fall and Fall-Related Injury  Intervention: Identify and Manage Contributors  Recent Flowsheet Documentation  Taken 3/30/2025 1752 by Jessica Christianson, RN  Medication Review/Management: medications reviewed  Taken 3/30/2025 1604 by Jessica Christianson RN  Medication Review/Management: medications reviewed     Problem: Fall Injury Risk  Goal: Absence of Fall and Fall-Related Injury  Intervention: Promote Injury-Free Environment  Recent Flowsheet Documentation  Taken 3/30/2025 1845 by Jessica Christianson, RN  Safety Promotion/Fall Prevention:   activity supervised   safety round/check completed  Taken 3/30/2025 1752 by Jessica Christianson RN  Safety Promotion/Fall Prevention:   activity supervised   safety round/check completed  Taken 3/30/2025 1604 by Jessica Christianson, RN  Safety Promotion/Fall Prevention:   activity supervised   safety round/check completed  Taken 3/30/2025 1400 by Jessica Christianson, RN  Safety Promotion/Fall Prevention:   activity supervised   safety round/check completed  Taken 3/30/2025 1206 by Jessica Christianson, RN  Safety Promotion/Fall Prevention:   activity supervised   safety round/check completed  Taken 3/30/2025 1006 by Jessica Christianson, RN  Safety Promotion/Fall Prevention:   activity supervised   safety round/check  completed  Taken 3/30/2025 7958 by Jessica Christianson, RN  Safety Promotion/Fall Prevention:   activity supervised   safety round/check completed

## 2025-03-30 NOTE — PROGRESS NOTES
"    [facility] Medicine Services  PROGRESS NOTE    Patient Name: Thomas Perez  : 1950  MRN: [id number]    Date of Admission: 3/20/2025  Primary Care Physician: Brock Randolph PA    Subjective   Subjective     CC:  Hypertension, MAGUI    HPI:  Patient seen resting comfortably in bed no acute distress.  Continues to decline lab draws. No complaints today. States \"my kidneys are fine\".       Objective   Objective     Vital Signs:   Temp:  [98.8 °F (37.1 °C)] 98.8 °F (37.1 °C)  Resp:  [16] 16  BP: (174)/(67) 174/67     Physical Exam  Constitutional:       General: He is sleeping. He is not in acute distress.  Cardiovascular:      Rate and Rhythm: Normal rate.      Pulses: Normal pulses.   Pulmonary:      Effort: Pulmonary effort is normal. No respiratory distress.   Abdominal:      General: There is no distension.      Palpations: Abdomen is soft.      Tenderness: There is no abdominal tenderness. There is no guarding or rebound.   Musculoskeletal:      Right lower leg: Edema present.      Left lower leg: Edema present.   Neurological:      Mental Status: He is easily aroused.   Psychiatric:         Mood and Affect: Mood normal.         Behavior: Behavior normal.           Results Reviewed:  LAB RESULTS:      Lab 25  0407 25  0603 25  1422 25  0346 25  1059   WBC 8.46 7.11  --  8.02 9.24   HEMOGLOBIN 8.2* 8.1*  --  8.0* 8.9*   HEMATOCRIT 25.8* 25.0*  --  25.7* 27.4*   PLATELETS 173 154  --  156 160   MCV 92.8 91.6  --  93.5 92.3   LDH  --   --  241*  --   --          Lab 25  0407 25  0603 25  0346 25  1239 25  1059   SODIUM 134* 134* 135* 139 138  138   POTASSIUM 5.0 5.0 4.6 4.5 4.5  4.5   CHLORIDE 101 101 102 104 105  105   CO2 22.0 21.0* 22.0 23.0 21.0*  21.0*   ANION GAP 11.0 12.0 11.0 12.0 12.0  12.0   BUN 40* 35* 32* 31* 31*  31*   CREATININE 2.64* 2.27* 2.47* 2.16* 2.30*  2.30*   EGFR 24.8* 29.7* 26.9* 31.5* " 29.3*  29.3*   GLUCOSE 151* 214* 152* 145* 143*  143*   CALCIUM 8.3* 8.1* 8.0* 8.7 8.3*  8.3*   MAGNESIUM  --   --   --   --  2.0         Lab 03/27/25  0407 03/26/25  0603 03/25/25  0346 03/24/25  1239 03/24/25  1059   TOTAL PROTEIN 6.5 6.0 5.8* 6.4 5.9*   ALBUMIN 3.6 3.3* 3.3* 3.7 3.6   GLOBULIN 2.9 2.7 2.5 2.7 2.3   ALT (SGPT) 15 14 14 17 15   AST (SGOT) 13 13 15 17 16   BILIRUBIN 0.2 0.2 0.2 0.3 0.3   ALK PHOS 93 102 93 100 97                           Brief Urine Lab Results  (Last result in the past 365 days)        Color   Clarity   Blood   Leuk Est   Nitrite   Protein   CREAT   Urine HCG        03/26/25 1339             128.7                 Microbiology Results Abnormal       None            No radiology results from the last 24 hrs    Results for orders placed during the hospital encounter of 03/20/25    Adult Transthoracic Echo Complete w/ Color, Spectral and Contrast if necessary per protocol    Interpretation Summary    Left ventricular systolic function is normal. Calculated left ventricular EF = 62.7% Left ventricular ejection fraction appears to be 61 - 65%.    Normal left atrial size and volume noted.      Current medications:  Scheduled Meds:amLODIPine, 10 mg, Oral, Q24H  aspirin, 81 mg, Oral, Daily  atorvastatin, 40 mg, Oral, Nightly  carvedilol, 25 mg, Oral, BID With Meals  ferrous sulfate, 325 mg, Oral, Daily With Breakfast  heparin (porcine), 5,000 Units, Subcutaneous, Q12H  insulin glargine, 10 Units, Subcutaneous, Nightly  insulin lispro, 2-7 Units, Subcutaneous, 4x Daily AC & at Bedtime  isosorbide dinitrate (ISORDIL) 20 mg, hydrALAZINE (APRESOLINE) 37.5 mg for BIDIL 20-37.5, , Oral, Q8H  Lidocaine, 1 patch, Transdermal, Q24H  nicotine, 1 patch, Transdermal, Q24H  pantoprazole, 40 mg, Oral, Q AM  senna-docusate sodium, 2 tablet, Oral, BID  sodium chloride, 500 mL, Intravenous, Once  sodium chloride, 10 mL, Intravenous, Q12H  [Held by provider] valsartan, 160 mg, Oral, Q24H      Continuous  Infusions:   PRN Meds:.  acetaminophen **OR** acetaminophen **OR** acetaminophen    senna-docusate sodium **AND** polyethylene glycol **AND** bisacodyl **AND** bisacodyl    calcium carbonate    dextrose    dextrose    gabapentin    glucagon (human recombinant)    melatonin    nitroglycerin    ondansetron    oxyCODONE-acetaminophen    sodium chloride    [COMPLETED] Insert Peripheral IV **AND** sodium chloride    sodium chloride    sodium chloride    Assessment & Plan   Assessment & Plan     Active Hospital Problems    Diagnosis  POA    **Hypertensive emergency [I16.1]  Yes    Type 2 myocardial infarction [I21.A1]  Yes    Abnormal nuclear stress test [R94.39]  Yes    Moderate protein-calorie malnutrition [E44.0]  Yes    Likely type II NSTEMI from hypertensive emergency [I21.4]  Yes    Type 2 diabetes mellitus with hyperglycemia, with long-term current use of insulin [E11.65, Z79.4]  Not Applicable    Tobacco abuse [Z72.0]  Yes    Coronary artery disease involving native coronary artery of native heart with angina pectoris [I25.119]  Yes    Anemia [D64.9]  Yes    CKD (chronic kidney disease) stage 3, GFR 30-59 ml/min [N18.30]  Yes    PAD (peripheral artery disease) [I73.9]  Yes    Medical non-compliance [Z91.199]  Not Applicable    COPD (chronic obstructive pulmonary disease) [J44.9]  Yes    Bilateral lower extremity edema [R60.0]  Yes      Resolved Hospital Problems    Diagnosis Date Resolved POA    Atypical angina [I20.89] 03/24/2025 Yes    Heart failure with preserved left ventricular function (HFpEF) [I50.30] 03/22/2025 Unknown    Hypertensive urgency [I16.0] 03/22/2025 Yes        Brief Hospital Course to date:  Thomas Perez is a 74 y.o. male with a PMHx of hypertension, hyperlipidemia, CAD status post RCA stent in 2010, PAD status post embolectomy and fasciotomy, COPD, poorly controlled type 2 diabetes, CKD, medical noncompliance and chronic pain who presented due to elevated blood pressure found incidentally by  home health nurse.  Cardiology consulted for elevated troponins, planning on outpatient follow-up for consideration of cardiac catheterization at that time.  Patient also found to have MAGUI, nephrology following. Currently refusing further work up. Planning to have discussion with CM/SW Monday for disposition plans.      Hypertensive emergency- resolved   Acute on chronic chest pain  NSTEMI II  -Cardiology following, no acute ACS. Plan for outpatient follow-up in 2 to 3 weeks to continue discussion of possible outpatient cardiac catheterization at that time.  -Continue aspirin, statin, carvedilol, BiDil, amlodipine (though patient refusing while inpatient)  -Deferring ARB and SGLT2 to nephrology  -continue holding valsartan for now     MAGUI on CKD  -baseline Cr around 0.9-1.1 in 2021 but no labs since then. SCr has worsened from 1.6 on arrival to 2.64 on last check.   -Renal ultrasound without hydronephrosis, small left renal cyst  -Renal artery duplex without evidence of hemodynamically significant renal artery stenosis  -Patient refusing labs, per below  -Nephrology following     Normocytic Anemia   -Possible dilutional component as patient had drop in all cell lines after IV fluids  -no signs of bleeding   -iron studies most consistent with anemia of chronic disease, likely CKD  -will start iron supplementation     BLE edema   -LE Dopplers negative   -ECHO unremarkable  -stable      CAD s/p stent  Chronic chest pain  HLD  PAD s/p embolectomy and fasciotomy   -daughter reports he is suppose to be on ASA but has been refusing all meds for some time, will restart   -Continue aspirin, statin, carvedilol, BiDil, amlodipine.     Insulin dependent T2DM  -lantus 20, lispro 7 TID at home  -currently on SSI, lantus 10u      COPD  Ongoing tobacco use  -not in acute exacerbation   -NRT     Medication noncompliance  -Patient unsure what medications he takes. Patient cannot read/write  -Colleague spoke to his daughter who is  "also uncertain of exactly what he takes. He reports she helps manage his meds.  -PT re-consulted for discharge planning = home with assist    Refusal of care  - 3/28/25: Patient refusing lab draws this afternoon. Lengthy discussion had with patient about the importance of lab draws to monitor kidney function as patient currently has an MAGUI in the setting of CAD with ongoing cardiac workup. Discussed with patient that his kidneys are injured and without lab draws, we are unable to safely monitor if is kidney function is improving or worsening. Discussed that if his kidney function is worsening, this could lead to kidney failure, electrolyte abnormalities, and/or ultimately death. Patient continued to refuse lab draws. Discussed with patient we can discuss again in the AM. Patient was sitting at bedside, alert and oriented to name, , hospital, city, situation, day of the week, and year.   - 3/28/25: Lengthy discussion had with daughter and patient this evening. Prior to admission, patient lived with daughter. Daughter states she will no longer care for patient at home. Daughter states patient does not know how to read/write/work an DEVORA and she feels he will be unsafe if he lives unassisted. Daughter requesting SW/CM assistance to help facilitate disposition. Again discussed importance of lab draws to monitor kidney function and that worsening kidney injury may result in renal failure or death. Patient stated \"I do not care, I do want to be poked and prodded. This is my body, my choice.\" CM made aware of daughter's refusal to care for patient, SW to follow up Monday. Patient remains alert and oriented but refuses to answer questions pertaining to risks of abstaining from treatment/lab draws. Capacity is in question, though difficult to determine if patient is simply refusing to answer or if limited educational background could be playing a role in fully understanding the consequences of his current treatment course. " "   - 3/29/25: Discussion had with patient again at bedside. Discussed importance of lab work for continued evaluation of kidney function. Discussed again that without lab draws, we are unable to determine if his kidneys are recovering or worsening. If his kidneys are worsening, discussed that this could lead to kidney failure or death. Patient voiced understanding and again stated he will refuse lab draws. Patient aware that SW plans to follow up with him on Monday for disposition plans.   - 3/30/25: Patient refusing labs. Discussed again importance of lab draws to assess renal function. If kidney function is worsening, discussed possibility of kidney failure and/or death. Patient states \"my kidneys are fine\", and refused labs/further work up. Patient still wants to discuss disposition with SW on Monday.      **Our pharmacist has arrange for him to get prepackaged pill packs from his pharmacy after discharge.  Medication should go through [facility] pharmacy at d/c as ours does not have prepackaged medications like this.    Expected Discharge Location and Transportation: Home  Expected Discharge   Expected Discharge Date: 3/29/2025; Expected Discharge Time:      VTE Prophylaxis:  Pharmacologic VTE prophylaxis orders are present.         AM-PAC 6 Clicks Score (PT): 23 (03/29/25 2750)    CODE STATUS:   Code Status and Medical Interventions: CPR (Attempt to Resuscitate); Full Support   Ordered at: 03/20/25 4404     Code Status (Patient has no pulse and is not breathing):    CPR (Attempt to Resuscitate)     Medical Interventions (Patient has pulse or is breathing):    Full Support     Level Of Support Discussed With:    Patient       Seb Donya, DO  03/30/25      "

## 2025-03-30 NOTE — PLAN OF CARE
Problem: Comorbidity Management  Goal: Blood Glucose Level Within Target Range  Outcome: Not Progressing  Goal: Blood Pressure in Desired Range  Outcome: Not Progressing     Problem: Fall Injury Risk  Goal: Absence of Fall and Fall-Related Injury  Outcome: Not Progressing  Intervention: Promote Injury-Free Environment  Recent Flowsheet Documentation  Taken 3/30/2025 0200 by Curly Sosa RN  Safety Promotion/Fall Prevention: safety round/check completed  Taken 3/30/2025 0000 by Curly Sosa RN  Safety Promotion/Fall Prevention: safety round/check completed  Taken 3/29/2025 2200 by Curly Sosa RN  Safety Promotion/Fall Prevention: safety round/check completed  Taken 3/29/2025 2000 by Curly Sosa, RN  Safety Promotion/Fall Prevention: safety round/check completed   Goal Outcome Evaluation:            Continues to refuse labs, medication, and vital signs, walked in hallway with walker and gait belt, plan of care ongoing

## 2025-03-31 LAB
ANION GAP SERPL CALCULATED.3IONS-SCNC: 9 MMOL/L (ref 5–15)
BUN SERPL-MCNC: 32 MG/DL (ref 8–23)
BUN/CREAT SERPL: 15.5 (ref 7–25)
CALCIUM SPEC-SCNC: 8.1 MG/DL (ref 8.6–10.5)
CHLORIDE SERPL-SCNC: 102 MMOL/L (ref 98–107)
CO2 SERPL-SCNC: 24 MMOL/L (ref 22–29)
CREAT SERPL-MCNC: 2.07 MG/DL (ref 0.76–1.27)
EGFRCR SERPLBLD CKD-EPI 2021: 33.2 ML/MIN/1.73
GLUCOSE BLDC GLUCOMTR-MCNC: 178 MG/DL (ref 70–130)
GLUCOSE BLDC GLUCOMTR-MCNC: 184 MG/DL (ref 70–130)
GLUCOSE BLDC GLUCOMTR-MCNC: 374 MG/DL (ref 70–130)
GLUCOSE SERPL-MCNC: 327 MG/DL (ref 65–99)
POTASSIUM SERPL-SCNC: 4.7 MMOL/L (ref 3.5–5.2)
SODIUM SERPL-SCNC: 135 MMOL/L (ref 136–145)

## 2025-03-31 PROCEDURE — 63710000001 INSULIN LISPRO (HUMAN) PER 5 UNITS: Performed by: PHYSICIAN ASSISTANT

## 2025-03-31 PROCEDURE — 25810000003 LACTATED RINGERS PER 1000 ML: Performed by: STUDENT IN AN ORGANIZED HEALTH CARE EDUCATION/TRAINING PROGRAM

## 2025-03-31 PROCEDURE — 82948 REAGENT STRIP/BLOOD GLUCOSE: CPT

## 2025-03-31 PROCEDURE — 80048 BASIC METABOLIC PNL TOTAL CA: CPT | Performed by: INTERNAL MEDICINE

## 2025-03-31 PROCEDURE — 63710000001 INSULIN GLARGINE PER 5 UNITS: Performed by: INTERNAL MEDICINE

## 2025-03-31 PROCEDURE — 99232 SBSQ HOSP IP/OBS MODERATE 35: CPT | Performed by: STUDENT IN AN ORGANIZED HEALTH CARE EDUCATION/TRAINING PROGRAM

## 2025-03-31 PROCEDURE — 25010000002 HEPARIN (PORCINE) PER 1000 UNITS: Performed by: PHYSICIAN ASSISTANT

## 2025-03-31 RX ORDER — SODIUM CHLORIDE, SODIUM LACTATE, POTASSIUM CHLORIDE, CALCIUM CHLORIDE 600; 310; 30; 20 MG/100ML; MG/100ML; MG/100ML; MG/100ML
75 INJECTION, SOLUTION INTRAVENOUS CONTINUOUS
Status: DISCONTINUED | OUTPATIENT
Start: 2025-03-31 | End: 2025-04-01

## 2025-03-31 RX ADMIN — PANTOPRAZOLE SODIUM 40 MG: 40 TABLET, DELAYED RELEASE ORAL at 06:40

## 2025-03-31 RX ADMIN — ASPIRIN 81 MG: 81 TABLET, COATED ORAL at 09:18

## 2025-03-31 RX ADMIN — Medication 10 ML: at 20:46

## 2025-03-31 RX ADMIN — ISOSORBIDE DINITRATE: 20 TABLET ORAL at 06:40

## 2025-03-31 RX ADMIN — HEPARIN SODIUM 5000 UNITS: 5000 INJECTION INTRAVENOUS; SUBCUTANEOUS at 09:18

## 2025-03-31 RX ADMIN — CARVEDILOL 25 MG: 12.5 TABLET, FILM COATED ORAL at 17:23

## 2025-03-31 RX ADMIN — ISOSORBIDE DINITRATE: 20 TABLET ORAL at 15:14

## 2025-03-31 RX ADMIN — FERROUS SULFATE TAB 325 MG (65 MG ELEMENTAL FE) 325 MG: 325 (65 FE) TAB at 09:18

## 2025-03-31 RX ADMIN — AMLODIPINE BESYLATE 10 MG: 10 TABLET ORAL at 09:18

## 2025-03-31 RX ADMIN — ATORVASTATIN CALCIUM 40 MG: 40 TABLET, FILM COATED ORAL at 20:42

## 2025-03-31 RX ADMIN — INSULIN LISPRO 2 UNITS: 100 INJECTION, SOLUTION INTRAVENOUS; SUBCUTANEOUS at 17:22

## 2025-03-31 RX ADMIN — INSULIN LISPRO 2 UNITS: 100 INJECTION, SOLUTION INTRAVENOUS; SUBCUTANEOUS at 20:45

## 2025-03-31 RX ADMIN — INSULIN GLARGINE 10 UNITS: 100 INJECTION, SOLUTION SUBCUTANEOUS at 20:44

## 2025-03-31 RX ADMIN — SODIUM CHLORIDE, SODIUM LACTATE, POTASSIUM CHLORIDE, CALCIUM CHLORIDE 75 ML/HR: 20; 30; 600; 310 INJECTION, SOLUTION INTRAVENOUS at 18:53

## 2025-03-31 RX ADMIN — HEPARIN SODIUM 5000 UNITS: 5000 INJECTION INTRAVENOUS; SUBCUTANEOUS at 20:44

## 2025-03-31 RX ADMIN — SENNOSIDES AND DOCUSATE SODIUM 2 TABLET: 50; 8.6 TABLET ORAL at 20:44

## 2025-03-31 RX ADMIN — Medication 5 MG: at 20:44

## 2025-03-31 RX ADMIN — ISOSORBIDE DINITRATE: 20 TABLET ORAL at 20:42

## 2025-03-31 RX ADMIN — INSULIN LISPRO 6 UNITS: 100 INJECTION, SOLUTION INTRAVENOUS; SUBCUTANEOUS at 12:09

## 2025-03-31 RX ADMIN — CARVEDILOL 25 MG: 12.5 TABLET, FILM COATED ORAL at 09:18

## 2025-03-31 NOTE — PROGRESS NOTES
[facility] Medicine Services  PROGRESS NOTE    Patient Name: Thomas Perez  : 1950  MRN: [id number]    Date of Admission: 3/20/2025  Primary Care Physician: Brock Randolph PA    Subjective   Subjective     CC:  Hypertension, MAGUI    HPI:  Patient reports feeling tired.  Denied pain.  No chest pain or shortness of breath.  No nausea or vomiting.  Patient allowed staff to obtain labs today.      Objective   Objective     Vital Signs:   Temp:  [97.7 °F (36.5 °C)-98.1 °F (36.7 °C)] 98.1 °F (36.7 °C)  Heart Rate:  [62-69] 69  Resp:  [16-18] 16  BP: (131-211)/(66-90) 157/70     Physical Exam:  Constitutional: No acute distress, awake, alert  HENT: NCAT, mucous membranes moist  Respiratory: Clear to auscultation bilaterally, respiratory effort normal   Cardiovascular: RRR  Gastrointestinal: Positive bowel sounds, soft, nontender, nondistended  Psychiatric: Appropriate affect, cooperative  Neurologic: Alert, oriented, symmetric facies, moves all extremities, speech clear  Skin: No rashes      Results Reviewed:  LAB RESULTS:      Lab 25  0407 25  0603 25  1422 25  0346 25  1059   WBC 8.46 7.11  --  8.02 9.24   HEMOGLOBIN 8.2* 8.1*  --  8.0* 8.9*   HEMATOCRIT 25.8* 25.0*  --  25.7* 27.4*   PLATELETS 173 154  --  156 160   MCV 92.8 91.6  --  93.5 92.3   LDH  --   --  241*  --   --          Lab 25  1550 25  0407 25  0603 25  0346 25  1239 25  1059   SODIUM 136 134* 134* 135* 139 138  138   POTASSIUM 4.8 5.0 5.0 4.6 4.5 4.5  4.5   CHLORIDE 101 101 101 102 104 105  105   CO2 23.0 22.0 21.0* 22.0 23.0 21.0*  21.0*   ANION GAP 12.0 11.0 12.0 11.0 12.0 12.0  12.0   BUN 25* 40* 35* 32* 31* 31*  31*   CREATININE 1.84* 2.64* 2.27* 2.47* 2.16* 2.30*  2.30*   EGFR 38.2* 24.8* 29.7* 26.9* 31.5* 29.3*  29.3*   GLUCOSE 172* 151* 214* 152* 145* 143*  143*   CALCIUM 8.9 8.3* 8.1* 8.0* 8.7 8.3*  8.3*   MAGNESIUM  --   --   --    --   --  2.0         Lab 03/27/25  0407 03/26/25  0603 03/25/25  0346 03/24/25  1239 03/24/25  1059   TOTAL PROTEIN 6.5 6.0 5.8* 6.4 5.9*   ALBUMIN 3.6 3.3* 3.3* 3.7 3.6   GLOBULIN 2.9 2.7 2.5 2.7 2.3   ALT (SGPT) 15 14 14 17 15   AST (SGOT) 13 13 15 17 16   BILIRUBIN 0.2 0.2 0.2 0.3 0.3   ALK PHOS 93 102 93 100 97                           Brief Urine Lab Results  (Last result in the past 365 days)        Color   Clarity   Blood   Leuk Est   Nitrite   Protein   CREAT   Urine HCG        03/26/25 1339             128.7                 Microbiology Results Abnormal       None            No radiology results from the last 24 hrs    Results for orders placed during the hospital encounter of 03/20/25    Adult Transthoracic Echo Complete w/ Color, Spectral and Contrast if necessary per protocol    Interpretation Summary    Left ventricular systolic function is normal. Calculated left ventricular EF = 62.7% Left ventricular ejection fraction appears to be 61 - 65%.    Normal left atrial size and volume noted.      Current medications:  Scheduled Meds:amLODIPine, 10 mg, Oral, Q24H  aspirin, 81 mg, Oral, Daily  atorvastatin, 40 mg, Oral, Nightly  carvedilol, 25 mg, Oral, BID With Meals  ferrous sulfate, 325 mg, Oral, Daily With Breakfast  heparin (porcine), 5,000 Units, Subcutaneous, Q12H  insulin glargine, 10 Units, Subcutaneous, Nightly  insulin lispro, 2-7 Units, Subcutaneous, 4x Daily AC & at Bedtime  isosorbide dinitrate (ISORDIL) 20 mg, hydrALAZINE (APRESOLINE) 37.5 mg for BIDIL 20-37.5, , Oral, Q8H  Lidocaine, 1 patch, Transdermal, Q24H  nicotine, 1 patch, Transdermal, Q24H  pantoprazole, 40 mg, Oral, Q AM  senna-docusate sodium, 2 tablet, Oral, BID  sodium chloride, 500 mL, Intravenous, Once  sodium chloride, 10 mL, Intravenous, Q12H  [Held by provider] valsartan, 160 mg, Oral, Q24H      Continuous Infusions:   PRN Meds:.  acetaminophen **OR** acetaminophen **OR** acetaminophen    senna-docusate sodium **AND**  polyethylene glycol **AND** bisacodyl **AND** bisacodyl    calcium carbonate    dextrose    dextrose    gabapentin    glucagon (human recombinant)    melatonin    nitroglycerin    ondansetron    oxyCODONE-acetaminophen    sodium chloride    [COMPLETED] Insert Peripheral IV **AND** sodium chloride    sodium chloride    sodium chloride    Assessment & Plan   Assessment & Plan     Active Hospital Problems    Diagnosis  POA    **Hypertensive emergency [I16.1]  Yes    Type 2 myocardial infarction [I21.A1]  Yes    Abnormal nuclear stress test [R94.39]  Yes    Moderate protein-calorie malnutrition [E44.0]  Yes    Likely type II NSTEMI from hypertensive emergency [I21.4]  Yes    Type 2 diabetes mellitus with hyperglycemia, with long-term current use of insulin [E11.65, Z79.4]  Not Applicable    Tobacco abuse [Z72.0]  Yes    Coronary artery disease involving native coronary artery of native heart with angina pectoris [I25.119]  Yes    Anemia [D64.9]  Yes    CKD (chronic kidney disease) stage 3, GFR 30-59 ml/min [N18.30]  Yes    PAD (peripheral artery disease) [I73.9]  Yes    Medical non-compliance [Z91.199]  Not Applicable    COPD (chronic obstructive pulmonary disease) [J44.9]  Yes    Bilateral lower extremity edema [R60.0]  Yes      Resolved Hospital Problems    Diagnosis Date Resolved POA    Atypical angina [I20.89] 03/24/2025 Yes    Heart failure with preserved left ventricular function (HFpEF) [I50.30] 03/22/2025 Unknown    Hypertensive urgency [I16.0] 03/22/2025 Yes        Brief Hospital Course to date:  Thomas Perez is a 74 y.o. male with a PMHx of HTN, HLD, CAD status post RCA stent in 2010, PAD status post embolectomy and fasciotomy, COPD, poorly controlled DM2, CKD, medical noncompliance and chronic pain who presented due to elevated blood pressure found incidentally by home health nurse.  Cardiology consulted for elevated troponins, planning on outpatient follow-up for consideration of cardiac catheterization at  that time.  Patient also found to have MAGUI, nephrology following.     This patient's problems and plans were partially entered by my partner and updated as appropriate by me 03/31/25.       Hypertensive emergency- resolved   Acute on chronic chest pain  NSTEMI II  -Cardiology followed, no acute ACS. Plan for outpatient follow-up in 2 to 3 weeks to continue discussion of possible outpatient cardiac catheterization at that time.  -Continue aspirin, statin, carvedilol, BiDil, amlodipine (though patient refusing)  -Deferring ARB and SGLT2 to nephrology  -continue holding valsartan for now     MAGUI on CKD  -baseline Cr around 0.9-1.1 in 2021 but no labs since then. SCr has worsened from 1.6 on arrival to 2.64 ---> improved to 1.84 on 3/30 --> 2 on 3/31  -Renal ultrasound without hydronephrosis, small left renal cyst  -Renal artery duplex without evidence of hemodynamically significant renal artery stenosis  -Nephrology following  -IVF trial; patient had been refusing fluids     Normocytic Anemia   -Possible dilutional component as patient had drop in all cell lines after IV fluids  -no signs of bleeding   -iron studies most consistent with anemia of chronic disease, likely CKD  -iron supplementation     BLE edema   -LE Dopplers negative   -ECHO unremarkable  -stable      CAD s/p stent  Chronic chest pain  HLD  PAD s/p embolectomy and fasciotomy   -daughter reports he is supposed to be on ASA but has been refusing all meds for some time, will restart   -Continue aspirin, statin, carvedilol, BiDil, amlodipine.     Insulin dependent T2DM  -lantus 20, lispro 7 TID at home  -currently on SSI, lantus 10 units      COPD  Ongoing tobacco use  -not in acute exacerbation   -NRT     Medication noncompliance  -Patient unsure what medications he takes. Patient cannot read/write  -Colleague spoke to his daughter who is also uncertain of exactly what he takes. He reports she helps manage his meds.  -PT re-consulted for discharge  planning = home with assist    Refusal of care  -Currently pt amenable to labs     **Our pharmacist has arrange for him to get prepackaged pill packs from his pharmacy after discharge.  Medication should go through [facility] pharmacy at d/c as ours does not have prepackaged medications like this.    Expected Discharge Location and Transportation: Home  Expected Discharge   Expected Discharge Date: 3/29/2025; Expected Discharge Time:      VTE Prophylaxis:  Pharmacologic VTE prophylaxis orders are present.         AM-PAC 6 Clicks Score (PT): 23 (03/29/25 0857)    CODE STATUS:   Code Status and Medical Interventions: CPR (Attempt to Resuscitate); Full Support   Ordered at: 03/20/25 6861     Code Status (Patient has no pulse and is not breathing):    CPR (Attempt to Resuscitate)     Medical Interventions (Patient has pulse or is breathing):    Full Support     Level Of Support Discussed With:    Patient       Desiree Prince MD  03/31/25

## 2025-03-31 NOTE — PLAN OF CARE
Goal Outcome Evaluation:      A&Ox4 and VSS on RA. Pt continues to be compliant with taking medications, having vitals taken, and labs. IV was able to be placed and IVFs infusing. Voiding spontaneously via urinal. Call light in reach

## 2025-03-31 NOTE — PROGRESS NOTES
"   LOS: 9 days    Patient Care Team:  Brock Randolph PA as PCP - General (Physician Assistant)    Subjective     Patient a little more compliant today.  Did allow labs yesterday and today.  Does agree to IV fluids if needed.  Reports he is leaving in 2 days if we do not have things figured out by then    Objective     Vital Signs:  Blood pressure 137/62, pulse 69, temperature 98.1 °F (36.7 °C), temperature source Oral, resp. rate 16, height 180.3 cm (70.98\"), weight 73.3 kg (161 lb 8 oz), SpO2 98%.      Intake/Output Summary (Last 24 hours) at 3/31/2025 1418  Last data filed at 3/31/2025 0918  Gross per 24 hour   Intake 558 ml   Output 500 ml   Net 58 ml        03/30 0701 - 03/31 0700  In: 676 [P.O.:676]  Out: 1050 [Urine:1050]    Physical Exam:    Exam limited due to patient factor    GENERAL: WD WM NAD  NEURO: Awake and alert.  Appears oriented.  PSYCHIATRIC: Agitated.  Refusing to participate in discussion or physical exam.  EYE: Open  ENT: Unable to examine  NECK: Unable to examine  CV: Unable to examine  LUNGS:  Quiet,  Nonlabored resp lying flat on room air.    ABDOMEN: Does not appear distended  : No Tabor  SKIN: Unable to examine      Labs:  Results from last 7 days   Lab Units 03/27/25  0407 03/26/25  0603 03/25/25  0346   WBC 10*3/mm3 8.46 7.11 8.02   HEMOGLOBIN g/dL 8.2* 8.1* 8.0*   PLATELETS 10*3/mm3 173 154 156     Results from last 7 days   Lab Units 03/31/25  1205 03/30/25  1550 03/27/25  0407 03/26/25  0603 03/25/25  0346   SODIUM mmol/L 135* 136 134* 134* 135*   POTASSIUM mmol/L 4.7 4.8 5.0 5.0 4.6   CHLORIDE mmol/L 102 101 101 101 102   CO2 mmol/L 24.0 23.0 22.0 21.0* 22.0   BUN mg/dL 32* 25* 40* 35* 32*   CREATININE mg/dL 2.07* 1.84* 2.64* 2.27* 2.47*   CALCIUM mg/dL 8.1* 8.9 8.3* 8.1* 8.0*   ALBUMIN g/dL  --   --  3.6 3.3* 3.3*     Results from last 7 days   Lab Units 03/27/25  0407   ALK PHOS U/L 93   BILIRUBIN mg/dL 0.2   ALT (SGPT) U/L 15   AST (SGOT) U/L 13       "             Estimated Creatinine Clearance: 33 mL/min (A) (by C-G formula based on SCr of 2.07 mg/dL (H)).      ssion   US RENAL BILATERAL     Date of Exam: 3/25/2025 9:47 AM EDT     Indication: raghav, r/o hydro.     Comparison: Renal artery duplex ultrasound 3/23/2025     Technique: Grayscale and color Doppler ultrasound evaluation of the kidneys and urinary bladder was performed.        Findings:  Right kidney measures 10.4 x 4.4 x 5.0 cm. Left kidney measures 10.4 x 5.2 x 5.0 cm. Each kidney maintains normal cortical thickness and cortical echotexture. Small left renal cortical cysts are present. Index cyst in the left lower renal pole measures   1.6 cm. No suspicious solid renal lesion on either side. No shadowing stone or hydronephrosis on either side. Urinary bladder has an unremarkable sonographic appearance.        IMPRESSION:  Impression:  No hydronephrosis.  Small left renal cysts.             A/P:      ARF: Creatinine back up to 2.1 overnight from 18 on recheck yesterday..  Urine output nonoliguric.  Creatinine was up to 2.6 when checked several days ago.  Cr 1.6 on this admission with uptrend likely due to hemodynamic variation.  ARB was held.  Recommend IV fluids however patient has been declining care over the past few days..  Renal ultrasound as above.  Will give gentle hydration overnight.  Monitor creatinine.     CKD stage III: Last known stable cr ~ 0.8-1.1mg/dl in 2021.  No more recent labs.  Patient with underlying proteinuria thought due to diabetes.  Patient may have progression of underlying disease.  Continue to monitor for baseline.    Hypertension urgency -. Blood pressure stable. Duplex negative for renal artery stenosis.    -Continue current medications   -monitor for now.    Electrolytes: Sodium and potassium have been fairly stable.  Monitor for now.    Metabolic acidosis: Resolved.  Monitor for now.     Anemia -hemoglobin remains below goal.  Tsat 19%  -Transfuse as indicated for Hgb  <7  -May benefit from LONG if renal function remains low.    Volume: No edema.  Patient lying flat on room air.  Will try gentle hydration overnight.  I's and O's as able.     Medical noncompliance: Patient has been refusing lab draws and IV fluids.  Makes renal evaluation and management difficult.  Patient understands risks.     High risk and complexity patient    Blaze Dawkins MD  03/31/25  14:18 EDT

## 2025-03-31 NOTE — CASE MANAGEMENT/SOCIAL WORK
Discharge Planning Assessment  [facility]     Patient Name: Thomas Perez  MRN: [id number]  Today's Date: 3/31/2025    Admit Date: 3/20/2025    Plan: Home with [facility] for SN, PT and Social Work       Discharge Plan       Row Name 03/31/25 1347       Plan    Plan Home with [facility] for SN, PT and Social Work    Patient/Family in Agreement with Plan yes    Plan Comments Mr. Perez's discharge plan is to return home, when medically ready.  His daughter, Colletta, is agreeable to her Father returning to their home.    [facility] has been arranged for skilled nursing, physical therapy and social work, to see him in the home.  The [facility] has pre packaged packs to assist with medication management.  CM will contact the pharmacy when the patient is discharged.   CM will arrange for [facility] transport to bring Mr. Perez home at discharge.    CM will follow up.    Final Discharge Disposition Code 06 - home with home health care                  Continued Care and Services - Admitted Since 3/20/2025       Home Medical Care       Service Provider Request Status Services Address Phone Fax Patient Preferred    [facility]  Selected Home Rehabilitation, Home Nursing, Home Medical  [address] [place] [address], [place] [address] [phone] --                  Expected Discharge Date and Time       Expected Discharge Date Expected Discharge Time    April 1, 2025             Alessandra Martin RN

## 2025-03-31 NOTE — PLAN OF CARE
Problem: Comorbidity Management  Goal: Blood Glucose Level Within Target Range  Outcome: Progressing  Goal: Blood Pressure in Desired Range  Outcome: Progressing     Problem: Fall Injury Risk  Goal: Absence of Fall and Fall-Related Injury  Outcome: Progressing  Intervention: Promote Injury-Free Environment  Recent Flowsheet Documentation  Taken 3/31/2025 0400 by Curly Sosa RN  Safety Promotion/Fall Prevention: safety round/check completed  Taken 3/31/2025 0200 by Curly Sosa RN  Safety Promotion/Fall Prevention: safety round/check completed  Taken 3/31/2025 0000 by Curly Sosa RN  Safety Promotion/Fall Prevention: safety round/check completed  Taken 3/30/2025 2200 by Curly Sosa RN  Safety Promotion/Fall Prevention: safety round/check completed   Goal Outcome Evaluation:         More agreeable to treatment last night, took some of his medications, and ambulated.VSS, MARIPOSA overnight, plan of care ongoing.

## 2025-03-31 NOTE — CASE MANAGEMENT/SOCIAL WORK
Continued Stay Note  [facility]     Patient Name: Thomas Perez  MRN: [id number]  Today's Date: 3/31/2025    Admit Date: 3/20/2025    Plan: Home with daughter's assistance, and [facility] for SN, PT and Social Work   Discharge Plan       Row Name 03/31/25 0943       Plan    Plan Comments MSW spoke with pt's daughter Colletta. Colletta reports that she thinks pt has been more confused recently but is now doing better and is agreeable to care. Disposition previously planned is pt returns home with daughter and home health. Pt's daughter reports she is agreeable to this plan once pt ready for discharge.                   Discharge Codes    No documentation.                 Expected Discharge Date and Time       Expected Discharge Date Expected Discharge Time    Mar 29, 2025               RENAE Aguilar

## 2025-04-01 LAB
ANION GAP SERPL CALCULATED.3IONS-SCNC: 12 MMOL/L (ref 5–15)
BUN SERPL-MCNC: 35 MG/DL (ref 8–23)
BUN/CREAT SERPL: 16.8 (ref 7–25)
CALCIUM SPEC-SCNC: 8.2 MG/DL (ref 8.6–10.5)
CHLORIDE SERPL-SCNC: 101 MMOL/L (ref 98–107)
CO2 SERPL-SCNC: 21 MMOL/L (ref 22–29)
CREAT SERPL-MCNC: 2.08 MG/DL (ref 0.76–1.27)
EGFRCR SERPLBLD CKD-EPI 2021: 32.8 ML/MIN/1.73
GLUCOSE BLDC GLUCOMTR-MCNC: 233 MG/DL (ref 70–130)
GLUCOSE BLDC GLUCOMTR-MCNC: 310 MG/DL (ref 70–130)
GLUCOSE BLDC GLUCOMTR-MCNC: 333 MG/DL (ref 70–130)
GLUCOSE SERPL-MCNC: 263 MG/DL (ref 65–99)
POTASSIUM SERPL-SCNC: 5.1 MMOL/L (ref 3.5–5.2)
SODIUM SERPL-SCNC: 134 MMOL/L (ref 136–145)

## 2025-04-01 PROCEDURE — 25010000002 HEPARIN (PORCINE) PER 1000 UNITS: Performed by: PHYSICIAN ASSISTANT

## 2025-04-01 PROCEDURE — 63710000001 INSULIN GLARGINE PER 5 UNITS: Performed by: INTERNAL MEDICINE

## 2025-04-01 PROCEDURE — 82948 REAGENT STRIP/BLOOD GLUCOSE: CPT

## 2025-04-01 PROCEDURE — 99232 SBSQ HOSP IP/OBS MODERATE 35: CPT | Performed by: NURSE PRACTITIONER

## 2025-04-01 PROCEDURE — 80048 BASIC METABOLIC PNL TOTAL CA: CPT | Performed by: INTERNAL MEDICINE

## 2025-04-01 PROCEDURE — 63710000001 INSULIN LISPRO (HUMAN) PER 5 UNITS: Performed by: PHYSICIAN ASSISTANT

## 2025-04-01 RX ORDER — ASPIRIN 81 MG/1
81 TABLET, CHEWABLE ORAL DAILY
Qty: 30 TABLET | Refills: 0 | Status: SHIPPED | OUTPATIENT
Start: 2025-04-01 | End: 2025-05-01

## 2025-04-01 RX ORDER — AMLODIPINE BESYLATE 10 MG/1
10 TABLET ORAL
Qty: 30 TABLET | Refills: 0 | Status: SHIPPED | OUTPATIENT
Start: 2025-04-01 | End: 2025-05-01

## 2025-04-01 RX ORDER — NITROGLYCERIN 0.4 MG/1
0.4 TABLET SUBLINGUAL
Qty: 25 TABLET | Refills: 1 | Status: SHIPPED | OUTPATIENT
Start: 2025-04-01

## 2025-04-01 RX ORDER — NICOTINE 21 MG/24HR
1 PATCH, TRANSDERMAL 24 HOURS TRANSDERMAL
Qty: 30 PATCH | Refills: 0 | Status: SHIPPED | OUTPATIENT
Start: 2025-04-01

## 2025-04-01 RX ORDER — ISOSORBIDE DINITRATE AND HYDRALAZINE HYDROCHLORIDE 37.5; 2 MG/1; MG/1
1 TABLET ORAL 3 TIMES DAILY
Qty: 90 TABLET | Refills: 0 | Status: SHIPPED | OUTPATIENT
Start: 2025-04-01 | End: 2025-04-02 | Stop reason: HOSPADM

## 2025-04-01 RX ORDER — PANTOPRAZOLE SODIUM 40 MG/1
40 TABLET, DELAYED RELEASE ORAL
Qty: 30 TABLET | Refills: 0 | Status: SHIPPED | OUTPATIENT
Start: 2025-04-02

## 2025-04-01 RX ORDER — ATORVASTATIN CALCIUM 40 MG/1
40 TABLET, FILM COATED ORAL NIGHTLY
Qty: 30 TABLET | Refills: 0 | Status: SHIPPED | OUTPATIENT
Start: 2025-04-01 | End: 2025-05-01

## 2025-04-01 RX ORDER — INSULIN GLARGINE 100 [IU]/ML
10 INJECTION, SOLUTION SUBCUTANEOUS NIGHTLY
Start: 2025-04-01

## 2025-04-01 RX ORDER — FERROUS SULFATE 325(65) MG
325 TABLET ORAL
Qty: 30 TABLET | Refills: 0 | Status: SHIPPED | OUTPATIENT
Start: 2025-04-02 | End: 2025-05-02

## 2025-04-01 RX ORDER — CARVEDILOL 25 MG/1
25 TABLET ORAL 2 TIMES DAILY WITH MEALS
Qty: 60 TABLET | Refills: 0 | Status: SHIPPED | OUTPATIENT
Start: 2025-04-01 | End: 2025-05-01

## 2025-04-01 RX ADMIN — HEPARIN SODIUM 5000 UNITS: 5000 INJECTION INTRAVENOUS; SUBCUTANEOUS at 20:52

## 2025-04-01 RX ADMIN — ATORVASTATIN CALCIUM 40 MG: 40 TABLET, FILM COATED ORAL at 20:53

## 2025-04-01 RX ADMIN — INSULIN GLARGINE 10 UNITS: 100 INJECTION, SOLUTION SUBCUTANEOUS at 20:52

## 2025-04-01 RX ADMIN — SENNOSIDES AND DOCUSATE SODIUM 2 TABLET: 50; 8.6 TABLET ORAL at 20:52

## 2025-04-01 RX ADMIN — AMLODIPINE BESYLATE 10 MG: 10 TABLET ORAL at 09:17

## 2025-04-01 RX ADMIN — INSULIN LISPRO 5 UNITS: 100 INJECTION, SOLUTION INTRAVENOUS; SUBCUTANEOUS at 20:51

## 2025-04-01 RX ADMIN — FERROUS SULFATE TAB 325 MG (65 MG ELEMENTAL FE) 325 MG: 325 (65 FE) TAB at 09:17

## 2025-04-01 RX ADMIN — PANTOPRAZOLE SODIUM 40 MG: 40 TABLET, DELAYED RELEASE ORAL at 05:45

## 2025-04-01 RX ADMIN — ASPIRIN 81 MG: 81 TABLET, COATED ORAL at 09:17

## 2025-04-01 RX ADMIN — INSULIN LISPRO 3 UNITS: 100 INJECTION, SOLUTION INTRAVENOUS; SUBCUTANEOUS at 17:16

## 2025-04-01 RX ADMIN — Medication 10 ML: at 20:52

## 2025-04-01 RX ADMIN — HEPARIN SODIUM 5000 UNITS: 5000 INJECTION INTRAVENOUS; SUBCUTANEOUS at 09:32

## 2025-04-01 RX ADMIN — OXYCODONE AND ACETAMINOPHEN 1 TABLET: 7.5; 325 TABLET ORAL at 09:31

## 2025-04-01 RX ADMIN — OXYCODONE AND ACETAMINOPHEN 1 TABLET: 7.5; 325 TABLET ORAL at 19:41

## 2025-04-01 RX ADMIN — CARVEDILOL 25 MG: 12.5 TABLET, FILM COATED ORAL at 09:17

## 2025-04-01 RX ADMIN — ISOSORBIDE DINITRATE: 20 TABLET ORAL at 05:45

## 2025-04-01 RX ADMIN — CARVEDILOL 25 MG: 12.5 TABLET, FILM COATED ORAL at 17:16

## 2025-04-01 RX ADMIN — ISOSORBIDE DINITRATE: 20 TABLET ORAL at 20:53

## 2025-04-01 RX ADMIN — INSULIN LISPRO 5 UNITS: 100 INJECTION, SOLUTION INTRAVENOUS; SUBCUTANEOUS at 11:18

## 2025-04-01 NOTE — PROGRESS NOTES
"   LOS: 10 days    Patient Care Team:  Brock Randolph PA as PCP - General (Physician Assistant)    Subjective     Stable renal function.     Objective     Vital Signs:  Blood pressure 136/62, pulse 58, temperature 97.7 °F (36.5 °C), temperature source Oral, resp. rate 16, height 180.3 cm (70.98\"), weight 73.3 kg (161 lb 8 oz), SpO2 98%.      Intake/Output Summary (Last 24 hours) at 4/1/2025 1619  Last data filed at 4/1/2025 0324  Gross per 24 hour   Intake 118 ml   Output 400 ml   Net -282 ml        03/31 0701 - 04/01 0700  In: 476 [P.O.:476]  Out: 875 [Urine:875]    Physical Exam:    Exam limited due to patient factor    GENERAL: WD WM NAD  NEURO: Awake and alert.  Appears oriented.  PSYCHIATRIC: Agitated.  Refusing to participate in discussion or physical exam.  EYE: Open  ENT: Unable to examine  NECK: Unable to examine  CV: Unable to examine  LUNGS:  Quiet,  Nonlabored resp lying flat on room air.    ABDOMEN: Does not appear distended  : No Tabor  SKIN: Unable to examine      Labs:  Results from last 7 days   Lab Units 03/27/25  0407 03/26/25  0603   WBC 10*3/mm3 8.46 7.11   HEMOGLOBIN g/dL 8.2* 8.1*   PLATELETS 10*3/mm3 173 154     Results from last 7 days   Lab Units 04/01/25  1141 03/31/25  1205 03/30/25  1550 03/27/25  0407 03/26/25  0603   SODIUM mmol/L 134* 135* 136 134* 134*   POTASSIUM mmol/L 5.1 4.7 4.8 5.0 5.0   CHLORIDE mmol/L 101 102 101 101 101   CO2 mmol/L 21.0* 24.0 23.0 22.0 21.0*   BUN mg/dL 35* 32* 25* 40* 35*   CREATININE mg/dL 2.08* 2.07* 1.84* 2.64* 2.27*   CALCIUM mg/dL 8.2* 8.1* 8.9 8.3* 8.1*   ALBUMIN g/dL  --   --   --  3.6 3.3*     Results from last 7 days   Lab Units 03/27/25  0407   ALK PHOS U/L 93   BILIRUBIN mg/dL 0.2   ALT (SGPT) U/L 15   AST (SGOT) U/L 13                   Estimated Creatinine Clearance: 32.3 mL/min (A) (by C-G formula based on SCr of 2.08 mg/dL (H)).      ssion   US RENAL BILATERAL     Date of Exam: 3/25/2025 9:47 AM EDT     Indication: raghav, r/o hydro.   "   Comparison: Renal artery duplex ultrasound 3/23/2025     Technique: Grayscale and color Doppler ultrasound evaluation of the kidneys and urinary bladder was performed.        Findings:  Right kidney measures 10.4 x 4.4 x 5.0 cm. Left kidney measures 10.4 x 5.2 x 5.0 cm. Each kidney maintains normal cortical thickness and cortical echotexture. Small left renal cortical cysts are present. Index cyst in the left lower renal pole measures   1.6 cm. No suspicious solid renal lesion on either side. No shadowing stone or hydronephrosis on either side. Urinary bladder has an unremarkable sonographic appearance.        IMPRESSION:  Impression:  No hydronephrosis.  Small left renal cysts.             A/P:      ARF: Stable renal function. Received IV fluids. Cr ~ 2.0. Suspect hemodynamic variation in renal function.     CKD stage III: Last known stable cr ~ 0.8-1.1mg/dl in 2021.  No more recent labs.  Patient with underlying proteinuria thought due to diabetes.  Patient may have progression of underlying disease.  Continue to monitor for baseline.    Hypertension urgency -. Blood pressure stable. Duplex negative for renal artery stenosis.    -Continue current medications   -monitor for now.    Electrolytes: Sodium and potassium have been fairly stable.  Monitor for now.    Metabolic acidosis: Resolved.  Monitor for now.     Anemia -hemoglobin remains below goal.  Tsat 19%  -Transfuse as indicated for Hgb <7  -May benefit from LONG if renal function remains low.    Volume: No edema.  Patient lying flat on room air.  Will try gentle hydration overnight.  I's and O's as able.     Medical noncompliance: Patient has been refusing lab draws and IV fluids.  Makes renal evaluation and management difficult.  Patient understands risks.     High risk and complexity patient    Lavell Ross MD  04/01/25  16:19 EDT

## 2025-04-01 NOTE — PROGRESS NOTES
[facility]  PROGRESS NOTE    Patient Name: Thomas Perez  : 1950  MRN: [id number]    Date of Admission: 3/20/2025  Primary Care Physician: Brock Randolph PA    Subjective   Subjective     CC:  Hypertension, MAGUI    HPI:  Resting in bed in the dark. States no pain. No new needs. Very eager to go home.       Objective   Objective     Vital Signs:   Temp:  [97.7 °F (36.5 °C)-98 °F (36.7 °C)] 97.7 °F (36.5 °C)  Heart Rate:  [58] 58  BP: (125-137)/(43-62) 136/62     Physical Exam:  Constitutional: No acute distress, awake, alert  HENT: NCAT, mucous membranes moist  Respiratory: Clear to auscultation bilaterally, respiratory effort normal   Cardiovascular: RRR  Gastrointestinal: Positive bowel sounds, soft, nontender, nondistended  Psychiatric: Appropriate affect, cooperative  Neurologic: Alert, oriented, symmetric facies, moves all extremities, LLE weakness, speech clear  Skin: No rashes      Results Reviewed:  LAB RESULTS:      Lab 25  0407 25  0603 25  1422   WBC 8.46 7.11  --    HEMOGLOBIN 8.2* 8.1*  --    HEMATOCRIT 25.8* 25.0*  --    PLATELETS 173 154  --    MCV 92.8 91.6  --    LDH  --   --  241*         Lab 25  1141 25  1205 25  1550 25  0407 25  0603   SODIUM 134* 135* 136 134* 134*   POTASSIUM 5.1 4.7 4.8 5.0 5.0   CHLORIDE 101 102 101 101 101   CO2 21.0* 24.0 23.0 22.0 21.0*   ANION GAP 12.0 9.0 12.0 11.0 12.0   BUN 35* 32* 25* 40* 35*   CREATININE 2.08* 2.07* 1.84* 2.64* 2.27*   EGFR 32.8* 33.2* 38.2* 24.8* 29.7*   GLUCOSE 263* 327* 172* 151* 214*   CALCIUM 8.2* 8.1* 8.9 8.3* 8.1*         Lab 25  0407 25  0603   TOTAL PROTEIN 6.5 6.0   ALBUMIN 3.6 3.3*   GLOBULIN 2.9 2.7   ALT (SGPT) 15 14   AST (SGOT) 13 13   BILIRUBIN 0.2 0.2   ALK PHOS 93 102                           Brief Urine Lab Results  (Last result in the past 365 days)        Color   Clarity   Blood   Leuk Est   Nitrite   Protein    CREAT   Urine HCG        03/26/25 1339             128.7                 Microbiology Results Abnormal       None            No radiology results from the last 24 hrs    Results for orders placed during the hospital encounter of 03/20/25    Adult Transthoracic Echo Complete w/ Color, Spectral and Contrast if necessary per protocol    Interpretation Summary    Left ventricular systolic function is normal. Calculated left ventricular EF = 62.7% Left ventricular ejection fraction appears to be 61 - 65%.    Normal left atrial size and volume noted.      Current medications:  Scheduled Meds:amLODIPine, 10 mg, Oral, Q24H  aspirin, 81 mg, Oral, Daily  atorvastatin, 40 mg, Oral, Nightly  carvedilol, 25 mg, Oral, BID With Meals  ferrous sulfate, 325 mg, Oral, Daily With Breakfast  heparin (porcine), 5,000 Units, Subcutaneous, Q12H  insulin glargine, 10 Units, Subcutaneous, Nightly  insulin lispro, 2-7 Units, Subcutaneous, 4x Daily AC & at Bedtime  isosorbide dinitrate (ISORDIL) 20 mg, hydrALAZINE (APRESOLINE) 37.5 mg for BIDIL 20-37.5, , Oral, Q8H  Lidocaine, 1 patch, Transdermal, Q24H  nicotine, 1 patch, Transdermal, Q24H  pantoprazole, 40 mg, Oral, Q AM  senna-docusate sodium, 2 tablet, Oral, BID  sodium chloride, 500 mL, Intravenous, Once  sodium chloride, 10 mL, Intravenous, Q12H  [Held by provider] valsartan, 160 mg, Oral, Q24H      Continuous Infusions:lactated ringers, 75 mL/hr, Last Rate: 75 mL/hr (03/31/25 4863)      PRN Meds:.  acetaminophen **OR** acetaminophen **OR** acetaminophen    senna-docusate sodium **AND** polyethylene glycol **AND** bisacodyl **AND** bisacodyl    calcium carbonate    dextrose    dextrose    gabapentin    glucagon (human recombinant)    melatonin    nitroglycerin    ondansetron    oxyCODONE-acetaminophen    sodium chloride    [COMPLETED] Insert Peripheral IV **AND** sodium chloride    sodium chloride    sodium chloride    Assessment & Plan   Assessment & Plan     Active Hospital Problems     Diagnosis  POA    **Hypertensive emergency [I16.1]  Yes    Type 2 myocardial infarction [I21.A1]  Yes    Abnormal nuclear stress test [R94.39]  Yes    Moderate protein-calorie malnutrition [E44.0]  Yes    Likely type II NSTEMI from hypertensive emergency [I21.4]  Yes    Type 2 diabetes mellitus with hyperglycemia, with long-term current use of insulin [E11.65, Z79.4]  Not Applicable    Tobacco abuse [Z72.0]  Yes    Coronary artery disease involving native coronary artery of native heart with angina pectoris [I25.119]  Yes    Anemia [D64.9]  Yes    CKD (chronic kidney disease) stage 3, GFR 30-59 ml/min [N18.30]  Yes    PAD (peripheral artery disease) [I73.9]  Yes    Medical non-compliance [Z91.199]  Not Applicable    COPD (chronic obstructive pulmonary disease) [J44.9]  Yes    Bilateral lower extremity edema [R60.0]  Yes      Resolved Hospital Problems    Diagnosis Date Resolved POA    Atypical angina [I20.89] 03/24/2025 Yes    Heart failure with preserved left ventricular function (HFpEF) [I50.30] 03/22/2025 Unknown    Hypertensive urgency [I16.0] 03/22/2025 Yes        Brief Hospital Course to date:  Thomas Perez is a 75 y.o. male with a PMHx of HTN, HLD, CAD status post RCA stent in 2010, PAD status post embolectomy and fasciotomy, COPD, poorly controlled DM2, CKD, medical noncompliance and chronic pain who presented due to elevated blood pressure found incidentally by home health nurse.  Cardiology consulted for elevated troponins, planning on outpatient follow-up for consideration of cardiac catheterization at that time.  Patient also found to have MAGUI, nephrology following.     This patient's problems and plans were partially entered by my partner and updated as appropriate by me 04/01/25.       Hypertensive emergency- resolved   Acute on chronic chest pain  NSTEMI II  -Cardiology followed, no acute ACS. Plan for outpatient follow-up in 2 to 3 weeks to continue discussion of possible outpatient cardiac  catheterization at that time.  -Continue aspirin, statin, carvedilol, BiDil, amlodipine (though patient refusing)  -Deferring ARB and SGLT2 to nephrology  -continue holding valsartan for now     MAGUI on CKD  -baseline Cr around 0.9-1.1 in 2021 but no labs since then. SCr has worsened from 1.6 on arrival to 2.64 ---> improved to 1.84 on 3/30 --> 2 on 3/31  -Renal ultrasound without hydronephrosis, small left renal cyst  -Renal artery duplex without evidence of hemodynamically significant renal artery stenosis  -Nephrology following  -IVF trial     Normocytic Anemia   -Possible dilutional component as patient had drop in all cell lines after IV fluids  -no signs of bleeding   -iron studies most consistent with anemia of chronic disease, likely CKD  -iron supplementation     BLE edema   -LE Dopplers negative   -ECHO unremarkable  -stable      CAD s/p stent  Chronic chest pain  HLD  PAD s/p embolectomy and fasciotomy   -daughter reports he is supposed to be on ASA but has been refusing all meds for some time, will restart   -Continue aspirin, statin, carvedilol, BiDil, amlodipine.     Insulin dependent T2DM  -lantus 20, lispro 7 TID at home  -currently on SSI, lantus 10 units      COPD  Ongoing tobacco use  -not in acute exacerbation   -NRT     Medication noncompliance  -Patient unsure what medications he takes. Patient cannot read/write  -Colleague spoke to his daughter who is also uncertain of exactly what he takes. He reports she helps manage his meds.  -PT re-consulted for discharge planning = home with assist    Refusal of care  -Currently pt amenable to labs     **Our pharmacist has arrange for him to get prepackaged pill packs from his pharmacy after discharge.  Medication should go through [facility] pharmacy at d/c as ours does not have prepackaged medications like this.    Expected Discharge Location and Transportation: Home soon when okay with NAL   Expected Discharge   Expected  Discharge Date: 4/2/2025; Expected Discharge Time:      VTE Prophylaxis:  Pharmacologic VTE prophylaxis orders are present.         AM-PAC 6 Clicks Score (PT): 24 (04/01/25 1477)    CODE STATUS:   Code Status and Medical Interventions: CPR (Attempt to Resuscitate); Full Support   Ordered at: 03/20/25 8482     Code Status (Patient has no pulse and is not breathing):    CPR (Attempt to Resuscitate)     Medical Interventions (Patient has pulse or is breathing):    Full Support     Level Of Support Discussed With:    Patient       Macy Crawley, TYLER  04/01/25

## 2025-04-01 NOTE — PLAN OF CARE
Problem: Comorbidity Management  Goal: Blood Glucose Level Within Target Range  Outcome: Progressing  Intervention: Monitor and Manage Glycemia  Recent Flowsheet Documentation  Taken 4/1/2025 1600 by Tete Allen RN  Medication Review/Management: medications reviewed  Taken 4/1/2025 1400 by Tete Allen RN  Medication Review/Management: medications reviewed  Taken 4/1/2025 1200 by Tete Allen RN  Medication Review/Management: medications reviewed  Taken 4/1/2025 1000 by Tete Allen RN  Medication Review/Management: medications reviewed  Taken 4/1/2025 0931 by Tete Allen RN  Medication Review/Management: medications reviewed  Goal: Blood Pressure in Desired Range  Outcome: Progressing  Intervention: Maintain Blood Pressure Management  Recent Flowsheet Documentation  Taken 4/1/2025 1600 by Tete lAlen RN  Medication Review/Management: medications reviewed  Taken 4/1/2025 1400 by Tete Allen RN  Medication Review/Management: medications reviewed  Taken 4/1/2025 1200 by Tete Allen RN  Medication Review/Management: medications reviewed  Taken 4/1/2025 1000 by Tete Allen RN  Medication Review/Management: medications reviewed  Taken 4/1/2025 0931 by Tete Allen RN  Medication Review/Management: medications reviewed  Goal: Blood Pressure in Desired Range  Outcome: Progressing  Intervention: Maintain Blood Pressure Management  Recent Flowsheet Documentation  Taken 4/1/2025 1600 by Tete Allen RN  Medication Review/Management: medications reviewed  Taken 4/1/2025 1400 by Tete Allen RN  Medication Review/Management: medications reviewed  Taken 4/1/2025 1200 by Tete Allen RN  Medication Review/Management: medications reviewed  Taken 4/1/2025 1000 by Tete Allen RN  Medication Review/Management: medications reviewed  Taken 4/1/2025 0931 by Tete Allen RN  Medication Review/Management: medications reviewed     Problem: Fall Injury Risk  Goal: Absence of Fall and  Fall-Related Injury  Outcome: Progressing  Intervention: Identify and Manage Contributors  Recent Flowsheet Documentation  Taken 4/1/2025 1600 by Tete Allen RN  Medication Review/Management: medications reviewed  Taken 4/1/2025 1400 by Tete Allen RN  Medication Review/Management: medications reviewed  Taken 4/1/2025 1200 by Tete Allen RN  Medication Review/Management: medications reviewed  Taken 4/1/2025 1000 by Tete Allen RN  Medication Review/Management: medications reviewed  Taken 4/1/2025 0931 by Tete Allen RN  Medication Review/Management: medications reviewed  Intervention: Promote Injury-Free Environment  Recent Flowsheet Documentation  Taken 4/1/2025 1600 by Tete Allen RN  Safety Promotion/Fall Prevention:   activity supervised   assistive device/personal items within reach   clutter free environment maintained   safety round/check completed   room organization consistent   toileting scheduled  Taken 4/1/2025 1400 by Tete Allen RN  Safety Promotion/Fall Prevention:   activity supervised   assistive device/personal items within reach   clutter free environment maintained   room organization consistent   safety round/check completed   toileting scheduled  Taken 4/1/2025 1200 by Tete Allen RN  Safety Promotion/Fall Prevention:   activity supervised   assistive device/personal items within reach   clutter free environment maintained   room organization consistent   safety round/check completed   toileting scheduled  Taken 4/1/2025 1000 by Tete Allen RN  Safety Promotion/Fall Prevention:   activity supervised   clutter free environment maintained   assistive device/personal items within reach   room organization consistent   safety round/check completed   toileting scheduled  Taken 4/1/2025 0931 by Tete Allen RN  Safety Promotion/Fall Prevention:   activity supervised   assistive device/personal items within reach   clutter free environment maintained   room  organization consistent   safety round/check completed   toileting scheduled   Goal Outcome Evaluation:            Pt alert and oriented x4. VSS when taken. RA. Blood sugar covered with insulin this afternoon and evening. Pt ready to leave hospital and return home. [place]. Plan to d/c home tomorrow

## 2025-04-02 ENCOUNTER — READMISSION MANAGEMENT (OUTPATIENT)
Dept: CALL CENTER | Facility: HOSPITAL | Age: 75
End: 2025-04-02
Payer: MEDICARE

## 2025-04-02 VITALS
DIASTOLIC BLOOD PRESSURE: 64 MMHG | SYSTOLIC BLOOD PRESSURE: 147 MMHG | RESPIRATION RATE: 16 BRPM | OXYGEN SATURATION: 98 % | BODY MASS INDEX: 23.4 KG/M2 | HEIGHT: 71 IN | WEIGHT: 167.1 LBS | TEMPERATURE: 97.7 F | HEART RATE: 58 BPM

## 2025-04-02 LAB
ANION GAP SERPL CALCULATED.3IONS-SCNC: 13 MMOL/L (ref 5–15)
BUN SERPL-MCNC: 34 MG/DL (ref 8–23)
BUN/CREAT SERPL: 15.8 (ref 7–25)
CALCIUM SPEC-SCNC: 8.4 MG/DL (ref 8.6–10.5)
CHLORIDE SERPL-SCNC: 102 MMOL/L (ref 98–107)
CO2 SERPL-SCNC: 20 MMOL/L (ref 22–29)
CREAT SERPL-MCNC: 2.15 MG/DL (ref 0.76–1.27)
EGFRCR SERPLBLD CKD-EPI 2021: 31.5 ML/MIN/1.73
GLUCOSE BLDC GLUCOMTR-MCNC: 188 MG/DL (ref 70–130)
GLUCOSE SERPL-MCNC: 158 MG/DL (ref 65–99)
POTASSIUM SERPL-SCNC: 4.9 MMOL/L (ref 3.5–5.2)
SODIUM SERPL-SCNC: 135 MMOL/L (ref 136–145)

## 2025-04-02 PROCEDURE — 80048 BASIC METABOLIC PNL TOTAL CA: CPT | Performed by: INTERNAL MEDICINE

## 2025-04-02 PROCEDURE — 99239 HOSP IP/OBS DSCHRG MGMT >30: CPT | Performed by: NURSE PRACTITIONER

## 2025-04-02 PROCEDURE — 82948 REAGENT STRIP/BLOOD GLUCOSE: CPT

## 2025-04-02 PROCEDURE — 63710000001 ONDANSETRON ODT 4 MG TABLET DISPERSIBLE: Performed by: NURSE PRACTITIONER

## 2025-04-02 PROCEDURE — 25010000002 ONDANSETRON PER 1 MG: Performed by: PHYSICIAN ASSISTANT

## 2025-04-02 PROCEDURE — 63710000001 INSULIN LISPRO (HUMAN) PER 5 UNITS: Performed by: PHYSICIAN ASSISTANT

## 2025-04-02 PROCEDURE — 25010000002 HEPARIN (PORCINE) PER 1000 UNITS: Performed by: PHYSICIAN ASSISTANT

## 2025-04-02 RX ORDER — ISOSORBIDE MONONITRATE 20 MG/1
20 TABLET ORAL 3 TIMES DAILY
Qty: 90 TABLET | Refills: 0 | Status: SHIPPED | OUTPATIENT
Start: 2025-04-02

## 2025-04-02 RX ORDER — HYDRALAZINE HYDROCHLORIDE 25 MG/1
37.5 TABLET, FILM COATED ORAL 3 TIMES DAILY
Qty: 135 TABLET | Refills: 0 | Status: SHIPPED | OUTPATIENT
Start: 2025-04-02

## 2025-04-02 RX ORDER — ONDANSETRON 4 MG/1
4 TABLET, ORALLY DISINTEGRATING ORAL EVERY 6 HOURS PRN
Status: DISCONTINUED | OUTPATIENT
Start: 2025-04-02 | End: 2025-04-02 | Stop reason: HOSPADM

## 2025-04-02 RX ADMIN — AMLODIPINE BESYLATE 10 MG: 10 TABLET ORAL at 07:48

## 2025-04-02 RX ADMIN — ASPIRIN 81 MG: 81 TABLET, COATED ORAL at 07:48

## 2025-04-02 RX ADMIN — FERROUS SULFATE TAB 325 MG (65 MG ELEMENTAL FE) 325 MG: 325 (65 FE) TAB at 07:48

## 2025-04-02 RX ADMIN — INSULIN LISPRO 2 UNITS: 100 INJECTION, SOLUTION INTRAVENOUS; SUBCUTANEOUS at 07:48

## 2025-04-02 RX ADMIN — ISOSORBIDE DINITRATE: 20 TABLET ORAL at 05:23

## 2025-04-02 RX ADMIN — PANTOPRAZOLE SODIUM 40 MG: 40 TABLET, DELAYED RELEASE ORAL at 05:33

## 2025-04-02 RX ADMIN — ONDANSETRON 4 MG: 4 TABLET, ORALLY DISINTEGRATING ORAL at 07:48

## 2025-04-02 RX ADMIN — CALCIUM CARBONATE (ANTACID) CHEW TAB 500 MG 2 TABLET: 500 CHEW TAB at 06:40

## 2025-04-02 RX ADMIN — HEPARIN SODIUM 5000 UNITS: 5000 INJECTION INTRAVENOUS; SUBCUTANEOUS at 07:48

## 2025-04-02 RX ADMIN — CARVEDILOL 25 MG: 12.5 TABLET, FILM COATED ORAL at 07:48

## 2025-04-02 NOTE — CASE MANAGEMENT/SOCIAL WORK
Continued Stay Note  [facility]     Patient Name: Thomas Perez  MRN: [id number]  Today's Date: 4/2/2025    Admit Date: 3/20/2025    Plan: Home with [facility] for SN, PT & SW   Discharge Plan       Row Name 04/02/25 0836       Plan    Plan Home with [facility] for SN, PT & SW    Patient/Family in Agreement with Plan yes    Plan Comments Home address is [address] [place] [id number] per daughter, Colletta. [employer] notified. Facesheet is incorrect - notified registration.    Final Discharge Disposition Code 06 - home with home health care                                                     Discharge Codes    No documentation.                 Expected Discharge Date and Time       Expected Discharge Date Expected Discharge Time    Apr 3, 2025               Michelle Michaud RN

## 2025-04-02 NOTE — CASE MANAGEMENT/SOCIAL WORK
Continued Stay Note  [facility]     Patient Name: Thomas Perez  MRN: [id number]  Today's Date: 4/2/2025    Admit Date: 3/20/2025    Plan: Home with [facility] for SN, PT & SW   Discharge Plan       Row Name 04/02/25 1153       Plan    Plan Comments MSW notified by CM that pt's daughter reported issues with med costs and coverage at pharmacy. MSW called pt's pharmacy and spoke with pharmacist. Pharmacist reports meds so far are $8.94 and they have resoruces that can help cover that cost for pt as pt's daughter reports she has no money to pay for any meds. Pharmacist reports there was an issue wtih the coverage however for the isosorbide-hydralazine. MSW updated APRN on this. APRN called pharmacy and resent in the prescription separately and then it was covered with a small copay. Pt's total med cost was $22 and pt's pharmacy used their indigent fund available to cover this cost and they will deliver meds to pt at his home.                   Discharge Codes    No documentation.                 Expected Discharge Date and Time       Expected Discharge Date Expected Discharge Time    Apr 3, 2025               RENAE Aguilar

## 2025-04-02 NOTE — DISCHARGE SUMMARY
[facility] Medicine Services  DISCHARGE SUMMARY    Patient Name: Thomas Perez  : 1950  MRN: [id number]    Date of Admission: 3/20/2025  4:09 PM  Date of Discharge:  2025  Primary Care Physician: Brock Randolph PA    Consults       Date and Time Order Name Status Description    3/25/2025  9:14 AM Inpatient Nephrology Consult Completed     3/22/2025  7:53 AM Inpatient Cardiology Consult Completed             Hospital Course       Active Hospital Problems    Diagnosis  POA    **Hypertensive emergency [I16.1]  Yes    Type 2 myocardial infarction [I21.A1]  Yes    Abnormal nuclear stress test [R94.39]  Yes    Moderate protein-calorie malnutrition [E44.0]  Yes    Likely type II NSTEMI from hypertensive emergency [I21.4]  Yes    Type 2 diabetes mellitus with hyperglycemia, with long-term current use of insulin [E11.65, Z79.4]  Not Applicable    Tobacco abuse [Z72.0]  Yes    Coronary artery disease involving native coronary artery of native heart with angina pectoris [I25.119]  Yes    Anemia [D64.9]  Yes    CKD (chronic kidney disease) stage 3, GFR 30-59 ml/min [N18.30]  Yes    PAD (peripheral artery disease) [I73.9]  Yes    Medical non-compliance [Z91.199]  Not Applicable    COPD (chronic obstructive pulmonary disease) [J44.9]  Yes    Bilateral lower extremity edema [R60.0]  Yes      Resolved Hospital Problems    Diagnosis Date Resolved POA    Atypical angina [I20.89] 2025 Yes    Heart failure with preserved left ventricular function (HFpEF) [I50.30] 2025 Unknown    Hypertensive urgency [I16.0] 2025 Yes          Hospital Course:  Thomas Perez is a 75 y.o. male   with a PMHx of HTN, HLD, CAD status post RCA stent in , PAD status post embolectomy and fasciotomy, COPD, poorly controlled DM2, CKD, medical noncompliance and chronic pain who presented due to elevated blood pressure found incidentally by home health nurse.  Cardiology consulted for elevated  troponins, planning on outpatient follow-up for consideration of cardiac catheterization at that time.  Patient also found to have MAGUI, nephrology following. Patient did initially refuse specific medications/ IV lab sticks but has been very cooperative and compliant the last few days prior to DC.         Hypertensive emergency- resolved   Acute on chronic chest pain  NSTEMI II  -Cardiology followed, no acute ACS. No heart cath this stay due to MAGUI. Plan for outpatient follow-up in 2 to 3 weeks to continue discussion of possible outpatient cardiac catheterization at that time.  -Continue aspirin, statin, carvedilol, BiDil, amlodipine   -Deferring ARB and SGLT2 to nephrology  -continue holding valsartan for now     MAGUI on CKD  -baseline Cr around 0.9-1.1 in 2021 but no labs since then. SCr has worsened from 1.6 on arrival to 2.64 ---> improved s/p IVF and medication adjustments   -Renal ultrasound without hydronephrosis, small left renal cyst  -Renal artery duplex without evidence of hemodynamically significant renal artery stenosis  -Nephrology following and will see outpatient after DC        Normocytic Anemia   -Possible dilutional component  -no signs of bleeding   -iron studies most consistent with anemia of chronic disease, likely CKD  -iron supplementation     BLE edema   -LE Dopplers negative   -ECHO unremarkable  -stable      CAD s/p stent  Chronic chest pain  HLD  PAD s/p embolectomy and fasciotomy   -daughter reports he is supposed to be on ASA but has been refusing all meds for some time, restarted and stable   -Continue aspirin, statin, carvedilol, BiDil, amlodipine.  --BiDil was not affordable at DC; individ medications sent in     Insulin dependent T2DM  -lantus 20, lispro 7 TID at home  -doses adjusted for DC home; follow up with PCP closely      COPD  Ongoing tobacco use  -not in acute exacerbation   -NRT     Medication noncompliance  -Patient unsure what medications he takes. Patient cannot  read/write  -Colleague spoke to his daughter who is also uncertain of exactly what he takes. He reports she helps manage his meds.  -PT re-consulted for discharge planning = home with assist     **Our pharmacist has arrange for him to get prepackaged pill packs from his pharmacy after discharge.  Medication should go through [facility] pharmacy at d/c as ours does not have prepackaged medications like this.    Discharge Follow Up Recommendations for outpatient labs/diagnostics:   PCP 1 week   Dr Ross 2 weeks  Ashley Cooper NP, Cardiology 2-3 weeks to discuss potential heart cath outpatient     Day of Discharge     HPI:   Feels well, very eager to go home. Asking when his ride will be here. I spoke to patients daughter on his cell phone in room, discussed POC for DC home with follow up with Cards in 2-3 weeks. Dtr educated that patient did not have heart cath this stay due to MAGUI, and explained why. Dtr working with CM to get medications at pharmacy more affordable. No f/c, n/v/d, soa or cp.       Vital Signs:      Vitals:    04/02/25 0705   BP: 147/64   Pulse:    Resp: 16   Temp:    SpO2:            Physical Exam:  Constitutional: No acute distress, awake, alert walking in room with walker   HENT: NCAT, mucous membranes moist  Respiratory: Clear to auscultation bilaterally, respiratory effort normal   Cardiovascular: RRR  Gastrointestinal: Positive bowel sounds, soft, nontender, nondistended  Psychiatric: Appropriate affect, cooperative  Neurologic: Alert, oriented, symmetric facies, moves all extremities, chronic LLE weakness, speech clear  Skin: No rashes    Pertinent  and/or Most Recent Results     LAB RESULTS:            Lab 04/02/25  0408 04/01/25  1141 03/31/25  1205 03/30/25  1550   SODIUM 135* 134* 135* 136   POTASSIUM 4.9 5.1 4.7 4.8   CHLORIDE 102 101 102 101   CO2 20.0* 21.0* 24.0 23.0   ANION GAP 13.0 12.0 9.0 12.0   BUN 34* 35* 32* 25*   CREATININE 2.15* 2.08* 2.07* 1.84*   EGFR  31.5* 32.8* 33.2* 38.2*   GLUCOSE 158* 263* 327* 172*   CALCIUM 8.4* 8.2* 8.1* 8.9                           Brief Urine Lab Results  (Last result in the past 365 days)        Color   Clarity   Blood   Leuk Est   Nitrite   Protein   CREAT   Urine HCG        03/26/25 1339             128.7               Microbiology Results (last 10 days)       Procedure Component Value - Date/Time    Eosinophil Smear - Urine, Urine, Clean Catch [[id number]]  (Normal) Collected: 03/25/25 1403    Lab Status: Final result Specimen: Urine, Clean Catch Updated: 03/25/25 1838     Eosinophil Smear 0 % EOS/100 Cells     Narrative:      No eosinophil seen            US Renal Bilateral  Result Date: 3/25/2025  US RENAL BILATERAL Date of Exam: 3/25/2025 9:47 AM EDT Indication: raghav, r/o hydro. Comparison: Renal artery duplex ultrasound 3/23/2025 Technique: Grayscale and color Doppler ultrasound evaluation of the kidneys and urinary bladder was performed. Findings: Right kidney measures 10.4 x 4.4 x 5.0 cm. Left kidney measures 10.4 x 5.2 x 5.0 cm. Each kidney maintains normal cortical thickness and cortical echotexture. Small left renal cortical cysts are present. Index cyst in the left lower renal pole measures 1.6 cm. No suspicious solid renal lesion on either side. No shadowing stone or hydronephrosis on either side. Urinary bladder has an unremarkable sonographic appearance.     Impression: No hydronephrosis. Small left renal cysts. Electronically Signed: Vane Hancock MD  3/25/2025 10:56 AM EDT  Workstation ID: [id number]    Stress Test With Pet Myocardial Perfusion  Result Date: 3/24/2025    Myocardial perfusion imaging indicates a small-to-moderate-sized, moderately severe area of ischemia located in the anterior wall and apex.   Left ventricular ejection fraction is normal (Calculated EF = 54%).   Heavy coronary artery calcification noted on CT attenuation correction images       Results for orders placed during the hospital encounter of  03/20/25    Duplex Renal Artery - Bilateral Complete CAR    Interpretation Summary  DUPLEX RENAL ARTERY BILATERAL COMPLETE CAR    Date of Exam: 3/23/2025 10:17 AM EDT    Indication: chronic kidney disease  renovascular hypertension.    Comparison: No comparisons available.    Technique: Grayscale, color-flow, Doppler spectral waveform analysis was performed of the kidneys, renal arteries, and aorta.      Findings:  Exam was technically difficult due to bowel gas and difficulties in positioning.    The abdominal aorta measures a maximum of 1.5 cm in the visualized proximal portion. Peak flow velocity in the suprarenal aorta 63 cm/s.    On the right, there is no elevation flow velocity in the renal artery. Peak flow velocity is 80 cm/s. Acceleration time is 25 ms. Right renal aortic ratio is 1.3. Resistive index is within normal limits and up to 0.6. Right renal vein is patent. Limited  grayscale images of the right kidney demonstrate no hydronephrosis or shadowing calculus. Right kidney measures 10.4 cm in length.    On the left, there is no significant elevation flow velocity. Peak flow velocity is 106 cm/s. Left renal or aortic ratio is 1.7. Acceleration time is 31 ms. Resistive indices are within normal limits at up to 0.7. Left renal vein is patent. Limited  grayscale images of the left kidney demonstrate no hydronephrosis or shadowing calculus. Left kidney measures 11.1 cm in length.    Impression  Impression:  No evidence of hemodynamically significant renal artery stenosis on either side.        Electronically Signed: Keanu Cool MD  3/23/2025 3:46 PM EDT  Workstation ID: [id number]      Results for orders placed during the hospital encounter of 03/20/25    Duplex Renal Artery - Bilateral Complete CAR    Interpretation Summary  DUPLEX RENAL ARTERY BILATERAL COMPLETE CAR    Date of Exam: 3/23/2025 10:17 AM EDT    Indication: chronic kidney disease  renovascular hypertension.    Comparison: No comparisons  available.    Technique: Grayscale, color-flow, Doppler spectral waveform analysis was performed of the kidneys, renal arteries, and aorta.      Findings:  Exam was technically difficult due to bowel gas and difficulties in positioning.    The abdominal aorta measures a maximum of 1.5 cm in the visualized proximal portion. Peak flow velocity in the suprarenal aorta 63 cm/s.    On the right, there is no elevation flow velocity in the renal artery. Peak flow velocity is 80 cm/s. Acceleration time is 25 ms. Right renal aortic ratio is 1.3. Resistive index is within normal limits and up to 0.6. Right renal vein is patent. Limited  grayscale images of the right kidney demonstrate no hydronephrosis or shadowing calculus. Right kidney measures 10.4 cm in length.    On the left, there is no significant elevation flow velocity. Peak flow velocity is 106 cm/s. Left renal or aortic ratio is 1.7. Acceleration time is 31 ms. Resistive indices are within normal limits at up to 0.7. Left renal vein is patent. Limited  grayscale images of the left kidney demonstrate no hydronephrosis or shadowing calculus. Left kidney measures 11.1 cm in length.    Impression  Impression:  No evidence of hemodynamically significant renal artery stenosis on either side.        Electronically Signed: Keanu Cool MD  3/23/2025 3:46 PM EDT  Workstation ID: [id number]      Results for orders placed during the hospital encounter of 03/20/25    Adult Transthoracic Echo Complete w/ Color, Spectral and Contrast if necessary per protocol    Interpretation Summary    Left ventricular systolic function is normal. Calculated left ventricular EF = 62.7% Left ventricular ejection fraction appears to be 61 - 65%.    Normal left atrial size and volume noted.      Discharge Details        Discharge Medications        New Medications        Instructions Start Date   amLODIPine 10 MG tablet  Commonly known as: NORVASC   10 mg, Oral, Every 24 Hours Scheduled       carvedilol 25 MG tablet  Commonly known as: COREG   25 mg, Oral, 2 Times Daily With Meals      ferrous sulfate 325 (65 FE) MG tablet   325 mg, Oral, Daily With Breakfast      isosorbide mononitrate 20 MG tablet  Commonly known as: ISMO,MONOKET   20 mg, Oral, 3 Times Daily      nicotine 21 MG/24HR patch  Commonly known as: NICODERM CQ   1 patch, Transdermal, Every 24 Hours Scheduled      nitroglycerin 0.4 MG SL tablet  Commonly known as: NITROSTAT   0.4 mg, Sublingual, Every 5 Minutes PRN, Take no more than 3 doses in 15 minutes.      pantoprazole 40 MG EC tablet  Commonly known as: PROTONIX   40 mg, Oral, Every Early Morning             Changes to Medications        Instructions Start Date   hydrALAZINE 25 MG tablet  Commonly known as: APRESOLINE  What changed: how much to take   37.5 mg, Oral, 3 Times Daily      insulin glargine 100 UNIT/ML injection  Commonly known as: LANTUS, SEMGLEE  What changed: how much to take   10 Units, Subcutaneous, Nightly             Continue These Medications        Instructions Start Date   aspirin 81 MG chewable tablet   81 mg, Oral, Daily, OTC      atorvastatin 40 MG tablet  Commonly known as: LIPITOR   40 mg, Oral, Nightly      gabapentin 400 MG capsule  Commonly known as: NEURONTIN   400 mg, 2 Times Daily PRN      insulin regular 100 UNIT/ML injection  Commonly known as: humuLIN R,novoLIN R   7 Units, 3 Times Daily Before Meals      oxyCODONE-acetaminophen 7.5-325 MG per tablet  Commonly known as: PERCOCET   1 tablet, 4 Times Daily PRN             Stop These Medications      famotidine 20 MG tablet  Commonly known as: PEPCID     melatonin 5 MG tablet tablet              Allergies   Allergen Reactions    Penicillins Hives    Valium [Diazepam] Hives         Discharge Disposition:  Home or Self Care    Diet:  Hospital:  No active diet order              CODE STATUS:    Code Status and Medical Interventions: CPR (Attempt to Resuscitate); Full Support   Ordered at: 03/20/25 0283      Code Status (Patient has no pulse and is not breathing):    CPR (Attempt to Resuscitate)     Medical Interventions (Patient has pulse or is breathing):    Full Support     Level Of Support Discussed With:    Patient       Future Appointments   Date Time Provider Department Center   4/22/2025 10:00 AM Bina Cooper APRN [facility]       Additional Instructions for the Follow-ups that You Need to Schedule       Ambulatory Referral to Home Health   As directed      Face to Face Visit Date: 3/28/2025   Follow-up provider for Plan of Care?: I treated the patient in an acute care facility and will not continue treatment after discharge.   Follow-up provider: FUAD BARAHONA [[id number]]   Reason/Clinical Findings: s/p hospitalization for hypertensive emergency   Describe mobility limitations that make leaving home difficult: Impaired functional mobility, balance, gait and endurance.   Nursing/Therapeutic Services Requested: Skilled Nursing Physical Therapy Medical / Social Work   Skilled nursing orders: Medication education Cardiopulmonary assessments Neurovascular assessments Mental health COPD management CHF management   PT orders: Therapeutic exercise Gait Training Transfer training Strengthening Home safety assessment   Weight Bearing Status: As Tolerated   Social work orders: Community resources Long range planning   Frequency: TYLER Marcus  04/03/25      Time Spent on Discharge:  I spent  45  minutes on this discharge activity which included: face-to-face encounter with the patient, reviewing the data in the system, coordination of the care with the nursing staff as well as consultants, documentation, and entering orders.

## 2025-04-02 NOTE — PLAN OF CARE
Problem: Comorbidity Management  Goal: Blood Glucose Level Within Target Range  Outcome: Progressing  Intervention: Monitor and Manage Glycemia  Recent Flowsheet Documentation  Taken 4/1/2025 2026 by Carlos Eduardo Nguyen RN  Medication Review/Management: medications reviewed  Goal: Blood Pressure in Desired Range  Outcome: Progressing  Intervention: Maintain Blood Pressure Management  Recent Flowsheet Documentation  Taken 4/1/2025 2026 by Carlos Eduardo Nguyen RN  Medication Review/Management: medications reviewed  Goal: Blood Pressure in Desired Range  Outcome: Progressing  Intervention: Maintain Blood Pressure Management  Recent Flowsheet Documentation  Taken 4/1/2025 2026 by Carlos Eduardo Nguyen RN  Medication Review/Management: medications reviewed     Problem: Fall Injury Risk  Goal: Absence of Fall and Fall-Related Injury  Outcome: Progressing  Intervention: Identify and Manage Contributors  Recent Flowsheet Documentation  Taken 4/1/2025 2026 by Carlos Eduardo Nguyen RN  Medication Review/Management: medications reviewed  Self-Care Promotion: independence encouraged  Intervention: Promote Injury-Free Environment  Recent Flowsheet Documentation  Taken 4/2/2025 0420 by Carlos Eduardo Nguyen RN  Safety Promotion/Fall Prevention:   activity supervised   assistive device/personal items within reach   clutter free environment maintained   fall prevention program maintained   gait belt   mobility aid in reach   nonskid shoes/slippers when out of bed   room organization consistent   safety round/check completed  Taken 4/2/2025 0220 by Carlos Eduardo Nguyen RN  Safety Promotion/Fall Prevention:   activity supervised   assistive device/personal items within reach   clutter free environment maintained   fall prevention program maintained   gait belt   mobility aid in reach   nonskid shoes/slippers when out of bed   room organization consistent   safety round/check completed  Taken 4/2/2025 0015 by Carlos Eduardo Nguyen RN  Safety Promotion/Fall Prevention:    activity supervised   assistive device/personal items within reach   clutter free environment maintained   fall prevention program maintained   gait belt   mobility aid in reach   nonskid shoes/slippers when out of bed   room organization consistent   safety round/check completed  Taken 4/1/2025 2200 by Carlos Eduardo Nguyen RN  Safety Promotion/Fall Prevention:   activity supervised   assistive device/personal items within reach   clutter free environment maintained   fall prevention program maintained   gait belt   mobility aid in reach   nonskid shoes/slippers when out of bed   room organization consistent   safety round/check completed  Taken 4/1/2025 2026 by Carlos Eduardo Nguyen, RN  Safety Promotion/Fall Prevention:   activity supervised   assistive device/personal items within reach   clutter free environment maintained   fall prevention program maintained   gait belt   mobility aid in reach   nonskid shoes/slippers when out of bed   room organization consistent   safety round/check completed   Goal Outcome Evaluation:

## 2025-04-02 NOTE — CASE MANAGEMENT/SOCIAL WORK
Case Management Discharge Note      Final Note: Patient ready for DC today. Patient will be discharged to home with his daughter, Colletta's assistance. Patient will have home health services with [facility] for SN, PT & SW. Notified [facility] liaison of [place] today and CM will fax DC summary to them @ [phone]. Daughter states patient's medications from  [facility] will be delivered to the home. CM notified pharmacy of [place] today. Verified with Colletta that she will be home all day. [employer] transportation - wheelchair - arranged for today, Wednesday, 4/2 @ 1:00pm. They will pick patient up at bedside. No PCS form needed. Patient and daughter both in agreement to this DC plan to home today.         Selected Continued Care - Admitted Since 3/20/2025       Destination    No services have been selected for the patient.                Durable Medical Equipment    No services have been selected for the patient.                Dialysis/Infusion    No services have been selected for the patient.                Home Medical Care Coordination complete.      Service Provider Services Address Phone Fax Patient Preferred    [facility] Home Rehabilitation, Home Nursing, Home Medical  [address] [place] [address], [place] [address] [phone] --              Therapy    No services have been selected for the patient.                Community Resources    No services have been selected for the patient.                Community & DME    No services have been selected for the patient.                         Final Discharge Disposition Code: 06 - home with home health care

## 2025-04-03 LAB — GLUCOSE BLDC GLUCOMTR-MCNC: 329 MG/DL (ref 70–130)

## 2025-04-03 NOTE — OUTREACH NOTE
Prep Survey      Flowsheet Row Responses   Hindu facility patient discharged from? [place]   Is LACE score < 7 ? No   Eligibility Readm Mgmt   Discharge diagnosis Hypertensive emergency   Does the patient have one of the following disease processes/diagnoses(primary or secondary)? Other   Does the patient have Home health ordered? Yes   What is the Home health agency?  [facility]   Is there a DME ordered? No   Prep survey completed? Yes            VALENTINA APPLE - Registered Nurse

## 2025-04-04 LAB
QT INTERVAL: 438 MS
QTC INTERVAL: 465 MS

## 2025-04-08 ENCOUNTER — APPOINTMENT (OUTPATIENT)
Dept: GENERAL RADIOLOGY | Facility: HOSPITAL | Age: 75
DRG: 683 | End: 2025-04-08
Payer: MEDICARE

## 2025-04-08 ENCOUNTER — HOSPITAL ENCOUNTER (INPATIENT)
Facility: HOSPITAL | Age: 75
LOS: 7 days | Discharge: LEFT BEFORE DISCHARGE | DRG: 683 | End: 2025-04-17
Attending: EMERGENCY MEDICINE | Admitting: HOSPITALIST
Payer: MEDICARE

## 2025-04-08 ENCOUNTER — READMISSION MANAGEMENT (OUTPATIENT)
Dept: CALL CENTER | Facility: HOSPITAL | Age: 75
End: 2025-04-08
Payer: MEDICARE
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
## 2025-04-08 DIAGNOSIS — B37.49 YEAST UTI: ICD-10-CM

## 2025-04-08 DIAGNOSIS — R53.1 GENERALIZED WEAKNESS: Primary | ICD-10-CM

## 2025-04-08 PROBLEM — R62.7 FAILURE TO THRIVE IN ADULT: Status: ACTIVE | Noted: 2025-04-08

## 2025-04-08 LAB
ALBUMIN SERPL-MCNC: 3.7 G/DL (ref 3.5–5.2)
ALBUMIN/GLOB SERPL: 1.3 G/DL
ALP SERPL-CCNC: 98 U/L (ref 39–117)
ALT SERPL W P-5'-P-CCNC: 23 U/L (ref 1–41)
ANION GAP SERPL CALCULATED.3IONS-SCNC: 11 MMOL/L (ref 5–15)
AST SERPL-CCNC: 21 U/L (ref 1–40)
BACTERIA UR QL AUTO: ABNORMAL /HPF
BASOPHILS # BLD AUTO: 0.06 10*3/MM3 (ref 0–0.2)
BASOPHILS NFR BLD AUTO: 0.4 % (ref 0–1.5)
BILIRUB SERPL-MCNC: 0.2 MG/DL (ref 0–1.2)
BILIRUB UR QL STRIP: NEGATIVE
BUN SERPL-MCNC: 34 MG/DL (ref 8–23)
BUN/CREAT SERPL: 14.5 (ref 7–25)
CALCIUM SPEC-SCNC: 8.7 MG/DL (ref 8.6–10.5)
CHLORIDE SERPL-SCNC: 109 MMOL/L (ref 98–107)
CK SERPL-CCNC: 149 U/L (ref 20–200)
CLARITY UR: ABNORMAL
CO2 SERPL-SCNC: 18 MMOL/L (ref 22–29)
COLOR UR: YELLOW
CREAT SERPL-MCNC: 2.34 MG/DL (ref 0.76–1.27)
DEPRECATED RDW RBC AUTO: 49 FL (ref 37–54)
EGFRCR SERPLBLD CKD-EPI 2021: 28.5 ML/MIN/1.73
EOSINOPHIL # BLD AUTO: 0.2 10*3/MM3 (ref 0–0.4)
EOSINOPHIL NFR BLD AUTO: 1.3 % (ref 0.3–6.2)
ERYTHROCYTE [DISTWIDTH] IN BLOOD BY AUTOMATED COUNT: 14.2 % (ref 12.3–15.4)
GEN 5 1HR TROPONIN T REFLEX: 170 NG/L
GLOBULIN UR ELPH-MCNC: 2.9 GM/DL
GLUCOSE BLDC GLUCOMTR-MCNC: 108 MG/DL (ref 70–130)
GLUCOSE BLDC GLUCOMTR-MCNC: 114 MG/DL (ref 70–130)
GLUCOSE BLDC GLUCOMTR-MCNC: 128 MG/DL (ref 70–130)
GLUCOSE BLDC GLUCOMTR-MCNC: 191 MG/DL (ref 70–130)
GLUCOSE SERPL-MCNC: 195 MG/DL (ref 65–99)
GLUCOSE UR STRIP-MCNC: ABNORMAL MG/DL
HCT VFR BLD AUTO: 30.3 % (ref 37.5–51)
HGB BLD-MCNC: 9.5 G/DL (ref 13–17.7)
HGB UR QL STRIP.AUTO: ABNORMAL
HOLD SPECIMEN: NORMAL
HOLD SPECIMEN: NORMAL
HYALINE CASTS UR QL AUTO: ABNORMAL /LPF
IMM GRANULOCYTES # BLD AUTO: 0.09 10*3/MM3 (ref 0–0.05)
IMM GRANULOCYTES NFR BLD AUTO: 0.6 % (ref 0–0.5)
KETONES UR QL STRIP: ABNORMAL
LEUKOCYTE ESTERASE UR QL STRIP.AUTO: ABNORMAL
LYMPHOCYTES # BLD AUTO: 1.39 10*3/MM3 (ref 0.7–3.1)
LYMPHOCYTES NFR BLD AUTO: 8.9 % (ref 19.6–45.3)
MAGNESIUM SERPL-MCNC: 2.1 MG/DL (ref 1.6–2.4)
MCH RBC QN AUTO: 29.8 PG (ref 26.6–33)
MCHC RBC AUTO-ENTMCNC: 31.4 G/DL (ref 31.5–35.7)
MCV RBC AUTO: 95 FL (ref 79–97)
MONOCYTES # BLD AUTO: 1.44 10*3/MM3 (ref 0.1–0.9)
MONOCYTES NFR BLD AUTO: 9.2 % (ref 5–12)
NEUTROPHILS NFR BLD AUTO: 12.5 10*3/MM3 (ref 1.7–7)
NEUTROPHILS NFR BLD AUTO: 79.6 % (ref 42.7–76)
NITRITE UR QL STRIP: NEGATIVE
NRBC BLD AUTO-RTO: 0 /100 WBC (ref 0–0.2)
PH UR STRIP.AUTO: 5 [PH] (ref 5–8)
PLATELET # BLD AUTO: 200 10*3/MM3 (ref 140–450)
PMV BLD AUTO: 10.8 FL (ref 6–12)
POTASSIUM SERPL-SCNC: 5.4 MMOL/L (ref 3.5–5.2)
PROT SERPL-MCNC: 6.6 G/DL (ref 6–8.5)
PROT UR QL STRIP: ABNORMAL
RBC # BLD AUTO: 3.19 10*6/MM3 (ref 4.14–5.8)
RBC # UR STRIP: ABNORMAL /HPF
REF LAB TEST METHOD: ABNORMAL
SODIUM SERPL-SCNC: 138 MMOL/L (ref 136–145)
SP GR UR STRIP: 1.02 (ref 1–1.03)
SQUAMOUS #/AREA URNS HPF: ABNORMAL /HPF
TROPONIN T % DELTA: -5
TROPONIN T NUMERIC DELTA: -9 NG/L
TROPONIN T SERPL HS-MCNC: 179 NG/L
TSH SERPL DL<=0.05 MIU/L-ACNC: 1.07 UIU/ML (ref 0.27–4.2)
UROBILINOGEN UR QL STRIP: ABNORMAL
VIT B12 BLD-MCNC: 342 PG/ML (ref 211–946)
WBC # UR STRIP: ABNORMAL /HPF
WBC NRBC COR # BLD AUTO: 15.68 10*3/MM3 (ref 3.4–10.8)
WHOLE BLOOD HOLD COAG: NORMAL
WHOLE BLOOD HOLD SPECIMEN: NORMAL
YEAST URNS QL MICRO: ABNORMAL /HPF

## 2025-04-08 PROCEDURE — 99285 EMERGENCY DEPT VISIT HI MDM: CPT

## 2025-04-08 PROCEDURE — 25810000003 SODIUM CHLORIDE 0.9 % SOLUTION: Performed by: EMERGENCY MEDICINE

## 2025-04-08 PROCEDURE — 63710000001 INSULIN REGULAR HUMAN PER 5 UNITS: Performed by: STUDENT IN AN ORGANIZED HEALTH CARE EDUCATION/TRAINING PROGRAM

## 2025-04-08 PROCEDURE — 71045 X-RAY EXAM CHEST 1 VIEW: CPT

## 2025-04-08 PROCEDURE — 25010000002 HEPARIN (PORCINE) PER 1000 UNITS: Performed by: STUDENT IN AN ORGANIZED HEALTH CARE EDUCATION/TRAINING PROGRAM

## 2025-04-08 PROCEDURE — 81001 URINALYSIS AUTO W/SCOPE: CPT | Performed by: EMERGENCY MEDICINE

## 2025-04-08 PROCEDURE — P9612 CATHETERIZE FOR URINE SPEC: HCPCS

## 2025-04-08 PROCEDURE — 99222 1ST HOSP IP/OBS MODERATE 55: CPT | Performed by: STUDENT IN AN ORGANIZED HEALTH CARE EDUCATION/TRAINING PROGRAM

## 2025-04-08 PROCEDURE — 93005 ELECTROCARDIOGRAM TRACING: CPT | Performed by: EMERGENCY MEDICINE

## 2025-04-08 PROCEDURE — 82948 REAGENT STRIP/BLOOD GLUCOSE: CPT

## 2025-04-08 PROCEDURE — 84484 ASSAY OF TROPONIN QUANT: CPT | Performed by: EMERGENCY MEDICINE

## 2025-04-08 PROCEDURE — 87086 URINE CULTURE/COLONY COUNT: CPT | Performed by: EMERGENCY MEDICINE

## 2025-04-08 PROCEDURE — 82607 VITAMIN B-12: CPT | Performed by: STUDENT IN AN ORGANIZED HEALTH CARE EDUCATION/TRAINING PROGRAM

## 2025-04-08 PROCEDURE — 25010000002 CEFTRIAXONE PER 250 MG: Performed by: EMERGENCY MEDICINE

## 2025-04-08 PROCEDURE — 80053 COMPREHEN METABOLIC PANEL: CPT | Performed by: EMERGENCY MEDICINE

## 2025-04-08 PROCEDURE — 83735 ASSAY OF MAGNESIUM: CPT | Performed by: EMERGENCY MEDICINE

## 2025-04-08 PROCEDURE — 82550 ASSAY OF CK (CPK): CPT | Performed by: EMERGENCY MEDICINE

## 2025-04-08 PROCEDURE — 84443 ASSAY THYROID STIM HORMONE: CPT | Performed by: EMERGENCY MEDICINE

## 2025-04-08 PROCEDURE — 85025 COMPLETE CBC W/AUTO DIFF WBC: CPT | Performed by: EMERGENCY MEDICINE

## 2025-04-08 PROCEDURE — 36415 COLL VENOUS BLD VENIPUNCTURE: CPT

## 2025-04-08 RX ORDER — BISACODYL 5 MG/1
5 TABLET, DELAYED RELEASE ORAL DAILY PRN
Status: DISCONTINUED | OUTPATIENT
Start: 2025-04-08 | End: 2025-04-17 | Stop reason: HOSPADM

## 2025-04-08 RX ORDER — ISOSORBIDE MONONITRATE 20 MG/1
20 TABLET ORAL 3 TIMES DAILY
Status: DISCONTINUED | OUTPATIENT
Start: 2025-04-08 | End: 2025-04-17 | Stop reason: HOSPADM

## 2025-04-08 RX ORDER — PANTOPRAZOLE SODIUM 40 MG/1
40 TABLET, DELAYED RELEASE ORAL
Status: DISCONTINUED | OUTPATIENT
Start: 2025-04-09 | End: 2025-04-09

## 2025-04-08 RX ORDER — FLUCONAZOLE 150 MG/1
150 TABLET ORAL ONCE
Status: COMPLETED | OUTPATIENT
Start: 2025-04-08 | End: 2025-04-08

## 2025-04-08 RX ORDER — HEPARIN SODIUM 5000 [USP'U]/ML
5000 INJECTION, SOLUTION INTRAVENOUS; SUBCUTANEOUS EVERY 8 HOURS SCHEDULED
Status: DISCONTINUED | OUTPATIENT
Start: 2025-04-08 | End: 2025-04-17 | Stop reason: HOSPADM

## 2025-04-08 RX ORDER — SODIUM CHLORIDE 9 MG/ML
40 INJECTION, SOLUTION INTRAVENOUS AS NEEDED
Status: DISCONTINUED | OUTPATIENT
Start: 2025-04-08 | End: 2025-04-17 | Stop reason: HOSPADM

## 2025-04-08 RX ORDER — INSULIN LISPRO 100 [IU]/ML
2-9 INJECTION, SOLUTION INTRAVENOUS; SUBCUTANEOUS
Status: DISCONTINUED | OUTPATIENT
Start: 2025-04-08 | End: 2025-04-17 | Stop reason: HOSPADM

## 2025-04-08 RX ORDER — FLUCONAZOLE 200 MG/1
200 TABLET ORAL EVERY 24 HOURS
Status: DISCONTINUED | OUTPATIENT
Start: 2025-04-09 | End: 2025-04-10

## 2025-04-08 RX ORDER — POLYETHYLENE GLYCOL 3350 17 G/17G
17 POWDER, FOR SOLUTION ORAL DAILY PRN
Status: DISCONTINUED | OUTPATIENT
Start: 2025-04-08 | End: 2025-04-17 | Stop reason: HOSPADM

## 2025-04-08 RX ORDER — IBUPROFEN 600 MG/1
1 TABLET ORAL
Status: DISCONTINUED | OUTPATIENT
Start: 2025-04-08 | End: 2025-04-17 | Stop reason: HOSPADM

## 2025-04-08 RX ORDER — NITROGLYCERIN 0.4 MG/1
0.4 TABLET SUBLINGUAL
Status: DISCONTINUED | OUTPATIENT
Start: 2025-04-08 | End: 2025-04-16

## 2025-04-08 RX ORDER — ACETAMINOPHEN 325 MG/1
650 TABLET ORAL EVERY 4 HOURS PRN
Status: DISCONTINUED | OUTPATIENT
Start: 2025-04-08 | End: 2025-04-17 | Stop reason: HOSPADM

## 2025-04-08 RX ORDER — ATORVASTATIN CALCIUM 40 MG/1
40 TABLET, FILM COATED ORAL NIGHTLY
Status: DISCONTINUED | OUTPATIENT
Start: 2025-04-08 | End: 2025-04-17 | Stop reason: HOSPADM

## 2025-04-08 RX ORDER — DEXTROSE MONOHYDRATE 25 G/50ML
25 INJECTION, SOLUTION INTRAVENOUS
Status: DISCONTINUED | OUTPATIENT
Start: 2025-04-08 | End: 2025-04-17 | Stop reason: HOSPADM

## 2025-04-08 RX ORDER — HYDRALAZINE HYDROCHLORIDE 25 MG/1
37.5 TABLET, FILM COATED ORAL 3 TIMES DAILY
Status: DISCONTINUED | OUTPATIENT
Start: 2025-04-08 | End: 2025-04-14

## 2025-04-08 RX ORDER — ACETAMINOPHEN 160 MG/5ML
650 SOLUTION ORAL EVERY 4 HOURS PRN
Status: DISCONTINUED | OUTPATIENT
Start: 2025-04-08 | End: 2025-04-17 | Stop reason: HOSPADM

## 2025-04-08 RX ORDER — GABAPENTIN 100 MG/1
200 CAPSULE ORAL 2 TIMES DAILY PRN
Status: DISCONTINUED | OUTPATIENT
Start: 2025-04-08 | End: 2025-04-12

## 2025-04-08 RX ORDER — NICOTINE POLACRILEX 4 MG
15 LOZENGE BUCCAL
Status: DISCONTINUED | OUTPATIENT
Start: 2025-04-08 | End: 2025-04-17 | Stop reason: HOSPADM

## 2025-04-08 RX ORDER — BISACODYL 10 MG
10 SUPPOSITORY, RECTAL RECTAL DAILY PRN
Status: DISCONTINUED | OUTPATIENT
Start: 2025-04-08 | End: 2025-04-17 | Stop reason: HOSPADM

## 2025-04-08 RX ORDER — SODIUM CHLORIDE 0.9 % (FLUSH) 0.9 %
10 SYRINGE (ML) INJECTION EVERY 12 HOURS SCHEDULED
Status: DISCONTINUED | OUTPATIENT
Start: 2025-04-08 | End: 2025-04-15

## 2025-04-08 RX ORDER — SODIUM CHLORIDE 0.9 % (FLUSH) 0.9 %
10 SYRINGE (ML) INJECTION AS NEEDED
Status: DISCONTINUED | OUTPATIENT
Start: 2025-04-08 | End: 2025-04-17 | Stop reason: HOSPADM

## 2025-04-08 RX ORDER — DEXTROSE MONOHYDRATE 25 G/50ML
25 INJECTION, SOLUTION INTRAVENOUS ONCE
Status: COMPLETED | OUTPATIENT
Start: 2025-04-08 | End: 2025-04-08

## 2025-04-08 RX ORDER — ASPIRIN 81 MG/1
81 TABLET, CHEWABLE ORAL DAILY
Status: DISCONTINUED | OUTPATIENT
Start: 2025-04-09 | End: 2025-04-17 | Stop reason: HOSPADM

## 2025-04-08 RX ORDER — AMOXICILLIN 250 MG
2 CAPSULE ORAL 2 TIMES DAILY PRN
Status: DISCONTINUED | OUTPATIENT
Start: 2025-04-08 | End: 2025-04-17 | Stop reason: HOSPADM

## 2025-04-08 RX ORDER — AMLODIPINE BESYLATE 10 MG/1
10 TABLET ORAL
Status: DISCONTINUED | OUTPATIENT
Start: 2025-04-09 | End: 2025-04-17 | Stop reason: HOSPADM

## 2025-04-08 RX ORDER — ACETAMINOPHEN 650 MG/1
650 SUPPOSITORY RECTAL EVERY 4 HOURS PRN
Status: DISCONTINUED | OUTPATIENT
Start: 2025-04-08 | End: 2025-04-17 | Stop reason: HOSPADM

## 2025-04-08 RX ORDER — CARVEDILOL 12.5 MG/1
25 TABLET ORAL 2 TIMES DAILY WITH MEALS
Status: DISCONTINUED | OUTPATIENT
Start: 2025-04-08 | End: 2025-04-17 | Stop reason: HOSPADM

## 2025-04-08 RX ADMIN — HYDRALAZINE HYDROCHLORIDE 37.5 MG: 25 TABLET ORAL at 21:11

## 2025-04-08 RX ADMIN — Medication 10 ML: at 21:12

## 2025-04-08 RX ADMIN — SODIUM CHLORIDE 1000 MG: 900 INJECTION INTRAVENOUS at 15:08

## 2025-04-08 RX ADMIN — HEPARIN SODIUM 5000 UNITS: 5000 INJECTION INTRAVENOUS; SUBCUTANEOUS at 21:11

## 2025-04-08 RX ADMIN — ISOSORBIDE MONONITRATE 20 MG: 20 TABLET ORAL at 21:12

## 2025-04-08 RX ADMIN — DEXTROSE MONOHYDRATE 25 G: 25 INJECTION, SOLUTION INTRAVENOUS at 17:22

## 2025-04-08 RX ADMIN — CARVEDILOL 25 MG: 12.5 TABLET, FILM COATED ORAL at 21:12

## 2025-04-08 RX ADMIN — INSULIN HUMAN 5 UNITS: 100 INJECTION, SOLUTION PARENTERAL at 17:23

## 2025-04-08 RX ADMIN — FLUCONAZOLE 150 MG: 150 TABLET ORAL at 15:08

## 2025-04-08 RX ADMIN — ATORVASTATIN CALCIUM 40 MG: 40 TABLET, FILM COATED ORAL at 21:12

## 2025-04-08 RX ADMIN — SODIUM CHLORIDE 1000 ML: 9 INJECTION, SOLUTION INTRAVENOUS at 12:45

## 2025-04-08 NOTE — H&P
[facility] Medicine Services  HISTORY AND PHYSICAL    Patient Name: Thomas Perez  : 1950  MRN: [id number]  Primary Care Physician: Brock Randolph PA  Date of admission: 2025      Subjective   Subjective     Chief Complaint:  Weakness, falls    HPI:  Thomas Perez is a 75 y.o. male with a PMHx of hypertension, hyperlipidemia, CAD status post RCA stent in , PAD status post embolectomy and fasciotomy, COPD, poorly controlled type 2 diabetes, CKD3, medication noncompliance and chronic pain presenting with weakness and recurrent falls.    Patient recently discharged from [facility] on 25.  Was admitted for hypertensive urgency, type II NSTEMI, and MAGUI on CKD.  Patient had periods of refusal of treatment/noncompliance during admission.  Plans were for outpatient follow-up with cardiology in 2 to 3 weeks for continued discussions of possible outpatient cardiac cath.  Patient was also to follow-up with nephrology 2 weeks after discharge for continued management of CKD.  After arrival home, patient has been lying in bed secondary to weakness.  Patient attempted to ambulate to the bathroom today, however was unable to do so and fell to the ground.  Daughter reports that patient has had multiple falls since discharge.  Today in the ED, patient reports bilateral lower extremity weakness.  Patient reports he is unable to lift his legs.  However, when patient is asked to move from sitting on side the bed to laying in the bed patient is able to lift both his lower extremities without difficulty.  Patient denies any chest pain, shortness of breath, abdominal pain. Endorses urinary frequency.  Continues to smoke 1 pack/day.  Denies any alcohol or illicit drug use.  Confirms full CODE STATUS.      Personal History     Past Medical History:   Diagnosis Date   • Back injury    • Diabetes mellitus    • History of angina    • Hypertension            Past Surgical History:   Procedure  Laterality Date   • CORONARY STENT PLACEMENT     • EMBOLECTOMY     • FASCIOTOMY         Family History: family history is not on file.     Social History:  reports that he has been smoking cigarettes. He has never used smokeless tobacco. He reports that he does not drink alcohol and does not use drugs.  Social History     Social History Narrative   • Not on file       Medications:  Available home medication information reviewed.  amLODIPine, aspirin, atorvastatin, carvedilol, ferrous sulfate, gabapentin, hydrALAZINE, insulin glargine, insulin regular, isosorbide mononitrate, nicotine, nitroglycerin, oxyCODONE-acetaminophen, and pantoprazole    Allergies   Allergen Reactions   • Penicillins Hives   • Valium [Diazepam] Hives       Objective   Objective     Vital Signs:   Temp:  [98.1 °F (36.7 °C)] 98.1 °F (36.7 °C)  Heart Rate:  [65-70] 66  Resp:  [16] 16  BP: (143-179)/() 179/70       Physical Exam  Constitutional:       General: He is not in acute distress.  Cardiovascular:      Rate and Rhythm: Normal rate.      Pulses: Normal pulses.   Pulmonary:      Effort: Pulmonary effort is normal. No respiratory distress.   Abdominal:      General: There is no distension.      Palpations: Abdomen is soft.      Tenderness: There is no abdominal tenderness. There is no guarding or rebound.   Musculoskeletal:      Right lower leg: Edema present.      Left lower leg: Edema present.   Skin:     General: Skin is warm.   Neurological:      Mental Status: He is alert.      Motor: Weakness present.      Comments: Subjective bilateral lower extremity weakness   Psychiatric:         Mood and Affect: Mood normal.         Behavior: Behavior normal.            Result Review:  I have personally reviewed the results from the time of this admission to 4/8/2025 15:31 EDT and agree with these findings:  []  Laboratory list / accordion  [x]  Microbiology  [x]  Radiology  []  EKG/Telemetry   []  Cardiology/Vascular   []  Pathology  [x]   Old records  []  Other:  Most notable findings include: UA with yeast      LAB RESULTS:      Lab 04/08/25  1129   WBC 15.68*   HEMOGLOBIN 9.5*   HEMATOCRIT 30.3*   PLATELETS 200   NEUTROS ABS 12.50*   IMMATURE GRANS (ABS) 0.09*   LYMPHS ABS 1.39   MONOS ABS 1.44*   EOS ABS 0.20   MCV 95.0         Lab 04/08/25  1129 04/02/25  0408   SODIUM 138 135*   POTASSIUM 5.4* 4.9   CHLORIDE 109* 102   CO2 18.0* 20.0*   ANION GAP 11.0 13.0   BUN 34* 34*   CREATININE 2.34* 2.15*   EGFR 28.5* 31.5*   GLUCOSE 195* 158*   CALCIUM 8.7 8.4*   MAGNESIUM 2.1  --    TSH 1.070  --          Lab 04/08/25  1129   TOTAL PROTEIN 6.6   ALBUMIN 3.7   GLOBULIN 2.9   ALT (SGPT) 23   AST (SGOT) 21   BILIRUBIN 0.2   ALK PHOS 98         Lab 04/08/25  1240 04/08/25  1129   HSTROP T 170* 179*                 UA          12/11/2024    13:37 3/20/2025    16:37 4/8/2025    12:56   Urinalysis   Squamous Epithelial Cells, UA 0-2     0-2  0-2    Specific Gravity, UA 1.014     1.018  1.025    Ketones, UA  Negative  Trace    Blood, UA Negative     Small (1+)  Small (1+)    Leukocytes, UA Negative     Negative  Moderate (2+)    Nitrite, UA  Negative  Negative    RBC, UA 1     6-10  3-5    WBC, UA  0-2  Too Numerous to Count    Bacteria, UA Negative     None Seen  None Seen       Details          This result is from an external source.               Microbiology Results (last 10 days)       ** No results found for the last 240 hours. **            XR Chest 1 View  Result Date: 4/8/2025  XR CHEST 1 VW Date of Exam: 4/8/2025 11:15 AM EDT Indication: Weak/Dizzy/AMS triage protocol Comparison: 3/22/2025 Findings: Heart heart is borderline enlarged. Vasculature is cephalized. There is a mild diffuse interstitial disease pattern, greater in the lower lungs, with no focal consolidation. There appear to be minimal pleural effusions. Overall appearance favors mild asymmetric pulmonary edema over pneumonia. There is no evidence of pneumothorax.     Impression: Impression:  Mild CHF. Electronically Signed: Castro Dumont MD  4/8/2025 11:48 AM EDT  Workstation ID: [id number]      Results for orders placed during the hospital encounter of 03/20/25    Adult Transthoracic Echo Complete w/ Color, Spectral and Contrast if necessary per protocol    Interpretation Summary  •  Left ventricular systolic function is normal. Calculated left ventricular EF = 62.7% Left ventricular ejection fraction appears to be 61 - 65%.  •  Normal left atrial size and volume noted.      Assessment & Plan   Assessment & Plan       Failure to thrive in adult    Thomas Perez is a 75 y.o. male with a PMHx of hypertension, hyperlipidemia, CAD status post RCA stent in 2010, PAD status post embolectomy and fasciotomy, COPD, poorly controlled type 2 diabetes, CKD3, medication noncompliance and chronic pain presenting with weakness and recurrent falls.    Weakness, fatigue, falls  -Patient states he has been lying in bed with minimal ambulation since discharge earlier this month. Patient initially requesting discharge, however daughter states she is unable to care for patient   -Patient reports bilateral weakness in lower extremities and states he is unable to move them on command.  However, when asking patient to move from side of bed to lying flat in bed, patient is able to lift bilateral lower extremities and move them without difficulty. Denies any saddle anesthesia or loss of control of bladder/bowels.   -CK, TSH within normal limits  -Deconditioning, diabetic peripheral neuropathy likely contributing   PLAN  -PT/OT consulted  -Will check Vit B12  -Consider imaging of spine if patient has continued lower extremity weakness    UTI  Leukocytosis  -Patient reports increased urinary frequency  -UA with negative nitrites, moderate leukocytes, too numerous to count white blood cells, moderate yeast, no bacteria  -Qtc 429  -Patient started on Diflucan and Rocephin in the ED  -Will continue Diflucan and Rocephin for now and adjust  based on urine culture    MAGUI on CKD3 c/b hyperkalemia  -Evaluated by nephrology during previous admission  -Baseline creatinine seems to be around 2.0.  -Creatinine on admission 2.34. No peaked T waves on ECG  -Patient reports PO intake has consisted of two 16oz mountain dew daily  -Status post IV fluids in ED. Will hold further fluids in setting of mild pulmonary edema seen on chest x-ray  -Insulin/dextrose for hyperkalemia  -Encourage p.o. intake  -Repeat BMP in a.m.    IDDM  -Home insulin regimen includes insulin glargine 10 units nightly and insulin regular 7 units 3 times daily with  -Continue insulin glargine 5 units nightly and sliding scale insulin for now    Elevated troponins  CAD status post RCA stent in 2010  PAD status post embolectomy and fasciotomy  HTN  -Similar value of troponins during previous admission  -Patient denying any chest pain  -ECG without acute ischemic changes  -Echo obtained last month with an EF of 61 to 65%  -Plan to follow-up with cardiology outpatient for continued discussions of outpatient cardiac catheterization  -Continue home amlodipine, aspirin, Coreg, hydralazine, isosorbide mononitrate    Normocytic anemia  -Chronic  -Hgb stable at 9.5 on admission  -No signs/symptoms of bleeding  -Previously iron studies consistent with anemia of chronic disease, likely c/b CKD  -Repeat CBC in AM    HLD  -Continue statin    GERD  -Continue PPI    Chronic pain  -Patient takes gabapentin 100 mg twice daily as needed.  Will reduce to 200 mg twice daily as needed    Tobacco use  -Encouraged cessation      VTE Prophylaxis:  Pharmacologic VTE prophylaxis orders are signed & held.            CODE STATUS:    Code Status and Medical Interventions: CPR (Attempt to Resuscitate); Full Support   Ordered at: 04/08/25 1531     Code Status (Patient has no pulse and is not breathing):    CPR (Attempt to Resuscitate)     Medical Interventions (Patient has pulse or is breathing):    Full Support     Level  Of Support Discussed With:    Patient       Expected Discharge   Expected discharge date/ time has not been documented.     Seb Naqvi DO  04/08/25

## 2025-04-08 NOTE — ED NOTES
Thomas Perez    Nursing Report ED to Floor:  Mental status: A+Ox4  Ambulatory status: Bedfast  Oxygen Therapy:  RA  Cardiac Rhythm: NSR  Admitted from: Home  Safety Concerns:  Falls  Precautions: N/A  Social Issues: Family unable to care for him.. wanting placement eval  ED Room #:  06    ED Nurse Phone Extension - [phone] or may call [phone].      HPI:   Chief Complaint   Patient presents with    Weakness - Generalized       Past Medical History:  Past Medical History:   Diagnosis Date    Back injury     Diabetes mellitus     History of angina     Hypertension         Past Surgical History:  Past Surgical History:   Procedure Laterality Date    CORONARY STENT PLACEMENT      EMBOLECTOMY      FASCIOTOMY          Admitting Doctor:   No admitting provider for patient encounter.    Consulting Provider(s):  Consults       Date and Time Order Name Status Description    3/25/2025  9:14 AM Inpatient Nephrology Consult Completed     3/22/2025  7:53 AM Inpatient Cardiology Consult Completed              Admitting Diagnosis:   The primary encounter diagnosis was Generalized weakness. Diagnoses of Fall in home, initial encounter and Yeast UTI were also pertinent to this visit.    Most Recent Vitals:   Vitals:    04/08/25 1300 04/08/25 1330 04/08/25 1400 04/08/25 1430   BP: 154/63 143/61 158/68 179/70   Pulse: 67 68 65 70   Resp:       Temp:       TempSrc:       SpO2: 94% 93% 94% 92%   Weight:       Height:           Active LDAs/IV Access:   Lines, Drains & Airways       Active LDAs       Name Placement date Placement time Site Days    Peripheral IV 04/08/25 Left Antecubital 04/08/25  --  Antecubital  less than 1                    Labs (abnormal labs have a star):   Labs Reviewed   COMPREHENSIVE METABOLIC PANEL - Abnormal; Notable for the following components:       Result Value    Glucose 195 (*)     BUN 34 (*)     Creatinine 2.34 (*)     Potassium 5.4 (*)     Chloride 109 (*)     CO2 18.0 (*)     eGFR 28.5 (*)     All other  components within normal limits    Narrative:     GFR Categories in Chronic Kidney Disease (CKD)      GFR Category          GFR (mL/min/1.73)    Interpretation  G1                     90 or greater         Normal or high (1)  G2                      60-89                Mild decrease (1)  G3a                   45-59                Mild to moderate decrease  G3b                   30-44                Moderate to severe decrease  G4                    15-29                Severe decrease  G5                    14 or less           Kidney failure          (1)In the absence of evidence of kidney disease, neither GFR category G1 or G2 fulfill the criteria for CKD.    eGFR calculation 2021 CKD-EPI creatinine equation, which does not include race as a factor   TROPONIN - Abnormal; Notable for the following components:    HS Troponin T 179 (*)     All other components within normal limits    Narrative:     High Sensitive Troponin T Reference Range:  <14.0 ng/L- Negative Female for AMI  <22.0 ng/L- Negative Male for AMI  >=14 - Abnormal Female indicating possible myocardial injury.  >=22 - Abnormal Male indicating possible myocardial injury.   Clinicians would have to utilize clinical acumen, EKG, Troponin, and serial changes to determine if it is an Acute Myocardial Infarction or myocardial injury due to an underlying chronic condition.        URINALYSIS W/ MICROSCOPIC IF INDICATED (NO CULTURE) - Abnormal; Notable for the following components:    Appearance, UA Hazy (*)     Glucose,  mg/dL (1+) (*)     Ketones, UA Trace (*)     Blood, UA Small (1+) (*)     Protein, UA >=300 mg/dL (3+) (*)     Leuk Esterase, UA Moderate (2+) (*)     All other components within normal limits   CBC WITH AUTO DIFFERENTIAL - Abnormal; Notable for the following components:    WBC 15.68 (*)     RBC 3.19 (*)     Hemoglobin 9.5 (*)     Hematocrit 30.3 (*)     MCHC 31.4 (*)     Neutrophil % 79.6 (*)     Lymphocyte % 8.9 (*)     Immature Grans %  0.6 (*)     Neutrophils, Absolute 12.50 (*)     Monocytes, Absolute 1.44 (*)     Immature Grans, Absolute 0.09 (*)     All other components within normal limits   HIGH SENSITIVITIY TROPONIN T 1HR - Abnormal; Notable for the following components:    HS Troponin T 170 (*)     All other components within normal limits    Narrative:     High Sensitive Troponin T Reference Range:  <14.0 ng/L- Negative Female for AMI  <22.0 ng/L- Negative Male for AMI  >=14 - Abnormal Female indicating possible myocardial injury.  >=22 - Abnormal Male indicating possible myocardial injury.   Clinicians would have to utilize clinical acumen, EKG, Troponin, and serial changes to determine if it is an Acute Myocardial Infarction or myocardial injury due to an underlying chronic condition.        URINALYSIS, MICROSCOPIC ONLY - Abnormal; Notable for the following components:    RBC, UA 3-5 (*)     WBC, UA Too Numerous to Count (*)     Yeast, UA Moderate/2+ Yeast (*)     All other components within normal limits   MAGNESIUM - Normal   TSH RFX ON ABNORMAL TO FREE T4 - Normal   CK - Normal   URINE CULTURE   RAINBOW DRAW    Narrative:     The following orders were created for panel order [place] Draw.  Procedure                               Abnormality         Status                     ---------                               -----------         ------                     Green Top (Gel)[[id number]]                                  Final result               Lavender Top[[id number]]                                     Final result               Gold Top - SST[[id number]]                                                              [place] Top[[id number]]                                         Final result               Light Blue Top[[id number]]                                   Final result                 Please view results for these tests on the individual orders.   CBC AND DIFFERENTIAL    Narrative:     The following orders were created for panel  order CBC & Differential.  Procedure                               Abnormality         Status                     ---------                               -----------         ------                     CBC Auto Differential[[id number]]        Abnormal            Final result                 Please view results for these tests on the individual orders.   GREEN TOP   LAVENDER TOP   GRAY TOP   LIGHT BLUE TOP       Meds Given in ED:   Medications   sodium chloride 0.9 % flush 10 mL (has no administration in time range)   fluconazole (DIFLUCAN) tablet 150 mg (has no administration in time range)   cefTRIAXone (ROCEPHIN) 1,000 mg in sodium chloride 0.9 % 100 mL MBP (has no administration in time range)   sodium chloride 0.9 % bolus 1,000 mL (1,000 mL Intravenous New Bag 4/8/25 1245)           Last NIH score:                                                          Dysphagia screening results:  Patient Factors Component (Dysphagia:Stroke or Rule-out)  Best Eye Response: 4-->(E4) spontaneous (04/08/25 1110)  Best Motor Response: 6-->(M6) obeys commands (04/08/25 1110)  Best Verbal Response: 5-->(V5) oriented (04/08/25 1110)  [place] Coma Scale Score: 15 (04/08/25 1110)     [place] Coma Scale:  No data recorded     CIWA:        Restraint Type:            Isolation Status:  No active isolations

## 2025-04-08 NOTE — ED PROVIDER NOTES
Subjective   History of Present Illness  Mr Perez arrives via EMS from home with weakness. Tried to stand at bedside to urinate into a urinal but his legs wouldn't support him.  Was admitted here on 20 March through 2 April with elevated blood pressure.  He was discharged home.  His daughter lives with him.  He tells me since he has been home he has not been out of bed other than getting up at bedside.  Denies fevers, chills, cough, shortness of breath, or vomiting.  Does acknowledge numbness in his legs but tells me that has been present for over a year.  He tells me he has been taking all of his medicines.      Review of Systems    Past Medical History:   Diagnosis Date    Back injury     Diabetes mellitus     History of angina     Hypertension        Allergies   Allergen Reactions    Penicillins Hives    Valium [Diazepam] Hives       Past Surgical History:   Procedure Laterality Date    CORONARY STENT PLACEMENT      EMBOLECTOMY      FASCIOTOMY         No family history on file.    Social History     Socioeconomic History    Marital status:    Tobacco Use    Smoking status: Every Day     Current packs/day: 1.00     Types: Cigarettes    Smokeless tobacco: Never    Tobacco comments:     pt report smoking since he was [age]   Vaping Use    Vaping status: Never Used   Substance and Sexual Activity    Alcohol use: Never    Drug use: Never    Sexual activity: Defer           Objective   Physical Exam  Vitals and nursing note reviewed.   Constitutional:       General: He is not in acute distress.  HENT:      Head: Normocephalic and atraumatic.      Nose: Nose normal. No congestion or rhinorrhea.   Eyes:      General: No scleral icterus.     Conjunctiva/sclera: Conjunctivae normal.   Neck:      Comments: No JVD   Cardiovascular:      Rate and Rhythm: Normal rate and regular rhythm.      Heart sounds: No murmur heard.     No friction rub.   Pulmonary:      Effort: Pulmonary effort is normal.      Breath sounds:  Normal breath sounds. No wheezing or rales.   Abdominal:      General: Bowel sounds are normal.      Palpations: Abdomen is soft.      Tenderness: There is no abdominal tenderness. There is no guarding or rebound.   Musculoskeletal:         General: No tenderness.      Cervical back: Normal range of motion and neck supple.      Right lower leg: No edema.      Left lower leg: No edema.   Skin:     General: Skin is warm and dry.      Coloration: Skin is not pale.      Findings: No erythema.   Neurological:      General: No focal deficit present.      Mental Status: He is alert.      Motor: Weakness present.      Coordination: Coordination normal.      Comments: Attempted to have him stand at bedside after IV fluids, he was only able to get upright with assistance.  He stood over his walker for a few seconds before his legs gave out and he fell back onto the bed.         Procedures           ED Course  ED Course as of 04/08/25 1450   Tue Apr 08, 2025   1406 Cath urine shows too numerous to count white cells, no bacteria and moderate yeast. [DT]   1406 Spoke with him about findings.  He prefers to go home rather than come back in the hospital.  I had him stand at bedside on his walker.  He was only able to support his weight for a few seconds before he slumped back down into the bed.  He gave me permission to talk to his daughter with whom he lives. [DT]   1420 Spoke with his daughter.  She tells me he has had other falls and she cannot lift him or take care of him [DT]   1421 Consulted pharmacist regarding his urinalysis.  He recommends Rocephin and Diflucan.  History of Candida auris [DT]      ED Course User Index  [DT] Angel Mclean MD                                                       Medical Decision Making  Reviewed and interpreted records regarding recent admission.  Ordered and interpreted multiple labs.  Gave IV fluids.  Had reevaluation.  Consulted pharmacist and gave IV antibiotics and admitted him to  the hospital    Problems Addressed:  Fall in home, initial encounter: complicated acute illness or injury  Generalized weakness: chronic illness or injury with exacerbation, progression, or side effects of treatment  Yeast UTI: chronic illness or injury with exacerbation, progression, or side effects of treatment    Amount and/or Complexity of Data Reviewed  External Data Reviewed: labs and notes.  Labs: ordered. Decision-making details documented in ED Course.  Radiology: ordered. Decision-making details documented in ED Course.  ECG/medicine tests: ordered. Decision-making details documented in ED Course.    Risk  Prescription drug management.  Decision regarding hospitalization.        Final diagnoses:   Generalized weakness   Fall in home, initial encounter   Yeast UTI       ED Disposition  ED Disposition       ED Disposition   Decision to Admit    Condition   --    Comment   Level of Care: Telemetry [5]   Diagnosis: Failure to thrive in adult [918271]   Certification: I Certify That Inpatient Hospital Services Are Medically Necessary For Greater Than 2 Midnights                 No follow-up provider specified.       Medication List      No changes were made to your prescriptions during this visit.            Angel Mclean MD  04/08/25 3874

## 2025-04-08 NOTE — CASE MANAGEMENT/SOCIAL WORK
Discharge Planning Assessment  [facility]     Patient Name: Thomas Perez  MRN: [id number]  Today's Date: 4/8/2025    Admit Date: 4/8/2025    Plan: ONGOING   Discharge Needs Assessment       Row Name 04/08/25 1531       Living Environment    People in Home child(petra), adult    Primary Care Provided by self;child(petra)    Provides Primary Care For no one, unable/limited ability to care for self    Family Caregiver if Needed child(petra), adult    Quality of Family Relationships supportive    Able to Return to Prior Arrangements yes       Resource/Environmental Concerns    Transportation Concerns other (see comments)  Can use [employer] or [employer]       Transition Planning    Patient/Family Anticipates Transition to --  TBD    Patient/Family Anticipated Services at Transition --  TBD    Transportation Anticipated health plan transportation       Discharge Needs Assessment    Readmission Within the Last 30 Days current reason for admission unrelated to previous admission    Current Outpatient/Agency/Support Group homecare agency    Equipment Currently Used at Home walker, rolling;shower chair;wheelchair    Concerns to be Addressed discharge planning    Anticipated Changes Related to Illness inability to care for self    Current Discharge Risk chronically ill                   Discharge Plan       Row Name 04/08/25 1535       Plan    Plan ONGOING    Patient/Family in Agreement with Plan yes    Plan Comments Met with pt at bedside for DCP.  Pt lives with dtr in [facility] [place].  Independent with ADLs at baseline.  Recent admission at [facility] 3/20-4/2.  DCd on 4/2 with [facility].   At DC, he walked 200' on initial eval and 350' on re-eval with SBA.  He uses a RW at baseline.  Also has a WC.  Pt reports a fxl decline over the last week since hospital DC and now cannot walk and requires asst with ADLs.  Uses [employer] or [employer] (he's unsure which) for transportation.  Neither he nor dtr has no vehicle.  Confirmed he has [employer]  Medicaid primary and Medicare part B and Rx coverage.  DCP evolving.  CM will cont to follow for DC needs.                  Selected Continued Care - Prior Encounters Includes continued care and service providers with selected services from prior encounters from 1/8/2025 to 4/8/2025      Discharged on 4/2/2025 Admission date: 3/20/2025 - Discharge disposition: Home or Self Care      Home Medical Care       Service Provider Services Address Phone Fax Patient Preferred    [facility], Home Nursing, Home Medical  [facility] [address], [place] [address] [phone] --                             Demographic Summary       Row Name 04/08/25 1531       General Information    Admission Type inpatient    Referral Source admission list    Reason for Consult discharge planning    Preferred Language English       Contact Information    Permission Granted to Share Info With     Contact Information Obtained for                    Functional Status       Row Name 04/08/25 1531       Functional Status    Usual Activity Tolerance moderate    Current Activity Tolerance poor       Functional Status, IADL    Medications assistive person    Meal Preparation completely dependent    Housekeeping completely dependent    Laundry completely dependent    Shopping completely dependent                   Psychosocial    No documentation.                  Abuse/Neglect    No documentation.                  Legal    No documentation.                  Substance Abuse    No documentation.                  Patient Forms    No documentation.                     Ketty Lugo RN

## 2025-04-09 PROBLEM — R53.1 WEAKNESS: Status: ACTIVE | Noted: 2025-04-09

## 2025-04-09 LAB
ANION GAP SERPL CALCULATED.3IONS-SCNC: 9 MMOL/L (ref 5–15)
BACTERIA SPEC AEROBE CULT: NO GROWTH
BASOPHILS # BLD AUTO: 0.03 10*3/MM3 (ref 0–0.2)
BASOPHILS NFR BLD AUTO: 0.3 % (ref 0–1.5)
BUN SERPL-MCNC: 33 MG/DL (ref 8–23)
BUN/CREAT SERPL: 16.2 (ref 7–25)
CALCIUM SPEC-SCNC: 8.5 MG/DL (ref 8.6–10.5)
CHLORIDE SERPL-SCNC: 110 MMOL/L (ref 98–107)
CO2 SERPL-SCNC: 19 MMOL/L (ref 22–29)
CREAT SERPL-MCNC: 2.04 MG/DL (ref 0.76–1.27)
DEPRECATED RDW RBC AUTO: 50.1 FL (ref 37–54)
EGFRCR SERPLBLD CKD-EPI 2021: 33.6 ML/MIN/1.73
EOSINOPHIL # BLD AUTO: 0.18 10*3/MM3 (ref 0–0.4)
EOSINOPHIL NFR BLD AUTO: 2 % (ref 0.3–6.2)
ERYTHROCYTE [DISTWIDTH] IN BLOOD BY AUTOMATED COUNT: 14.5 % (ref 12.3–15.4)
GEN 5 1HR TROPONIN T REFLEX: 202 NG/L
GLUCOSE BLDC GLUCOMTR-MCNC: 208 MG/DL (ref 70–130)
GLUCOSE BLDC GLUCOMTR-MCNC: 234 MG/DL (ref 70–130)
GLUCOSE BLDC GLUCOMTR-MCNC: 243 MG/DL (ref 70–130)
GLUCOSE BLDC GLUCOMTR-MCNC: 282 MG/DL (ref 70–130)
GLUCOSE SERPL-MCNC: 129 MG/DL (ref 65–99)
HCT VFR BLD AUTO: 25.9 % (ref 37.5–51)
HGB BLD-MCNC: 8.1 G/DL (ref 13–17.7)
IMM GRANULOCYTES # BLD AUTO: 0.03 10*3/MM3 (ref 0–0.05)
IMM GRANULOCYTES NFR BLD AUTO: 0.3 % (ref 0–0.5)
LIPASE SERPL-CCNC: 14 U/L (ref 13–60)
LYMPHOCYTES # BLD AUTO: 1.54 10*3/MM3 (ref 0.7–3.1)
LYMPHOCYTES NFR BLD AUTO: 17.4 % (ref 19.6–45.3)
MAGNESIUM SERPL-MCNC: 2.2 MG/DL (ref 1.6–2.4)
MCH RBC QN AUTO: 29.7 PG (ref 26.6–33)
MCHC RBC AUTO-ENTMCNC: 31.3 G/DL (ref 31.5–35.7)
MCV RBC AUTO: 94.9 FL (ref 79–97)
MONOCYTES # BLD AUTO: 0.95 10*3/MM3 (ref 0.1–0.9)
MONOCYTES NFR BLD AUTO: 10.7 % (ref 5–12)
NEUTROPHILS NFR BLD AUTO: 6.13 10*3/MM3 (ref 1.7–7)
NEUTROPHILS NFR BLD AUTO: 69.3 % (ref 42.7–76)
NRBC BLD AUTO-RTO: 0 /100 WBC (ref 0–0.2)
PHOSPHATE SERPL-MCNC: 2.7 MG/DL (ref 2.5–4.5)
PLATELET # BLD AUTO: 171 10*3/MM3 (ref 140–450)
PMV BLD AUTO: 10.9 FL (ref 6–12)
POTASSIUM SERPL-SCNC: 4.8 MMOL/L (ref 3.5–5.2)
QT INTERVAL: 430 MS
QTC INTERVAL: 447 MS
RBC # BLD AUTO: 2.73 10*6/MM3 (ref 4.14–5.8)
SODIUM SERPL-SCNC: 138 MMOL/L (ref 136–145)
TROPONIN T % DELTA: 2
TROPONIN T NUMERIC DELTA: 3 NG/L
TROPONIN T SERPL HS-MCNC: 199 NG/L
WBC NRBC COR # BLD AUTO: 8.86 10*3/MM3 (ref 3.4–10.8)

## 2025-04-09 PROCEDURE — 83690 ASSAY OF LIPASE: CPT | Performed by: STUDENT IN AN ORGANIZED HEALTH CARE EDUCATION/TRAINING PROGRAM

## 2025-04-09 PROCEDURE — 93010 ELECTROCARDIOGRAM REPORT: CPT | Performed by: INTERNAL MEDICINE

## 2025-04-09 PROCEDURE — 25010000002 CEFTRIAXONE PER 250 MG: Performed by: STUDENT IN AN ORGANIZED HEALTH CARE EDUCATION/TRAINING PROGRAM

## 2025-04-09 PROCEDURE — 97162 PT EVAL MOD COMPLEX 30 MIN: CPT

## 2025-04-09 PROCEDURE — 63710000001 INSULIN GLARGINE PER 5 UNITS: Performed by: STUDENT IN AN ORGANIZED HEALTH CARE EDUCATION/TRAINING PROGRAM

## 2025-04-09 PROCEDURE — 63710000001 INSULIN LISPRO (HUMAN) PER 5 UNITS: Performed by: STUDENT IN AN ORGANIZED HEALTH CARE EDUCATION/TRAINING PROGRAM

## 2025-04-09 PROCEDURE — 80048 BASIC METABOLIC PNL TOTAL CA: CPT | Performed by: STUDENT IN AN ORGANIZED HEALTH CARE EDUCATION/TRAINING PROGRAM

## 2025-04-09 PROCEDURE — 84100 ASSAY OF PHOSPHORUS: CPT | Performed by: STUDENT IN AN ORGANIZED HEALTH CARE EDUCATION/TRAINING PROGRAM

## 2025-04-09 PROCEDURE — 82948 REAGENT STRIP/BLOOD GLUCOSE: CPT

## 2025-04-09 PROCEDURE — G0378 HOSPITAL OBSERVATION PER HR: HCPCS

## 2025-04-09 PROCEDURE — 93005 ELECTROCARDIOGRAM TRACING: CPT | Performed by: STUDENT IN AN ORGANIZED HEALTH CARE EDUCATION/TRAINING PROGRAM

## 2025-04-09 PROCEDURE — 85025 COMPLETE CBC W/AUTO DIFF WBC: CPT | Performed by: STUDENT IN AN ORGANIZED HEALTH CARE EDUCATION/TRAINING PROGRAM

## 2025-04-09 PROCEDURE — 25010000002 MORPHINE PER 10 MG: Performed by: NURSE PRACTITIONER

## 2025-04-09 PROCEDURE — 97165 OT EVAL LOW COMPLEX 30 MIN: CPT

## 2025-04-09 PROCEDURE — 83735 ASSAY OF MAGNESIUM: CPT | Performed by: STUDENT IN AN ORGANIZED HEALTH CARE EDUCATION/TRAINING PROGRAM

## 2025-04-09 PROCEDURE — 84484 ASSAY OF TROPONIN QUANT: CPT | Performed by: STUDENT IN AN ORGANIZED HEALTH CARE EDUCATION/TRAINING PROGRAM

## 2025-04-09 PROCEDURE — 25010000002 HEPARIN (PORCINE) PER 1000 UNITS: Performed by: STUDENT IN AN ORGANIZED HEALTH CARE EDUCATION/TRAINING PROGRAM

## 2025-04-09 PROCEDURE — 99232 SBSQ HOSP IP/OBS MODERATE 35: CPT | Performed by: STUDENT IN AN ORGANIZED HEALTH CARE EDUCATION/TRAINING PROGRAM

## 2025-04-09 RX ORDER — ALUMINA, MAGNESIA, AND SIMETHICONE 2400; 2400; 240 MG/30ML; MG/30ML; MG/30ML
30 SUSPENSION ORAL ONCE
Status: COMPLETED | OUTPATIENT
Start: 2025-04-09 | End: 2025-04-09

## 2025-04-09 RX ORDER — PANTOPRAZOLE SODIUM 40 MG/1
40 TABLET, DELAYED RELEASE ORAL
Status: DISCONTINUED | OUTPATIENT
Start: 2025-04-09 | End: 2025-04-17 | Stop reason: HOSPADM

## 2025-04-09 RX ORDER — LANOLIN ALCOHOL/MO/W.PET/CERES
500 CREAM (GRAM) TOPICAL DAILY
Status: DISCONTINUED | OUTPATIENT
Start: 2025-04-09 | End: 2025-04-11

## 2025-04-09 RX ORDER — SIMETHICONE 80 MG
80 TABLET,CHEWABLE ORAL 4 TIMES DAILY PRN
Status: DISCONTINUED | OUTPATIENT
Start: 2025-04-09 | End: 2025-04-17 | Stop reason: HOSPADM

## 2025-04-09 RX ORDER — MORPHINE SULFATE 2 MG/ML
1 INJECTION, SOLUTION INTRAMUSCULAR; INTRAVENOUS ONCE
Status: COMPLETED | OUTPATIENT
Start: 2025-04-09 | End: 2025-04-09

## 2025-04-09 RX ORDER — CALCIUM CARBONATE 500 MG/1
2 TABLET, CHEWABLE ORAL 3 TIMES DAILY PRN
Status: DISCONTINUED | OUTPATIENT
Start: 2025-04-09 | End: 2025-04-17 | Stop reason: HOSPADM

## 2025-04-09 RX ADMIN — Medication 10 ML: at 20:26

## 2025-04-09 RX ADMIN — HEPARIN SODIUM 5000 UNITS: 5000 INJECTION INTRAVENOUS; SUBCUTANEOUS at 14:48

## 2025-04-09 RX ADMIN — CARVEDILOL 25 MG: 12.5 TABLET, FILM COATED ORAL at 17:31

## 2025-04-09 RX ADMIN — MORPHINE SULFATE 1 MG: 2 INJECTION, SOLUTION INTRAMUSCULAR; INTRAVENOUS at 20:26

## 2025-04-09 RX ADMIN — CYANOCOBALAMIN TAB 1000 MCG 500 MCG: 1000 TAB at 12:43

## 2025-04-09 RX ADMIN — SODIUM CHLORIDE 1000 MG: 900 INJECTION INTRAVENOUS at 14:48

## 2025-04-09 RX ADMIN — PANTOPRAZOLE SODIUM 40 MG: 40 TABLET, DELAYED RELEASE ORAL at 17:28

## 2025-04-09 RX ADMIN — PANTOPRAZOLE SODIUM 40 MG: 40 TABLET, DELAYED RELEASE ORAL at 06:22

## 2025-04-09 RX ADMIN — HEPARIN SODIUM 5000 UNITS: 5000 INJECTION INTRAVENOUS; SUBCUTANEOUS at 22:57

## 2025-04-09 RX ADMIN — CALCIUM CARBONATE (ANTACID) CHEW TAB 500 MG 2 TABLET: 500 CHEW TAB at 17:15

## 2025-04-09 RX ADMIN — INSULIN LISPRO 6 UNITS: 100 INJECTION, SOLUTION INTRAVENOUS; SUBCUTANEOUS at 20:25

## 2025-04-09 RX ADMIN — INSULIN LISPRO 4 UNITS: 100 INJECTION, SOLUTION INTRAVENOUS; SUBCUTANEOUS at 08:47

## 2025-04-09 RX ADMIN — FLUCONAZOLE 200 MG: 200 TABLET ORAL at 08:48

## 2025-04-09 RX ADMIN — ATORVASTATIN CALCIUM 40 MG: 40 TABLET, FILM COATED ORAL at 20:26

## 2025-04-09 RX ADMIN — CARVEDILOL 25 MG: 12.5 TABLET, FILM COATED ORAL at 08:47

## 2025-04-09 RX ADMIN — HYDRALAZINE HYDROCHLORIDE 37.5 MG: 25 TABLET ORAL at 15:46

## 2025-04-09 RX ADMIN — HYDRALAZINE HYDROCHLORIDE 37.5 MG: 25 TABLET ORAL at 08:47

## 2025-04-09 RX ADMIN — ISOSORBIDE MONONITRATE 20 MG: 20 TABLET ORAL at 20:25

## 2025-04-09 RX ADMIN — Medication 10 ML: at 08:49

## 2025-04-09 RX ADMIN — ISOSORBIDE MONONITRATE 20 MG: 20 TABLET ORAL at 08:48

## 2025-04-09 RX ADMIN — INSULIN LISPRO 4 UNITS: 100 INJECTION, SOLUTION INTRAVENOUS; SUBCUTANEOUS at 12:44

## 2025-04-09 RX ADMIN — SIMETHICONE 80 MG: 80 TABLET, CHEWABLE ORAL at 16:34

## 2025-04-09 RX ADMIN — INSULIN GLARGINE 5 UNITS: 100 INJECTION, SOLUTION SUBCUTANEOUS at 20:25

## 2025-04-09 RX ADMIN — HYDRALAZINE HYDROCHLORIDE 37.5 MG: 25 TABLET ORAL at 20:26

## 2025-04-09 RX ADMIN — ALUMINUM HYDROXIDE, MAGNESIUM HYDROXIDE, DIMETHICONE 30 ML: 400; 400; 40 SUSPENSION ORAL at 15:58

## 2025-04-09 RX ADMIN — ISOSORBIDE MONONITRATE 20 MG: 20 TABLET ORAL at 15:46

## 2025-04-09 RX ADMIN — HEPARIN SODIUM 5000 UNITS: 5000 INJECTION INTRAVENOUS; SUBCUTANEOUS at 06:22

## 2025-04-09 RX ADMIN — INSULIN LISPRO 4 UNITS: 100 INJECTION, SOLUTION INTRAVENOUS; SUBCUTANEOUS at 17:21

## 2025-04-09 RX ADMIN — AMLODIPINE BESYLATE 10 MG: 10 TABLET ORAL at 08:48

## 2025-04-09 RX ADMIN — ASPIRIN 81 MG: 81 TABLET, CHEWABLE ORAL at 08:47

## 2025-04-09 RX ADMIN — NITROGLYCERIN 0.4 MG: 0.4 TABLET, ORALLY DISINTEGRATING SUBLINGUAL at 16:16

## 2025-04-09 NOTE — CASE MANAGEMENT/SOCIAL WORK
Continued Stay Note  [facility]     Patient Name: Thomas Perez  MRN: [id number]  Today's Date: 4/9/2025    Admit Date: 4/8/2025    Plan: ONGOING   Discharge Plan       Row Name 04/09/25 1531       Plan    Plan Comments MSW attempted to speak with pt at bedside. MSW called and spoke with pt's daughter. Pt's daughter reports that she is hoping pt can go to short term rehab and then return back home with her. Pt's daughter reports she would like pt to go to [facility] if able. MSW called referral to [facility] with [facility].                   Discharge Codes    No documentation.                 Expected Discharge Date and Time       Expected Discharge Date Expected Discharge Time    Apr 12, 2025               RENAE Aguilar

## 2025-04-09 NOTE — PLAN OF CARE
Goal Outcome Evaluation:  Plan of Care Reviewed With: patient        Progress: no change  Outcome Evaluation: Pt presents with recent falls, generalized weakness, standing balance deficit, and poor endurance warranting IP OT services to promote return to PLOF. Poor safety awareness and inconsistencies observed with performance/effort. Rec d/c to IRF for best functional outcomes.    Anticipated Discharge Disposition (OT): inpatient rehabilitation facility

## 2025-04-09 NOTE — PLAN OF CARE
Goal Outcome Evaluation:  Plan of Care Reviewed With: patient           Outcome Evaluation: Pt presents with strength, balance, endurance, and safety awareness below baseline contributing to bed mobility, transfer, and ambulation deficits. Inconsistencies noted between formal MMT and functional mobility tasks. Pt will benefit from PT to address these aforementioned deficits and return to PLOF. PT rec IRF upon dc when medically appropriate.    Anticipated Discharge Disposition (PT): inpatient rehabilitation facility

## 2025-04-09 NOTE — THERAPY EVALUATION
Patient Name: Thomas Perez  : 1950    MRN: [id number]                              Today's Date: 2025       Admit Date: 2025    Visit Dx:     ICD-10-CM ICD-9-CM   1. Generalized weakness  R53.1 780.79   2. Fall in home, initial encounter  W19.XXXA E888.9    Y92.009 E849.0   3. Yeast UTI  B37.49 112.2     Patient Active Problem List   Diagnosis    Type 2 diabetes mellitus with hyperglycemia, with long-term current use of insulin    Tobacco abuse    Coronary artery disease involving native coronary artery of native heart with angina pectoris    Anemia    CKD (chronic kidney disease) stage 3, GFR 30-59 ml/min    PAD (peripheral artery disease)    Medical non-compliance    COPD (chronic obstructive pulmonary disease)    Bilateral lower extremity edema    Hypertensive emergency    Likely type II NSTEMI from hypertensive emergency    Abnormal nuclear stress test    Moderate protein-calorie malnutrition    Type 2 myocardial infarction    Failure to thrive in adult     Past Medical History:   Diagnosis Date    Back injury     Diabetes mellitus     History of angina     Hypertension      Past Surgical History:   Procedure Laterality Date    CORONARY STENT PLACEMENT      EMBOLECTOMY      FASCIOTOMY        General Information       Row Name 25 1017          OT Time and Intention    Document Type evaluation  -CS     Mode of Treatment occupational therapy  -CS       Row Name 25 1017          General Information    Patient Profile Reviewed yes  -CS     Existing Precautions/Restrictions fall;other (see comments);oxygen therapy device and L/min  chronic B foot drop  -CS     Barriers to Rehab medically complex  -CS       Row Name 25 1017          Cognition    Orientation Status (Cognition) oriented x 3  -CS       Row Name 25 1017          Safety Issues/Impairments Affecting Functional Mobility    Safety Issues Affecting Function (Mobility) insight into deficits/self-awareness;safety precaution  awareness;awareness of need for assistance;safety precautions follow-through/compliance;judgment  -CS     Impairments Affecting Function (Mobility) balance;endurance/activity tolerance;strength;motor control;sensation/sensory awareness  -CS               User Key  (r) = Recorded By, (t) = Taken By, (c) = Cosigned By      Initials Name Provider Type    CS Douglas Avery OT Occupational Therapist                     Mobility/ADL's       Row Name 04/09/25 1018          Bed Mobility    Bed Mobility supine-sit;scooting/bridging  -CS     Scooting/Bridging [place] (Bed Mobility) contact guard;verbal cues  -CS     Supine-Sit [place] (Bed Mobility) contact guard;verbal cues  -CS     Assistive Device (Bed Mobility) bed rails;head of bed elevated  -CS     Comment, (Bed Mobility) no dizziness reported, impulsive with need to stand  -CS       Row Name 04/09/25 1018          Transfers    Transfers sit-stand transfer  -       Row Name 04/09/25 1018          Bed-Chair Transfer    Bed-Chair [place] (Transfers) minimum assist (75% patient effort);2 person assist  -CS       [facility] Name 04/09/25 1018          Sit-Stand Transfer    Sit-Stand [place] (Transfers) minimum assist (75% patient effort);2 person assist  -CS       Row Name 04/09/25 1018          Activities of Daily Living    BADL Assessment/Intervention lower body dressing;grooming;feeding  -[facility] Name 04/09/25 1018          Lower Body Dressing Assessment/Training    [place] Level (Lower Body Dressing) don;pants/bottoms;contact guard assist  -CS     Position (Lower Body Dressing) supported standing;unsupported sitting  -CS     Comment, (Lower Body Dressing) CGA for dynamic sit-stand balance during pants pullup  -       Row Name 04/09/25 1018          Grooming Assessment/Training    [place] Level (Grooming) wash face, hands;set up  -CS     Position (Grooming) supported sitting  -CS       Row Name 04/09/25 1018          Self-Feeding  Assessment/Training    [place] Level (Feeding) finger foods;liquids to mouth;prepare tray/open items;scoop food and bring to mouth;independent  -CS     Position (Feeding) supported sitting  -CS               User Key  (r) = Recorded By, (t) = Taken By, (c) = Cosigned By      Initials Name Provider Type    CS Douglas Avery OT Occupational Therapist                   Obj/Interventions       Row Name 04/09/25 1021          Sensory Assessment (Somatosensory)    Sensory Assessment (Somatosensory) bilateral UE  -CS     Left LE Sensory Assessment general sensation;light touch awareness;light touch localization;intact  -CS       Row Name 04/09/25 1021          Vision Assessment/Intervention    Visual Impairment/Limitations blurry vision  -     Vision Assessment Comment reports blurred and impaired vision, presents heavy lidded and lethargic, tracking intact and full to confrontation, able to read time on monitor across room  -CS       Row Name 04/09/25 1021          Range of Motion Comprehensive    General Range of Motion bilateral upper extremity ROM WFL  -       Row Name 04/09/25 1021          Strength Comprehensive (MMT)    General Manual Muscle Testing (MMT) Assessment upper extremity strength deficits identified  -     Comment, General Manual Muscle Testing (MMT) Assessment BUE grossly 4/5, symmetrical this date  -       Row Name 04/09/25 1021          Motor Skills    Motor Skills coordination;functional endurance  -     Coordination bilateral;upper extremity;bimanual skills;WFL  -     Functional Endurance requiring supplemental O2 this date for O2 sats > 90%  -       Row Name 04/09/25 1021          Balance    Balance Assessment sitting static balance;sitting dynamic balance;standing static balance;standing dynamic balance  -     Static Sitting Balance standby assist  -CS     Dynamic Sitting Balance contact guard  -CS     Position, Sitting Balance unsupported;sitting edge of bed  -CS     Static  Standing Balance contact guard  -CS     Dynamic Standing Balance minimal assist;1 person to manage equipment;1-person assist  -CS     Position/Device Used, Standing Balance unsupported  -CS     Balance Interventions standing;sit to stand;sitting;occupation based/functional task  -CS               User Key  (r) = Recorded By, (t) = Taken By, (c) = Cosigned By      Initials Name Provider Type    CS Douglas Avery, OT Occupational Therapist                   Goals/Plan       Row Name 04/09/25 1030          Transfer Goal 1 (OT)    Activity/Assistive Device (Transfer Goal 1, OT) bed-to-chair/chair-to-bed;toilet  -CS     [place] Level/Cues Needed (Transfer Goal 1, OT) modified independence  -CS     Strategies/Barriers (Transfers Goal 1, OT) AD recs per PT  -CS     Progress/Outcome (Transfer Goal 1, OT) new goal  -CS       Row Name 04/09/25 1030          Dressing Goal 1 (OT)    Activity/Device (Dressing Goal 1, OT) lower body dressing  -CS     [place]/Cues Needed (Dressing Goal 1, OT) independent  -CS     Time Frame (Dressing Goal 1, OT) short term goal (STG);5 days  -CS     Progress/Outcome (Dressing Goal 1, OT) goal ongoing  -CS       Row Name 04/09/25 1030          Grooming Goal 1 (OT)    Activity/Device (Grooming Goal 1, OT) hair care;oral care;wash face, hands  -CS     [place] (Grooming Goal 1, OT) supervision required  -CS     Time Frame (Grooming Goal 1, OT) short term goal (STG);5 days  -CS     Strategies/Barriers (Grooming Goal 1, OT) sinkside  -CS     Progress/Outcome (Grooming Goal 1, OT) goal ongoing  -CS       Row Name 04/09/25 1030          Strength Goal 1 (OT)    Time Frame (Strength Goal 1, OT) long term goal (LTG)  -CS     Strategies/Barriers (Strength Goal 1, OT) Increase gross BUE strength 1/2 muscle grade to promote safety/Ind with ADLs and related transfers  -CS     Progress/Outcome (Strength Goal 1, OT) goal ongoing  -CS       Row Name 04/09/25 1030          Therapy Assessment/Plan  (OT)    Planned Therapy Interventions (OT) activity tolerance training;functional balance retraining;occupation/activity based interventions;ROM/therapeutic exercise;strengthening exercise;transfer/mobility retraining;patient/caregiver education/training;neuromuscular control/coordination retraining;cognitive/visual perception retraining;BADL retraining;adaptive equipment training  -CS               User Key  (r) = Recorded By, (t) = Taken By, (c) = Cosigned By      Initials Name Provider Type    CS Douglas Avery, OT Occupational Therapist                   Clinical Impression       Row Name 04/09/25 1023          Plan of Care Review    Plan of Care Reviewed With patient  -CS     Progress no change  -CS     Outcome Evaluation Pt presents with recent falls, generalized weakness, standing balance deficit, and poor endurance warranting IP OT services to promote return to PLOF. Poor safety awareness and inconsistencies observed with performance/effort. Rec d/c to IRF for best functional outcomes.  -CS       Row Name 04/09/25 1023          Therapy Assessment/Plan (OT)    Patient/Family Therapy Goal Statement (OT) return to baseline, no more falls  -CS     Rehab Potential (OT) good  -CS     Criteria for Skilled Therapeutic Interventions Met (OT) yes;meets criteria;skilled treatment is necessary  -CS     Therapy Frequency (OT) daily  -CS     Predicted Duration of Therapy Intervention (OT) 7 days  -CS       Row Name 04/09/25 1023          Therapy Plan Review/Discharge Plan (OT)    Anticipated Discharge Disposition (OT) inpatient rehabilitation facility  -       Row Name 04/09/25 1023          Vital Signs    Pre Systolic BP Rehab --  RN cleared for eval  -CS     O2 Delivery Pre Treatment room air  -CS     O2 Delivery Intra Treatment room air  -CS     O2 Delivery Post Treatment room air  -CS     Pre Patient Position Supine  -CS     Intra Patient Position Standing  -CS     Post Patient Position Sitting  -CS       Row Name  04/09/25 1023          Positioning and Restraints    Pre-Treatment Position in bed  -CS     Post Treatment Position chair  -CS     In Chair notified nsg;reclined;sitting;call light within reach;encouraged to call for assist;exit alarm on;on mechanical lift sling;waffle cushion  -CS               User Key  (r) = Recorded By, (t) = Taken By, (c) = Cosigned By      Initials Name Provider Type    CS Douglas Avery, OT Occupational Therapist                   Outcome Measures       Row Name 04/09/25 1032          How much help from another is currently needed...    Putting on and taking off regular lower body clothing? 3  -CS     Bathing (including washing, rinsing, and drying) 2  -CS     Toileting (which includes using toilet bed pan or urinal) 3  -CS     Putting on and taking off regular upper body clothing 3  -CS     Taking care of personal grooming (such as brushing teeth) 3  -CS     Eating meals 4  -CS     AM-PAC 6 Clicks Score (OT) 18  -CS       Row Name 04/09/25 0933          How much help from another person do you currently need...    Turning from your back to your side while in flat bed without using bedrails? 4  -KR     Moving from lying on back to sitting on the side of a flat bed without bedrails? 3  -KR     Moving to and from a bed to a chair (including a wheelchair)? 3  -KR     Standing up from a chair using your arms (e.g., wheelchair, bedside chair)? 3  -KR     Climbing 3-5 steps with a railing? 2  -KR     To walk in hospital room? 2  -KR     AM-PAC 6 Clicks Score (PT) 17  -KR     Highest Level of Mobility Goal 5 --> Static standing  -KR       Row Name 04/09/25 1032 04/09/25 0933       Functional Assessment    Outcome Measure Options AM-PAC 6 Clicks Daily Activity (OT)  -CS AM-PAC 6 Clicks Basic Mobility (PT)  -KR              User Key  (r) = Recorded By, (t) = Taken By, (c) = Cosigned By      Initials Name Provider Type    CS Douglas Avery, KIMI Occupational Therapist    Nat Ugalde, PT  Physical Therapist                    Occupational Therapy Education       Title: PT OT SLP Therapies (In Progress)       Topic: Occupational Therapy (Done)       Point: ADL training (Done)       Learning Progress Summary            Patient Acceptance, E,D, VU,DU by  at 4/9/2025 1032                      Point: Home exercise program (Done)       Learning Progress Summary            Patient Acceptance, E,D, VU,DU by  at 4/9/2025 1032                      Point: Precautions (Done)       Learning Progress Summary            Patient Acceptance, E,D, VU,DU by  at 4/9/2025 1032                      Point: Body mechanics (Done)       Learning Progress Summary            Patient Acceptance, E,D, VU,DU by  at 4/9/2025 1032                                      User Key       Initials Effective Dates Name Provider Type Discipline     06/16/21 -  Douglas Avery OT Occupational Therapist OT                  OT Recommendation and Plan  Planned Therapy Interventions (OT): activity tolerance training, functional balance retraining, occupation/activity based interventions, ROM/therapeutic exercise, strengthening exercise, transfer/mobility retraining, patient/caregiver education/training, neuromuscular control/coordination retraining, cognitive/visual perception retraining, BADL retraining, adaptive equipment training  Therapy Frequency (OT): daily  Plan of Care Review  Plan of Care Reviewed With: patient  Progress: no change  Outcome Evaluation: Pt presents with recent falls, generalized weakness, standing balance deficit, and poor endurance warranting IP OT services to promote return to PLOF. Poor safety awareness and inconsistencies observed with performance/effort. Rec d/c to IRF for best functional outcomes.     Time Calculation:   Evaluation Complexity (OT)  Review Occupational Profile/Medical/Therapy History Complexity: expanded/moderate complexity  Assessment, Occupational Performance/Identification of Deficit  Complexity: 1-3 performance deficits  Clinical Decision Making Complexity (OT): problem focused assessment/low complexity  Overall Complexity of Evaluation (OT): low complexity     Time Calculation- OT       Row Name 04/09/25 1032             Time Calculation- OT    OT Start Time 0848  -CS      OT Received On 04/09/25  -CS      OT Goal Re-Cert Due Date 04/19/25  -CS         Untimed Charges    OT Eval/Re-eval Minutes 47  -CS         Total Minutes    Untimed Charges Total Minutes 47  -CS       Total Minutes 47  -CS                User Key  (r) = Recorded By, (t) = Taken By, (c) = Cosigned By      Initials Name Provider Type    CS Douglas Avery, OT Occupational Therapist                  Therapy Charges for Today       Code Description Service Date Service Provider Modifiers Qty    81458406015 HC OT EVAL LOW COMPLEXITY 4 4/9/2025 Douglas Avery OT GO 1                 Douglas Avery OT  4/9/2025

## 2025-04-09 NOTE — PLAN OF CARE
Problem: Adult Inpatient Plan of Care  Goal: Plan of Care Review  Outcome: Progressing  Goal: Patient-Specific Goal (Individualized)  Outcome: Progressing  Goal: Absence of Hospital-Acquired Illness or Injury  Outcome: Progressing  Intervention: Identify and Manage Fall Risk  Recent Flowsheet Documentation  Taken 4/9/2025 0000 by Екатерина Duenas RN  Safety Promotion/Fall Prevention:   activity supervised   safety round/check completed  Taken 4/8/2025 2200 by Екатерина Duenas RN  Safety Promotion/Fall Prevention:   activity supervised   safety round/check completed  Taken 4/8/2025 2000 by Екатерина Duenas RN  Safety Promotion/Fall Prevention:   activity supervised   safety round/check completed  Intervention: Prevent Skin Injury  Recent Flowsheet Documentation  Taken 4/9/2025 0000 by Екатерина Duenas RN  Body Position:   side-lying   left  Skin Protection:   incontinence pads utilized   skin sealant/moisture barrier applied   transparent dressing maintained  Taken 4/8/2025 2200 by Екатерина Duenas RN  Body Position:   position changed independently   supine  Skin Protection:   incontinence pads utilized   skin sealant/moisture barrier applied   transparent dressing maintained  Taken 4/8/2025 2000 by Екатерина Duenas RN  Body Position:   side-lying   right  Skin Protection:   incontinence pads utilized   skin sealant/moisture barrier applied   transparent dressing maintained  Intervention: Prevent and Manage VTE (Venous Thromboembolism) Risk  Recent Flowsheet Documentation  Taken 4/9/2025 0000 by Екатерина Duenas RN  VTE Prevention/Management:   SCDs (sequential compression devices) off   patient refused intervention   other (see comments)  Taken 4/8/2025 2200 by Екатерина Duenas RN  VTE Prevention/Management:   SCDs (sequential compression devices) off   patient refused intervention   other (see comments)  Taken 4/8/2025 2000 by Екатерина Duenas RN  VTE Prevention/Management: (HEPARIN)   SCDs (sequential  compression devices) off   patient refused intervention   other (see comments)  Intervention: Prevent Infection  Recent Flowsheet Documentation  Taken 4/9/2025 0000 by Екатерина Duenas RN  Infection Prevention: environmental surveillance performed  Taken 4/8/2025 2200 by Екатерина Duenas RN  Infection Prevention: environmental surveillance performed  Taken 4/8/2025 2000 by Екатерина Duenas RN  Infection Prevention: environmental surveillance performed  Goal: Optimal Comfort and Wellbeing  Outcome: Progressing  Intervention: Provide Person-Centered Care  Recent Flowsheet Documentation  Taken 4/9/2025 0000 by Екатерина Duenas RN  Trust Relationship/Rapport:   care explained   choices provided   emotional support provided   empathic listening provided   questions answered   reassurance provided   questions encouraged   thoughts/feelings acknowledged  Taken 4/8/2025 2200 by Екатерина Duenas RN  Trust Relationship/Rapport:   care explained   choices provided   emotional support provided   empathic listening provided   questions answered   reassurance provided   questions encouraged   thoughts/feelings acknowledged  Taken 4/8/2025 2000 by Екатерина Duenas RN  Trust Relationship/Rapport:   care explained   choices provided   emotional support provided   empathic listening provided   questions answered   reassurance provided   questions encouraged   thoughts/feelings acknowledged  Goal: Readiness for Transition of Care  Outcome: Progressing   Goal Outcome Evaluation:

## 2025-04-09 NOTE — THERAPY EVALUATION
Patient Name: Thomas Perez  : 1950    MRN: [id number]                              Today's Date: 2025       Admit Date: 2025    Visit Dx:     ICD-10-CM ICD-9-CM   1. Generalized weakness  R53.1 780.79   2. Fall in home, initial encounter  W19.XXXA E888.9    Y92.009 E849.0   3. Yeast UTI  B37.49 112.2     Patient Active Problem List   Diagnosis    Type 2 diabetes mellitus with hyperglycemia, with long-term current use of insulin    Tobacco abuse    Coronary artery disease involving native coronary artery of native heart with angina pectoris    Anemia    CKD (chronic kidney disease) stage 3, GFR 30-59 ml/min    PAD (peripheral artery disease)    Medical non-compliance    COPD (chronic obstructive pulmonary disease)    Bilateral lower extremity edema    Hypertensive emergency    Likely type II NSTEMI from hypertensive emergency    Abnormal nuclear stress test    Moderate protein-calorie malnutrition    Type 2 myocardial infarction    Failure to thrive in adult     Past Medical History:   Diagnosis Date    Back injury     Diabetes mellitus     History of angina     Hypertension      Past Surgical History:   Procedure Laterality Date    CORONARY STENT PLACEMENT      EMBOLECTOMY      FASCIOTOMY        General Information       Row Name 25          Physical Therapy Time and Intention    Document Type evaluation  -KR     Mode of Treatment physical therapy;individual therapy  -KR       Row Name 25          General Information    Patient Profile Reviewed yes  -KR     Prior Level of Function --  pt with recent [facility] admission 2025, dc'd home ambulating 350' with SBA.  -KR     Existing Precautions/Restrictions fall;other (see comments);oxygen therapy device and L/min  chronic B foot drop  -KR     Barriers to Rehab medically complex  -KR       Row Name 25          Living Environment    Current Living Arrangements home  -KR     People in Home child(petra), adult  -KR       Row  Name 04/09/25 0920          Home Main Entrance    Number of Stairs, Main Entrance two  -KR     Stair Railings, Main Entrance none  -KR       Row Name 04/09/25 0920          Stairs Within Home, Primary    Number of Stairs, Within Home, Primary none  -KR       Row Name 04/09/25 0920          Cognition    Orientation Status (Cognition) oriented x 3  -KR       Row Name 04/09/25 0920          Safety Issues/Impairments Affecting Functional Mobility    Safety Issues Affecting Function (Mobility) safety precaution awareness;safety precautions follow-through/compliance;judgment;insight into deficits/self-awareness  -KR     Impairments Affecting Function (Mobility) balance;endurance/activity tolerance;strength;motor control;sensation/sensory awareness  -KR               User Key  (r) = Recorded By, (t) = Taken By, (c) = Cosigned By      Initials Name Provider Type    Nat Ugalde PT Physical Therapist                   Mobility       Row Name 04/09/25 0923          Bed-Chair Transfer    Bed-Chair [place] (Transfers) minimum assist (75% patient effort);2 person assist  -KR     Comment, (Bed-Chair Transfer) steps to chair  -KR       Row Name 04/09/25 0923          Sit-Stand Transfer    Sit-Stand [place] (Transfers) minimum assist (75% patient effort);2 person assist  -KR     Comment, (Sit-Stand Transfer) 1x from chair  -KR       Row Name 04/09/25 0923          Gait/Stairs (Locomotion)    [place] Level (Gait) unable to assess  -KR               User Key  (r) = Recorded By, (t) = Taken By, (c) = Cosigned By      Initials Name Provider Type    Nat Ugalde PT Physical Therapist                   Obj/Interventions       Row Name 04/09/25 0924          Range of Motion Comprehensive    General Range of Motion bilateral lower extremity ROM WNL  -KR       Row Name 04/09/25 0924          Strength Comprehensive (MMT)    General Manual Muscle Testing (MMT) Assessment lower extremity strength deficits  identified  -KR     Comment, General Manual Muscle Testing (MMT) Assessment inconsistencies noted between formal MMT and functional mobility. B hip flexion 2/5, B knee extension 2/5, B knee flexion 1/5, B ankle DF 0/5, 1/5 PF. However, pt able to don pants in sitting, progress LEs to EOB without assist, and no knee buckling while standing.   -KR       Row Name 04/09/25 0924          Balance    Balance Assessment sitting static balance;sitting dynamic balance;standing static balance;standing dynamic balance  -KR     Static Sitting Balance standby assist  -KR     Dynamic Sitting Balance standby assist  -KR     Position, Sitting Balance unsupported;sitting in chair  -KR     Static Standing Balance contact guard  -KR     Dynamic Standing Balance minimal assist;2-person assist;non-verbal cues (demo/gesture);verbal cues  -KR     Position/Device Used, Standing Balance unsupported  -KR     Balance Interventions sitting;standing;sit to stand;static;dynamic  -KR       Row Name 04/09/25 0924          Sensory Assessment (Somatosensory)    Left LE Sensory Assessment general sensation;light touch awareness;light touch localization;impaired  -KR     Right LE Sensory Assessment general sensation;light touch awareness;light touch localization;impaired  -KR               User Key  (r) = Recorded By, (t) = Taken By, (c) = Cosigned By      Initials Name Provider Type    Nat Ugalde, PT Physical Therapist                   Goals/Plan       Row Name 04/09/25 0931          Bed Mobility Goal 1 (PT)    Activity/Assistive Device (Bed Mobility Goal 1, PT) bed mobility activities, all  -KR     [place] Level/Cues Needed (Bed Mobility Goal 1, PT) independent  -KR     Time Frame (Bed Mobility Goal 1, PT) short term goal (STG);4 days  -KR       Row Name 04/09/25 0931          Transfer Goal 1 (PT)    Activity/Assistive Device (Transfer Goal 1, PT) sit-to-stand/stand-to-sit;bed-to-chair/chair-to-bed  -KR     [place] Level/Cues  Needed (Transfer Goal 1, PT) independent  -KR     Time Frame (Transfer Goal 1, PT) long term goal (LTG);1 week  -KR       Row Name 04/09/25 0931          Gait Training Goal 1 (PT)    Activity/Assistive Device (Gait Training Goal 1, PT) gait (walking locomotion);improve balance and speed;increase endurance/gait distance  -KR     [place] Level (Gait Training Goal 1, PT) independent  -KR     Distance (Gait Training Goal 1, PT) 150  -KR     Time Frame (Gait Training Goal 1, PT) long term goal (LTG);1 week  -KR       Row Name 04/09/25 0931          Stairs Goal 1 (PT)    Activity/Assistive Device (Stairs Goal 1, PT) stairs, all skills  -KR     [place] Level/Cues Needed (Stairs Goal 1, PT) contact guard required  -KR     Number of Stairs (Stairs Goal 1, PT) 2  -KR     Time Frame (Stairs Goal 1, PT) long term goal (LTG);1 week  -KR       Row Name 04/09/25 0931          Therapy Assessment/Plan (PT)    Planned Therapy Interventions (PT) balance training;bed mobility training;gait training;home exercise program;neuromuscular re-education;patient/family education;postural re-education;transfer training;stretching;strengthening;stair training;ROM (range of motion);orthotic fitting/training  -KR               User Key  (r) = Recorded By, (t) = Taken By, (c) = Cosigned By      Initials Name Provider Type    Nat Ugalde, PT Physical Therapist                   Clinical Impression       Row Name 04/09/25 0929          Pain    Pretreatment Pain Rating 0/10 - no pain  -KR     Posttreatment Pain Rating 0/10 - no pain  -KR       Row Name 04/09/25 0929          Plan of Care Review    Plan of Care Reviewed With patient  -KR     Outcome Evaluation Pt presents with strength, balance, endurance, and safety awareness below baseline contributing to bed mobility, transfer, and ambulation deficits. Inconsistencies noted between formal MMT and functional mobility tasks. Pt will benefit from PT to address these aforementioned  deficits and return to PLOF. PT rec IRF upon dc when medically appropriate.  -KR       Row Name 04/09/25 0929          Therapy Assessment/Plan (PT)    Rehab Potential (PT) good  -KR     Criteria for Skilled Interventions Met (PT) yes;meets criteria;skilled treatment is necessary  -KR     Therapy Frequency (PT) daily  -KR     Predicted Duration of Therapy Intervention (PT) 1 week  -KR       Row Name 04/09/25 0929          Vital Signs    Pre Patient Position Sitting  -KR     Intra Patient Position Standing  -KR     Post Patient Position Sitting  -KR       Row Name 04/09/25 0929          Positioning and Restraints    Pre-Treatment Position in bed  -KR     Post Treatment Position chair  -KR     In Chair notified nsg;reclined;call light within reach;exit alarm on;encouraged to call for assist;waffle cushion;legs elevated  -KR               User Key  (r) = Recorded By, (t) = Taken By, (c) = Cosigned By      Initials Name Provider Type    Nat Ugalde PT Physical Therapist                   Outcome Measures       Row Name 04/09/25 0933          How much help from another person do you currently need...    Turning from your back to your side while in flat bed without using bedrails? 4  -KR     Moving from lying on back to sitting on the side of a flat bed without bedrails? 3  -KR     Moving to and from a bed to a chair (including a wheelchair)? 3  -KR     Standing up from a chair using your arms (e.g., wheelchair, bedside chair)? 3  -KR     Climbing 3-5 steps with a railing? 2  -KR     To walk in hospital room? 2  -KR     AM-PAC 6 Clicks Score (PT) 17  -KR     Highest Level of Mobility Goal 5 --> Static standing  -KR       Row Name 04/09/25 0933          Functional Assessment    Outcome Measure Options AM-PAC 6 Clicks Basic Mobility (PT)  -KR               User Key  (r) = Recorded By, (t) = Taken By, (c) = Cosigned By      Initials Name Provider Type    Nat Ugalde PT Physical Therapist                                  Physical Therapy Education       Title: PT OT SLP Therapies (In Progress)       Topic: Physical Therapy (In Progress)       Point: Mobility training (In Progress)       Learning Progress Summary            Patient Acceptance, E, NR by KR at 4/9/2025 0934                      Point: Home exercise program (Not Started)       Learner Progress:  Not documented in this visit.              Point: Body mechanics (In Progress)       Learning Progress Summary            Patient Acceptance, E, NR by KR at 4/9/2025 0934                      Point: Precautions (In Progress)       Learning Progress Summary            Patient Acceptance, E, NR by KR at 4/9/2025 0934                                      User Key       Initials Effective Dates Name Provider Type Discipline     12/30/22 -  Nat Drummond, PT Physical Therapist PT                  PT Recommendation and Plan  Planned Therapy Interventions (PT): balance training, bed mobility training, gait training, home exercise program, neuromuscular re-education, patient/family education, postural re-education, transfer training, stretching, strengthening, stair training, ROM (range of motion), orthotic fitting/training  Outcome Evaluation: Pt presents with strength, balance, endurance, and safety awareness below baseline contributing to bed mobility, transfer, and ambulation deficits. Inconsistencies noted between formal MMT and functional mobility tasks. Pt will benefit from PT to address these aforementioned deficits and return to PLOF. PT rec IRF upon dc when medically appropriate.     Time Calculation:   PT Evaluation Complexity  History, PT Evaluation Complexity: 3 or more personal factors and/or comorbidities  Examination of Body Systems (PT Eval Complexity): total of 4 or more elements  Clinical Presentation (PT Evaluation Complexity): evolving  Clinical Decision Making (PT Evaluation Complexity): moderate complexity  Overall Complexity (PT Evaluation  Complexity): moderate complexity     PT Charges       Row Name 04/09/25 0938             Time Calculation    Start Time 0902  -KR      PT Received On 04/09/25  -KR      PT Goal Re-Cert Due Date 04/19/25  -KR         Untimed Charges    PT Eval/Re-eval Minutes 47  -KR         Total Minutes    Untimed Charges Total Minutes 47  -KR       Total Minutes 47  -KR                User Key  (r) = Recorded By, (t) = Taken By, (c) = Cosigned By      Initials Name Provider Type    KR Nat Drummond, DUYEN Physical Therapist                  Therapy Charges for Today       Code Description Service Date Service Provider Modifiers Qty    52862079328 HC PT EVAL MOD COMPLEXITY 4 4/9/2025 Nat Drummond, PT GP 1            PT G-Codes  Outcome Measure Options: AM-PAC 6 Clicks Basic Mobility (PT)  AM-PAC 6 Clicks Score (PT): 17  PT Discharge Summary  Anticipated Discharge Disposition (PT): inpatient rehabilitation facility    Nat Drummond PT  4/9/2025

## 2025-04-09 NOTE — PROGRESS NOTES
[facility] Medicine Services  PROGRESS NOTE    Patient Name: Thomas Perez  : 1950  MRN: [id number]    Date of Admission: 2025  Primary Care Physician: Brock Randolph PA    Subjective   Subjective     CC:  weakness    HPI:  Patient continued to complain of bilateral lower extremity weakness this morning.  Otherwise no acute complaints.      Objective   Objective     Vital Signs:   Temp:  [98.3 °F (36.8 °C)-99.2 °F (37.3 °C)] 98.4 °F (36.9 °C)  Heart Rate:  [63-71] 71  Resp:  [18] 18  BP: (140-179)/(55-83) 162/55  Flow (L/min) (Oxygen Therapy):  [2] 2     Physical Exam  Constitutional:       General: He is not in acute distress.  Cardiovascular:      Rate and Rhythm: Normal rate and regular rhythm.      Heart sounds: Normal heart sounds.   Pulmonary:      Effort: Pulmonary effort is normal.      Breath sounds: Normal breath sounds.   Abdominal:      General: There is no distension.      Palpations: Abdomen is soft.      Tenderness: There is no abdominal tenderness.   Musculoskeletal:      Right lower leg: No edema.      Left lower leg: No edema.      Comments: Will not move either lower extremity on command however patient observed using his legs to maneuver himself in bed.   Neurological:      General: No focal deficit present.      Mental Status: He is alert.          Results Reviewed:  LAB RESULTS:      Lab 25  0409 25  1240 25  1129   WBC 8.86  --  15.68*   HEMOGLOBIN 8.1*  --  9.5*   HEMATOCRIT 25.9*  --  30.3*   PLATELETS 171  --  200   NEUTROS ABS 6.13  --  12.50*   IMMATURE GRANS (ABS) 0.03  --  0.09*   LYMPHS ABS 1.54  --  1.39   MONOS ABS 0.95*  --  1.44*   EOS ABS 0.18  --  0.20   MCV 94.9  --  95.0   HSTROP T  --  170* 179*         Lab 25  0409 25  1129   SODIUM 138 138   POTASSIUM 4.8 5.4*   CHLORIDE 110* 109*   CO2 19.0* 18.0*   ANION GAP 9.0 11.0   BUN 33* 34*   CREATININE 2.04* 2.34*   EGFR 33.6* 28.5*   GLUCOSE 129* 195*    CALCIUM 8.5* 8.7   MAGNESIUM 2.2 2.1   PHOSPHORUS 2.7  --    TSH  --  1.070         Lab 04/08/25  1129   TOTAL PROTEIN 6.6   ALBUMIN 3.7   GLOBULIN 2.9   ALT (SGPT) 23   AST (SGOT) 21   BILIRUBIN 0.2   ALK PHOS 98         Lab 04/08/25  1240 04/08/25  1129   HSTROP T 170* 179*             Lab 04/08/25  1129   VITAMIN B 12 342         Brief Urine Lab Results  (Last result in the past 365 days)        Color   Clarity   Blood   Leuk Est   Nitrite   Protein   CREAT   Urine HCG        04/08/25 1256 Yellow   Hazy   Small (1+)   Moderate (2+)   Negative   >=300 mg/dL (3+)                   Microbiology Results Abnormal       None            XR Chest 1 View  Result Date: 4/8/2025  XR CHEST 1 VW Date of Exam: 4/8/2025 11:15 AM EDT Indication: Weak/Dizzy/AMS triage protocol Comparison: 3/22/2025 Findings: Heart heart is borderline enlarged. Vasculature is cephalized. There is a mild diffuse interstitial disease pattern, greater in the lower lungs, with no focal consolidation. There appear to be minimal pleural effusions. Overall appearance favors mild asymmetric pulmonary edema over pneumonia. There is no evidence of pneumothorax.     Impression: Impression: Mild CHF. Electronically Signed: Castro Dumont MD  4/8/2025 11:48 AM EDT  Workstation ID: [id number]      Results for orders placed during the hospital encounter of 03/20/25    Adult Transthoracic Echo Complete w/ Color, Spectral and Contrast if necessary per protocol    Interpretation Summary  •  Left ventricular systolic function is normal. Calculated left ventricular EF = 62.7% Left ventricular ejection fraction appears to be 61 - 65%.  •  Normal left atrial size and volume noted.      Current medications:  Scheduled Meds:amLODIPine, 10 mg, Oral, Q24H  aspirin, 81 mg, Oral, Daily  atorvastatin, 40 mg, Oral, Nightly  carvedilol, 25 mg, Oral, BID With Meals  cefTRIAXone, 1,000 mg, Intravenous, Q24H  fluconazole, 200 mg, Oral, Q24H  heparin (porcine), 5,000 Units,  Subcutaneous, Q8H  hydrALAZINE, 37.5 mg, Oral, TID  insulin glargine, 5 Units, Subcutaneous, Nightly  insulin lispro, 2-9 Units, Subcutaneous, 4x Daily AC & at Bedtime  isosorbide mononitrate, 20 mg, Oral, TID  pantoprazole, 40 mg, Oral, Q AM  Pharmacy Meds to Bed Consult, , Not Applicable, Daily  sodium chloride, 10 mL, Intravenous, Q12H      Continuous Infusions:   PRN Meds:.•  acetaminophen **OR** acetaminophen **OR** acetaminophen  •  senna-docusate sodium **AND** polyethylene glycol **AND** bisacodyl **AND** bisacodyl  •  Calcium Replacement - Follow Nurse / BPA Driven Protocol  •  dextrose  •  dextrose  •  gabapentin  •  glucagon (human recombinant)  •  Magnesium Cardiology Dose Replacement - Follow Nurse / BPA Driven Protocol  •  nitroglycerin  •  Phosphorus Replacement - Follow Nurse / BPA Driven Protocol  •  Potassium Replacement - Follow Nurse / BPA Driven Protocol  •  sodium chloride  •  sodium chloride  •  sodium chloride    Assessment & Plan   Assessment & Plan     Active Hospital Problems    Diagnosis  POA   • **Failure to thrive in adult [R62.7]  Yes      Resolved Hospital Problems   No resolved problems to display.        Brief Hospital Course to date:  Thomas Perez is a 75 y.o. male with hypertension, hyperlipidemia, CAD status post RCA stent in 2010, PAD status post embolectomy and fasciotomy left lower extremity, COPD, poorly controlled type 2 diabetes, CKD 3, medication noncompliance who presents for lower extremity weakness and recurrent falls.  Patient recently discharged home from the hospital after being treated for hypertensive emergency.    Weakness, lower extremities  Falls  - Patient with low functional status, family unable to care for him  - Discussed weakness with physical therapy today.  Patient's exam is consistent with providers exam.  When asked to move his lower extremities he reports he cannot however he was able to maneuver himself to the chair, no knee buckling while standing  and he put on his pants.  - Weakness likely multifactorial in the setting of patient effort, deconditioning and diabetic neuropathy  - PT OT following, recommending inpatient rehab    UTI  - Increased urinary frequency  - UA consistent with UTI  - Continue Rocephin  - Follow-up cultures    MAGUI on CKD 3  Hyperkalemia-resolved  -Baseline creatinine around 2  -Back to baseline  -Will continue to monitor with a.m. labs    IDDM  - Continue glargine 5 nightly with sliding scale insulin, adjust as needed    Elevated troponins, chronic  CAD status post RCA stent 2010  PAD status post embolectomy and fasciotomy  Hypertension  - Troponins elevated but stable from prior admission last, no chest pain  - Patient has cardiology follow-up outpatient for continued discussions about outpatient cardiac cath  - Continue home amlodipine, aspirin, Coreg, hydralazine, isosorbide mononitrate    Anemia  - Chronic  -No signs of bleeding  -Previous studies consistent with anemia of chronic disease likely from CKD  -Continue to monitor    HLD-continue statin  GERD-continue PPI  Chronic pain-dose reduced gabapentin to 200 mg twice daily, patient takes his as needed  Tobacco use-encouraged cessation, NRT    Addendum: Patient complaining of chest pain this afternoon.  He has a history of chronic chest pain.  Last admission he had an abnormal stress test but was unable to undergo left heart cath due to his renal function.  Was planning on outpatient follow-up with cardiology.  Patient describes chest pain today as squeezing. Reports this is consistent with other eipsodes of CP he has had in the past. EKG without acute ischemic changes.  Troponin elevated to 199 however has been elevated in the 170s this admit and is chronically elevated.  Pain improved with sitting up and using a GI cocktail and simethicone.  Still having some ongoing pain so was given additional dose of Protonix and Tums which also helped.  Nitro did not help. Repeat troponin  pending. Pt getting his isosorbide mononitrate.    Expected Discharge Location and Transportation: TBD  Expected Discharge   Expected Discharge Date: 4/12/2025; Expected Discharge Time:      VTE Prophylaxis:  Pharmacologic VTE prophylaxis orders are present.         AM-PAC 6 Clicks Score (PT): 17 (04/09/25 0949)    CODE STATUS:   Code Status and Medical Interventions: CPR (Attempt to Resuscitate); Full Support   Ordered at: 04/08/25 1531     Code Status (Patient has no pulse and is not breathing):    CPR (Attempt to Resuscitate)     Medical Interventions (Patient has pulse or is breathing):    Full Support     Level Of Support Discussed With:    Patient       Lilia Leroy MD  04/09/25

## 2025-04-09 NOTE — OUTREACH NOTE
Medical Week 1 Survey      Flowsheet Row Responses   [facility] patient discharged from? [facility]   Does the patient have one of the following disease processes/diagnoses(primary or secondary)? Other   Week 1 attempt successful? No   Unsuccessful attempts Attempt 1   Revoke Readmitted            VALENTINA APPLE - Registered Nurse

## 2025-04-10 ENCOUNTER — APPOINTMENT (OUTPATIENT)
Dept: GENERAL RADIOLOGY | Facility: HOSPITAL | Age: 75
DRG: 683 | End: 2025-04-10
Payer: MEDICARE

## 2025-04-10 LAB
ANION GAP SERPL CALCULATED.3IONS-SCNC: 11 MMOL/L (ref 5–15)
BUN SERPL-MCNC: 36 MG/DL (ref 8–23)
BUN/CREAT SERPL: 14.8 (ref 7–25)
CALCIUM SPEC-SCNC: 8.8 MG/DL (ref 8.6–10.5)
CHLORIDE SERPL-SCNC: 108 MMOL/L (ref 98–107)
CO2 SERPL-SCNC: 20 MMOL/L (ref 22–29)
CREAT SERPL-MCNC: 2.43 MG/DL (ref 0.76–1.27)
DEPRECATED RDW RBC AUTO: 50.4 FL (ref 37–54)
EGFRCR SERPLBLD CKD-EPI 2021: 27.2 ML/MIN/1.73
ERYTHROCYTE [DISTWIDTH] IN BLOOD BY AUTOMATED COUNT: 14.3 % (ref 12.3–15.4)
GLUCOSE BLDC GLUCOMTR-MCNC: 174 MG/DL (ref 70–130)
GLUCOSE BLDC GLUCOMTR-MCNC: 237 MG/DL (ref 70–130)
GLUCOSE BLDC GLUCOMTR-MCNC: 253 MG/DL (ref 70–130)
GLUCOSE BLDC GLUCOMTR-MCNC: 329 MG/DL (ref 70–130)
GLUCOSE SERPL-MCNC: 152 MG/DL (ref 65–99)
HCT VFR BLD AUTO: 25.5 % (ref 37.5–51)
HGB BLD-MCNC: 7.9 G/DL (ref 13–17.7)
MAGNESIUM SERPL-MCNC: 2.4 MG/DL (ref 1.6–2.4)
MCH RBC QN AUTO: 29.9 PG (ref 26.6–33)
MCHC RBC AUTO-ENTMCNC: 31 G/DL (ref 31.5–35.7)
MCV RBC AUTO: 96.6 FL (ref 79–97)
PLATELET # BLD AUTO: 169 10*3/MM3 (ref 140–450)
PMV BLD AUTO: 11 FL (ref 6–12)
POTASSIUM SERPL-SCNC: 5.5 MMOL/L (ref 3.5–5.2)
RBC # BLD AUTO: 2.64 10*6/MM3 (ref 4.14–5.8)
SODIUM SERPL-SCNC: 139 MMOL/L (ref 136–145)
WBC NRBC COR # BLD AUTO: 9.31 10*3/MM3 (ref 3.4–10.8)

## 2025-04-10 PROCEDURE — G0378 HOSPITAL OBSERVATION PER HR: HCPCS

## 2025-04-10 PROCEDURE — 85027 COMPLETE CBC AUTOMATED: CPT | Performed by: STUDENT IN AN ORGANIZED HEALTH CARE EDUCATION/TRAINING PROGRAM

## 2025-04-10 PROCEDURE — 97116 GAIT TRAINING THERAPY: CPT

## 2025-04-10 PROCEDURE — 80048 BASIC METABOLIC PNL TOTAL CA: CPT | Performed by: STUDENT IN AN ORGANIZED HEALTH CARE EDUCATION/TRAINING PROGRAM

## 2025-04-10 PROCEDURE — 63710000001 INSULIN LISPRO (HUMAN) PER 5 UNITS: Performed by: STUDENT IN AN ORGANIZED HEALTH CARE EDUCATION/TRAINING PROGRAM

## 2025-04-10 PROCEDURE — 82948 REAGENT STRIP/BLOOD GLUCOSE: CPT

## 2025-04-10 PROCEDURE — 83735 ASSAY OF MAGNESIUM: CPT | Performed by: STUDENT IN AN ORGANIZED HEALTH CARE EDUCATION/TRAINING PROGRAM

## 2025-04-10 PROCEDURE — 63710000001 INSULIN GLARGINE PER 5 UNITS: Performed by: STUDENT IN AN ORGANIZED HEALTH CARE EDUCATION/TRAINING PROGRAM

## 2025-04-10 PROCEDURE — 25010000002 HEPARIN (PORCINE) PER 1000 UNITS: Performed by: STUDENT IN AN ORGANIZED HEALTH CARE EDUCATION/TRAINING PROGRAM

## 2025-04-10 PROCEDURE — 74018 RADEX ABDOMEN 1 VIEW: CPT

## 2025-04-10 PROCEDURE — 99232 SBSQ HOSP IP/OBS MODERATE 35: CPT | Performed by: STUDENT IN AN ORGANIZED HEALTH CARE EDUCATION/TRAINING PROGRAM

## 2025-04-10 RX ORDER — L.ACID,PARA/B.BIFIDUM/S.THERM 8B CELL
1 CAPSULE ORAL DAILY
Status: DISCONTINUED | OUTPATIENT
Start: 2025-04-10 | End: 2025-04-11

## 2025-04-10 RX ADMIN — Medication 10 ML: at 20:57

## 2025-04-10 RX ADMIN — CYANOCOBALAMIN TAB 1000 MCG 500 MCG: 1000 TAB at 08:26

## 2025-04-10 RX ADMIN — INSULIN LISPRO 7 UNITS: 100 INJECTION, SOLUTION INTRAVENOUS; SUBCUTANEOUS at 18:20

## 2025-04-10 RX ADMIN — ACETAMINOPHEN 650 MG: 325 TABLET, FILM COATED ORAL at 08:26

## 2025-04-10 RX ADMIN — INSULIN GLARGINE 5 UNITS: 100 INJECTION, SOLUTION SUBCUTANEOUS at 20:56

## 2025-04-10 RX ADMIN — AMLODIPINE BESYLATE 10 MG: 10 TABLET ORAL at 08:26

## 2025-04-10 RX ADMIN — CARVEDILOL 25 MG: 12.5 TABLET, FILM COATED ORAL at 08:26

## 2025-04-10 RX ADMIN — GABAPENTIN 200 MG: 100 CAPSULE ORAL at 18:20

## 2025-04-10 RX ADMIN — ISOSORBIDE MONONITRATE 20 MG: 20 TABLET ORAL at 08:25

## 2025-04-10 RX ADMIN — ACETAMINOPHEN 650 MG: 325 TABLET, FILM COATED ORAL at 02:37

## 2025-04-10 RX ADMIN — SIMETHICONE 80 MG: 80 TABLET, CHEWABLE ORAL at 08:25

## 2025-04-10 RX ADMIN — Medication 10 ML: at 08:23

## 2025-04-10 RX ADMIN — ASPIRIN 81 MG: 81 TABLET, CHEWABLE ORAL at 08:24

## 2025-04-10 RX ADMIN — ISOSORBIDE MONONITRATE 20 MG: 20 TABLET ORAL at 20:55

## 2025-04-10 RX ADMIN — HEPARIN SODIUM 5000 UNITS: 5000 INJECTION INTRAVENOUS; SUBCUTANEOUS at 06:40

## 2025-04-10 RX ADMIN — CALCIUM CARBONATE (ANTACID) CHEW TAB 500 MG 2 TABLET: 500 CHEW TAB at 02:37

## 2025-04-10 RX ADMIN — PANTOPRAZOLE SODIUM 40 MG: 40 TABLET, DELAYED RELEASE ORAL at 08:27

## 2025-04-10 RX ADMIN — HYDRALAZINE HYDROCHLORIDE 37.5 MG: 25 TABLET ORAL at 08:23

## 2025-04-10 RX ADMIN — INSULIN LISPRO 4 UNITS: 100 INJECTION, SOLUTION INTRAVENOUS; SUBCUTANEOUS at 20:55

## 2025-04-10 RX ADMIN — INSULIN LISPRO 2 UNITS: 100 INJECTION, SOLUTION INTRAVENOUS; SUBCUTANEOUS at 08:23

## 2025-04-10 RX ADMIN — CARVEDILOL 25 MG: 12.5 TABLET, FILM COATED ORAL at 18:19

## 2025-04-10 NOTE — NURSING NOTE
Patient sitting on edge of bed with c/o throat pain and nose running. Reports it feels like someone is squeezing his neck and its worse when he swallows.  Denies chest pain. Added humidification to oxygen and gave patient tylenol and tums.

## 2025-04-10 NOTE — NURSING NOTE
Patient care assumed at this time. Patient asleep in bed. Alleyvns in place. Able to reposition self. Bed alarm on and call light within reach.

## 2025-04-10 NOTE — PLAN OF CARE
Goal Outcome Evaluation:  Plan of Care Reviewed With: patient        Progress: no change     Patient able to fall asleep after tylenol and tums given for c/o throat pain. Had several loose stools overnight. No c/o chest pain.                 Problem: Adult Inpatient Plan of Care  Goal: Plan of Care Review  Outcome: Progressing  Flowsheets (Taken 4/10/2025 0605)  Progress: no change  Plan of Care Reviewed With: patient  Goal: Patient-Specific Goal (Individualized)  Outcome: Progressing  Goal: Absence of Hospital-Acquired Illness or Injury  Outcome: Progressing  Intervention: Identify and Manage Fall Risk  Recent Flowsheet Documentation  Taken 4/10/2025 0600 by Da Lindsay RN  Safety Promotion/Fall Prevention:   activity supervised   clutter free environment maintained   fall prevention program maintained   nonskid shoes/slippers when out of bed   safety round/check completed  Taken 4/10/2025 0400 by Da Lindsay RN  Safety Promotion/Fall Prevention:   activity supervised   clutter free environment maintained   fall prevention program maintained   nonskid shoes/slippers when out of bed   safety round/check completed  Taken 4/10/2025 0237 by Da Lindsay RN  Safety Promotion/Fall Prevention:   activity supervised   clutter free environment maintained   fall prevention program maintained   nonskid shoes/slippers when out of bed   safety round/check completed  Taken 4/10/2025 0030 by Da Lindsay RN  Safety Promotion/Fall Prevention:   activity supervised   clutter free environment maintained   fall prevention program maintained   nonskid shoes/slippers when out of bed   safety round/check completed  Taken 4/9/2025 2250 by Da Lindsay RN  Safety Promotion/Fall Prevention:   activity supervised   clutter free environment maintained   fall prevention program maintained   nonskid shoes/slippers when out of bed   safety round/check completed  Intervention: Prevent Skin Injury  Recent Flowsheet  Documentation  Taken 4/10/2025 0600 by Da Lindsay RN  Body Position: position changed independently  Skin Protection:   incontinence pads utilized   silicone foam dressing in place  Taken 4/10/2025 0400 by Da Lindsay RN  Body Position: position changed independently  Skin Protection:   incontinence pads utilized   silicone foam dressing in place  Taken 4/10/2025 0237 by Da Lindsay RN  Body Position: position changed independently  Skin Protection:   incontinence pads utilized   silicone foam dressing in place  Taken 4/10/2025 0030 by Da Lindsay RN  Body Position: position changed independently  Skin Protection:   incontinence pads utilized   silicone foam dressing in place  Taken 4/9/2025 2250 by Da Lindsay RN  Body Position: position changed independently  Skin Protection:   incontinence pads utilized   silicone foam dressing in place  Intervention: Prevent and Manage VTE (Venous Thromboembolism) Risk  Recent Flowsheet Documentation  Taken 4/9/2025 2250 by Da Lindsay RN  VTE Prevention/Management: (pharmalogical) other (see comments)  Intervention: Prevent Infection  Recent Flowsheet Documentation  Taken 4/10/2025 0600 by Da Lindsay RN  Infection Prevention:   hand hygiene promoted   rest/sleep promoted  Taken 4/10/2025 0400 by Da Lindsay RN  Infection Prevention:   hand hygiene promoted   rest/sleep promoted  Taken 4/10/2025 0237 by Da Lindsay RN  Infection Prevention:   hand hygiene promoted   rest/sleep promoted  Taken 4/10/2025 0030 by Da Lindsay RN  Infection Prevention:   hand hygiene promoted   rest/sleep promoted  Taken 4/9/2025 2250 by Da Lindsay RN  Infection Prevention:   hand hygiene promoted   rest/sleep promoted  Goal: Optimal Comfort and Wellbeing  Outcome: Progressing  Intervention: Monitor Pain and Promote Comfort  Recent Flowsheet Documentation  Taken 4/10/2025 0237 by Da Lindsay RN  Pain Management Interventions: pain medication given  Intervention:  Provide Person-Centered Care  Recent Flowsheet Documentation  Taken 4/9/2025 5782 by Da Lindsay, RN  Trust Relationship/Rapport:   care explained   choices provided  Goal: Readiness for Transition of Care  Outcome: Progressing

## 2025-04-10 NOTE — NURSING NOTE
"Pt removed his telemetry and SpO2 monitor and oxygen(nasal cannula), refusing meds at this time. He states he thinks the meds we're giving him are giving him diarrhea -- I offered probiotic which was ordered for his diarrhea, and he states \"you're lying, it won't help my diarrhea. Just leave me alone and let me sleep.\" Dr. Leroy informed & will see pt. Pt refuses to sign refusal of care form, witnessed by myself and Phyllis Felder RN. Informed charge nurse, CORETTA Vaca.  Will try to allow pt uninterrupted rest for now, until eval by M.D.  "

## 2025-04-10 NOTE — PLAN OF CARE
Goal Outcome Evaluation:  Plan of Care Reviewed With: patient        Progress: improving  Outcome Evaluation: Pt paticiated in therapy session w/ good effort this date. Pt ambulated into hallway w/ minAx1 and FWW however was limited by rapid fatigue. Pt demonstrated bilateral foot drop and mild bilateral knee buckling w/ fatigue. Continue to progress poc as able .    Anticipated Discharge Disposition (PT): inpatient rehabilitation facility

## 2025-04-10 NOTE — PLAN OF CARE
Goal Outcome Evaluation:  Plan of Care Reviewed With: patient        Progress: no change  Outcome Evaluation: Pt continues to refuse most meds (did take some meds this evening - see MAR), refuses tele/SpO2 monitoring. Continues diarrhea. Remains oriented x4. MD aware. Fall precautions continue.

## 2025-04-10 NOTE — PROGRESS NOTES
[facility] Medicine Services  PROGRESS NOTE    Patient Name: Thomas Perez  : 1950  MRN: [id number]    Date of Admission: 2025  Primary Care Physician: Brock Randolph PA    Subjective   Subjective     CC:  weakness    HPI:  Patient refusing much of his care this morning.  He is declining medications as well as telemetry monitoring.  Patient reports his medications make him feel bad.  Long discussion about his medications and the importance.  He reports that the 5 medications he was taking at home after last discharge did not make him ill.  Discussed that he was prescribed more than that however he is unsure of which 5 medications he is referring to.      Objective   Objective     Vital Signs:   Temp:  [98 °F (36.7 °C)-98.2 °F (36.8 °C)] 98.1 °F (36.7 °C)  Heart Rate:  [56-70] 60  Resp:  [16-20] 20  BP: (124-160)/(54-75) 124/55  Flow (L/min) (Oxygen Therapy):  [2] 2     Physical Exam  Constitutional:       General: He is not in acute distress.  Cardiovascular:      Rate and Rhythm: Normal rate and regular rhythm.      Heart sounds: Normal heart sounds.   Pulmonary:      Effort: Pulmonary effort is normal.      Breath sounds: Normal breath sounds.   Abdominal:      General: There is no distension.      Palpations: Abdomen is soft.      Tenderness: There is no abdominal tenderness.   Musculoskeletal:      Right lower leg: No edema.      Left lower leg: No edema.      Comments: Will not move either lower extremity on command however patient observed using his legs to maneuver himself in bed.   Neurological:      General: No focal deficit present.      Mental Status: He is alert.          Results Reviewed:  LAB RESULTS:      Lab 04/10/25  0344 25  1859 25  1702 25  0409 25  1240 25  1129   WBC 9.31  --   --  8.86  --  15.68*   HEMOGLOBIN 7.9*  --   --  8.1*  --  9.5*   HEMATOCRIT 25.5*  --   --  25.9*  --  30.3*   PLATELETS 169  --   --  171  --   200   NEUTROS ABS  --   --   --  6.13  --  12.50*   IMMATURE GRANS (ABS)  --   --   --  0.03  --  0.09*   LYMPHS ABS  --   --   --  1.54  --  1.39   MONOS ABS  --   --   --  0.95*  --  1.44*   EOS ABS  --   --   --  0.18  --  0.20   MCV 96.6  --   --  94.9  --  95.0   HSTROP T  --  202* 199*  --  170* 179*         Lab 04/10/25  0344 04/09/25  0409 04/08/25  1129   SODIUM 139 138 138   POTASSIUM 5.5* 4.8 5.4*   CHLORIDE 108* 110* 109*   CO2 20.0* 19.0* 18.0*   ANION GAP 11.0 9.0 11.0   BUN 36* 33* 34*   CREATININE 2.43* 2.04* 2.34*   EGFR 27.2* 33.6* 28.5*   GLUCOSE 152* 129* 195*   CALCIUM 8.8 8.5* 8.7   MAGNESIUM 2.4 2.2 2.1   PHOSPHORUS  --  2.7  --    TSH  --   --  1.070         Lab 04/09/25  1702 04/08/25  1129   TOTAL PROTEIN  --  6.6   ALBUMIN  --  3.7   GLOBULIN  --  2.9   ALT (SGPT)  --  23   AST (SGOT)  --  21   BILIRUBIN  --  0.2   ALK PHOS  --  98   LIPASE 14  --          Lab 04/09/25  1859 04/09/25  1702 04/08/25  1240 04/08/25  1129   HSTROP T 202* 199* 170* 179*             Lab 04/08/25  1129   VITAMIN B 12 342         Brief Urine Lab Results  (Last result in the past 365 days)        Color   Clarity   Blood   Leuk Est   Nitrite   Protein   CREAT   Urine HCG        04/08/25 1256 Yellow   Hazy   Small (1+)   Moderate (2+)   Negative   >=300 mg/dL (3+)                   Microbiology Results Abnormal       None            XR Abdomen KUB  Result Date: 4/10/2025  XR ABDOMEN KUB Date of Exam: 4/10/2025 11:16 AM EDT Indication: abd pain Comparison: None available. Findings: Lung bases are clear. Nonobstructive bowel gas pattern. Vascular calcifications. No displaced osseous normality. Degenerative changes present. Right iliac stent seen. No abnormal abdominal calcifications otherwise.     Impression: Impression: Nonobstructive bowel gas pattern. Electronically Signed: Amari Baldwin MD  4/10/2025 12:28 PM EDT  Workstation ID: [id number]      Results for orders placed during the hospital encounter of  03/20/25    Adult Transthoracic Echo Complete w/ Color, Spectral and Contrast if necessary per protocol    Interpretation Summary    Left ventricular systolic function is normal. Calculated left ventricular EF = 62.7% Left ventricular ejection fraction appears to be 61 - 65%.    Normal left atrial size and volume noted.      Current medications:  Scheduled Meds:amLODIPine, 10 mg, Oral, Q24H  aspirin, 81 mg, Oral, Daily  atorvastatin, 40 mg, Oral, Nightly  carvedilol, 25 mg, Oral, BID With Meals  cefTRIAXone, 1,000 mg, Intravenous, Q24H  heparin (porcine), 5,000 Units, Subcutaneous, Q8H  hydrALAZINE, 37.5 mg, Oral, TID  insulin glargine, 5 Units, Subcutaneous, Nightly  insulin lispro, 2-9 Units, Subcutaneous, 4x Daily AC & at Bedtime  isosorbide mononitrate, 20 mg, Oral, TID  lactobacillus acidophilus, 1 capsule, Oral, Daily  pantoprazole, 40 mg, Oral, BID AC  Pharmacy Meds to Bed Consult, , Not Applicable, Daily  sodium chloride, 10 mL, Intravenous, Q12H  vitamin B-12, 500 mcg, Oral, Daily      Continuous Infusions:   PRN Meds:.  acetaminophen **OR** acetaminophen **OR** acetaminophen    senna-docusate sodium **AND** polyethylene glycol **AND** bisacodyl **AND** bisacodyl    calcium carbonate    Calcium Replacement - Follow Nurse / BPA Driven Protocol    dextrose    dextrose    gabapentin    glucagon (human recombinant)    Magnesium Cardiology Dose Replacement - Follow Nurse / BPA Driven Protocol    nitroglycerin    Phosphorus Replacement - Follow Nurse / BPA Driven Protocol    Potassium Replacement - Follow Nurse / BPA Driven Protocol    simethicone    sodium chloride    sodium chloride    sodium chloride    Assessment & Plan   Assessment & Plan     Active Hospital Problems    Diagnosis  POA    **Failure to thrive in adult [R62.7]  Yes    Weakness [R53.1]  Yes      Resolved Hospital Problems   No resolved problems to display.        Brief Hospital Course to date:  Thomas Perez is a 75 y.o. male with hypertension,  hyperlipidemia, CAD status post RCA stent in 2010, PAD status post embolectomy and fasciotomy left lower extremity, COPD, poorly controlled type 2 diabetes, CKD 3, medication noncompliance who presents for lower extremity weakness and recurrent falls.  Patient recently discharged home from the hospital after being treated for hypertensive emergency.    Weakness, lower extremities  Falls  - Patient with low functional status, family unable to care for him  - When asked to move his legs patient reports that he cannot however he has been able to ambulate some with PT with 1 assist.  - Weakness likely multifactorial in the setting of patient effort, deconditioning and diabetic neuropathy  - PT OT following, recommending inpatient rehab    UTI  - Increased urinary frequency  - UA consistent with UTI  - Continue Rocephin  - Follow-up cultures    MAGUI on CKD 3  Hyperkalemia-resolved  -Baseline creatinine around 2  - Creatinine up again today  -Discontinuing fluconazole, this is appears to have been started in the ER for yeast in his UA  -Will continue to monitor with a.m. labs    IDDM  - Continue glargine 5 nightly with sliding scale insulin, adjust as needed    Elevated troponins, chronic  CAD status post RCA stent 2010  PAD status post embolectomy and fasciotomy  Hypertension  Chronic chest pain  - Troponins elevated chronically, patient denies chest pain.  His pain yesterday resolved with antacids and GI cocktails.  - Patient has cardiology follow-up outpatient for continued discussions about outpatient cardiac cath  - Continue home amlodipine, aspirin, Coreg, hydralazine, isosorbide mononitrate    Diarrhea  - Likely secondary to antibiotics  - Hold bowel regimen  - Added probiotic however patient declined  - Continue to monitor    Anemia  - Chronic  -No signs of bleeding  -Previous studies consistent with anemia of chronic disease likely from CKD  -Continue to monitor    Medical noncompliance  - Complicated by very poor  health literacy.  - Discussed medications with patient again today.  Will go through medication list at home with him  - I have increased his PPI to twice daily in order to help with some of his stomach upset with medications    HLD-continue statin  GERD-continue PPI  Chronic pain-dose reduced gabapentin to 200 mg twice daily, patient takes his as needed  Tobacco use-encouraged cessation, NRT      Expected Discharge Location and Transportation: TBD  Expected Discharge   Expected Discharge Date: 4/12/2025; Expected Discharge Time:      VTE Prophylaxis:  Pharmacologic VTE prophylaxis orders are present.         AM-PAC 6 Clicks Score (PT): 18 (04/10/25 1400)    CODE STATUS:   Code Status and Medical Interventions: CPR (Attempt to Resuscitate); Full Support   Ordered at: 04/08/25 2645     Code Status (Patient has no pulse and is not breathing):    CPR (Attempt to Resuscitate)     Medical Interventions (Patient has pulse or is breathing):    Full Support     Level Of Support Discussed With:    Patient       Lilia Leroy MD  04/10/25

## 2025-04-10 NOTE — PLAN OF CARE
Goal Outcome Evaluation:           Progress: no change  Outcome Evaluation: Pt refusing meds, telemetry/SpO2 monitoring, oxygen(nasal cannula). Pt did participate w/ PT (see PT note). Pt c/o diarrhea (see output, multiple episodes) but refuses probiotic as ordered. Dr. Leroy informed. Plan discharge to inpt rehab when medically ready, if pt is agreeable.

## 2025-04-10 NOTE — NURSING NOTE
Pt continues to refuse meds - see MAR. Also continues to refuse telemetry/SpO2 & oxygen. Discussed rationale of each w/ pt.  He continues to refuse.

## 2025-04-10 NOTE — THERAPY TREATMENT NOTE
Patient Name: Thomas Perez  : 1950    MRN: [id number]                              Today's Date: 4/10/2025       Admit Date: 2025    Visit Dx:     ICD-10-CM ICD-9-CM   1. Generalized weakness  R53.1 780.79   2. Fall in home, initial encounter  W19.XXXA E888.9    Y92.009 E849.0   3. Yeast UTI  B37.49 112.2     Patient Active Problem List   Diagnosis    Type 2 diabetes mellitus with hyperglycemia, with long-term current use of insulin    Tobacco abuse    Coronary artery disease involving native coronary artery of native heart with angina pectoris    Anemia    CKD (chronic kidney disease) stage 3, GFR 30-59 ml/min    PAD (peripheral artery disease)    Medical non-compliance    COPD (chronic obstructive pulmonary disease)    Bilateral lower extremity edema    Hypertensive emergency    Likely type II NSTEMI from hypertensive emergency    Abnormal nuclear stress test    Moderate protein-calorie malnutrition    Type 2 myocardial infarction    Failure to thrive in adult    Weakness     Past Medical History:   Diagnosis Date    Back injury     Diabetes mellitus     History of angina     Hypertension      Past Surgical History:   Procedure Laterality Date    CORONARY STENT PLACEMENT      EMBOLECTOMY      FASCIOTOMY        General Information       Row Name 04/10/25 135          Physical Therapy Time and Intention    Document Type therapy note (daily note)  -AC     Mode of Treatment physical therapy  -AC       Row Name 04/10/25 1351          General Information    Patient Profile Reviewed yes  -AC     Existing Precautions/Restrictions fall;other (see comments);oxygen therapy device and L/min  chronic B foot drop  -AC     Barriers to Rehab medically complex  -AC       Row Name 04/10/25 1355          Cognition    Orientation Status (Cognition) oriented x 3  -AC       Row Name 04/10/25 1357          Safety Issues/Impairments Affecting Functional Mobility    Safety Issues Affecting Function (Mobility) insight into  deficits/self-awareness;safety precaution awareness;awareness of need for assistance;safety precautions follow-through/compliance;judgment  -     Impairments Affecting Function (Mobility) balance;endurance/activity tolerance;strength;motor control;sensation/sensory awareness  -               User Key  (r) = Recorded By, (t) = Taken By, (c) = Cosigned By      Initials Name Provider Type    AC Alexus Perez, PT Physical Therapist                   Mobility       Row Name 04/10/25 1359          Bed Mobility    Bed Mobility supine-sit  -AC     Supine-Sit [place] (Bed Mobility) contact guard;verbal cues  -     Assistive Device (Bed Mobility) bed rails;head of bed elevated  -     Comment, (Bed Mobility) Pt transitioned to sitting EOB w/ CGAx1 and extended time. No dizziness reported in sitting  -       Row Name 04/10/25 9846          Sit-Stand Transfer    Sit-Stand [place] (Transfers) minimum assist (75% patient effort);1 person assist;verbal cues;nonverbal cues (demo/gesture)  -     Assistive Device (Sit-Stand Transfers) walker, front-wheeled  -AC       Row Name 04/10/25 0395          Gait/Stairs (Locomotion)    [place] Level (Gait) minimum assist (75% patient effort);1 person assist;verbal cues;nonverbal cues (demo/gesture)  -     Assistive Device (Gait) walker, front-wheeled  -     Patient was able to Ambulate yes  -AC     Distance in Feet (Gait) 35  -AC     Deviations/Abnormal Patterns (Gait) greta decreased;festinating/shuffling;base of support, narrow;bilateral deviations  -AC     Bilateral Gait Deviations forward flexed posture;heel strike decreased  -AC     Left Sided Gait Deviations knee buckling, left side;foot drop/toe drag  -AC     Right Sided Gait Deviations knee buckling, right side;foot drop/toe drag  -AC     Comment, (Gait/Stairs) Pt ambulated into hallway w/ minAx1 and FWW. Pt demonstrated slowed gait speed, bilateral foot drop, and flexed trunk posture. In addition w/  fatigue pt demonstrated mild bilateral knee buckling. Pt required VC for body position in walker, increased foot clearance bilaterally, and forward gaze. Gait distance limited by fatigue.  -AC               User Key  (r) = Recorded By, (t) = Taken By, (c) = Cosigned By      Initials Name Provider Type    AC Alexus Perez PT Physical Therapist                   Obj/Interventions       Row Name 04/10/25 [facility]          Balance    Balance Assessment sitting static balance;sitting dynamic balance;standing static balance;standing dynamic balance  -AC     Static Sitting Balance standby assist  -AC     Dynamic Sitting Balance contact guard  -AC     Position, Sitting Balance unsupported;sitting edge of bed  -AC     Static Standing Balance contact guard  -AC     Dynamic Standing Balance minimal assist  -AC     Position/Device Used, Standing Balance supported;walker, front-wheeled  -AC     Balance Interventions sitting;standing;sit to stand;supported;static;dynamic  -AC               User Key  (r) = Recorded By, (t) = Taken By, (c) = Cosigned By      Initials Name Provider Type     Alexus Perez PT Physical Therapist                   Goals/Plan    No documentation.                  Clinical Impression       Row Name 04/10/25 [facility]          Pain    Pretreatment Pain Rating 0/10 - no pain  -     Posttreatment Pain Rating 0/10 - no pain  -AC       Row Name 04/10/25 1403          Plan of Care Review    Plan of Care Reviewed With patient  -     Progress improving  -     Outcome Evaluation Pt paticiated in therapy session w/ good effort this date. Pt ambulated into hallway w/ minAx1 and FWW however was limited by rapid fatigue. Pt demonstrated bilateral foot drop and mild bilateral knee buckling w/ fatigue. Continue to progress poc as able .  -       Row Name 04/10/25 140          Therapy Assessment/Plan (PT)    Rehab Potential (PT) good  -     Criteria for Skilled Interventions Met (PT) yes;meets criteria;skilled  treatment is necessary  -AC     Therapy Frequency (PT) daily  -AC     Predicted Duration of Therapy Intervention (PT) 1 week  -AC       Row Name 04/10/25 1403          Vital Signs    O2 Delivery Pre Treatment room air  -AC     O2 Delivery Intra Treatment room air  -AC     O2 Delivery Post Treatment room air  -AC     Pre Patient Position Supine  -AC     Intra Patient Position Standing  -AC     Post Patient Position Supine  -AC       Row Name 04/10/25 1403          Positioning and Restraints    Pre-Treatment Position in bed  -AC     Post Treatment Position bed  -AC     In Bed notified nsg;supine;call light within reach;encouraged to call for assist;exit alarm on;side rails up x2  -AC               User Key  (r) = Recorded By, (t) = Taken By, (c) = Cosigned By      Initials Name Provider Type    Alexus Gibson, DUYEN Physical Therapist                   Outcome Measures       Row Name 04/10/25 1405 04/10/25 0823       How much help from another person do you currently need...    Turning from your back to your side while in flat bed without using bedrails? 4  -AC 4  -EM    Moving from lying on back to sitting on the side of a flat bed without bedrails? 3  -AC 4  -EM    Moving to and from a bed to a chair (including a wheelchair)? 3  -AC 3  -EM    Standing up from a chair using your arms (e.g., wheelchair, bedside chair)? 3  -AC 3  -EM    Climbing 3-5 steps with a railing? 2  -AC 2  -EM    To walk in hospital room? 3  -AC 3  -EM    AM-PAC 6 Clicks Score (PT) 18  -AC 19  -EM    Highest Level of Mobility Goal 6 --> Walk 10 steps or more  -AC 6 --> Walk 10 steps or more  -EM      Row Name 04/10/25 1405          Functional Assessment    Outcome Measure Options AM-PAC 6 Clicks Basic Mobility (PT)  -AC               User Key  (r) = Recorded By, (t) = Taken By, (c) = Cosigned By      Initials Name Provider Type    Sadie Braswell RN Registered Nurse    Alexus Gibson PT Physical Therapist                                  Physical Therapy Education       Title: PT OT SLP Therapies (In Progress)       Topic: Physical Therapy (In Progress)       Point: Mobility training (In Progress)       Learning Progress Summary            Patient Acceptance, E, NR by AC at 4/10/2025 1405    Acceptance, E, NR by KR at 4/9/2025 0934                      Point: Home exercise program (Not Started)       Learner Progress:  Not documented in this visit.              Point: Body mechanics (In Progress)       Learning Progress Summary            Patient Acceptance, E, NR by AC at 4/10/2025 1405    Acceptance, E, NR by KR at 4/9/2025 0934                      Point: Precautions (In Progress)       Learning Progress Summary            Patient Acceptance, E, NR by AC at 4/10/2025 1405    Acceptance, E, NR by KR at 4/9/2025 0934                                      User Key       Initials Effective Dates Name Provider Type Discipline    KR 12/30/22 -  Nat Drummond, PT Physical Therapist PT    AC 07/11/24 -  Alexus Perez PT Physical Therapist PT                  PT Recommendation and Plan     Progress: improving  Outcome Evaluation: Pt paticiated in therapy session w/ good effort this date. Pt ambulated into hallway w/ minAx1 and FWW however was limited by rapid fatigue. Pt demonstrated bilateral foot drop and mild bilateral knee buckling w/ fatigue. Continue to progress poc as able .     Time Calculation:         PT Charges       Row Name 04/10/25 1406             Time Calculation    Start Time 1306  -AC      PT Received On 04/10/25  -      PT Goal Re-Cert Due Date 04/19/25  -         Time Calculation- PT    Total Timed Code Minutes- PT 23 minute(s)  -AC         Timed Charges    25784 - Gait Training Minutes  23  -AC         Total Minutes    Timed Charges Total Minutes 23  -AC       Total Minutes 23  -AC                User Key  (r) = Recorded By, (t) = Taken By, (c) = Cosigned By      Initials Name Provider Type    AC Alexus Perez, DUYEN Physical  Therapist                  Therapy Charges for Today       Code Description Service Date Service Provider Modifiers Qty    10058456378  GAIT TRAINING EA 15 MIN 4/10/2025 Alexus Perez, PT GP 2    35258646395 HC PT THER SUPP EA 15 MIN 4/10/2025 Alexus Perez, PT GP 2            PT G-Codes  Outcome Measure Options: AM-PAC 6 Clicks Basic Mobility (PT)  AM-PAC 6 Clicks Score (PT): 18  AM-PAC 6 Clicks Score (OT): 18  PT Discharge Summary  Anticipated Discharge Disposition (PT): inpatient rehabilitation facility    Alexus Perez PT  4/10/2025

## 2025-04-11 PROBLEM — R62.7 FTT (FAILURE TO THRIVE) IN ADULT: Status: ACTIVE | Noted: 2025-04-11

## 2025-04-11 LAB
GLUCOSE BLDC GLUCOMTR-MCNC: 197 MG/DL (ref 70–130)
GLUCOSE BLDC GLUCOMTR-MCNC: 315 MG/DL (ref 70–130)
QT INTERVAL: 404 MS
QT INTERVAL: 412 MS
QTC INTERVAL: 429 MS
QTC INTERVAL: 438 MS

## 2025-04-11 PROCEDURE — 97535 SELF CARE MNGMENT TRAINING: CPT

## 2025-04-11 PROCEDURE — 63710000001 INSULIN LISPRO (HUMAN) PER 5 UNITS: Performed by: STUDENT IN AN ORGANIZED HEALTH CARE EDUCATION/TRAINING PROGRAM

## 2025-04-11 PROCEDURE — 97116 GAIT TRAINING THERAPY: CPT

## 2025-04-11 PROCEDURE — 97530 THERAPEUTIC ACTIVITIES: CPT

## 2025-04-11 PROCEDURE — 82948 REAGENT STRIP/BLOOD GLUCOSE: CPT

## 2025-04-11 PROCEDURE — 97110 THERAPEUTIC EXERCISES: CPT

## 2025-04-11 PROCEDURE — 63710000001 INSULIN GLARGINE PER 5 UNITS: Performed by: STUDENT IN AN ORGANIZED HEALTH CARE EDUCATION/TRAINING PROGRAM

## 2025-04-11 PROCEDURE — 99232 SBSQ HOSP IP/OBS MODERATE 35: CPT | Performed by: STUDENT IN AN ORGANIZED HEALTH CARE EDUCATION/TRAINING PROGRAM

## 2025-04-11 RX ADMIN — ISOSORBIDE MONONITRATE 20 MG: 20 TABLET ORAL at 16:12

## 2025-04-11 RX ADMIN — INSULIN LISPRO 7 UNITS: 100 INJECTION, SOLUTION INTRAVENOUS; SUBCUTANEOUS at 18:27

## 2025-04-11 RX ADMIN — INSULIN GLARGINE 5 UNITS: 100 INJECTION, SOLUTION SUBCUTANEOUS at 20:56

## 2025-04-11 RX ADMIN — INSULIN LISPRO 2 UNITS: 100 INJECTION, SOLUTION INTRAVENOUS; SUBCUTANEOUS at 08:45

## 2025-04-11 RX ADMIN — HYDRALAZINE HYDROCHLORIDE 37.5 MG: 25 TABLET ORAL at 16:10

## 2025-04-11 NOTE — PLAN OF CARE
Goal Outcome Evaluation:  Plan of Care Reviewed With: (P) patient        Progress: (P) improving  Outcome Evaluation: (P) Pt presents with improved ADL participation and balance, but continues to benefit from IPOT services to address remaining deficits. Pt completed toileting standing with FWW and UB dressing with no LOB. Recommend IRF at d/c.    Anticipated Discharge Disposition (OT): (P) inpatient rehabilitation facility

## 2025-04-11 NOTE — THERAPY TREATMENT NOTE
Patient Name: Thomas Perez  : 1950    MRN: [id number]                              Today's Date: 2025       Admit Date: 2025    Visit Dx:     ICD-10-CM ICD-9-CM   1. Generalized weakness  R53.1 780.79   2. Fall in home, initial encounter  W19.XXXA E888.9    Y92.009 E849.0   3. Yeast UTI  B37.49 112.2     Patient Active Problem List   Diagnosis    Type 2 diabetes mellitus with hyperglycemia, with long-term current use of insulin    Tobacco abuse    Coronary artery disease involving native coronary artery of native heart with angina pectoris    Anemia    CKD (chronic kidney disease) stage 3, GFR 30-59 ml/min    PAD (peripheral artery disease)    Medical non-compliance    COPD (chronic obstructive pulmonary disease)    Bilateral lower extremity edema    Hypertensive emergency    Likely type II NSTEMI from hypertensive emergency    Abnormal nuclear stress test    Moderate protein-calorie malnutrition    Type 2 myocardial infarction    Failure to thrive in adult    Weakness    FTT (failure to thrive) in adult     Past Medical History:   Diagnosis Date    Back injury     Diabetes mellitus     History of angina     Hypertension      Past Surgical History:   Procedure Laterality Date    CORONARY STENT PLACEMENT      EMBOLECTOMY      FASCIOTOMY        General Information       Row Name 25 1519          OT Time and Intention    Document Type therapy note (daily note)  -CS (r) SJ (t) CS (c)     Mode of Treatment occupational therapy  -CS (r) SJ (t) CS (c)       Row Name 25 1519          General Information    Patient Profile Reviewed yes  -CS (r) SJ (t) CS (c)     Existing Precautions/Restrictions fall;other (see comments)  chronic B foot drop  -CS (r) SJ (t) CS (c)       Row Name 25 1519          Cognition    Orientation Status (Cognition) oriented x 3  -CS (r) SJ (t) CS (c)       Row Name 25 1519          Safety Issues/Impairments Affecting Functional Mobility    Safety Issues  Affecting Function (Mobility) awareness of need for assistance;impulsivity;insight into deficits/self-awareness;safety precaution awareness;safety precautions follow-through/compliance  -CS (r) SJ (t) CS (c)     Impairments Affecting Function (Mobility) balance;endurance/activity tolerance;strength;motor control;sensation/sensory awareness  -CS (r) SJ (t) CS (c)               User Key  (r) = Recorded By, (t) = Taken By, (c) = Cosigned By      Initials Name Provider Type    CS Douglas Avery, OT Occupational Therapist    Jewell Trinidad, OT Student OT Student                     Mobility/ADL's       [facility] 04/11/25 1520          Bed Mobility    Comment, (Bed Mobility) Pt found in chair  -CS (r) SJ (t) CS (c)       Row Name 04/11/25 1520          Transfers    Transfers sit-stand transfer;stand-sit transfer  -CS (r) SJ (t) CS (c)       Row Name 04/11/25 1520          Sit-Stand Transfer    Sit-Stand [place] (Transfers) minimum assist (75% patient effort);1 person assist;verbal cues;nonverbal cues (demo/gesture)  -CS (r) SJ (t) CS (c)     Assistive Device (Sit-Stand Transfers) walker, front-wheeled  -CS (r) SJ (t) CS (c)       Row Name 04/11/25 1520          Stand-Sit Transfer    Stand-Sit [place] (Transfers) minimum assist (75% patient effort);1 person assist;verbal cues  -CS (r) SJ (t) CS (c)     Assistive Device (Stand-Sit Transfers) walker, front-wheeled  -CS (r) SJ (t) CS (c)       Row Name 04/11/25 1520          Activities of Daily Living    BADL Assessment/Intervention upper body dressing;toileting  -CS (r) SJ (t) CS (c)       Row Name 04/11/25 1520          Upper Body Dressing Assessment/Training    [place] Level (Upper Body Dressing) don;doff;front opening garment;supervision  -CS (r) SJ (t) CS (c)     Position (Upper Body Dressing) supported sitting  -CS (r) SJ (t) CS (c)       Row Name 04/11/25 1520          Toileting Assessment/Training    [place] Level (Toileting) adjust/manage  clothing;perform perineal hygiene;contact guard assist  -CS (r) SJ (t) CS (c)     Assistive Devices (Toileting) grab bar/safety frame;commode  -CS (r) SJ (t) CS (c)     Position (Toileting) supported standing  -CS (r) SJ (t) CS (c)               User Key  (r) = Recorded By, (t) = Taken By, (c) = Cosigned By      Initials Name Provider Type    CS Douglas Avery, OT Occupational Therapist    Jewell Trinidad, OT Student OT Student                   Obj/Interventions       Row Name 04/11/25 1523          Balance    Balance Assessment sitting static balance;sitting dynamic balance;sit to stand dynamic balance;standing static balance;standing dynamic balance  -CS (r) SJ (t) CS (c)     Static Sitting Balance standby assist  -CS (r) SJ (t) CS (c)     Dynamic Sitting Balance standby assist  -CS (r) SJ (t) CS (c)     Position, Sitting Balance supported;sitting in chair  -CS (r) SJ (t) CS (c)     Static Standing Balance contact guard  -CS (r) SJ (t) CS (c)     Dynamic Standing Balance contact guard  -CS (r) SJ (t) CS (c)     Position/Device Used, Standing Balance walker, front-wheeled;supported  -CS (r) SJ (t) CS (c)     Balance Interventions sitting;standing;sit to stand;supported;static;dynamic;minimal challenge;occupation based/functional task  -CS (r) SJ (t) CS (c)               User Key  (r) = Recorded By, (t) = Taken By, (c) = Cosigned By      Initials Name Provider Type    CS Douglas Avery, OT Occupational Therapist    Jewell Trinidad, OT Student OT Student                   Goals/Plan    No documentation.                  Clinical Impression       Row Name 04/11/25 1523          Pain Assessment    Pretreatment Pain Rating 0/10 - no pain  -CS (r) SJ (t) CS (c)     Posttreatment Pain Rating 0/10 - no pain  -CS (r) SJ (t) CS (c)       Row Name 04/11/25 1523          Plan of Care Review    Plan of Care Reviewed With patient  -CS (r) SJ (t) CS (c)     Progress improving  -CS (r) SJ (t) CS (c)     Outcome  Evaluation Pt presents with improved ADL participation and balance, but continues to benefit from IPOT services to address remaining deficits. Pt completed toileting standing with FWW and UB dressing with no LOB. Recommend IRF at d/c.  -CS (r) SJ (t) CS (c)       Row Name 04/11/25 1523          Therapy Plan Review/Discharge Plan (OT)    Anticipated Discharge Disposition (OT) inpatient rehabilitation facility  -CS (r) SJ (t) CS (c)       Row Name 04/11/25 1523          Vital Signs    Pre Patient Position Sitting  -CS (r) SJ (t) CS (c)     Intra Patient Position Standing  -CS (r) SJ (t) CS (c)     Post Patient Position Sitting  -CS (r) SJ (t) CS (c)       Row Name 04/11/25 1523          Positioning and Restraints    Pre-Treatment Position sitting in chair/recliner  -CS (r) SJ (t) CS (c)     Post Treatment Position chair  Left with PT  -CS (r) SJ (t) CS (c)     In Chair notified nsg;encouraged to call for assist;sitting;with PT  -CS (r) SJ (t) CS (c)               User Key  (r) = Recorded By, (t) = Taken By, (c) = Cosigned By      Initials Name Provider Type    CS Douglas Avery, OT Occupational Therapist    Jewell Trinidad, OT Student OT Student                   Outcome Measures       Row Name 04/11/25 1527          How much help from another is currently needed...    Putting on and taking off regular lower body clothing? 3  -CS (r) SJ (t) CS (c)     Bathing (including washing, rinsing, and drying) 2  -CS (r) SJ (t) CS (c)     Toileting (which includes using toilet bed pan or urinal) 3  -CS (r) SJ (t) CS (c)     Putting on and taking off regular upper body clothing 3  -CS (r) SJ (t) CS (c)     Taking care of personal grooming (such as brushing teeth) 3  -CS (r) SJ (t) CS (c)     Eating meals 4  -CS (r) SJ (t) CS (c)     AM-PAC 6 Clicks Score (OT) 18  -CS (r) SJ (t)       Row Name 04/11/25 1511 04/11/25 0800       How much help from another person do you currently need...    Turning from your back to your side  while in flat bed without using bedrails? 4  -KR 4  -SB    Moving from lying on back to sitting on the side of a flat bed without bedrails? 3  -KR 3  -SB    Moving to and from a bed to a chair (including a wheelchair)? 3  -KR 3  -SB    Standing up from a chair using your arms (e.g., wheelchair, bedside chair)? 3  -KR 3  -SB    Climbing 3-5 steps with a railing? 2  -KR 2  -SB    To walk in hospital room? 3  -KR 3  -SB    AM-PAC 6 Clicks Score (PT) 18  -KR 18  -SB    Highest Level of Mobility Goal 6 --> Walk 10 steps or more  -KR 6 --> Walk 10 steps or more  -SB      Row Name 04/11/25 1527          Functional Assessment    Outcome Measure Options AM-PAC 6 Clicks Daily Activity (OT)  -CS (r) SJ (t) CS (c)               User Key  (r) = Recorded By, (t) = Taken By, (c) = Cosigned By      Initials Name Provider Type    CS Douglas Avery, OT Occupational Therapist    Maureen Roth, RN Registered Nurse    Nat Ugalde, PT Physical Therapist    Jewell Trinidad, OT Student OT Student                    Occupational Therapy Education       Title: PT OT SLP Therapies (In Progress)       Topic: Occupational Therapy (Done)       Point: ADL training (Done)       Learning Progress Summary            Patient Acceptance, E, DU by  at 4/11/2025 1417    Acceptance, E,D, VU,DU by  at 4/9/2025 1032                      Point: Home exercise program (Done)       Learning Progress Summary            Patient Acceptance, E, DU by  at 4/11/2025 1417    Acceptance, E,D, VU,DU by CS at 4/9/2025 1032                      Point: Precautions (Done)       Learning Progress Summary            Patient Acceptance, E, DU by  at 4/11/2025 1417    Acceptance, E,D, VU,DU by  at 4/9/2025 1032                      Point: Body mechanics (Done)       Learning Progress Summary            Patient Acceptance, E, DU by  at 4/11/2025 1417    Acceptance, E,D, VU,DU by  at 4/9/2025 1032                                      User Key        Initials Effective Dates Name Provider Type Discipline     06/16/21 -  Douglas Avery, OT Occupational Therapist OT     02/13/25 -  Jewell Vergara, OT Student OT Student OT                  OT Recommendation and Plan     Plan of Care Review  Plan of Care Reviewed With: patient  Progress: improving  Outcome Evaluation: Pt presents with improved ADL participation and balance, but continues to benefit from IPOT services to address remaining deficits. Pt completed toileting standing with FWW and UB dressing with no LOB. Recommend IRF at d/c.     Time Calculation:         Time Calculation- OT       Row Name 04/11/25 1528 04/11/25 1514          Time Calculation- OT    OT Start Time 1417  -CS (r) SJ (t) CS (c) --     OT Received On 04/11/25  -CS (r) SJ (t) CS (c) --     OT Goal Re-Cert Due Date 04/19/25  -CS (r) SJ (t) CS (c) --        Timed Charges    59474 - Gait Training Minutes  -- 10  -KR     19502 - OT Therapeutic Activity Minutes 9  -CS (r) SJ (t) CS (c) --     84608 - OT Self Care/Mgmt Minutes 14  -CS (r) SJ (t) CS (c) --        Total Minutes    Timed Charges Total Minutes 23  -CS (r) SJ (t) 10  -KR      Total Minutes 23  -CS (r) SJ (t) 10  -KR               User Key  (r) = Recorded By, (t) = Taken By, (c) = Cosigned By      Initials Name Provider Type    CS Douglas Avery, OT Occupational Therapist    KR Nat Drummond, PT Physical Therapist     Jewell Vergara, OT Student OT Student                  Therapy Charges for Today       Code Description Service Date Service Provider Modifiers Qty    02729364919 HC OT THERAPEUTIC ACT EA 15 MIN 4/11/2025 Jewell Vergara, OT Student GO 1    [id number] HC OT SELF CARE/MGMT/TRAIN EA 15 MIN 4/11/2025 Jewell Vergara, OT Student GO 1                 KIMI Perry  4/11/2025

## 2025-04-11 NOTE — PROGRESS NOTES
[facility] Medicine Services  PROGRESS NOTE    Patient Name: Thomas Perez  : 1950  MRN: [id number]    Date of Admission: 2025  Primary Care Physician: Brock Randolph PA    Subjective   Subjective     CC:  weakness    HPI:  Patient reports feeling tired this morning but otherwise well.  Had another discussion the patient's medications and which ones were making him feel ill.  Patient reports he is willing to try 5 of his medications.  Also discussed PPI and its importance based on his symptoms and he is willing to try this as well.      Objective   Objective     Vital Signs:   Temp:  [98.1 °F (36.7 °C)-98.3 °F (36.8 °C)] 98.1 °F (36.7 °C)  Resp:  [16] 16  BP: (122-159)/(55-63) 159/63     Physical Exam  Constitutional:       General: He is not in acute distress.  Cardiovascular:      Rate and Rhythm: Normal rate and regular rhythm.      Heart sounds: Normal heart sounds.   Pulmonary:      Effort: Pulmonary effort is normal.      Breath sounds: Normal breath sounds.   Abdominal:      General: There is no distension.      Palpations: Abdomen is soft.      Tenderness: There is no abdominal tenderness.   Musculoskeletal:      Right lower leg: No edema.      Left lower leg: No edema.   Neurological:      General: No focal deficit present.      Mental Status: He is alert.          Results Reviewed:  LAB RESULTS:      Lab 04/10/25  0344 25  1859 25  1702 25  0409 25  1240 25  1129   WBC 9.31  --   --  8.86  --  15.68*   HEMOGLOBIN 7.9*  --   --  8.1*  --  9.5*   HEMATOCRIT 25.5*  --   --  25.9*  --  30.3*   PLATELETS 169  --   --  171  --  200   NEUTROS ABS  --   --   --  6.13  --  12.50*   IMMATURE GRANS (ABS)  --   --   --  0.03  --  0.09*   LYMPHS ABS  --   --   --  1.54  --  1.39   MONOS ABS  --   --   --  0.95*  --  1.44*   EOS ABS  --   --   --  0.18  --  0.20   MCV 96.6  --   --  94.9  --  95.0   HSTROP T  --  202* 199*  --  170* 179*          Lab 04/10/25  0344 04/09/25  0409 04/08/25  1129   SODIUM 139 138 138   POTASSIUM 5.5* 4.8 5.4*   CHLORIDE 108* 110* 109*   CO2 20.0* 19.0* 18.0*   ANION GAP 11.0 9.0 11.0   BUN 36* 33* 34*   CREATININE 2.43* 2.04* 2.34*   EGFR 27.2* 33.6* 28.5*   GLUCOSE 152* 129* 195*   CALCIUM 8.8 8.5* 8.7   MAGNESIUM 2.4 2.2 2.1   PHOSPHORUS  --  2.7  --    TSH  --   --  1.070         Lab 04/09/25  1702 04/08/25  1129   TOTAL PROTEIN  --  6.6   ALBUMIN  --  3.7   GLOBULIN  --  2.9   ALT (SGPT)  --  23   AST (SGOT)  --  21   BILIRUBIN  --  0.2   ALK PHOS  --  98   LIPASE 14  --          Lab 04/09/25  1859 04/09/25  1702 04/08/25  1240 04/08/25  1129   HSTROP T 202* 199* 170* 179*             Lab 04/08/25  1129   VITAMIN B 12 342         Brief Urine Lab Results  (Last result in the past 365 days)        Color   Clarity   Blood   Leuk Est   Nitrite   Protein   CREAT   Urine HCG        04/08/25 1256 Yellow   Hazy   Small (1+)   Moderate (2+)   Negative   >=300 mg/dL (3+)                   Microbiology Results Abnormal       None            XR Abdomen KUB  Result Date: 4/10/2025  XR ABDOMEN KUB Date of Exam: 4/10/2025 11:16 AM EDT Indication: abd pain Comparison: None available. Findings: Lung bases are clear. Nonobstructive bowel gas pattern. Vascular calcifications. No displaced osseous normality. Degenerative changes present. Right iliac stent seen. No abnormal abdominal calcifications otherwise.     Impression: Impression: Nonobstructive bowel gas pattern. Electronically Signed: Amari Baldwin MD  4/10/2025 12:28 PM EDT  Workstation ID: [id number]      Results for orders placed during the hospital encounter of 03/20/25    Adult Transthoracic Echo Complete w/ Color, Spectral and Contrast if necessary per protocol    Interpretation Summary  •  Left ventricular systolic function is normal. Calculated left ventricular EF = 62.7% Left ventricular ejection fraction appears to be 61 - 65%.  •  Normal left atrial size and volume  noted.      Current medications:  Scheduled Meds:amLODIPine, 10 mg, Oral, Q24H  aspirin, 81 mg, Oral, Daily  atorvastatin, 40 mg, Oral, Nightly  carvedilol, 25 mg, Oral, BID With Meals  heparin (porcine), 5,000 Units, Subcutaneous, Q8H  hydrALAZINE, 37.5 mg, Oral, TID  insulin glargine, 5 Units, Subcutaneous, Nightly  insulin lispro, 2-9 Units, Subcutaneous, 4x Daily AC & at Bedtime  isosorbide mononitrate, 20 mg, Oral, TID  pantoprazole, 40 mg, Oral, BID AC  Pharmacy Meds to Bed Consult, , Not Applicable, Daily  sodium chloride, 10 mL, Intravenous, Q12H      Continuous Infusions:   PRN Meds:.•  acetaminophen **OR** acetaminophen **OR** acetaminophen  •  senna-docusate sodium **AND** polyethylene glycol **AND** bisacodyl **AND** bisacodyl  •  calcium carbonate  •  Calcium Replacement - Follow Nurse / BPA Driven Protocol  •  dextrose  •  dextrose  •  gabapentin  •  glucagon (human recombinant)  •  Magnesium Cardiology Dose Replacement - Follow Nurse / BPA Driven Protocol  •  nitroglycerin  •  Phosphorus Replacement - Follow Nurse / BPA Driven Protocol  •  Potassium Replacement - Follow Nurse / BPA Driven Protocol  •  simethicone  •  sodium chloride  •  sodium chloride  •  sodium chloride    Assessment & Plan   Assessment & Plan     Active Hospital Problems    Diagnosis  POA   • **Failure to thrive in adult [R62.7]  Yes   • FTT (failure to thrive) in adult [R62.7]  Yes   • Weakness [R53.1]  Yes      Resolved Hospital Problems   No resolved problems to display.        Brief Hospital Course to date:  Thomas Perez is a 75 y.o. male with hypertension, hyperlipidemia, CAD status post RCA stent in 2010, PAD status post embolectomy and fasciotomy left lower extremity, COPD, poorly controlled type 2 diabetes, CKD 3, medication noncompliance who presents for lower extremity weakness and recurrent falls.  Patient recently discharged home from the hospital after being treated for hypertensive emergency.    Weakness, lower  extremities  Falls  - Patient with low functional status, family unable to care for him  - When asked to move his legs patient reports that he cannot however he has been able to ambulate some with PT with 1 assist.  - Weakness likely multifactorial in the setting of patient effort, deconditioning and diabetic neuropathy  - PT OT following, recommending inpatient rehab    UTI  - Increased urinary frequency  - UA consistent with UTI  - Status post Rocephin, patient refused last dose  - Follow-up cultures    MAGUI on CKD 3  Hyperkalemia-resolved  -Baseline creatinine around 2  - Creatinine up yesterday, refusing labs today  - Suspect fluconazole was a contributing factor.  This has been discontinued  -Will continue to monitor with a.m. labs    IDDM  - Continue glargine 5 nightly with sliding scale insulin, adjust as needed    Elevated troponins, chronic  CAD status post RCA stent 2010  PAD status post embolectomy and fasciotomy  Hypertension  Chronic chest pain  - Troponins elevated chronically, patient denies chest pain.  His pain yesterday resolved with antacids and GI cocktails.  - Patient has cardiology follow-up outpatient for continued discussions about outpatient cardiac cath  - Continue home amlodipine, aspirin, Coreg, hydralazine, isosorbide mononitrate    Diarrhea  - Likely secondary to antibiotics  - Hold bowel regimen  - Added probiotic however patient declined  - Continue to monitor    Anemia  - Chronic  -No signs of bleeding  -Previous studies consistent with anemia of chronic disease likely from CKD  -Continue to monitor    Medical noncompliance  - Complicated by very poor health literacy.  - Discussed medications with patient again today.  Willing to try his 5 cardiac medications and a PPI    HLD-continue statin  GERD-continue PPI  Chronic pain-dose reduced gabapentin to 200 mg twice daily, patient takes his as needed  Tobacco use-encouraged cessation, NRT      Expected Discharge Location and  Transportation: TBD  Expected Discharge   Expected Discharge Date: 4/12/2025; Expected Discharge Time:      VTE Prophylaxis:  Pharmacologic VTE prophylaxis orders are present.         AM-PAC 6 Clicks Score (PT): 18 (04/11/25 1511)    CODE STATUS:   Code Status and Medical Interventions: CPR (Attempt to Resuscitate); Full Support   Ordered at: 04/08/25 1531     Code Status (Patient has no pulse and is not breathing):    CPR (Attempt to Resuscitate)     Medical Interventions (Patient has pulse or is breathing):    Full Support     Level Of Support Discussed With:    Patient       Lilia Leroy MD  04/11/25

## 2025-04-11 NOTE — CASE MANAGEMENT/SOCIAL WORK
Continued Stay Note  [facility]     Patient Name: Thomas Perez  MRN: [id number]  Today's Date: 4/11/2025    Admit Date: 4/8/2025    Plan: ONGOING   Discharge Plan       Row Name 04/11/25 1310       Plan    Plan Comments MSW spoke with Ruby with [facility] [employer]. Once updated OT note in today, they will initiate precert with [facility].      Row Name 04/11/25 8174       Plan    Plan Comments Per physician, pt may be ready for discharge to [facility] this weekend. MSW updated Javyy with [facility].                   Discharge Codes    No documentation.                 Expected Discharge Date and Time       Expected Discharge Date Expected Discharge Time    Apr 12, 2025               RENAE Aguilar

## 2025-04-11 NOTE — PLAN OF CARE
Goal Outcome Evaluation:  Plan of Care Reviewed With: patient        Progress: improving  Outcome Evaluation: Pt with improved gait mechanics following application of DFA wrap to B ankles. Pt continues to demo B quad and hip weakness with transfers and ambulation and will continue to benefit from PT to address his strength, balance, and endurance deficits.    Anticipated Discharge Disposition (PT): inpatient rehabilitation facility

## 2025-04-11 NOTE — CASE MANAGEMENT/SOCIAL WORK
Continued Stay Note  [facility]     Patient Name: Thomas Perez  MRN: [id number]  Today's Date: 4/11/2025    Admit Date: 4/8/2025    Plan: ONGOING   Discharge Plan       Row Name 04/11/25 1049       Plan    Plan Comments Per physician, pt may be ready for discharge to [facility] this weekend. MSW updated Ally with [facility].                   Discharge Codes    No documentation.                 Expected Discharge Date and Time       Expected Discharge Date Expected Discharge Time    Apr 12, 2025               RENAE Aguilar

## 2025-04-11 NOTE — THERAPY TREATMENT NOTE
Patient Name: Thomas Perez  : 1950    MRN: [id number]                              Today's Date: 2025       Admit Date: 2025    Visit Dx:     ICD-10-CM ICD-9-CM   1. Generalized weakness  R53.1 780.79   2. Fall in home, initial encounter  W19.XXXA E888.9    Y92.009 E849.0   3. Yeast UTI  B37.49 112.2     Patient Active Problem List   Diagnosis    Type 2 diabetes mellitus with hyperglycemia, with long-term current use of insulin    Tobacco abuse    Coronary artery disease involving native coronary artery of native heart with angina pectoris    Anemia    CKD (chronic kidney disease) stage 3, GFR 30-59 ml/min    PAD (peripheral artery disease)    Medical non-compliance    COPD (chronic obstructive pulmonary disease)    Bilateral lower extremity edema    Hypertensive emergency    Likely type II NSTEMI from hypertensive emergency    Abnormal nuclear stress test    Moderate protein-calorie malnutrition    Type 2 myocardial infarction    Failure to thrive in adult    Weakness    FTT (failure to thrive) in adult     Past Medical History:   Diagnosis Date    Back injury     Diabetes mellitus     History of angina     Hypertension      Past Surgical History:   Procedure Laterality Date    CORONARY STENT PLACEMENT      EMBOLECTOMY      FASCIOTOMY        General Information       Row Name 25 1507          Physical Therapy Time and Intention    Document Type therapy note (daily note)  -KR     Mode of Treatment physical therapy  -KR       Row Name 25 1507          General Information    Patient Profile Reviewed yes  -KR     Existing Precautions/Restrictions fall;other (see comments)  chronic B foot drop  -KR     Barriers to Rehab hearing deficit;medically complex  -KR       Row Name 25 1507          Cognition    Orientation Status (Cognition) oriented x 3  -KR       Row Name 25 1507          Safety Issues/Impairments Affecting Functional Mobility    Safety Issues Affecting Function  (Mobility) insight into deficits/self-awareness;awareness of need for assistance;problem-solving;safety precaution awareness;sequencing abilities;safety precautions follow-through/compliance;judgment  -KR     Impairments Affecting Function (Mobility) balance;endurance/activity tolerance;strength;motor control;sensation/sensory awareness  -KR               User Key  (r) = Recorded By, (t) = Taken By, (c) = Cosigned By      Initials Name Provider Type    Nat Ugalde PT Physical Therapist                   Mobility       Row Name 04/11/25 1507          Sit-Stand Transfer    Sit-Stand [place] (Transfers) minimum assist (75% patient effort);1 person assist;verbal cues;nonverbal cues (demo/gesture)  -KR     Assistive Device (Sit-Stand Transfers) walker, front-wheeled  -KR     Comment, (Sit-Stand Transfer) 1x from chair CGA, 5x STS transfer training mostly CGA, one STS req'd Gayla d/t posterior lean. Cues for safe hand placement.  -KR       Row Name 04/11/25 1507          Gait/Stairs (Locomotion)    [place] Level (Gait) contact guard  -KR     Assistive Device (Gait) walker, front-wheeled  -KR     Distance in Feet (Gait) 50  30 + 50 x 2  -KR     Deviations/Abnormal Patterns (Gait) greta decreased;base of support, narrow;bilateral deviations  -KR     Bilateral Gait Deviations forward flexed posture;heel strike decreased  -KR     Left Sided Gait Deviations foot drop/toe drag  -KR     Right Sided Gait Deviations foot drop/toe drag  -KR     Comment, (Gait/Stairs) B foot drop with initial ambulation trial. DFA wrap applied to both ankles with improved LE clearance B on subsequent ambulation trials. Cues for upright posture throughout. B knee instability noted. Farther distance limited by fatigue and weakness.  -KR               User Key  (r) = Recorded By, (t) = Taken By, (c) = Cosigned By      Initials Name Provider Type    Nat Ugalde PT Physical Therapist                   Obj/Interventions        Row Name 04/11/25 1509          Motor Skills    Therapeutic Exercise hip;knee  -KR       Row Name 04/11/25 1509          Hip (Therapeutic Exercise)    Hip (Therapeutic Exercise) strengthening exercise  -KR     Hip Strengthening (Therapeutic Exercise) 10 repetitions;bilateral;marching while standing  -KR       Row Name 04/11/25 1509          Knee (Therapeutic Exercise)    Knee (Therapeutic Exercise) strengthening exercise  -KR     Knee Strengthening (Therapeutic Exercise) 10 repetitions;bilateral;SLR (straight leg raise);LAQ (long arc quad)  -KR       Row Name 04/11/25 1509          Balance    Balance Assessment sitting dynamic balance;sitting static balance;standing static balance;standing dynamic balance  -KR     Static Sitting Balance standby assist  -KR     Dynamic Sitting Balance standby assist  -KR     Position, Sitting Balance unsupported;sitting in chair  -KR     Static Standing Balance contact guard  -KR     Dynamic Standing Balance contact guard  -KR     Position/Device Used, Standing Balance supported;walker, front-wheeled  -KR     Balance Interventions sitting;standing;sit to stand;static;dynamic;supported  -KR               User Key  (r) = Recorded By, (t) = Taken By, (c) = Cosigned By      Initials Name Provider Type    KR aNt Drummond, PT Physical Therapist                   Goals/Plan    No documentation.                  Clinical Impression       Row Name 04/11/25 1510          Pain    Pretreatment Pain Rating 0/10 - no pain  -KR     Posttreatment Pain Rating 0/10 - no pain  -KR       Row Name 04/11/25 1510          Plan of Care Review    Plan of Care Reviewed With patient  -KR     Progress improving  -KR     Outcome Evaluation Pt with improved gait mechanics following application of DFA wrap to B ankles. Pt continues to demo B quad and hip weakness with transfers and ambulation and will continue to benefit from PT to address his strength, balance, and endurance deficits.  -KR       Row Name  04/11/25 1510          Vital Signs    Pre Patient Position Sitting  -KR     Intra Patient Position Standing  -KR     Post Patient Position Sitting  -KR       Row Name 04/11/25 1510          Positioning and Restraints    Pre-Treatment Position sitting in chair/recliner  -KR     Post Treatment Position chair  -KR     In Chair notified nsg;reclined;call light within reach;encouraged to call for assist;exit alarm on;waffle cushion;legs elevated  -KR               User Key  (r) = Recorded By, (t) = Taken By, (c) = Cosigned By      Initials Name Provider Type    Nat Ugalde, PT Physical Therapist                   Outcome Measures       Row Name 04/11/25 1511 04/11/25 0800       How much help from another person do you currently need...    Turning from your back to your side while in flat bed without using bedrails? 4  -KR 4  -SB    Moving from lying on back to sitting on the side of a flat bed without bedrails? 3  -KR 3  -SB    Moving to and from a bed to a chair (including a wheelchair)? 3  -KR 3  -SB    Standing up from a chair using your arms (e.g., wheelchair, bedside chair)? 3  -KR 3  -SB    Climbing 3-5 steps with a railing? 2  -KR 2  -SB    To walk in hospital room? 3  -KR 3  -SB    AM-PAC 6 Clicks Score (PT) 18  -KR 18  -SB    Highest Level of Mobility Goal 6 --> Walk 10 steps or more  -KR 6 --> Walk 10 steps or more  -SB              User Key  (r) = Recorded By, (t) = Taken By, (c) = Cosigned By      Initials Name Provider Type    Maureen Roth RN Registered Nurse    Nat Ugalde, PT Physical Therapist                                 Physical Therapy Education       Title: PT OT SLP Therapies (In Progress)       Topic: Physical Therapy (In Progress)       Point: Mobility training (In Progress)       Learning Progress Summary            Patient Acceptance, E, NR by MEGHA at 4/11/2025 1512    Acceptance, E, NR by RADHA at 4/10/2025 1405    Acceptance, E, NR by MEGHA at 4/9/2025 0934                       Point: Home exercise program (Not Started)       Learner Progress:  Not documented in this visit.              Point: Body mechanics (In Progress)       Learning Progress Summary            Patient Acceptance, E, NR by KR at 4/11/2025 1512    Acceptance, E, NR by AC at 4/10/2025 1405    Acceptance, E, NR by KR at 4/9/2025 0934                      Point: Precautions (In Progress)       Learning Progress Summary            Patient Acceptance, E, NR by KR at 4/11/2025 1512    Acceptance, E, NR by AC at 4/10/2025 1405    Acceptance, E, NR by KR at 4/9/2025 0934                                      User Key       Initials Effective Dates Name Provider Type Discipline     12/30/22 -  Nat Drummond, PT Physical Therapist PT     07/11/24 -  Alexus Perez PT Physical Therapist PT                  PT Recommendation and Plan  Planned Therapy Interventions (PT): balance training, bed mobility training, gait training, home exercise program, neuromuscular re-education, patient/family education, postural re-education, transfer training, stretching, strengthening, stair training, ROM (range of motion), orthotic fitting/training  Progress: improving  Outcome Evaluation: Pt with improved gait mechanics following application of DFA wrap to B ankles. Pt continues to demo B quad and hip weakness with transfers and ambulation and will continue to benefit from PT to address his strength, balance, and endurance deficits.     Time Calculation:   PT Evaluation Complexity  History, PT Evaluation Complexity: 3 or more personal factors and/or comorbidities  Examination of Body Systems (PT Eval Complexity): total of 4 or more elements  Clinical Presentation (PT Evaluation Complexity): evolving  Clinical Decision Making (PT Evaluation Complexity): moderate complexity  Overall Complexity (PT Evaluation Complexity): moderate complexity     PT Charges       Row Name 04/11/25 1514             Time Calculation    Start Time 1439  -KR      PT  Received On 04/11/25  -KR         Timed Charges    25165 - PT Therapeutic Exercise Minutes 8  -KR      83427 - Gait Training Minutes  10  -KR      26084 - PT Therapeutic Activity Minutes 5  -KR         Total Minutes    Timed Charges Total Minutes 23  -KR       Total Minutes 23  -KR                User Key  (r) = Recorded By, (t) = Taken By, (c) = Cosigned By      Initials Name Provider Type    Nat Ugalde, PT Physical Therapist                  Therapy Charges for Today       Code Description Service Date Service Provider Modifiers Qty    66475186274 HC PT THER PROC EA 15 MIN 4/11/2025 Nat Drummond, PT GP 1    82202624914 HC GAIT TRAINING EA 15 MIN 4/11/2025 Nat Drummond, PT GP 1            PT G-Codes  Outcome Measure Options: AM-PAC 6 Clicks Basic Mobility (PT)  AM-PAC 6 Clicks Score (PT): 18  AM-PAC 6 Clicks Score (OT): 18  PT Discharge Summary  Anticipated Discharge Disposition (PT): inpatient rehabilitation facility    Nat Drummond PT  4/11/2025

## 2025-04-12 LAB
ANION GAP SERPL CALCULATED.3IONS-SCNC: 13 MMOL/L (ref 5–15)
BUN SERPL-MCNC: 35 MG/DL (ref 8–23)
BUN/CREAT SERPL: 14.2 (ref 7–25)
CALCIUM SPEC-SCNC: 8.9 MG/DL (ref 8.6–10.5)
CHLORIDE SERPL-SCNC: 105 MMOL/L (ref 98–107)
CO2 SERPL-SCNC: 19 MMOL/L (ref 22–29)
CREAT SERPL-MCNC: 2.46 MG/DL (ref 0.76–1.27)
DEPRECATED RDW RBC AUTO: 48 FL (ref 37–54)
EGFRCR SERPLBLD CKD-EPI 2021: 26.8 ML/MIN/1.73
ERYTHROCYTE [DISTWIDTH] IN BLOOD BY AUTOMATED COUNT: 13.9 % (ref 12.3–15.4)
GLUCOSE BLDC GLUCOMTR-MCNC: 143 MG/DL (ref 70–130)
GLUCOSE BLDC GLUCOMTR-MCNC: 261 MG/DL (ref 70–130)
GLUCOSE SERPL-MCNC: 192 MG/DL (ref 65–99)
HCT VFR BLD AUTO: 28.7 % (ref 37.5–51)
HGB BLD-MCNC: 9.1 G/DL (ref 13–17.7)
MAGNESIUM SERPL-MCNC: 2.5 MG/DL (ref 1.6–2.4)
MCH RBC QN AUTO: 29.8 PG (ref 26.6–33)
MCHC RBC AUTO-ENTMCNC: 31.7 G/DL (ref 31.5–35.7)
MCV RBC AUTO: 94.1 FL (ref 79–97)
PLATELET # BLD AUTO: 204 10*3/MM3 (ref 140–450)
PMV BLD AUTO: 10.7 FL (ref 6–12)
POTASSIUM SERPL-SCNC: 5.1 MMOL/L (ref 3.5–5.2)
RBC # BLD AUTO: 3.05 10*6/MM3 (ref 4.14–5.8)
SODIUM SERPL-SCNC: 137 MMOL/L (ref 136–145)
WBC NRBC COR # BLD AUTO: 9.64 10*3/MM3 (ref 3.4–10.8)

## 2025-04-12 PROCEDURE — 80048 BASIC METABOLIC PNL TOTAL CA: CPT | Performed by: STUDENT IN AN ORGANIZED HEALTH CARE EDUCATION/TRAINING PROGRAM

## 2025-04-12 PROCEDURE — 83735 ASSAY OF MAGNESIUM: CPT | Performed by: STUDENT IN AN ORGANIZED HEALTH CARE EDUCATION/TRAINING PROGRAM

## 2025-04-12 PROCEDURE — 85027 COMPLETE CBC AUTOMATED: CPT | Performed by: STUDENT IN AN ORGANIZED HEALTH CARE EDUCATION/TRAINING PROGRAM

## 2025-04-12 PROCEDURE — 99232 SBSQ HOSP IP/OBS MODERATE 35: CPT | Performed by: STUDENT IN AN ORGANIZED HEALTH CARE EDUCATION/TRAINING PROGRAM

## 2025-04-12 PROCEDURE — 63710000001 INSULIN GLARGINE PER 5 UNITS: Performed by: STUDENT IN AN ORGANIZED HEALTH CARE EDUCATION/TRAINING PROGRAM

## 2025-04-12 PROCEDURE — 63710000001 INSULIN LISPRO (HUMAN) PER 5 UNITS: Performed by: STUDENT IN AN ORGANIZED HEALTH CARE EDUCATION/TRAINING PROGRAM

## 2025-04-12 PROCEDURE — 82948 REAGENT STRIP/BLOOD GLUCOSE: CPT

## 2025-04-12 PROCEDURE — 25810000003 LACTATED RINGERS PER 1000 ML: Performed by: STUDENT IN AN ORGANIZED HEALTH CARE EDUCATION/TRAINING PROGRAM

## 2025-04-12 RX ORDER — ONDANSETRON 2 MG/ML
4 INJECTION INTRAMUSCULAR; INTRAVENOUS EVERY 6 HOURS PRN
Status: DISCONTINUED | OUTPATIENT
Start: 2025-04-12 | End: 2025-04-17 | Stop reason: HOSPADM

## 2025-04-12 RX ORDER — SODIUM CHLORIDE, SODIUM LACTATE, POTASSIUM CHLORIDE, CALCIUM CHLORIDE 600; 310; 30; 20 MG/100ML; MG/100ML; MG/100ML; MG/100ML
100 INJECTION, SOLUTION INTRAVENOUS CONTINUOUS
Status: ACTIVE | OUTPATIENT
Start: 2025-04-12 | End: 2025-04-12

## 2025-04-12 RX ORDER — GUAIFENESIN AND DEXTROMETHORPHAN HYDROBROMIDE 600; 30 MG/1; MG/1
1 TABLET, EXTENDED RELEASE ORAL 2 TIMES DAILY PRN
Status: DISCONTINUED | OUTPATIENT
Start: 2025-04-12 | End: 2025-04-17 | Stop reason: HOSPADM

## 2025-04-12 RX ADMIN — SODIUM CHLORIDE, SODIUM LACTATE, POTASSIUM CHLORIDE, CALCIUM CHLORIDE 75 ML/HR: 20; 30; 600; 310 INJECTION, SOLUTION INTRAVENOUS at 15:19

## 2025-04-12 RX ADMIN — HYDRALAZINE HYDROCHLORIDE 37.5 MG: 25 TABLET ORAL at 21:15

## 2025-04-12 RX ADMIN — INSULIN LISPRO 6 UNITS: 100 INJECTION, SOLUTION INTRAVENOUS; SUBCUTANEOUS at 17:40

## 2025-04-12 RX ADMIN — INSULIN GLARGINE 5 UNITS: 100 INJECTION, SOLUTION SUBCUTANEOUS at 21:15

## 2025-04-12 RX ADMIN — HYDRALAZINE HYDROCHLORIDE 37.5 MG: 25 TABLET ORAL at 15:11

## 2025-04-12 RX ADMIN — CARVEDILOL 25 MG: 12.5 TABLET, FILM COATED ORAL at 17:40

## 2025-04-12 NOTE — SIGNIFICANT NOTE
RN called reporting patient continues to refuse all labs, vital signs and telemetry monitoring.  Instructed RN to hold BP medication tonight due to inability to obtain BP.

## 2025-04-12 NOTE — PROGRESS NOTES
[facility] Medicine Services  PROGRESS NOTE    Patient Name: Thomas Perez  : 1950  MRN: [id number]    Date of Admission: 2025  Primary Care Physician: Brock Randolph PA    Subjective   Subjective     CC:  weakness    HPI:  Had a long discussion today with patient about compliance.  He was agreeable to lab draws today and vital signs.  He reports having some cold symptoms and would like medication for that.  Told him we can order this however he needs to take his other mes. He agreeable to midday BP meds. He did allow labs to be drawn.       Objective   Objective     Vital Signs:   Temp:  [98 °F (36.7 °C)-98.1 °F (36.7 °C)] 98 °F (36.7 °C)  Heart Rate:  [75] 75  Resp:  [18] 18  BP: (159-175)/(63-75) 175/75     Physical Exam  Constitutional:       General: He is not in acute distress.  Cardiovascular:      Rate and Rhythm: Normal rate and regular rhythm.      Heart sounds: Normal heart sounds.   Pulmonary:      Effort: Pulmonary effort is normal.      Breath sounds: Normal breath sounds.   Abdominal:      General: There is no distension.      Palpations: Abdomen is soft.      Tenderness: There is no abdominal tenderness.   Musculoskeletal:      Right lower leg: No edema.      Left lower leg: No edema.   Neurological:      General: No focal deficit present.      Mental Status: He is alert.          Results Reviewed:  LAB RESULTS:      Lab 25  1112 04/10/25  0344 25  1859 25  1702 25  0409 25  1240 25  1129   WBC 9.64 9.31  --   --  8.86  --  15.68*   HEMOGLOBIN 9.1* 7.9*  --   --  8.1*  --  9.5*   HEMATOCRIT 28.7* 25.5*  --   --  25.9*  --  30.3*   PLATELETS 204 169  --   --  171  --  200   NEUTROS ABS  --   --   --   --  6.13  --  12.50*   IMMATURE GRANS (ABS)  --   --   --   --  0.03  --  0.09*   LYMPHS ABS  --   --   --   --  1.54  --  1.39   MONOS ABS  --   --   --   --  0.95*  --  1.44*   EOS ABS  --   --   --   --  0.18  --  0.20    MCV 94.1 96.6  --   --  94.9  --  95.0   HSTROP T  --   --  202* 199*  --  170* 179*         Lab 04/12/25  1112 04/10/25  0344 04/09/25  0409 04/08/25  1129   SODIUM 137 139 138 138   POTASSIUM 5.1 5.5* 4.8 5.4*   CHLORIDE 105 108* 110* 109*   CO2 19.0* 20.0* 19.0* 18.0*   ANION GAP 13.0 11.0 9.0 11.0   BUN 35* 36* 33* 34*   CREATININE 2.46* 2.43* 2.04* 2.34*   EGFR 26.8* 27.2* 33.6* 28.5*   GLUCOSE 192* 152* 129* 195*   CALCIUM 8.9 8.8 8.5* 8.7   MAGNESIUM 2.5* 2.4 2.2 2.1   PHOSPHORUS  --   --  2.7  --    TSH  --   --   --  1.070         Lab 04/09/25  1702 04/08/25  1129   TOTAL PROTEIN  --  6.6   ALBUMIN  --  3.7   GLOBULIN  --  2.9   ALT (SGPT)  --  23   AST (SGOT)  --  21   BILIRUBIN  --  0.2   ALK PHOS  --  98   LIPASE 14  --          Lab 04/09/25  1859 04/09/25  1702 04/08/25  1240 04/08/25  1129   HSTROP T 202* 199* 170* 179*             Lab 04/08/25  1129   VITAMIN B 12 342         Brief Urine Lab Results  (Last result in the past 365 days)        Color   Clarity   Blood   Leuk Est   Nitrite   Protein   CREAT   Urine HCG        04/08/25 1256 Yellow   Hazy   Small (1+)   Moderate (2+)   Negative   >=300 mg/dL (3+)                   Microbiology Results Abnormal       None            No radiology results from the last 24 hrs      Results for orders placed during the hospital encounter of 03/20/25    Adult Transthoracic Echo Complete w/ Color, Spectral and Contrast if necessary per protocol    Interpretation Summary    Left ventricular systolic function is normal. Calculated left ventricular EF = 62.7% Left ventricular ejection fraction appears to be 61 - 65%.    Normal left atrial size and volume noted.      Current medications:  Scheduled Meds:amLODIPine, 10 mg, Oral, Q24H  aspirin, 81 mg, Oral, Daily  atorvastatin, 40 mg, Oral, Nightly  carvedilol, 25 mg, Oral, BID With Meals  heparin (porcine), 5,000 Units, Subcutaneous, Q8H  hydrALAZINE, 37.5 mg, Oral, TID  insulin glargine, 5 Units, Subcutaneous,  Nightly  insulin lispro, 2-9 Units, Subcutaneous, 4x Daily AC & at Bedtime  isosorbide mononitrate, 20 mg, Oral, TID  pantoprazole, 40 mg, Oral, BID AC  Pharmacy Meds to Bed Consult, , Not Applicable, Daily  sodium chloride, 10 mL, Intravenous, Q12H      Continuous Infusions:   PRN Meds:.  acetaminophen **OR** acetaminophen **OR** acetaminophen    senna-docusate sodium **AND** polyethylene glycol **AND** bisacodyl **AND** bisacodyl    calcium carbonate    Calcium Replacement - Follow Nurse / BPA Driven Protocol    dextrose    dextrose    gabapentin    glucagon (human recombinant)    guaifenesin-dextromethorphan 600-30 mg    Magnesium Cardiology Dose Replacement - Follow Nurse / BPA Driven Protocol    nitroglycerin    Phosphorus Replacement - Follow Nurse / BPA Driven Protocol    Potassium Replacement - Follow Nurse / BPA Driven Protocol    simethicone    sodium chloride    sodium chloride    sodium chloride    Assessment & Plan   Assessment & Plan     Active Hospital Problems    Diagnosis  POA    **Failure to thrive in adult [R62.7]  Yes    FTT (failure to thrive) in adult [R62.7]  Yes    Weakness [R53.1]  Yes      Resolved Hospital Problems   No resolved problems to display.        Brief Hospital Course to date:  Thomas Perez is a 75 y.o. male with hypertension, hyperlipidemia, CAD status post RCA stent in 2010, PAD status post embolectomy and fasciotomy left lower extremity, COPD, poorly controlled type 2 diabetes, CKD 3, medication noncompliance who presents for lower extremity weakness and recurrent falls.  Patient recently discharged home from the hospital after being treated for hypertensive emergency.    Weakness, lower extremities  Falls  - Patient with low functional status, family unable to care for him  - When asked to move his legs patient reports that he cannot however he has been able to ambulate some with PT with 1 assist.  - Weakness likely multifactorial in the setting of patient effort,  deconditioning and diabetic neuropathy  - PT OT following, recommending inpatient rehab    UTI  - Increased urinary frequency  - UA consistent with UTI  - Status post Rocephin, patient refused last dose  - Follow-up cultures    MAGUI on CKD 3  Hyperkalemia-resolved  -Baseline creatinine around 2  - Patient allowed labs to be drawn today.  Creatinine stable at 2.46.  - Suspect fluconazole was a contributing factor.  This has been discontinued  -Will continue to monitor with labs, pt requesting them be drawn later in the day  -will trial IV fluids     IDDM  - Continue glargine 5 nightly with sliding scale insulin, adjust as needed    Elevated troponins, chronic  CAD status post RCA stent 2010  PAD status post embolectomy and fasciotomy  Hypertension  Chronic chest pain  Hx hypertensive urgency   - Patient has cardiology follow-up outpatient for continued discussions about outpatient cardiac cath  - Continue home amlodipine, aspirin, Coreg, hydralazine, isosorbide mononitrate  -discussed importance of compliance again today     Diarrhea  - Likely secondary to antibiotics  - Hold bowel regimen  - Added probiotic however patient declined  - Continue to monitor    Anemia  - Chronic  -No signs of bleeding  -Previous studies consistent with anemia of chronic disease likely from CKD  -Continue to monitor    Medical noncompliance  - Complicated by very poor health literacy.  - Discussed medications with patient again today.  Willing to try his 5 cardiac medications and a PPI  -reports gabapentin makes him feel odd, will discontinue per his request     HLD-continue statin  GERD-continue PPI  Chronic pain-dose reduced gabapentin to 200 mg twice daily, patient takes his as needed  Tobacco use-encouraged cessation, NRT      Expected Discharge Location and Transportation: TBD  Expected Discharge   Expected Discharge Date: 4/12/2025; Expected Discharge Time:      VTE Prophylaxis:  Pharmacologic VTE prophylaxis orders are present.          AM-PAC 6 Clicks Score (PT): 18 (04/12/25 0800)    CODE STATUS:   Code Status and Medical Interventions: CPR (Attempt to Resuscitate); Full Support   Ordered at: 04/08/25 1531     Code Status (Patient has no pulse and is not breathing):    CPR (Attempt to Resuscitate)     Medical Interventions (Patient has pulse or is breathing):    Full Support     Level Of Support Discussed With:    Patient       Lilia Leroy MD  04/12/25

## 2025-04-12 NOTE — NURSING NOTE
Patient refused 2000 vitals and blood glucose. TYLER Peter notified. BP meds and SS insulin held d/t not being able to safely administer with refusals. Patient refused all night time medications outside of insulin. Educated on importance of medication compliance.

## 2025-04-13 LAB
ANION GAP SERPL CALCULATED.3IONS-SCNC: 12 MMOL/L (ref 5–15)
BUN SERPL-MCNC: 39 MG/DL (ref 8–23)
BUN/CREAT SERPL: 21.2 (ref 7–25)
CALCIUM SPEC-SCNC: 8.7 MG/DL (ref 8.6–10.5)
CHLORIDE SERPL-SCNC: 107 MMOL/L (ref 98–107)
CO2 SERPL-SCNC: 18 MMOL/L (ref 22–29)
CREAT SERPL-MCNC: 1.84 MG/DL (ref 0.76–1.27)
DEPRECATED RDW RBC AUTO: 50.2 FL (ref 37–54)
EGFRCR SERPLBLD CKD-EPI 2021: 38 ML/MIN/1.73
ERYTHROCYTE [DISTWIDTH] IN BLOOD BY AUTOMATED COUNT: 14 % (ref 12.3–15.4)
GLUCOSE BLDC GLUCOMTR-MCNC: 154 MG/DL (ref 70–130)
GLUCOSE BLDC GLUCOMTR-MCNC: 204 MG/DL (ref 70–130)
GLUCOSE BLDC GLUCOMTR-MCNC: 255 MG/DL (ref 70–130)
GLUCOSE SERPL-MCNC: 145 MG/DL (ref 65–99)
HCT VFR BLD AUTO: 27.3 % (ref 37.5–51)
HGB BLD-MCNC: 8.3 G/DL (ref 13–17.7)
MCH RBC QN AUTO: 29.5 PG (ref 26.6–33)
MCHC RBC AUTO-ENTMCNC: 30.4 G/DL (ref 31.5–35.7)
MCV RBC AUTO: 97.2 FL (ref 79–97)
PLATELET # BLD AUTO: 192 10*3/MM3 (ref 140–450)
PMV BLD AUTO: 10.6 FL (ref 6–12)
POTASSIUM SERPL-SCNC: 4.8 MMOL/L (ref 3.5–5.2)
RBC # BLD AUTO: 2.81 10*6/MM3 (ref 4.14–5.8)
SODIUM SERPL-SCNC: 137 MMOL/L (ref 136–145)
WBC NRBC COR # BLD AUTO: 7.89 10*3/MM3 (ref 3.4–10.8)

## 2025-04-13 PROCEDURE — 99232 SBSQ HOSP IP/OBS MODERATE 35: CPT | Performed by: HOSPITALIST

## 2025-04-13 PROCEDURE — 82948 REAGENT STRIP/BLOOD GLUCOSE: CPT

## 2025-04-13 PROCEDURE — 85027 COMPLETE CBC AUTOMATED: CPT | Performed by: STUDENT IN AN ORGANIZED HEALTH CARE EDUCATION/TRAINING PROGRAM

## 2025-04-13 PROCEDURE — 63710000001 INSULIN LISPRO (HUMAN) PER 5 UNITS: Performed by: STUDENT IN AN ORGANIZED HEALTH CARE EDUCATION/TRAINING PROGRAM

## 2025-04-13 PROCEDURE — 80048 BASIC METABOLIC PNL TOTAL CA: CPT | Performed by: STUDENT IN AN ORGANIZED HEALTH CARE EDUCATION/TRAINING PROGRAM

## 2025-04-13 RX ADMIN — Medication 10 ML: at 09:06

## 2025-04-13 NOTE — PROGRESS NOTES
[facility] Medicine Services  PROGRESS NOTE    Patient Name: Thomas Perez  : 1950  MRN: [id number]    Date of Admission: 2025  Primary Care Physician: Brock Randolph PA    Subjective   Subjective     CC: Weakness    HPI: Up in bed. Just wants left alone to sleep.     Objective   Objective     Vital Signs:   Temp:  [98 °F (36.7 °C)-98.6 °F (37 °C)] 98.6 °F (37 °C)  Heart Rate:  [75] 75  Resp:  [18] 18  BP: (146-184)/(48-89) 163/73    NAD, alert and oriented  OP clear, dry MM  Neck supple  RRR  CTAB  +BS, soft     Results Reviewed:  LAB RESULTS:      Lab 25  1112 04/10/25  0344 04/09/25  18525  17025  0409 25  1240 25  1129   WBC 9.64 9.31  --   --  8.86  --  15.68*   HEMOGLOBIN 9.1* 7.9*  --   --  8.1*  --  9.5*   HEMATOCRIT 28.7* 25.5*  --   --  25.9*  --  30.3*   PLATELETS 204 169  --   --  171  --  200   NEUTROS ABS  --   --   --   --  6.13  --  12.50*   IMMATURE GRANS (ABS)  --   --   --   --  0.03  --  0.09*   LYMPHS ABS  --   --   --   --  1.54  --  1.39   MONOS ABS  --   --   --   --  0.95*  --  1.44*   EOS ABS  --   --   --   --  0.18  --  0.20   MCV 94.1 96.6  --   --  94.9  --  95.0   HSTROP T  --   --  202* 199*  --  170* 179*         Lab 25  1112 04/10/25  03425  0409 25  1129   SODIUM 137 139 138 138   POTASSIUM 5.1 5.5* 4.8 5.4*   CHLORIDE 105 108* 110* 109*   CO2 19.0* 20.0* 19.0* 18.0*   ANION GAP 13.0 11.0 9.0 11.0   BUN 35* 36* 33* 34*   CREATININE 2.46* 2.43* 2.04* 2.34*   EGFR 26.8* 27.2* 33.6* 28.5*   GLUCOSE 192* 152* 129* 195*   CALCIUM 8.9 8.8 8.5* 8.7   MAGNESIUM 2.5* 2.4 2.2 2.1   PHOSPHORUS  --   --  2.7  --    TSH  --   --   --  1.070         Lab 25  17025  1129   TOTAL PROTEIN  --  6.6   ALBUMIN  --  3.7   GLOBULIN  --  2.9   ALT (SGPT)  --  23   AST (SGOT)  --  21   BILIRUBIN  --  0.2   ALK PHOS  --  98   LIPASE 14  --          Lab 25  1859 25  17025  1240  04/08/25  1129   HSTROP T 202* 199* 170* 179*             Lab 04/08/25  1129   VITAMIN B 12 342         Brief Urine Lab Results  (Last result in the past 365 days)        Color   Clarity   Blood   Leuk Est   Nitrite   Protein   CREAT   Urine HCG        04/08/25 1256 Yellow   Hazy   Small (1+)   Moderate (2+)   Negative   >=300 mg/dL (3+)                   Microbiology Results Abnormal       None            No radiology results from the last 24 hrs      Results for orders placed during the hospital encounter of 03/20/25    Adult Transthoracic Echo Complete w/ Color, Spectral and Contrast if necessary per protocol    Interpretation Summary  •  Left ventricular systolic function is normal. Calculated left ventricular EF = 62.7% Left ventricular ejection fraction appears to be 61 - 65%.  •  Normal left atrial size and volume noted.      Current medications:  Scheduled Meds:amLODIPine, 10 mg, Oral, Q24H  aspirin, 81 mg, Oral, Daily  atorvastatin, 40 mg, Oral, Nightly  carvedilol, 25 mg, Oral, BID With Meals  heparin (porcine), 5,000 Units, Subcutaneous, Q8H  hydrALAZINE, 37.5 mg, Oral, TID  insulin glargine, 5 Units, Subcutaneous, Nightly  insulin lispro, 2-9 Units, Subcutaneous, 4x Daily AC & at Bedtime  isosorbide mononitrate, 20 mg, Oral, TID  pantoprazole, 40 mg, Oral, BID AC  Pharmacy Meds to Bed Consult, , Not Applicable, Daily  sodium chloride, 10 mL, Intravenous, Q12H      Continuous Infusions:   PRN Meds:.•  acetaminophen **OR** acetaminophen **OR** acetaminophen  •  senna-docusate sodium **AND** polyethylene glycol **AND** bisacodyl **AND** bisacodyl  •  calcium carbonate  •  Calcium Replacement - Follow Nurse / BPA Driven Protocol  •  dextrose  •  dextrose  •  glucagon (human recombinant)  •  guaifenesin-dextromethorphan 600-30 mg  •  Magnesium Cardiology Dose Replacement - Follow Nurse / BPA Driven Protocol  •  nitroglycerin  •  ondansetron  •  Phosphorus Replacement - Follow Nurse / BPA Driven Protocol  •   Potassium Replacement - Follow Nurse / BPA Driven Protocol  •  simethicone  •  sodium chloride  •  sodium chloride  •  sodium chloride    Assessment & Plan   Assessment & Plan     Active Hospital Problems    Diagnosis  POA   • **Failure to thrive in adult [R62.7]  Yes   • FTT (failure to thrive) in adult [R62.7]  Yes   • Weakness [R53.1]  Yes      Resolved Hospital Problems   No resolved problems to display.        Brief Hospital Course to date:  Thomas Perez is a 75 y.o. male with hypertension, hyperlipidemia, CAD status post RCA stent in 2010, PAD status post embolectomy and fasciotomy left lower extremity, COPD, poorly controlled type 2 diabetes, CKD 3, medication noncompliance who presents for lower extremity weakness and recurrent falls.  Patient recently discharged home from the hospital after being treated for hypertensive emergency.    Weakness, lower extremities  Falls  - Patient with low functional status, family unable to care for him  - When asked to move his legs patient reports that he cannot however he has been able to ambulate some with PT with 1 assist.  - Weakness likely multifactorial in the setting of patient effort, deconditioning and diabetic neuropathy  - PT OT following, recommending inpatient rehab    UTI  - Increased urinary frequency  - UA consistent with UTI  - Status post Rocephin, patient refused last dose  - Follow-up cultures    MAGUI on CKD 3  Hyperkalemia-resolved  -Baseline creatinine around 2    IDDM  -stable on current regimen    Elevated troponins, chronic  CAD status post RCA stent 2010  PAD status post embolectomy and fasciotomy  Hypertension  Chronic chest pain  Hx hypertensive urgency   -Patient has cardiology follow-up outpatient for continued discussions about outpatient cardiac cath  -Continue home amlodipine, aspirin, Coreg, hydralazine, isosorbide mononitrate  -discussed importance of compliance again today     Diarrhea  -no complaints today    Anemia  -Chronic  -No signs  of bleeding  -Previous studies consistent with anemia of chronic disease likely from CKD  -Continue to monitor    Medical noncompliance  -complicated by very poor health literacy  -stopped gabapentin    HLD-continue statin  GERD-continue PPI  Chronic pain-stopped gabapentin  Tobacco use-encouraged cessation, NRT      Expected Discharge Location and Transportation: TBD  Expected Discharge   Expected Discharge Date: 4/15/2025; Expected Discharge Time:      VTE Prophylaxis:  Pharmacologic VTE prophylaxis orders are present.         AM-PAC 6 Clicks Score (PT): 18 (04/12/25 2000)    CODE STATUS:   Code Status and Medical Interventions: CPR (Attempt to Resuscitate); Full Support   Ordered at: 04/08/25 1531     Code Status (Patient has no pulse and is not breathing):    CPR (Attempt to Resuscitate)     Medical Interventions (Patient has pulse or is breathing):    Full Support     Level Of Support Discussed With:    Patient       Castro Bermudez MD  04/13/25

## 2025-04-13 NOTE — NURSING NOTE
Pt agreeable to 1900 vitals and BG check, was NOT agreeable to 2100 medications. Finally got him to agree to hydralazine and insulin after reeducating on medication compliance importance. Still refused all other medications.     Current IV needs replaced, pt is agreeable to a USG IV placement, but with only allowing one stick. Charge RN notified,

## 2025-04-13 NOTE — PLAN OF CARE
Problem: Adult Inpatient Plan of Care  Goal: Plan of Care Review  Outcome: Not Progressing  Flowsheets  Taken 4/13/2025 1622 by Lynne Pond RN  Progress: no change  Outcome Evaluation: Pt continues to refuse medications during this shift, even after extensive education on risks.  Taken 4/11/2025 1510 by Nat Drummond PT  Plan of Care Reviewed With: patient  Goal: Patient-Specific Goal (Individualized)  Outcome: Not Progressing  Goal: Absence of Hospital-Acquired Illness or Injury  Outcome: Not Progressing  Intervention: Identify and Manage Fall Risk  Recent Flowsheet Documentation  Taken 4/13/2025 1600 by Lynne Pond RN  Safety Promotion/Fall Prevention: fall prevention program maintained  Taken 4/13/2025 1400 by Lynne Pond RN  Safety Promotion/Fall Prevention: fall prevention program maintained  Taken 4/13/2025 1200 by Lynne Pond RN  Safety Promotion/Fall Prevention: fall prevention program maintained  Taken 4/13/2025 1000 by Lynne Pond RN  Safety Promotion/Fall Prevention: fall prevention program maintained  Taken 4/13/2025 0905 by Lynne Pond RN  Safety Promotion/Fall Prevention: fall prevention program maintained  Intervention: Prevent Skin Injury  Recent Flowsheet Documentation  Taken 4/13/2025 1600 by Lynne Pond RN  Body Position: supine  Skin Protection: incontinence pads utilized  Taken 4/13/2025 1400 by Lynne Pond RN  Skin Protection: incontinence pads utilized  Taken 4/13/2025 1200 by Lynne Pond RN  Body Position: position changed independently  Skin Protection: incontinence pads utilized  Taken 4/13/2025 1000 by Lynne Pond RN  Body Position: position changed independently  Skin Protection: incontinence pads utilized  Taken 4/13/2025 0905 by Lynne Pond RN  Body Position: position changed independently  Skin Protection: incontinence pads utilized  Intervention: Prevent and Manage VTE (Venous Thromboembolism) Risk  Recent Flowsheet Documentation  Taken  4/13/2025 0905 by Lynne Pond RN  VTE Prevention/Management: (refuses Heparin)   SCDs (sequential compression devices) off   patient refused intervention   other (see comments)  Intervention: Prevent Infection  Recent Flowsheet Documentation  Taken 4/13/2025 1600 by Lynne Pond RN  Infection Prevention: environmental surveillance performed  Taken 4/13/2025 1400 by Lynne Pond RN  Infection Prevention: environmental surveillance performed  Taken 4/13/2025 1200 by Lynne Pond RN  Infection Prevention: environmental surveillance performed  Taken 4/13/2025 1000 by Lynne Pond RN  Infection Prevention: environmental surveillance performed  Taken 4/13/2025 0905 by Lynne Pond RN  Infection Prevention: environmental surveillance performed  Goal: Optimal Comfort and Wellbeing  Outcome: Not Progressing  Intervention: Provide Person-Centered Care  Recent Flowsheet Documentation  Taken 4/13/2025 0905 by Lynne Pond RN  Trust Relationship/Rapport: care explained  Goal: Readiness for Transition of Care  Outcome: Not Progressing     Problem: Comorbidity Management  Goal: Blood Glucose Level Within Target Range  Outcome: Not Progressing  Intervention: Monitor and Manage Glycemia  Recent Flowsheet Documentation  Taken 4/13/2025 1600 by Lynne Pond RN  Medication Review/Management: medications reviewed  Taken 4/13/2025 1400 by Lynne Pond RN  Medication Review/Management: medications reviewed  Taken 4/13/2025 1200 by Lynne Pond RN  Medication Review/Management: medications reviewed  Taken 4/13/2025 1000 by Lynne Pond RN  Medication Review/Management: medications reviewed  Taken 4/13/2025 0905 by Lynne Pond RN  Medication Review/Management: medications reviewed  Goal: Blood Pressure in Desired Range  Outcome: Not Progressing  Intervention: Maintain Blood Pressure Management  Recent Flowsheet Documentation  Taken 4/13/2025 1600 by Lynne Pond RN  Medication Review/Management:  medications reviewed  Taken 4/13/2025 1400 by Lynne Pond RN  Medication Review/Management: medications reviewed  Taken 4/13/2025 1200 by Lynne Pond RN  Medication Review/Management: medications reviewed  Taken 4/13/2025 1000 by Lynne Pond RN  Medication Review/Management: medications reviewed  Taken 4/13/2025 0905 by Lynne Pond RN  Medication Review/Management: medications reviewed     Problem: Skin Injury Risk Increased  Goal: Skin Health and Integrity  Outcome: Not Progressing  Intervention: Optimize Skin Protection  Recent Flowsheet Documentation  Taken 4/13/2025 1600 by Lynne Pond RN  Pressure Reduction Techniques: frequent weight shift encouraged  Head of Bed (HOB) Positioning: [facility] elevated  Pressure Reduction Devices: pressure-redistributing mattress utilized  Skin Protection: incontinence pads utilized  Taken 4/13/2025 1400 by Lynne Pond RN  Pressure Reduction Techniques: frequent weight shift encouraged  Head of Bed (HOB) Positioning: [facility] elevated  Pressure Reduction Devices: pressure-redistributing mattress utilized  Skin Protection: incontinence pads utilized  Taken 4/13/2025 1200 by Lynne Pond RN  Pressure Reduction Techniques: frequent weight shift encouraged  Head of Bed (HOB) Positioning: [facility] elevated  Pressure Reduction Devices: pressure-redistributing mattress utilized  Skin Protection: incontinence pads utilized  Taken 4/13/2025 1000 by Lynne Podn RN  Pressure Reduction Techniques: frequent weight shift encouraged  Head of Bed (HOB) Positioning: [facility] elevated  Pressure Reduction Devices: pressure-redistributing mattress utilized  Skin Protection: incontinence pads utilized  Taken 4/13/2025 0905 by Lynne Pond RN  Pressure Reduction Techniques: (pt is a self turn) frequent weight shift encouraged  Head of Bed (HOB) Positioning: [facility] elevated  Pressure Reduction Devices: pressure-redistributing mattress utilized  Skin Protection: incontinence pads utilized      Problem: Fall Injury Risk  Goal: Absence of Fall and Fall-Related Injury  Outcome: Not Progressing  Intervention: Identify and Manage Contributors  Recent Flowsheet Documentation  Taken 4/13/2025 1600 by Lynne Pond RN  Medication Review/Management: medications reviewed  Taken 4/13/2025 1400 by Lynne Pond RN  Medication Review/Management: medications reviewed  Taken 4/13/2025 1200 by Lynne Pond RN  Medication Review/Management: medications reviewed  Taken 4/13/2025 1000 by Lynne Pond RN  Medication Review/Management: medications reviewed  Taken 4/13/2025 0905 by Lynne Pond RN  Medication Review/Management: medications reviewed  Intervention: Promote Injury-Free Environment  Recent Flowsheet Documentation  Taken 4/13/2025 1600 by Lynne Pond RN  Safety Promotion/Fall Prevention: fall prevention program maintained  Taken 4/13/2025 1400 by Lynne Pond RN  Safety Promotion/Fall Prevention: fall prevention program maintained  Taken 4/13/2025 1200 by Lynne Pond RN  Safety Promotion/Fall Prevention: fall prevention program maintained  Taken 4/13/2025 1000 by Lynne Pond RN  Safety Promotion/Fall Prevention: fall prevention program maintained  Taken 4/13/2025 0905 by Lynne Pond RN  Safety Promotion/Fall Prevention: fall prevention program maintained     Problem: UTI (Urinary Tract Infection)  Goal: Improved Infection Symptoms  Outcome: Not Progressing     Problem: [facility]  Goal: Collaboration with the Healthcare Team  Outcome: Not Progressing   Goal Outcome Evaluation:           Progress: no change  Outcome Evaluation: Pt continues to refuse medications during this shift, even after extensive education on risks.

## 2025-04-13 NOTE — PLAN OF CARE
Goal Outcome Evaluation:   Pt still refusing medications over night, did take BP medication and insulin. Allowed House Supervisor to place an USG IV. VSS. No acute events noted this shift.

## 2025-04-14 PROBLEM — I10 ESSENTIAL HYPERTENSION: Status: ACTIVE | Noted: 2025-03-22

## 2025-04-14 PROBLEM — E78.5 HYPERLIPIDEMIA LDL GOAL <70: Status: ACTIVE | Noted: 2025-04-14

## 2025-04-14 LAB
GLUCOSE BLDC GLUCOMTR-MCNC: 174 MG/DL (ref 70–130)
GLUCOSE BLDC GLUCOMTR-MCNC: 181 MG/DL (ref 70–130)
GLUCOSE BLDC GLUCOMTR-MCNC: 274 MG/DL (ref 70–130)
QT INTERVAL: 414 MS
QTC INTERVAL: 434 MS

## 2025-04-14 PROCEDURE — 97530 THERAPEUTIC ACTIVITIES: CPT

## 2025-04-14 PROCEDURE — 63710000001 INSULIN GLARGINE PER 5 UNITS: Performed by: STUDENT IN AN ORGANIZED HEALTH CARE EDUCATION/TRAINING PROGRAM

## 2025-04-14 PROCEDURE — 99231 SBSQ HOSP IP/OBS SF/LOW 25: CPT | Performed by: HOSPITALIST

## 2025-04-14 PROCEDURE — 25010000002 HEPARIN (PORCINE) PER 1000 UNITS: Performed by: STUDENT IN AN ORGANIZED HEALTH CARE EDUCATION/TRAINING PROGRAM

## 2025-04-14 PROCEDURE — 63710000001 INSULIN LISPRO (HUMAN) PER 5 UNITS: Performed by: STUDENT IN AN ORGANIZED HEALTH CARE EDUCATION/TRAINING PROGRAM

## 2025-04-14 PROCEDURE — 82948 REAGENT STRIP/BLOOD GLUCOSE: CPT

## 2025-04-14 RX ORDER — HALOPERIDOL 5 MG/ML
0.5 INJECTION INTRAMUSCULAR ONCE
Status: DISCONTINUED | OUTPATIENT
Start: 2025-04-14 | End: 2025-04-14

## 2025-04-14 RX ORDER — HYDRALAZINE HYDROCHLORIDE 50 MG/1
50 TABLET, FILM COATED ORAL 3 TIMES DAILY
Status: DISCONTINUED | OUTPATIENT
Start: 2025-04-14 | End: 2025-04-16

## 2025-04-14 RX ADMIN — INSULIN GLARGINE 5 UNITS: 100 INJECTION, SOLUTION SUBCUTANEOUS at 21:42

## 2025-04-14 RX ADMIN — INSULIN LISPRO 6 UNITS: 100 INJECTION, SOLUTION INTRAVENOUS; SUBCUTANEOUS at 21:42

## 2025-04-14 RX ADMIN — HEPARIN SODIUM 5000 UNITS: 5000 INJECTION INTRAVENOUS; SUBCUTANEOUS at 21:43

## 2025-04-14 NOTE — PLAN OF CARE
Goal Outcome Evaluation:  Plan of Care Reviewed With: patient        Progress: no change  Outcome Evaluation: Pt refused all cueing and assist provided by PT this session, as well as further participation in PT following return to bed from bathroom. Pt able to ambulate back to bed with supervision assist, however continues to demo L knee instability with ambulation. Continue to progress PT POC as able.    Anticipated Discharge Disposition (PT): inpatient rehabilitation facility

## 2025-04-14 NOTE — THERAPY TREATMENT NOTE
Patient Name: Thomas Perez  : 1950    MRN: [id number]                              Today's Date: 2025       Admit Date: 2025    Visit Dx:     ICD-10-CM ICD-9-CM   1. Generalized weakness  R53.1 780.79   2. Fall in home, initial encounter  W19.XXXA E888.9    Y92.009 E849.0   3. Yeast UTI  B37.49 112.2     Patient Active Problem List   Diagnosis    Type 2 diabetes mellitus with hyperglycemia, with long-term current use of insulin    Tobacco abuse    Coronary artery disease involving native coronary artery of native heart with angina pectoris    Anemia    CKD (chronic kidney disease) stage 3, GFR 30-59 ml/min    PAD (peripheral artery disease)    Medical non-compliance    COPD (chronic obstructive pulmonary disease)    Bilateral lower extremity edema    Hypertensive emergency    Likely type II NSTEMI from hypertensive emergency    Abnormal nuclear stress test    Moderate protein-calorie malnutrition    Type 2 myocardial infarction    Failure to thrive in adult    Weakness    FTT (failure to thrive) in adult     Past Medical History:   Diagnosis Date    Back injury     Diabetes mellitus     History of angina     Hypertension      Past Surgical History:   Procedure Laterality Date    CORONARY STENT PLACEMENT      EMBOLECTOMY      FASCIOTOMY        General Information       Row Name 25 1144          Physical Therapy Time and Intention    Document Type therapy note (daily note)  -KR     Mode of Treatment physical therapy;individual therapy  -KR       Row Name 25 1144          General Information    Patient Profile Reviewed yes  -KR     Existing Precautions/Restrictions fall;other (see comments)  chronic B foot drop  -KR     Barriers to Rehab hearing deficit;medically complex;uncooperative  -KR       Row Name 25 1144          Cognition    Orientation Status (Cognition) oriented x 3  -KR       Row Name 25 1144          Safety Issues/Impairments Affecting Functional Mobility    Safety  Issues Affecting Function (Mobility) awareness of need for assistance;insight into deficits/self-awareness;judgment;problem-solving;safety precaution awareness;sequencing abilities;safety precautions follow-through/compliance;at risk behavior observed  -KR     Impairments Affecting Function (Mobility) balance;endurance/activity tolerance;strength;motor control;sensation/sensory awareness  -KR               User Key  (r) = Recorded By, (t) = Taken By, (c) = Cosigned By      Initials Name Provider Type    Nat Ugalde PT Physical Therapist                   Mobility       Row Name 04/14/25 1145          Bed Mobility    Bed Mobility sit-supine  -KR     Sit-Supine Snyder (Bed Mobility) independent  -KR       Row Name 04/14/25 1145          Sit-Stand Transfer    Sit-Stand Snyder (Transfers) independent  -KR     Assistive Device (Sit-Stand Transfers) walker, front-wheeled  -KR     Comment, (Sit-Stand Transfer) 1x from toilet  -KR       Row Name 04/14/25 1145          Gait/Stairs (Locomotion)    Snyder Level (Gait) supervision  -KR     Assistive Device (Gait) walker, front-wheeled  -KR     Distance in Feet (Gait) 12  -KR     Deviations/Abnormal Patterns (Gait) greta decreased;base of support, narrow;bilateral deviations  -KR     Bilateral Gait Deviations forward flexed posture;heel strike decreased  -KR     Left Sided Gait Deviations knee buckling, left side  -KR     Comment, (Gait/Stairs) [place] [facility] [place] PT assumed care at this time. PT provided supervision assist while pt ambulated back to bed. Pt unwilling to accept verbal cueing or assist for ambulation.  -KR               User Key  (r) = Recorded By, (t) = Taken By, (c) = Cosigned By      Initials Name Provider Type    Nat Ugalde PT Physical Therapist                   Obj/Interventions       Row Name 04/14/25 1147          Motor Skills    Therapeutic Exercise other (see comments)  pt refused  -KR       Row Name  04/14/25 1147          Balance    Balance Assessment sitting static balance;sitting dynamic balance;standing static balance;standing dynamic balance  -KR     Static Sitting Balance independent  -KR     Dynamic Sitting Balance independent  -KR     Position, Sitting Balance unsupported;sitting edge of bed;other (see comments)  toilet  -KR     Static Standing Balance supervision  -KR     Dynamic Standing Balance supervision  -KR     Position/Device Used, Standing Balance supported;walker, front-wheeled  -KR     Balance Interventions sitting;standing;sit to stand;supported;static;dynamic;occupation based/functional task  -KR     Comment, Balance pt performed toileting and donning pants independently  -KR               User Key  (r) = Recorded By, (t) = Taken By, (c) = Cosigned By      Initials Name Provider Type    Nat Ugalde PT Physical Therapist                   Goals/Plan    No documentation.                  Clinical Impression       Row Name 04/14/25 1147          Pain    Additional Documentation Pain Scale: FACES Pre/Post-Treatment (Group)  -KR       Row Name 04/14/25 1147          Pain Scale: FACES Pre/Post-Treatment    Pain: FACES Scale, Pretreatment 0-->no hurt  -KR     Posttreatment Pain Rating 0-->no hurt  -KR       Row Name 04/14/25 1147          Plan of Care Review    Plan of Care Reviewed With patient  -KR     Progress no change  -KR     Outcome Evaluation Pt refused all cueing and assist provided by PT this session, as well as further participation in PT following return to bed from bathroom. Pt able to ambulate back to bed with supervision assist, however continues to demo L knee instability with ambulation. Continue to progress PT POC as able.  -KR       Row Name 04/14/25 1147          Vital Signs    Pre Patient Position Sitting  -KR     Intra Patient Position Standing  -KR     Post Patient Position Supine  -KR       Row Name 04/14/25 1147          Positioning and Restraints    Pre-Treatment  Position bathroom  -KR     Post Treatment Position bed  -KR     In Bed notified nsg;fowlers;call light within reach;encouraged to call for assist;exit alarm on;side rails up x2  -KR               User Key  (r) = Recorded By, (t) = Taken By, (c) = Cosigned By      Initials Name Provider Type    Nat Ugalde PT Physical Therapist                   Outcome Measures       Row Name 04/14/25 1149          How much help from another person do you currently need...    Turning from your back to your side while in flat bed without using bedrails? 4  -KR     Moving from lying on back to sitting on the side of a flat bed without bedrails? 4  -KR     Moving to and from a bed to a chair (including a wheelchair)? 3  -KR     Standing up from a chair using your arms (e.g., wheelchair, bedside chair)? 4  -KR     Climbing 3-5 steps with a railing? 2  -KR     To walk in hospital room? 3  -KR     AM-PAC 6 Clicks Score (PT) 20  -KR     Highest Level of Mobility Goal 6 --> Walk 10 steps or more  -KR               User Key  (r) = Recorded By, (t) = Taken By, (c) = Cosigned By      Initials Name Provider Type    Nat Ugalde PT Physical Therapist                                 Physical Therapy Education       Title: PT OT SLP Therapies (In Progress)       Topic: Physical Therapy (In Progress)       Point: Mobility training (In Progress)       Learning Progress Summary            Patient Acceptance, E, RT,NR by KR at 4/14/2025 1149    Acceptance, E, NR by KR at 4/11/2025 1512    Acceptance, E, NR by RADHA at 4/10/2025 1405    Acceptance, E, NR by KR at 4/9/2025 0934                      Point: Home exercise program (Not Started)       Learner Progress:  Not documented in this visit.              Point: Body mechanics (In Progress)       Learning Progress Summary            Patient Acceptance, E, RT,NR by KR at 4/14/2025 1149    Acceptance, E, NR by KR at 4/11/2025 1512    Acceptance, E, NR by AC at 4/10/2025 1405    Acceptance,  E, NR by KR at 4/9/2025 0934                      Point: Precautions (In Progress)       Learning Progress Summary            Patient Acceptance, E, RT,NR by KR at 4/14/2025 1149    Acceptance, E, NR by KR at 4/11/2025 1512    Acceptance, E, NR by AC at 4/10/2025 1405    Acceptance, E, NR by KR at 4/9/2025 0934                                      User Key       Initials Effective Dates Name Provider Type Discipline    MEGHA 12/30/22 -  Nat Drummond, PT Physical Therapist PT    RADHA 07/11/24 -  Alexus Perez, PT Physical Therapist PT                  PT Recommendation and Plan  Planned Therapy Interventions (PT): balance training, bed mobility training, gait training, home exercise program, neuromuscular re-education, patient/family education, postural re-education, transfer training, stretching, strengthening, stair training, ROM (range of motion), orthotic fitting/training  Progress: no change  Outcome Evaluation: Pt refused all cueing and assist provided by PT this session, as well as further participation in PT following return to bed from bathroom. Pt able to ambulate back to bed with supervision assist, however continues to demo L knee instability with ambulation. Continue to progress PT POC as able.     Time Calculation:   PT Evaluation Complexity  History, PT Evaluation Complexity: 3 or more personal factors and/or comorbidities  Examination of Body Systems (PT Eval Complexity): total of 4 or more elements  Clinical Presentation (PT Evaluation Complexity): evolving  Clinical Decision Making (PT Evaluation Complexity): moderate complexity  Overall Complexity (PT Evaluation Complexity): moderate complexity     PT Charges       Row Name 04/14/25 1149             Time Calculation    Start Time 1119  -KR      PT Received On 04/14/25  -KR         Timed Charges    37950 - PT Therapeutic Activity Minutes 23  -KR         Total Minutes    Timed Charges Total Minutes 23  -KR       Total Minutes 23  -KR                User  Key  (r) = Recorded By, (t) = Taken By, (c) = Cosigned By      Initials Name Provider Type    KR Nat Drummond, PT Physical Therapist                  Therapy Charges for Today       Code Description Service Date Service Provider Modifiers Qty    [id number]  PT THERAPEUTIC ACT EA 15 MIN 4/14/2025 Nat Drummond, PT GP 2            PT G-Codes  Outcome Measure Options: AM-PAC 6 Clicks Daily Activity (OT)  AM-PAC 6 Clicks Score (PT): 20  AM-PAC 6 Clicks Score (OT): 18  PT Discharge Summary  Anticipated Discharge Disposition (PT): inpatient rehabilitation facility    Nat Drummond, DUYEN  4/14/2025

## 2025-04-14 NOTE — CASE MANAGEMENT/SOCIAL WORK
Continued Stay Note  [facility]     Patient Name: Thomas Perez  MRN: [id number]  Today's Date: 4/14/2025    Admit Date: 4/8/2025    Plan: ONGOING   Discharge Plan       Row Name 04/14/25 1023       Plan    Plan Comments April [facility] reports precert still pending at this time with pt's insurance.                   Discharge Codes    No documentation.                 Expected Discharge Date and Time       Expected Discharge Date Expected Discharge Time    Apr 15, 2025               RENAE Aguilar

## 2025-04-14 NOTE — PROGRESS NOTES
[facility] Medicine Services  PROGRESS NOTE    Patient Name: Thomas Perez  : 1950  MRN: [id number]    Date of Admission: 2025  Primary Care Physician: Brock Randolph PA    Subjective   Subjective     CC: Weakness    HPI: Up in bed. Resting, no issues.    Objective   Objective     Vital Signs:   Temp:  [97.6 °F (36.4 °C)-98.2 °F (36.8 °C)] 97.6 °F (36.4 °C)  Resp:  [16-18] 16  BP: (157-180)/(65-78) 180/76    NAD, alert and oriented  OP clear, dry MM  Neck supple  RRR  CTAB  +BS, soft     Results Reviewed:  LAB RESULTS:      Lab 25  1112 04/10/25  0344 04/09/25  18525  1702 25  0409 25  1240 25  1129   WBC 7.89 9.64 9.31  --   --  8.86  --  15.68*   HEMOGLOBIN 8.3* 9.1* 7.9*  --   --  8.1*  --  9.5*   HEMATOCRIT 27.3* 28.7* 25.5*  --   --  25.9*  --  30.3*   PLATELETS 192 204 169  --   --  171  --  200   NEUTROS ABS  --   --   --   --   --  6.13  --  12.50*   IMMATURE GRANS (ABS)  --   --   --   --   --  0.03  --  0.09*   LYMPHS ABS  --   --   --   --   --  1.54  --  1.39   MONOS ABS  --   --   --   --   --  0.95*  --  1.44*   EOS ABS  --   --   --   --   --  0.18  --  0.20   MCV 97.2* 94.1 96.6  --   --  94.9  --  95.0   HSTROP T  --   --   --  202* 199*  --  170* 179*         Lab 25  0815 25  1112 04/10/25  0344 25  0409 25  1129   SODIUM 137 137 139 138 138   POTASSIUM 4.8 5.1 5.5* 4.8 5.4*   CHLORIDE 107 105 108* 110* 109*   CO2 18.0* 19.0* 20.0* 19.0* 18.0*   ANION GAP 12.0 13.0 11.0 9.0 11.0   BUN 39* 35* 36* 33* 34*   CREATININE 1.84* 2.46* 2.43* 2.04* 2.34*   EGFR 38.0* 26.8* 27.2* 33.6* 28.5*   GLUCOSE 145* 192* 152* 129* 195*   CALCIUM 8.7 8.9 8.8 8.5* 8.7   MAGNESIUM  --  2.5* 2.4 2.2 2.1   PHOSPHORUS  --   --   --  2.7  --    TSH  --   --   --   --  1.070         Lab 25  1702 25  1129   TOTAL PROTEIN  --  6.6   ALBUMIN  --  3.7   GLOBULIN  --  2.9   ALT (SGPT)  --  23   AST  (SGOT)  --  21   BILIRUBIN  --  0.2   ALK PHOS  --  98   LIPASE 14  --          Lab 04/09/25  1859 04/09/25  1702 04/08/25  1240 04/08/25  1129   HSTROP T 202* 199* 170* 179*             Lab 04/08/25  1129   VITAMIN B 12 342         Brief Urine Lab Results  (Last result in the past 365 days)        Color   Clarity   Blood   Leuk Est   Nitrite   Protein   CREAT   Urine HCG        04/08/25 1256 Yellow   Hazy   Small (1+)   Moderate (2+)   Negative   >=300 mg/dL (3+)                   Microbiology Results Abnormal       None            No radiology results from the last 24 hrs      Results for orders placed during the hospital encounter of 03/20/25    Adult Transthoracic Echo Complete w/ Color, Spectral and Contrast if necessary per protocol    Interpretation Summary    Left ventricular systolic function is normal. Calculated left ventricular EF = 62.7% Left ventricular ejection fraction appears to be 61 - 65%.    Normal left atrial size and volume noted.      Current medications:  Scheduled Meds:amLODIPine, 10 mg, Oral, Q24H  aspirin, 81 mg, Oral, Daily  atorvastatin, 40 mg, Oral, Nightly  carvedilol, 25 mg, Oral, BID With Meals  heparin (porcine), 5,000 Units, Subcutaneous, Q8H  hydrALAZINE, 37.5 mg, Oral, TID  insulin glargine, 5 Units, Subcutaneous, Nightly  insulin lispro, 2-9 Units, Subcutaneous, 4x Daily AC & at Bedtime  isosorbide mononitrate, 20 mg, Oral, TID  pantoprazole, 40 mg, Oral, BID AC  Pharmacy Meds to Bed Consult, , Not Applicable, Daily  sodium chloride, 10 mL, Intravenous, Q12H      Continuous Infusions:   PRN Meds:.  acetaminophen **OR** acetaminophen **OR** acetaminophen    senna-docusate sodium **AND** polyethylene glycol **AND** bisacodyl **AND** bisacodyl    calcium carbonate    Calcium Replacement - Follow Nurse / BPA Driven Protocol    dextrose    dextrose    glucagon (human recombinant)    guaifenesin-dextromethorphan 600-30 mg    Magnesium Cardiology Dose Replacement - Follow Nurse / BPA  Driven Protocol    nitroglycerin    ondansetron    Phosphorus Replacement - Follow Nurse / BPA Driven Protocol    Potassium Replacement - Follow Nurse / BPA Driven Protocol    simethicone    sodium chloride    sodium chloride    sodium chloride    Assessment & Plan   Assessment & Plan     Active Hospital Problems    Diagnosis  POA    **Failure to thrive in adult [R62.7]  Yes    FTT (failure to thrive) in adult [R62.7]  Yes    Weakness [R53.1]  Yes      Resolved Hospital Problems   No resolved problems to display.        Brief Hospital Course to date:  Thomas Perez is a 75 y.o. male with hypertension, hyperlipidemia, CAD status post RCA stent in 2010, PAD status post embolectomy and fasciotomy left lower extremity, COPD, poorly controlled type 2 diabetes, CKD 3, medication noncompliance who presents for lower extremity weakness and recurrent falls.  Patient recently discharged home from the hospital after being treated for hypertensive emergency.    Weakness, lower extremities  Falls  - Patient with low functional status, family unable to care for him  - When asked to move his legs patient reports that he cannot however he has been able to ambulate some with PT with 1 assist.  - Weakness likely multifactorial in the setting of patient effort, deconditioning and diabetic neuropathy  - PT OT following, recommending inpatient rehab    UTI  - Increased urinary frequency  - UA consistent with UTI  - Status post Rocephin, patient refused last dose  - Follow-up cultures    MAGUI on CKD 3  Hyperkalemia-resolved  -Baseline creatinine around 2    IDDM  -stable on current regimen    Elevated troponins, chronic  CAD status post RCA stent 2010  PAD status post embolectomy and fasciotomy  Hypertension  Chronic chest pain  Hx hypertensive urgency   -Patient has cardiology follow-up outpatient for continued discussions about outpatient cardiac cath  -Continue home amlodipine, aspirin, coreg, hydralazine, isosorbide mononitrate,  titrate prn    Diarrhea  -no complaints today    Anemia  -Chronic  -No signs of bleeding  -Previous studies consistent with anemia of chronic disease likely from CKD  -Continue to monitor    Medical noncompliance  -complicated by very poor health literacy  -stopped gabapentin    HLD-continue statin  GERD-continue PPI  Chronic pain-stopped gabapentin  Tobacco use-encouraged cessation, NRT      Expected Discharge Location and Transportation: TBD  Expected Discharge   Expected Discharge Date: 4/15/2025; Expected Discharge Time:      VTE Prophylaxis:  Pharmacologic VTE prophylaxis orders are present.         AM-PAC 6 Clicks Score (PT): 18 (04/13/25 2000)    CODE STATUS:   Code Status and Medical Interventions: CPR (Attempt to Resuscitate); Full Support   Ordered at: 04/08/25 1531     Code Status (Patient has no pulse and is not breathing):    CPR (Attempt to Resuscitate)     Medical Interventions (Patient has pulse or is breathing):    Full Support     Level Of Support Discussed With:    Patient       Castro Bermudez MD  04/14/25

## 2025-04-14 NOTE — SIGNIFICANT NOTE
Called per nursing secondary to pt trying to leave ama, increased agitation, currently not oriented. Code white called. Restraints requested (4 points)     Restraints not placed. Pt currently oriented and now agreeable to stay

## 2025-04-14 NOTE — PLAN OF CARE
Problem: Adult Inpatient Plan of Care  Goal: Plan of Care Review  Outcome: Progressing  Goal: Patient-Specific Goal (Individualized)  Outcome: Progressing  Goal: Absence of Hospital-Acquired Illness or Injury  Outcome: Progressing  Intervention: Identify and Manage Fall Risk  Recent Flowsheet Documentation  Taken 4/14/2025 0200 by Lore Mijares RN  Safety Promotion/Fall Prevention:   activity supervised   assistive device/personal items within reach   clutter free environment maintained   room organization consistent   safety round/check completed  Taken 4/14/2025 0000 by Lore Mijares RN  Safety Promotion/Fall Prevention:   activity supervised   assistive device/personal items within reach   clutter free environment maintained   nonskid shoes/slippers when out of bed   room organization consistent   safety round/check completed  Taken 4/13/2025 2200 by Lore Mijares RN  Safety Promotion/Fall Prevention:   activity supervised   assistive device/personal items within reach   clutter free environment maintained   nonskid shoes/slippers when out of bed   room organization consistent   safety round/check completed  Taken 4/13/2025 2000 by Lore Mijares RN  Safety Promotion/Fall Prevention:   activity supervised   assistive device/personal items within reach   clutter free environment maintained   nonskid shoes/slippers when out of bed   room organization consistent   safety round/check completed  Intervention: Prevent Skin Injury  Recent Flowsheet Documentation  Taken 4/14/2025 0200 by Lore Mijares RN  Body Position: position changed independently  Skin Protection: incontinence pads utilized  Taken 4/14/2025 0000 by Lore Mijares RN  Body Position: position changed independently  Skin Protection: incontinence pads utilized  Taken 4/13/2025 2200 by Lore Mijares RN  Body Position: position changed independently  Skin Protection:   incontinence pads utilized   transparent dressing maintained   silicone foam dressing in place  Taken  4/13/2025 2000 by Lore Mijares RN  Body Position: position changed independently  Skin Protection: incontinence pads utilized  Intervention: Prevent and Manage VTE (Venous Thromboembolism) Risk  Recent Flowsheet Documentation  Taken 4/14/2025 0000 by Lore Mijares RN  VTE Prevention/Management: (pt refusing SCDS or heparin subcut)   SCDs (sequential compression devices) off   patient refused intervention   other (see comments)  Taken 4/13/2025 2000 by Lore Mijares RN  VTE Prevention/Management: (pt refusing SCDs and heparin subcut)   SCDs (sequential compression devices) off   patient refused intervention   other (see comments)  Intervention: Prevent Infection  Recent Flowsheet Documentation  Taken 4/14/2025 0200 by Lore Mijares RN  Infection Prevention:   hand hygiene promoted   rest/sleep promoted   environmental surveillance performed  Taken 4/14/2025 0000 by Lore Mijares RN  Infection Prevention:   hand hygiene promoted   rest/sleep promoted   environmental surveillance performed  Taken 4/13/2025 2200 by Lore Mijares RN  Infection Prevention:   rest/sleep promoted   hand hygiene promoted   environmental surveillance performed  Taken 4/13/2025 2000 by Lore Mijares RN  Infection Prevention:   hand hygiene promoted   rest/sleep promoted  Goal: Optimal Comfort and Wellbeing  Outcome: Progressing  Intervention: Provide Person-Centered Care  Recent Flowsheet Documentation  Taken 4/14/2025 0000 by Lore Mijares RN  Trust Relationship/Rapport:   care explained   choices provided   emotional support provided   empathic listening provided   questions answered   questions encouraged   reassurance provided   thoughts/feelings acknowledged  Taken 4/13/2025 2000 by Lore Mijares RN  Trust Relationship/Rapport:   care explained   choices provided   emotional support provided   empathic listening provided   questions answered   questions encouraged   reassurance provided   thoughts/feelings acknowledged  Goal: Readiness for Transition of  Care  Outcome: Progressing     Problem: Comorbidity Management  Goal: Blood Glucose Level Within Target Range  Outcome: Progressing  Intervention: Monitor and Manage Glycemia  Recent Flowsheet Documentation  Taken 4/14/2025 0200 by Lore Mijares RN  Medication Review/Management: medications reviewed  Taken 4/14/2025 0000 by Lore Mijares RN  Medication Review/Management: medications reviewed  Taken 4/13/2025 2200 by Lore Mijares RN  Medication Review/Management: medications reviewed  Taken 4/13/2025 2000 by Lore Mijares RN  Medication Review/Management: medications reviewed  Goal: Blood Pressure in Desired Range  Outcome: Progressing  Intervention: Maintain Blood Pressure Management  Recent Flowsheet Documentation  Taken 4/14/2025 0200 by Lore Mijares RN  Medication Review/Management: medications reviewed  Taken 4/14/2025 0000 by Lore Mijares RN  Medication Review/Management: medications reviewed  Taken 4/13/2025 2200 by Lore Mijares RN  Medication Review/Management: medications reviewed  Taken 4/13/2025 2000 by Lore Mijares RN  Medication Review/Management: medications reviewed     Problem: Skin Injury Risk Increased  Goal: Skin Health and Integrity  Outcome: Progressing  Intervention: Optimize Skin Protection  Recent Flowsheet Documentation  Taken 4/14/2025 0200 by Lore Mijares RN  Activity Management: sitting, edge of bed  Pressure Reduction Techniques:   frequent weight shift encouraged   pressure points protected  Head of Bed (HOB) Positioning: HOB elevated  Pressure Reduction Devices:   pressure-redistributing mattress utilized   positioning supports utilized  Skin Protection: incontinence pads utilized  Taken 4/14/2025 0000 by Lore Mijares RN  Activity Management: back to bed  Pressure Reduction Techniques: frequent weight shift encouraged  Head of Bed (HOB) Positioning: HOB elevated  Pressure Reduction Devices:   pressure-redistributing mattress utilized   positioning supports utilized  Skin Protection: incontinence pads  utilized  Taken 4/13/2025 2200 by Lore Mijares RN  Activity Management: ambulated outside room  Pressure Reduction Techniques:   frequent weight shift encouraged   heels elevated off bed   pressure points protected   weight shift assistance provided  Head of Bed (HOB) Positioning: HOB elevated  Pressure Reduction Devices:   pressure-redistributing mattress utilized   positioning supports utilized  Skin Protection:   incontinence pads utilized   transparent dressing maintained   silicone foam dressing in place  Taken 4/13/2025 2000 by Lore Mijares RN  Activity Management: up in chair  Pressure Reduction Techniques:   frequent weight shift encouraged   pressure points protected  Head of Bed (HOB) Positioning: HOB elevated  Pressure Reduction Devices: pressure-redistributing mattress utilized  Skin Protection: incontinence pads utilized     Problem: Fall Injury Risk  Goal: Absence of Fall and Fall-Related Injury  Outcome: Progressing  Intervention: Identify and Manage Contributors  Recent Flowsheet Documentation  Taken 4/14/2025 0200 by Lore Mijares RN  Medication Review/Management: medications reviewed  Taken 4/14/2025 0000 by Lore Mijares RN  Medication Review/Management: medications reviewed  Taken 4/13/2025 2200 by Lore Mijares RN  Medication Review/Management: medications reviewed  Taken 4/13/2025 2000 by Lore Mijares RN  Medication Review/Management: medications reviewed  Intervention: Promote Injury-Free Environment  Recent Flowsheet Documentation  Taken 4/14/2025 0200 by Lore Mijares RN  Safety Promotion/Fall Prevention:   activity supervised   assistive device/personal items within reach   clutter free environment maintained   room organization consistent   safety round/check completed  Taken 4/14/2025 0000 by Lore Mijares RN  Safety Promotion/Fall Prevention:   activity supervised   assistive device/personal items within reach   clutter free environment maintained   nonskid shoes/slippers when out of bed   room organization  consistent   safety round/check completed  Taken 4/13/2025 2200 by Lore Mijares, RN  Safety Promotion/Fall Prevention:   activity supervised   assistive device/personal items within reach   clutter free environment maintained   nonskid shoes/slippers when out of bed   room organization consistent   safety round/check completed  Taken 4/13/2025 2000 by Lore Mijares, RN  Safety Promotion/Fall Prevention:   activity supervised   assistive device/personal items within reach   clutter free environment maintained   nonskid shoes/slippers when out of bed   room organization consistent   safety round/check completed     Problem: UTI (Urinary Tract Infection)  Goal: Improved Infection Symptoms  Outcome: Progressing     Problem: [facility]  Goal: Collaboration with the Healthcare Team  Outcome: Progressing  Intervention: [place]  Recent Flowsheet Documentation  Taken 4/14/2025 0000 by Lore Mijares, RN  Family/Support System Care:   self-care encouraged   support provided  Taken 4/13/2025 2000 by Lore Mijares, RN  Family/Support System Care:   self-care encouraged   support provided   Goal Outcome Evaluation:

## 2025-04-15 LAB — GLUCOSE BLDC GLUCOMTR-MCNC: 153 MG/DL (ref 70–130)

## 2025-04-15 PROCEDURE — 99231 SBSQ HOSP IP/OBS SF/LOW 25: CPT | Performed by: HOSPITALIST

## 2025-04-15 PROCEDURE — 82948 REAGENT STRIP/BLOOD GLUCOSE: CPT

## 2025-04-15 NOTE — CASE MANAGEMENT/SOCIAL WORK
Continued Stay Note  [facility]     Patient Name: Thomas Perez  MRN: [id number]  Today's Date: 4/15/2025    Admit Date: 4/8/2025    Plan: ONGOING   Discharge Plan       Row Name 04/15/25 1140       Plan    Plan Comments Pt's insurance denied rehab and offered a peer to peer. MSW submitted peer to peer request to physician advisor.                   Discharge Codes    No documentation.                 Expected Discharge Date and Time       Expected Discharge Date Expected Discharge Time    Apr 15, 2025               RENAE Aguilar

## 2025-04-15 NOTE — PROGRESS NOTES
[facility] Medicine Services  PROGRESS NOTE    Patient Name: Thomas Perez  : 1950  MRN: [id number]    Date of Admission: 2025  Primary Care Physician: Brock Randolph PA    Subjective   Subjective     CC: Weakness    HPI: Up in bed. No issues.     Objective   Objective     Vital Signs:   Temp:  [97.5 °F (36.4 °C)-98.4 °F (36.9 °C)] 97.7 °F (36.5 °C)  Heart Rate:  [62] 62  Resp:  [16-18] 16  BP: (162-184)/(61-75) 182/75    NAD, alert  OP clear, dry MM  Neck supple  RRR  CTAB  +BS, soft     Results Reviewed:  LAB RESULTS:      Lab 04/13/25  0815 04/12/25  1112 04/10/25  0344 04/09/25  1859 25  1702 25  0409 25  1240 25  1129   WBC 7.89 9.64 9.31  --   --  8.86  --  15.68*   HEMOGLOBIN 8.3* 9.1* 7.9*  --   --  8.1*  --  9.5*   HEMATOCRIT 27.3* 28.7* 25.5*  --   --  25.9*  --  30.3*   PLATELETS 192 204 169  --   --  171  --  200   NEUTROS ABS  --   --   --   --   --  6.13  --  12.50*   IMMATURE GRANS (ABS)  --   --   --   --   --  0.03  --  0.09*   LYMPHS ABS  --   --   --   --   --  1.54  --  1.39   MONOS ABS  --   --   --   --   --  0.95*  --  1.44*   EOS ABS  --   --   --   --   --  0.18  --  0.20   MCV 97.2* 94.1 96.6  --   --  94.9  --  95.0   HSTROP T  --   --   --  202* 199*  --  170* 179*         Lab 25  0815 25  1112 04/10/25  0344 25  0409 25  1129   SODIUM 137 137 139 138 138   POTASSIUM 4.8 5.1 5.5* 4.8 5.4*   CHLORIDE 107 105 108* 110* 109*   CO2 18.0* 19.0* 20.0* 19.0* 18.0*   ANION GAP 12.0 13.0 11.0 9.0 11.0   BUN 39* 35* 36* 33* 34*   CREATININE 1.84* 2.46* 2.43* 2.04* 2.34*   EGFR 38.0* 26.8* 27.2* 33.6* 28.5*   GLUCOSE 145* 192* 152* 129* 195*   CALCIUM 8.7 8.9 8.8 8.5* 8.7   MAGNESIUM  --  2.5* 2.4 2.2 2.1   PHOSPHORUS  --   --   --  2.7  --    TSH  --   --   --   --  1.070         Lab 25  1702 25  1129   TOTAL PROTEIN  --  6.6   ALBUMIN  --  3.7   GLOBULIN  --  2.9   ALT (SGPT)  --  23   AST  (SGOT)  --  21   BILIRUBIN  --  0.2   ALK PHOS  --  98   LIPASE 14  --          Lab 04/09/25  1859 04/09/25  1702 04/08/25  1240 04/08/25  1129   HSTROP T 202* 199* 170* 179*             Lab 04/08/25  1129   VITAMIN B 12 342         Brief Urine Lab Results  (Last result in the past 365 days)        Color   Clarity   Blood   Leuk Est   Nitrite   Protein   CREAT   Urine HCG        04/08/25 1256 Yellow   Hazy   Small (1+)   Moderate (2+)   Negative   >=300 mg/dL (3+)                   Microbiology Results Abnormal       None            No radiology results from the last 24 hrs      Results for orders placed during the hospital encounter of 03/20/25    Adult Transthoracic Echo Complete w/ Color, Spectral and Contrast if necessary per protocol    Interpretation Summary  •  Left ventricular systolic function is normal. Calculated left ventricular EF = 62.7% Left ventricular ejection fraction appears to be 61 - 65%.  •  Normal left atrial size and volume noted.      Current medications:  Scheduled Meds:amLODIPine, 10 mg, Oral, Q24H  aspirin, 81 mg, Oral, Daily  atorvastatin, 40 mg, Oral, Nightly  carvedilol, 25 mg, Oral, BID With Meals  heparin (porcine), 5,000 Units, Subcutaneous, Q8H  hydrALAZINE, 50 mg, Oral, TID  insulin glargine, 5 Units, Subcutaneous, Nightly  insulin lispro, 2-9 Units, Subcutaneous, 4x Daily AC & at Bedtime  isosorbide mononitrate, 20 mg, Oral, TID  pantoprazole, 40 mg, Oral, BID AC  Pharmacy Meds to Bed Consult, , Not Applicable, Daily  sodium chloride, 10 mL, Intravenous, Q12H      Continuous Infusions:   PRN Meds:.•  acetaminophen **OR** acetaminophen **OR** acetaminophen  •  senna-docusate sodium **AND** polyethylene glycol **AND** bisacodyl **AND** bisacodyl  •  calcium carbonate  •  Calcium Replacement - Follow Nurse / BPA Driven Protocol  •  dextrose  •  dextrose  •  glucagon (human recombinant)  •  guaifenesin-dextromethorphan 600-30 mg  •  Magnesium Cardiology Dose Replacement - Follow  Nurse / BPA Driven Protocol  •  nitroglycerin  •  ondansetron  •  Phosphorus Replacement - Follow Nurse / BPA Driven Protocol  •  Potassium Replacement - Follow Nurse / BPA Driven Protocol  •  simethicone  •  sodium chloride  •  sodium chloride  •  sodium chloride    Assessment & Plan   Assessment & Plan     Active Hospital Problems    Diagnosis  POA   • **Failure to thrive in adult [R62.7]  Yes   • FTT (failure to thrive) in adult [R62.7]  Yes   • Weakness [R53.1]  Yes      Resolved Hospital Problems   No resolved problems to display.        Brief Hospital Course to date:  Thomas Perez is a 75 y.o. male with hypertension, hyperlipidemia, CAD status post RCA stent in 2010, PAD status post embolectomy and fasciotomy left lower extremity, COPD, poorly controlled type 2 diabetes, CKD 3, medication noncompliance who presents for lower extremity weakness and recurrent falls.  Patient recently discharged home from the hospital after being treated for hypertensive emergency.    Weakness, lower extremities  Falls  - Patient with low functional status, family unable to care for him  - When asked to move his legs patient reports that he cannot however he has been able to ambulate some with PT with 1 assist.  - Weakness likely multifactorial in the setting of patient effort, deconditioning and diabetic neuropathy  - PT OT following, recommending inpatient rehab    UTI  - Increased urinary frequency  - UA consistent with UTI  - Status post Rocephin, patient refused last dose  - Follow-up cultures    MAGUI on CKD 3  Hyperkalemia-resolved  -Baseline creatinine around 2    IDDM  -stable on current regimen    Elevated troponins, chronic  CAD status post RCA stent 2010  PAD status post embolectomy and fasciotomy  Hypertension  Chronic chest pain  Hx hypertensive urgency   -Patient has cardiology follow-up outpatient for continued discussions about outpatient cardiac cath  -Continue home amlodipine, aspirin, coreg, hydralazine,  isosorbide mononitrate, titrate prn, but noncompliant with medications here also    Diarrhea  -no complaints today    Anemia  -Chronic  -No signs of bleeding  -Previous studies consistent with anemia of chronic disease likely from CKD  -Continue to monitor    Medical noncompliance  -complicated by very poor health literacy  -stopped gabapentin    HLD-continue statin  GERD-continue PPI  Chronic pain-stopped gabapentin  Tobacco use-encouraged cessation, NRT      Expected Discharge Location and Transportation: TBD  Expected Discharge   Expected Discharge Date: 4/15/2025; Expected Discharge Time:      VTE Prophylaxis:  Pharmacologic VTE prophylaxis orders are present.         AM-PAC 6 Clicks Score (PT): 20 (04/14/25 0699)    CODE STATUS:   Code Status and Medical Interventions: CPR (Attempt to Resuscitate); Full Support   Ordered at: 04/08/25 8053     Code Status (Patient has no pulse and is not breathing):    CPR (Attempt to Resuscitate)     Medical Interventions (Patient has pulse or is breathing):    Full Support     Level Of Support Discussed With:    Patient       Castro Bermudez MD  04/15/25

## 2025-04-16 PROCEDURE — 99232 SBSQ HOSP IP/OBS MODERATE 35: CPT | Performed by: PHYSICIAN ASSISTANT

## 2025-04-16 RX ORDER — HYDRALAZINE HYDROCHLORIDE 25 MG/1
25 TABLET, FILM COATED ORAL 3 TIMES DAILY
Status: DISCONTINUED | OUTPATIENT
Start: 2025-04-16 | End: 2025-04-17 | Stop reason: HOSPADM

## 2025-04-16 NOTE — PLAN OF CARE
Problem: Adult Inpatient Plan of Care  Goal: Plan of Care Review  Outcome: Progressing  Goal: Patient-Specific Goal (Individualized)  Outcome: Progressing  Goal: Absence of Hospital-Acquired Illness or Injury  Outcome: Progressing  Intervention: Identify and Manage Fall Risk  Recent Flowsheet Documentation  Taken 4/16/2025 0400 by Rochelle Mast RN  Safety Promotion/Fall Prevention:   activity supervised   assistive device/personal items within reach   clutter free environment maintained   fall prevention program maintained   lighting adjusted   nonskid shoes/slippers when out of bed   room organization consistent   safety round/check completed  Taken 4/16/2025 0200 by Rochelle Mast RN  Safety Promotion/Fall Prevention:   activity supervised   assistive device/personal items within reach   clutter free environment maintained   fall prevention program maintained   lighting adjusted   nonskid shoes/slippers when out of bed   room organization consistent   safety round/check completed  Taken 4/16/2025 0000 by Rochelle Mast RN  Safety Promotion/Fall Prevention:   activity supervised   assistive device/personal items within reach   clutter free environment maintained   fall prevention program maintained   lighting adjusted   nonskid shoes/slippers when out of bed   room organization consistent   safety round/check completed  Taken 4/15/2025 2200 by Rochelle Mast RN  Safety Promotion/Fall Prevention:   activity supervised   assistive device/personal items within reach   clutter free environment maintained   fall prevention program maintained   lighting adjusted   nonskid shoes/slippers when out of bed   room organization consistent   safety round/check completed  Taken 4/15/2025 2047 by Rochelle Mast RN  Safety Promotion/Fall Prevention:   activity supervised   assistive device/personal items within reach   clutter free environment maintained   fall prevention program maintained    lighting adjusted   nonskid shoes/slippers when out of bed   room organization consistent   safety round/check completed  Intervention: Prevent Skin Injury  Recent Flowsheet Documentation  Taken 4/16/2025 0400 by Rochelle Mast RN  Body Position: position changed independently  Skin Protection: incontinence pads utilized  Taken 4/16/2025 0200 by Rochelle Mast RN  Body Position: position changed independently  Skin Protection: incontinence pads utilized  Taken 4/16/2025 0000 by Rochelle Mast RN  Body Position: position changed independently  Skin Protection: incontinence pads utilized  Taken 4/15/2025 2200 by Rochelle Mast RN  Body Position: position changed independently  Skin Protection: incontinence pads utilized  Taken 4/15/2025 2047 by Rochelle Mast RN  Body Position: position changed independently  Skin Protection: incontinence pads utilized  Intervention: Prevent and Manage VTE (Venous Thromboembolism) Risk  Recent Flowsheet Documentation  Taken 4/15/2025 2047 by Rochelle Mast RN  VTE Prevention/Management: patient refused intervention  Intervention: Prevent Infection  Recent Flowsheet Documentation  Taken 4/16/2025 0400 by Rochelle Mast RN  Infection Prevention:   equipment surfaces disinfected   hand hygiene promoted   personal protective equipment utilized  Taken 4/16/2025 0200 by Rochelle Mast RN  Infection Prevention:   equipment surfaces disinfected   hand hygiene promoted   personal protective equipment utilized  Taken 4/16/2025 0000 by Rochelle Mast RN  Infection Prevention:   equipment surfaces disinfected   hand hygiene promoted   personal protective equipment utilized  Taken 4/15/2025 2200 by Rochelle Mast RN  Infection Prevention:   equipment surfaces disinfected   hand hygiene promoted   personal protective equipment utilized  Taken 4/15/2025 2047 by Rochelle Mast RN  Infection Prevention:   equipment surfaces  disinfected   hand hygiene promoted   personal protective equipment utilized  Goal: Optimal Comfort and Wellbeing  Outcome: Progressing  Intervention: Provide Person-Centered Care  Recent Flowsheet Documentation  Taken 4/15/2025 2047 by Rochelle Mast RN  Trust Relationship/Rapport:   care explained   questions encouraged  Goal: Readiness for Transition of Care  Outcome: Progressing     Problem: Comorbidity Management  Goal: Blood Glucose Level Within Target Range  Outcome: Progressing  Intervention: Monitor and Manage Glycemia  Recent Flowsheet Documentation  Taken 4/16/2025 0400 by Rochelle Mast RN  Medication Review/Management: medications reviewed  Taken 4/16/2025 0200 by Rochelle Mast RN  Medication Review/Management: medications reviewed  Taken 4/16/2025 0000 by Rochelle Mast RN  Medication Review/Management: medications reviewed  Taken 4/15/2025 2200 by Rochelle Mast RN  Medication Review/Management: medications reviewed  Taken 4/15/2025 2047 by Rochelle Mast RN  Medication Review/Management: medications reviewed  Goal: Blood Pressure in Desired Range  Outcome: Progressing  Intervention: Maintain Blood Pressure Management  Recent Flowsheet Documentation  Taken 4/16/2025 0400 by Rochelle Mast RN  Medication Review/Management: medications reviewed  Taken 4/16/2025 0200 by Rochelle Mast RN  Medication Review/Management: medications reviewed  Taken 4/16/2025 0000 by Rochelle Mast RN  Medication Review/Management: medications reviewed  Taken 4/15/2025 2200 by Rochelle Mast RN  Medication Review/Management: medications reviewed  Taken 4/15/2025 2047 by Rochelle Mast RN  Medication Review/Management: medications reviewed     Problem: Skin Injury Risk Increased  Goal: Skin Health and Integrity  Outcome: Progressing  Intervention: Optimize Skin Protection  Recent Flowsheet Documentation  Taken 4/16/2025 0400 by Rochelle Mast  RN  Activity Management: activity encouraged  Pressure Reduction Techniques:   frequent weight shift encouraged   weight shift assistance provided  Head of Bed (HOB) Positioning: [facility] elevated  Pressure Reduction Devices: pressure-redistributing mattress utilized  Skin Protection: incontinence pads utilized  Taken 4/16/2025 0200 by Rochelle Mast RN  Activity Management: back to bed  Pressure Reduction Techniques:   frequent weight shift encouraged   weight shift assistance provided  Head of Bed (HOB) Positioning: [facility] elevated  Pressure Reduction Devices: pressure-redistributing mattress utilized  Skin Protection: incontinence pads utilized  Taken 4/16/2025 0000 by Rochelle Mast RN  Activity Management: activity encouraged  Pressure Reduction Techniques:   frequent weight shift encouraged   weight shift assistance provided  Head of Bed (HOB) Positioning: [facility] elevated  Pressure Reduction Devices: pressure-redistributing mattress utilized  Skin Protection: incontinence pads utilized  Taken 4/15/2025 2200 by Rochelle Mast RN  Activity Management: back to bed  Pressure Reduction Techniques:   frequent weight shift encouraged   weight shift assistance provided  Head of Bed (HOB) Positioning: [facility] elevated  Pressure Reduction Devices: pressure-redistributing mattress utilized  Skin Protection: incontinence pads utilized  Taken 4/15/2025 2047 by Rochelle Mast RN  Activity Management: up in chair  Pressure Reduction Techniques:   frequent weight shift encouraged   weight shift assistance provided  Head of Bed (HOB) Positioning: [facility] elevated  Pressure Reduction Devices: pressure-redistributing mattress utilized  Skin Protection: incontinence pads utilized     Problem: Fall Injury Risk  Goal: Absence of Fall and Fall-Related Injury  Outcome: Progressing  Intervention: Identify and Manage Contributors  Recent Flowsheet Documentation  Taken 4/16/2025 0400 by Rochelle Mast RN  Medication  Review/Management: medications reviewed  Self-Care Promotion:   independence encouraged   BADL personal objects within reach  Taken 4/16/2025 0200 by Rochelle Mast RN  Medication Review/Management: medications reviewed  Self-Care Promotion:   independence encouraged   BADL personal objects within reach  Taken 4/16/2025 0000 by Rochelle Mast RN  Medication Review/Management: medications reviewed  Self-Care Promotion:   independence encouraged   BADL personal objects within reach  Taken 4/15/2025 2200 by Rochelle Mast RN  Medication Review/Management: medications reviewed  Self-Care Promotion:   independence encouraged   BADL personal objects within reach  Taken 4/15/2025 2047 by Rochelle Mast RN  Medication Review/Management: medications reviewed  Self-Care Promotion:   independence encouraged   BADL personal objects within reach  Intervention: Promote Injury-Free Environment  Recent Flowsheet Documentation  Taken 4/16/2025 0400 by Rochelle Mast RN  Safety Promotion/Fall Prevention:   activity supervised   assistive device/personal items within reach   clutter free environment maintained   fall prevention program maintained   lighting adjusted   nonskid shoes/slippers when out of bed   room organization consistent   safety round/check completed  Taken 4/16/2025 0200 by Rochelle Mast RN  Safety Promotion/Fall Prevention:   activity supervised   assistive device/personal items within reach   clutter free environment maintained   fall prevention program maintained   lighting adjusted   nonskid shoes/slippers when out of bed   room organization consistent   safety round/check completed  Taken 4/16/2025 0000 by Rochelle Mast RN  Safety Promotion/Fall Prevention:   activity supervised   assistive device/personal items within reach   clutter free environment maintained   fall prevention program maintained   lighting adjusted   nonskid shoes/slippers when out of bed    room organization consistent   safety round/check completed  Taken 4/15/2025 2200 by Rochelle Mast, RN  Safety Promotion/Fall Prevention:   activity supervised   assistive device/personal items within reach   clutter free environment maintained   fall prevention program maintained   lighting adjusted   nonskid shoes/slippers when out of bed   room organization consistent   safety round/check completed  Taken 4/15/2025 2047 by Rochelle Mast RN  Safety Promotion/Fall Prevention:   activity supervised   assistive device/personal items within reach   clutter free environment maintained   fall prevention program maintained   lighting adjusted   nonskid shoes/slippers when out of bed   room organization consistent   safety round/check completed     Problem: UTI (Urinary Tract Infection)  Goal: Improved Infection Symptoms  Outcome: Progressing     Problem: [facility]  Goal: Collaboration with the Healthcare Team  Outcome: Progressing  Intervention: [facility] [place]  Recent Flowsheet Documentation  Taken 4/15/2025 2047 by Rochelle Mast, RN  Supportive Measures: active listening utilized  Family/Support System Care: self-care encouraged   Goal Outcome Evaluation:

## 2025-04-16 NOTE — CASE MANAGEMENT/SOCIAL WORK
Continued Stay Note  [facility]     Patient Name: Thomas ePrez  MRN: [id number]  Today's Date: 4/16/2025    Admit Date: 4/8/2025    Plan: ONGOING   Discharge Plan       Row Name 04/16/25 1221       Plan    Plan Comments Pt's insurance denial upheld for [facility]. MSW updated pt's daughter by phone and discussed family appeal options. Pt's daughter was interested in this initially and was going to come to [facility] this afternoon to get AOR form signed and information for appeal. However, daughter called MSW back and reports she just wants to pursue longterm placement if able. MSW faxed and called referrals to [facility] facilities as well as surrounding [facility] facilities.                   Discharge Codes    No documentation.                 Expected Discharge Date and Time       Expected Discharge Date Expected Discharge Time    Apr 16, 2025               RENAE Aguilar

## 2025-04-16 NOTE — PROGRESS NOTES
[facility] Medicine Services  PROGRESS NOTE    Patient Name: Thomas Perez  : 1950  MRN: [id number]    Date of Admission: 2025  Primary Care Physician: Brock Randolph PA    Subjective     CC: weakness / FTT    HPI:   Dozing in bed. Woke but did not converse with me. Has been refusing all PO meds.   Insurance denied for [facility]. Daughter pursuing LTC.     Objective     Vital Signs:   BP: (168)/(68) 168/68    Physical Exam  Constitutional: No acute distress, asleep  HENT: NCAT, mucous membranes moist  Respiratory: Clear to auscultation bilaterally, normal respiratory effort    Cardiovascular: RRR  Gastrointestinal: Positive bowel sounds, nondistended    Results Reviewed:  LAB RESULTS:      Lab 25  0815 25  1112 04/10/25  0344 25  1702   WBC 7.89 9.64 9.31  --   --    HEMOGLOBIN 8.3* 9.1* 7.9*  --   --    HEMATOCRIT 27.3* 28.7* 25.5*  --   --    PLATELETS 192 204 169  --   --    MCV 97.2* 94.1 96.6  --   --    HSTROP T  --   --   --  202* 199*         Lab 25  0815 25  1112 04/10/25  0344   SODIUM 137 137 139   POTASSIUM 4.8 5.1 5.5*   CHLORIDE 107 105 108*   CO2 18.0* 19.0* 20.0*   ANION GAP 12.0 13.0 11.0   BUN 39* 35* 36*   CREATININE 1.84* 2.46* 2.43*   EGFR 38.0* 26.8* 27.2*   GLUCOSE 145* 192* 152*   CALCIUM 8.7 8.9 8.8   MAGNESIUM  --  2.5* 2.4         Lab 25  1702   LIPASE 14         Lab 25  18525  1702   HSTROP T 202* 199*     Brief Urine Lab Results  (Last result in the past 365 days)        Color   Clarity   Blood   Leuk Est   Nitrite   Protein   CREAT   Urine HCG        25 1256 Yellow   Hazy   Small (1+)   Moderate (2+)   Negative   >=300 mg/dL (3+)                 Microbiology Results Abnormal       None          No radiology results from the last 24 hrs    Results for orders placed during the hospital encounter of 25    Adult Transthoracic Echo Complete w/ Color, Spectral and Contrast if  necessary per protocol    Interpretation Summary    Left ventricular systolic function is normal. Calculated left ventricular EF = 62.7% Left ventricular ejection fraction appears to be 61 - 65%.    Normal left atrial size and volume noted.    Current medications:  Scheduled Meds:amLODIPine, 10 mg, Oral, Q24H  aspirin, 81 mg, Oral, Daily  atorvastatin, 40 mg, Oral, Nightly  carvedilol, 25 mg, Oral, BID With Meals  heparin (porcine), 5,000 Units, Subcutaneous, Q8H  hydrALAZINE, 25 mg, Oral, TID  insulin glargine, 5 Units, Subcutaneous, Nightly  insulin lispro, 2-9 Units, Subcutaneous, 4x Daily AC & at Bedtime  isosorbide mononitrate, 20 mg, Oral, TID  pantoprazole, 40 mg, Oral, BID AC  Pharmacy Meds to Bed Consult, , Not Applicable, Daily      Continuous Infusions:   PRN Meds:.  acetaminophen **OR** acetaminophen **OR** acetaminophen    senna-docusate sodium **AND** polyethylene glycol **AND** bisacodyl **AND** bisacodyl    calcium carbonate    Calcium Replacement - Follow Nurse / BPA Driven Protocol    dextrose    dextrose    glucagon (human recombinant)    guaifenesin-dextromethorphan 600-30 mg    Magnesium Cardiology Dose Replacement - Follow Nurse / BPA Driven Protocol    nitroglycerin    ondansetron    Phosphorus Replacement - Follow Nurse / BPA Driven Protocol    Potassium Replacement - Follow Nurse / BPA Driven Protocol    simethicone    sodium chloride    sodium chloride    sodium chloride    Assessment & Plan     Active Hospital Problems    Diagnosis  POA    **Failure to thrive in adult [R62.7]  Yes    FTT (failure to thrive) in adult [R62.7]  Yes    Weakness [R53.1]  Yes      Resolved Hospital Problems   No resolved problems to display.     Brief Hospital Course to date:  Thomas Perez is a 75 y.o. male with PMH significant for HTN, HLD, CAD s/p RCA stent (2010), PAD s/p LLE fasciotomy/embolectomy, COPD, poorly-controlled DMII, CKD 3 and medication noncompliance. Recently admitted to [facility]  3/20-4/2/25 for hypertensive emergency, type II NSTEMI and MAGUI.     Lower extremity weakness / falls   - At baseline, has BLE foot drop and ambulates with a walker. Family has had difficulty caring for him for some time  - PT/OT following - insurance denied for [facility]. Family pursuing LTC placement now    Pyuria / urinary frequency   - Patient reported increased urinary frequency  - UA concerning for UTI. Urine culture no growth  - s/p IV Rocephin x 1 dose - patient refuse further doses     MAGUI on CKD 3  Hyperkalemia, resolved  - Baseline creatinine appears to be 1.8-2.1  - Creatinine 2.43 on 4/10  - Improved to 1.84 by 4/13     Insulin-dependent DMII  - Hgb A1c 7.9% on 3/20/25  - Is ordered to receive 5 units Lantus QHS and Lispro SSI  - Refusing most doses of insulin and frequently refusing accuchecks     Elevated troponins, chronic  CAD status post RCA stent 2010  PAD status post embolectomy and fasciotomy  Hypertension  Chronic chest pain  Hx hypertensive urgency   - Patient has cardiology follow-up outpatient for continued discussions about outpatient cardiac cath  - Have resumed home ASA, Amlodipine, Carvedilol, Hydralazine and Isosorbide  - Noncompliant with medications here    Chronic anemia   - No signs of bleeding   -Previous studies consistent with anemia of chronic disease likely from CKD  - Continue to monitor    Medical noncompliance  - Complicated by very poor health literacy    HLD, continue statin  GERD, continue PPI  Chronic pain, stopped gabapentin  COPD/tobacco use, encouraged cessation    Expected Discharge Location and Transportation: LTC  Expected Discharge Expected Discharge Date: 4/18/2025; Expected Discharge Time:      VTE Prophylaxis:Pharmacologic VTE prophylaxis orders are present.    AM-PAC 6 Clicks Score (PT): 22 (04/16/25 0800)    CODE STATUS:   Code Status and Medical Interventions: CPR (Attempt to Resuscitate); Full Support   Ordered at: 04/08/25 3897     Code Status (Patient has no  pulse and is not breathing):    CPR (Attempt to Resuscitate)     Medical Interventions (Patient has pulse or is breathing):    Full Support     Level Of Support Discussed With:    Patient     Myah Veras PA-C  04/16/25

## 2025-04-16 NOTE — DISCHARGE PLACEMENT REQUEST
"Thomas Perez (75 y.o. Male)     Please consider for longterm care options.  [place] [phone]      Date of Birth   1950    Social Security Number       Address   [address] [place] [address]    Home Phone   [phone]    MRN   [id number]       Voodoo   None    Marital Status                               Admission Date   4/8/2025    Admission Type   Emergency    Admitting Provider   Castro Bermudez MD    Attending Provider   Castro Bermudez MD    Department, Room/Bed   [facility] 3E, S338/1       Discharge Date       Discharge Disposition       Discharge Destination                                 Attending Provider: Castro Bermudez MD    Allergies: Penicillins, Valium [Diazepam]    Isolation: None   Infection: None   Code Status: CPR    Ht: 180.3 cm (71\")   Wt: 75.8 kg (167 lb)    Admission Cmt: None   Principal Problem: Failure to thrive in adult [R62.7]                   Active Insurance as of 4/8/2025       Primary Coverage       Payor Plan Insurance Group Employer/Plan Group    MEDICARE MEDICARE B ONLY        Payor Plan Address Payor Plan Phone Number Payor Plan Fax Number Effective Dates    [address] [address] [phone]  3/1/2016 - None Entered    [facility] [id number]         Subscriber Name Subscriber Birth Date Member ID       THOMAS PEREZ 1950 [id number]               Secondary Coverage       Payor Plan Insurance Group Employer/Plan Group    [place] [id number]       Payor Plan Address Payor Plan Phone Number Payor Plan Fax Number Effective Dates    [employer] [address] [address] [phone]  3/1/2024 - None Entered    [facility] [id number]         Subscriber Name Subscriber Birth Date Member ID       THOMAS PEREZ 1950 [id number]                     Emergency Contacts        (Rel.) Home Phone Work Phone Mobile Phone    ASHER,COLLETTA (Daughter) [phone] -- [phone]                 History & Physical    "     Seb Naqvi DO at 25 1531              [facility] Medicine Services  HISTORY AND PHYSICAL    Patient Name: Thomas Perez  : 1950  MRN: [id number]  Primary Care Physician: Brock Randolph PA  Date of admission: 2025      Subjective  Subjective     Chief Complaint:  Weakness, falls    HPI:  Thomas Perez is a 75 y.o. male with a PMHx of hypertension, hyperlipidemia, CAD status post RCA stent in , PAD status post embolectomy and fasciotomy, COPD, poorly controlled type 2 diabetes, CKD3, medication noncompliance and chronic pain presenting with weakness and recurrent falls.    Patient recently discharged from [facility] on 25.  Was admitted for hypertensive urgency, type II NSTEMI, and MAGUI on CKD.  Patient had periods of refusal of treatment/noncompliance during admission.  Plans were for outpatient follow-up with cardiology in 2 to 3 weeks for continued discussions of possible outpatient cardiac cath.  Patient was also to follow-up with nephrology 2 weeks after discharge for continued management of CKD.  After arrival home, patient has been lying in bed secondary to weakness.  Patient attempted to ambulate to the bathroom today, however was unable to do so and fell to the ground.  Daughter reports that patient has had multiple falls since discharge.  Today in the ED, patient reports bilateral lower extremity weakness.  Patient reports he is unable to lift his legs.  However, when patient is asked to move from sitting on side the bed to laying in the bed patient is able to lift both his lower extremities without difficulty.  Patient denies any chest pain, shortness of breath, abdominal pain. Endorses urinary frequency.  Continues to smoke 1 pack/day.  Denies any alcohol or illicit drug use.  Confirms full CODE STATUS.      Personal History     Past Medical History:   Diagnosis Date    Back injury     Diabetes mellitus     History of angina     Hypertension             Past Surgical History:   Procedure Laterality Date    CORONARY STENT PLACEMENT      EMBOLECTOMY      FASCIOTOMY         Family History: family history is not on file.     Social History:  reports that he has been smoking cigarettes. He has never used smokeless tobacco. He reports that he does not drink alcohol and does not use drugs.  Social History     Social History Narrative    Not on file       Medications:  Available home medication information reviewed.  amLODIPine, aspirin, atorvastatin, carvedilol, ferrous sulfate, gabapentin, hydrALAZINE, insulin glargine, insulin regular, isosorbide mononitrate, nicotine, nitroglycerin, oxyCODONE-acetaminophen, and pantoprazole    Allergies   Allergen Reactions    Penicillins Hives    Valium [Diazepam] Hives       Objective  Objective     Vital Signs:   Temp:  [98.1 °F (36.7 °C)] 98.1 °F (36.7 °C)  Heart Rate:  [65-70] 66  Resp:  [16] 16  BP: (143-179)/() 179/70       Physical Exam  Constitutional:       General: He is not in acute distress.  Cardiovascular:      Rate and Rhythm: Normal rate.      Pulses: Normal pulses.   Pulmonary:      Effort: Pulmonary effort is normal. No respiratory distress.   Abdominal:      General: There is no distension.      Palpations: Abdomen is soft.      Tenderness: There is no abdominal tenderness. There is no guarding or rebound.   Musculoskeletal:      Right lower leg: Edema present.      Left lower leg: Edema present.   Skin:     General: Skin is warm.   Neurological:      Mental Status: He is alert.      Motor: Weakness present.      Comments: Subjective bilateral lower extremity weakness   Psychiatric:         Mood and Affect: Mood normal.         Behavior: Behavior normal.            Result Review:  I have personally reviewed the results from the time of this admission to 4/8/2025 15:31 EDT and agree with these findings:  []  Laboratory list / accordion  [x]  Microbiology  [x]  Radiology  []  EKG/Telemetry   []   Cardiology/Vascular   []  Pathology  [x]  Old records  []  Other:  Most notable findings include: UA with yeast      LAB RESULTS:      Lab 04/08/25  1129   WBC 15.68*   HEMOGLOBIN 9.5*   HEMATOCRIT 30.3*   PLATELETS 200   NEUTROS ABS 12.50*   IMMATURE GRANS (ABS) 0.09*   LYMPHS ABS 1.39   MONOS ABS 1.44*   EOS ABS 0.20   MCV 95.0         Lab 04/08/25  1129 04/02/25  0408   SODIUM 138 135*   POTASSIUM 5.4* 4.9   CHLORIDE 109* 102   CO2 18.0* 20.0*   ANION GAP 11.0 13.0   BUN 34* 34*   CREATININE 2.34* 2.15*   EGFR 28.5* 31.5*   GLUCOSE 195* 158*   CALCIUM 8.7 8.4*   MAGNESIUM 2.1  --    TSH 1.070  --          Lab 04/08/25  1129   TOTAL PROTEIN 6.6   ALBUMIN 3.7   GLOBULIN 2.9   ALT (SGPT) 23   AST (SGOT) 21   BILIRUBIN 0.2   ALK PHOS 98         Lab 04/08/25  1240 04/08/25  1129   HSTROP T 170* 179*                 UA          12/11/2024    13:37 3/20/2025    16:37 4/8/2025    12:56   Urinalysis   Squamous Epithelial Cells, UA 0-2     0-2  0-2    Specific Gravity, UA 1.014     1.018  1.025    Ketones, UA  Negative  Trace    Blood, UA Negative     Small (1+)  Small (1+)    Leukocytes, UA Negative     Negative  Moderate (2+)    Nitrite, UA  Negative  Negative    RBC, UA 1     6-10  3-5    WBC, UA  0-2  Too Numerous to Count    Bacteria, UA Negative     None Seen  None Seen       Details          This result is from an external source.               Microbiology Results (last 10 days)       ** No results found for the last 240 hours. **            XR Chest 1 View  Result Date: 4/8/2025  XR CHEST 1 VW Date of Exam: 4/8/2025 11:15 AM EDT Indication: Weak/Dizzy/AMS triage protocol Comparison: 3/22/2025 Findings: Heart heart is borderline enlarged. Vasculature is cephalized. There is a mild diffuse interstitial disease pattern, greater in the lower lungs, with no focal consolidation. There appear to be minimal pleural effusions. Overall appearance favors mild asymmetric pulmonary edema over pneumonia. There is no evidence of  pneumothorax.     Impression: Impression: Mild CHF. Electronically Signed: Castro Dumont MD  4/8/2025 11:48 AM EDT  Workstation ID: [id number]      Results for orders placed during the hospital encounter of 03/20/25    Adult Transthoracic Echo Complete w/ Color, Spectral and Contrast if necessary per protocol    Interpretation Summary    Left ventricular systolic function is normal. Calculated left ventricular EF = 62.7% Left ventricular ejection fraction appears to be 61 - 65%.    Normal left atrial size and volume noted.      Assessment & Plan  Assessment & Plan       Failure to thrive in adult    Thomas Perez is a 75 y.o. male with a PMHx of hypertension, hyperlipidemia, CAD status post RCA stent in 2010, PAD status post embolectomy and fasciotomy, COPD, poorly controlled type 2 diabetes, CKD3, medication noncompliance and chronic pain presenting with weakness and recurrent falls.    Weakness, fatigue, falls  -Patient states he has been lying in bed with minimal ambulation since discharge earlier this month. Patient initially requesting discharge, however daughter states she is unable to care for patient   -Patient reports bilateral weakness in lower extremities and states he is unable to move them on command.  However, when asking patient to move from side of bed to lying flat in bed, patient is able to lift bilateral lower extremities and move them without difficulty. Denies any saddle anesthesia or loss of control of bladder/bowels.   -CK, TSH within normal limits  -Deconditioning, diabetic peripheral neuropathy likely contributing   PLAN  -PT/OT consulted  -Will check Vit B12  -Consider imaging of spine if patient has continued lower extremity weakness    UTI  Leukocytosis  -Patient reports increased urinary frequency  -UA with negative nitrites, moderate leukocytes, too numerous to count white blood cells, moderate yeast, no bacteria  -Qtc 429  -Patient started on Diflucan and Rocephin in the ED  -Will continue  Diflucan and Rocephin for now and adjust based on urine culture    MAGUI on CKD3 c/b hyperkalemia  -Evaluated by nephrology during previous admission  -Baseline creatinine seems to be around 2.0.  -Creatinine on admission 2.34. No peaked T waves on ECG  -Patient reports PO intake has consisted of two 16oz mountain dew daily  -Status post IV fluids in ED. Will hold further fluids in setting of mild pulmonary edema seen on chest x-ray  -Insulin/dextrose for hyperkalemia  -Encourage p.o. intake  -Repeat BMP in a.m.    IDDM  -Home insulin regimen includes insulin glargine 10 units nightly and insulin regular 7 units 3 times daily with  -Continue insulin glargine 5 units nightly and sliding scale insulin for now    Elevated troponins  CAD status post RCA stent in 2010  PAD status post embolectomy and fasciotomy  HTN  -Similar value of troponins during previous admission  -Patient denying any chest pain  -ECG without acute ischemic changes  -Echo obtained last month with an EF of 61 to 65%  -Plan to follow-up with cardiology outpatient for continued discussions of outpatient cardiac catheterization  -Continue home amlodipine, aspirin, Coreg, hydralazine, isosorbide mononitrate    Normocytic anemia  -Chronic  -Hgb stable at 9.5 on admission  -No signs/symptoms of bleeding  -Previously iron studies consistent with anemia of chronic disease, likely c/b CKD  -Repeat CBC in AM    HLD  -Continue statin    GERD  -Continue PPI    Chronic pain  -Patient takes gabapentin 100 mg twice daily as needed.  Will reduce to 200 mg twice daily as needed    Tobacco use  -Encouraged cessation      VTE Prophylaxis:  Pharmacologic VTE prophylaxis orders are signed & held.            CODE STATUS:    Code Status and Medical Interventions: CPR (Attempt to Resuscitate); Full Support   Ordered at: 04/08/25 1534     Code Status (Patient has no pulse and is not breathing):    CPR (Attempt to Resuscitate)     Medical Interventions (Patient has pulse or  is breathing):    Full Support     Level Of Support Discussed With:    Patient       Expected Discharge   Expected discharge date/ time has not been documented.     Seb Naqvi DO  04/08/25      Electronically signed by Seb Naqvi DO at 04/08/25 1616       Prior to Admission Medications       Prescriptions Last Dose Informant Patient Reported? Taking?    amLODIPine (NORVASC) 10 MG tablet 4/8/2025  No Yes    Take 1 tablet by mouth Daily for 30 days.    aspirin 81 MG chewable tablet 4/8/2025  No Yes    Chew 1 tablet Daily for 30 days. OTC    atorvastatin (LIPITOR) 40 MG tablet Past Week  No Yes    Take 1 tablet by mouth Every Night for 30 days.    carvedilol (COREG) 25 MG tablet 4/8/2025  No Yes    Take 1 tablet by mouth 2 (Two) Times a Day With Meals for 30 days.    ferrous sulfate 325 (65 FE) MG tablet 4/8/2025  No Yes    Take 1 tablet by mouth Daily With Breakfast for 30 days.    Patient taking differently:  Take 1 tablet by mouth Daily With Breakfast. OTC    gabapentin (NEURONTIN) 400 MG capsule 4/8/2025  Yes Yes    Take 1 capsule by mouth 2 (Two) Times a Day As Needed (As Needed).    hydrALAZINE (APRESOLINE) 25 MG tablet 4/8/2025  No Yes    Take 1.5 tablets by mouth 3 (Three) Times a Day.    insulin glargine (LANTUS, SEMGLEE) 100 UNIT/ML injection Past Week  No Yes    Inject 10 Units under the skin into the appropriate area as directed Every Night.    insulin regular (humuLIN R,novoLIN R) 100 UNIT/ML injection 4/8/2025  Yes Yes    Inject 7 Units under the skin into the appropriate area as directed 3 (Three) Times a Day Before Meals.    isosorbide mononitrate (ISMO,MONOKET) 20 MG tablet 4/8/2025  No Yes    Take 1 tablet by mouth 3 (Three) Times a Day.    nitroglycerin (NITROSTAT) 0.4 MG SL tablet Past Week  No Yes    Place 1 tablet under the tongue Every 5 (Five) Minutes As Needed for Chest Pain. Take no more than 3 doses in 15 minutes.    oxyCODONE-acetaminophen (PERCOCET) 7.5-325 MG per tablet 4/8/2025  Yes Yes     Take 1 tablet by mouth 4 (Four) Times a Day As Needed for Moderate Pain (For Pain).    pantoprazole (PROTONIX) 40 MG EC tablet 2025  No Yes    Take 1 tablet by mouth Every Morning.             Physician Progress Notes (last 48 hours)        Castro Bermudze MD at 04/15/25 0925              [facility] Medicine Services  PROGRESS NOTE    Patient Name: Thomas Perez  : 1950  MRN: [id number]    Date of Admission: 2025  Primary Care Physician: Brock Randolph PA    Subjective  Subjective     CC: Weakness    HPI: Up in bed. No issues.     Objective  Objective     Vital Signs:   Temp:  [97.5 °F (36.4 °C)-98.4 °F (36.9 °C)] 97.7 °F (36.5 °C)  Heart Rate:  [62] 62  Resp:  [16-18] 16  BP: (162-184)/(61-75) 182/75    NAD, alert  OP clear, dry MM  Neck supple  RRR  CTAB  +BS, soft     Results Reviewed:  LAB RESULTS:      Lab 25  0815 25  1112 04/10/25  0344 25  1859 25  1702 25  0409 25  1240 25  1129   WBC 7.89 9.64 9.31  --   --  8.86  --  15.68*   HEMOGLOBIN 8.3* 9.1* 7.9*  --   --  8.1*  --  9.5*   HEMATOCRIT 27.3* 28.7* 25.5*  --   --  25.9*  --  30.3*   PLATELETS 192 204 169  --   --  171  --  200   NEUTROS ABS  --   --   --   --   --  6.13  --  12.50*   IMMATURE GRANS (ABS)  --   --   --   --   --  0.03  --  0.09*   LYMPHS ABS  --   --   --   --   --  1.54  --  1.39   MONOS ABS  --   --   --   --   --  0.95*  --  1.44*   EOS ABS  --   --   --   --   --  0.18  --  0.20   MCV 97.2* 94.1 96.6  --   --  94.9  --  95.0   HSTROP T  --   --   --  202* 199*  --  170* 179*         Lab 25  0815 25  1112 04/10/25  0344 25  0409 25  1129   SODIUM 137 137 139 138 138   POTASSIUM 4.8 5.1 5.5* 4.8 5.4*   CHLORIDE 107 105 108* 110* 109*   CO2 18.0* 19.0* 20.0* 19.0* 18.0*   ANION GAP 12.0 13.0 11.0 9.0 11.0   BUN 39* 35* 36* 33* 34*   CREATININE 1.84* 2.46* 2.43* 2.04* 2.34*   EGFR 38.0* 26.8* 27.2* 33.6* 28.5*   GLUCOSE 145*  192* 152* 129* 195*   CALCIUM 8.7 8.9 8.8 8.5* 8.7   MAGNESIUM  --  2.5* 2.4 2.2 2.1   PHOSPHORUS  --   --   --  2.7  --    TSH  --   --   --   --  1.070         Lab 04/09/25  1702 04/08/25  1129   TOTAL PROTEIN  --  6.6   ALBUMIN  --  3.7   GLOBULIN  --  2.9   ALT (SGPT)  --  23   AST (SGOT)  --  21   BILIRUBIN  --  0.2   ALK PHOS  --  98   LIPASE 14  --          Lab 04/09/25  1859 04/09/25  1702 04/08/25  1240 04/08/25  1129   HSTROP T 202* 199* 170* 179*             Lab 04/08/25  1129   VITAMIN B 12 342         Brief Urine Lab Results  (Last result in the past 365 days)        Color   Clarity   Blood   Leuk Est   Nitrite   Protein   CREAT   Urine HCG        04/08/25 1256 Yellow   Hazy   Small (1+)   Moderate (2+)   Negative   >=300 mg/dL (3+)                   Microbiology Results Abnormal       None            No radiology results from the last 24 hrs      Results for orders placed during the hospital encounter of 03/20/25    Adult Transthoracic Echo Complete w/ Color, Spectral and Contrast if necessary per protocol    Interpretation Summary    Left ventricular systolic function is normal. Calculated left ventricular EF = 62.7% Left ventricular ejection fraction appears to be 61 - 65%.    Normal left atrial size and volume noted.      Current medications:  Scheduled Meds:amLODIPine, 10 mg, Oral, Q24H  aspirin, 81 mg, Oral, Daily  atorvastatin, 40 mg, Oral, Nightly  carvedilol, 25 mg, Oral, BID With Meals  heparin (porcine), 5,000 Units, Subcutaneous, Q8H  hydrALAZINE, 50 mg, Oral, TID  insulin glargine, 5 Units, Subcutaneous, Nightly  insulin lispro, 2-9 Units, Subcutaneous, 4x Daily AC & at Bedtime  isosorbide mononitrate, 20 mg, Oral, TID  pantoprazole, 40 mg, Oral, BID AC  Pharmacy Meds to Bed Consult, , Not Applicable, Daily  sodium chloride, 10 mL, Intravenous, Q12H      Continuous Infusions:   PRN Meds:.  acetaminophen **OR** acetaminophen **OR** acetaminophen    senna-docusate sodium **AND** polyethylene  glycol **AND** bisacodyl **AND** bisacodyl    calcium carbonate    Calcium Replacement - Follow Nurse / BPA Driven Protocol    dextrose    dextrose    glucagon (human recombinant)    guaifenesin-dextromethorphan 600-30 mg    Magnesium Cardiology Dose Replacement - Follow Nurse / BPA Driven Protocol    nitroglycerin    ondansetron    Phosphorus Replacement - Follow Nurse / BPA Driven Protocol    Potassium Replacement - Follow Nurse / BPA Driven Protocol    simethicone    sodium chloride    sodium chloride    sodium chloride    Assessment & Plan  Assessment & Plan     Active Hospital Problems    Diagnosis  POA    **Failure to thrive in adult [R62.7]  Yes    FTT (failure to thrive) in adult [R62.7]  Yes    Weakness [R53.1]  Yes      Resolved Hospital Problems   No resolved problems to display.        Brief Hospital Course to date:  Thomas Perez is a 75 y.o. male with hypertension, hyperlipidemia, CAD status post RCA stent in 2010, PAD status post embolectomy and fasciotomy left lower extremity, COPD, poorly controlled type 2 diabetes, CKD 3, medication noncompliance who presents for lower extremity weakness and recurrent falls.  Patient recently discharged home from the hospital after being treated for hypertensive emergency.    Weakness, lower extremities  Falls  - Patient with low functional status, family unable to care for him  - When asked to move his legs patient reports that he cannot however he has been able to ambulate some with PT with 1 assist.  - Weakness likely multifactorial in the setting of patient effort, deconditioning and diabetic neuropathy  - PT OT following, recommending inpatient rehab    UTI  - Increased urinary frequency  - UA consistent with UTI  - Status post Rocephin, patient refused last dose  - Follow-up cultures    MAGUI on CKD 3  Hyperkalemia-resolved  -Baseline creatinine around 2    IDDM  -stable on current regimen    Elevated troponins, chronic  CAD status post RCA stent 2010  PAD  status post embolectomy and fasciotomy  Hypertension  Chronic chest pain  Hx hypertensive urgency   -Patient has cardiology follow-up outpatient for continued discussions about outpatient cardiac cath  -Continue home amlodipine, aspirin, coreg, hydralazine, isosorbide mononitrate, titrate prn, but noncompliant with medications here also    Diarrhea  -no complaints today    Anemia  -Chronic  -No signs of bleeding  -Previous studies consistent with anemia of chronic disease likely from CKD  -Continue to monitor    Medical noncompliance  -complicated by very poor health literacy  -stopped gabapentin    HLD-continue statin  GERD-continue PPI  Chronic pain-stopped gabapentin  Tobacco use-encouraged cessation, NRT      Expected Discharge Location and Transportation: TBD  Expected Discharge   Expected Discharge Date: 4/15/2025; Expected Discharge Time:      VTE Prophylaxis:  Pharmacologic VTE prophylaxis orders are present.         AM-PAC 6 Clicks Score (PT): 20 (25 1149)    CODE STATUS:   Code Status and Medical Interventions: CPR (Attempt to Resuscitate); Full Support   Ordered at: 25 1531     Code Status (Patient has no pulse and is not breathing):    CPR (Attempt to Resuscitate)     Medical Interventions (Patient has pulse or is breathing):    Full Support     Level Of Support Discussed With:    Patient       Castro Bermudez MD  04/15/25       Electronically signed by Castro Bermudez MD at 04/15/25 0908          Physical Therapy Notes (most recent note)        Nat Drummond, PT at 25 1119  Version 1 of 1         Patient Name: Thomas Perez  : 1950    MRN: [id number]                              Today's Date: 2025       Admit Date: 2025    Visit Dx:     ICD-10-CM ICD-9-CM   1. Generalized weakness  R53.1 780.79   2. Fall in home, initial encounter  W19.XXXA E888.9    Y92.009 E849.0   3. Yeast UTI  B37.49 112.2     Patient Active Problem List   Diagnosis    Type 2 diabetes mellitus  with hyperglycemia, with long-term current use of insulin    Tobacco abuse    Coronary artery disease involving native coronary artery of native heart with angina pectoris    Anemia    CKD (chronic kidney disease) stage 3, GFR 30-59 ml/min    PAD (peripheral artery disease)    Medical non-compliance    COPD (chronic obstructive pulmonary disease)    Bilateral lower extremity edema    Hypertensive emergency    Likely type II NSTEMI from hypertensive emergency    Abnormal nuclear stress test    Moderate protein-calorie malnutrition    Type 2 myocardial infarction    Failure to thrive in adult    Weakness    FTT (failure to thrive) in adult     Past Medical History:   Diagnosis Date    Back injury     Diabetes mellitus     History of angina     Hypertension      Past Surgical History:   Procedure Laterality Date    CORONARY STENT PLACEMENT      EMBOLECTOMY      FASCIOTOMY        General Information       Row Name 04/14/25 1144          Physical Therapy Time and Intention    Document Type therapy note (daily note)  -KR     Mode of Treatment physical therapy;individual therapy  -KR       Row Name 04/14/25 1144          General Information    Patient Profile Reviewed yes  -KR     Existing Precautions/Restrictions fall;other (see comments)  chronic B foot drop  -KR     Barriers to Rehab hearing deficit;medically complex;uncooperative  -KR       Row Name 04/14/25 1144          Cognition    Orientation Status (Cognition) oriented x 3  -KR       Row Name 04/14/25 1144          Safety Issues/Impairments Affecting Functional Mobility    Safety Issues Affecting Function (Mobility) awareness of need for assistance;insight into deficits/self-awareness;judgment;problem-solving;safety precaution awareness;sequencing abilities;safety precautions follow-through/compliance;at risk behavior observed  -KR     Impairments Affecting Function (Mobility) balance;endurance/activity tolerance;strength;motor control;sensation/sensory awareness   -KR               User Key  (r) = Recorded By, (t) = Taken By, (c) = Cosigned By      Initials Name Provider Type    Nat Ugalde PT Physical Therapist                   Mobility       Row Name 04/14/25 1145          Bed Mobility    Bed Mobility sit-supine  -KR     Sit-Supine [place] (Bed Mobility) independent  -KR       Row Name 04/14/25 1145          Sit-Stand Transfer    Sit-Stand [place] (Transfers) independent  -KR     Assistive Device (Sit-Stand Transfers) walker, front-wheeled  -KR     Comment, (Sit-Stand Transfer) 1x from toilet  -KR       Row Name 04/14/25 1145          Gait/Stairs (Locomotion)    [place] Level (Gait) supervision  -KR     Assistive Device (Gait) walker, front-wheeled  -KR     Distance in Feet (Gait) 12  -KR     Deviations/Abnormal Patterns (Gait) greta decreased;base of support, narrow;bilateral deviations  -KR     Bilateral Gait Deviations forward flexed posture;heel strike decreased  -KR     Left Sided Gait Deviations knee buckling, left side  -KR     Comment, (Gait/Stairs) pt found on toilet with [facility] staff. PT assumed care at this time. PT provided supervision assist while pt ambulated back to bed. Pt unwilling to accept verbal cueing or assist for ambulation.  -KR               User Key  (r) = Recorded By, (t) = Taken By, (c) = Cosigned By      Initials Name Provider Type    Nat Ugalde PT Physical Therapist                   Obj/Interventions       Row Name 04/14/25 1147          Motor Skills    Therapeutic Exercise other (see comments)  pt refused  -KR       Row Name 04/14/25 1147          Balance    Balance Assessment sitting static balance;sitting dynamic balance;standing static balance;standing dynamic balance  -KR     Static Sitting Balance independent  -KR     Dynamic Sitting Balance independent  -KR     Position, Sitting Balance unsupported;sitting edge of bed;other (see comments)  toilet  -KR     Static Standing Balance supervision  -KR      Dynamic Standing Balance supervision  -KR     Position/Device Used, Standing Balance supported;walker, front-wheeled  -KR     Balance Interventions sitting;standing;sit to stand;supported;static;dynamic;occupation based/functional task  -KR     Comment, Balance pt performed toileting and donning pants independently  -KR               User Key  (r) = Recorded By, (t) = Taken By, (c) = Cosigned By      Initials Name Provider Type    Nat Ugalde PT Physical Therapist                   Goals/Plan    No documentation.                  Clinical Impression       Row Name 04/14/25 1147          Pain    Additional Documentation Pain Scale: FACES Pre/Post-Treatment (Group)  -KR       [facility] Name 04/14/25 1147          Pain Scale: FACES Pre/Post-Treatment    Pain: FACES Scale, Pretreatment 0-->no hurt  -KR     Posttreatment Pain Rating 0-->no hurt  -KR       [facility] Name 04/14/25 1147          Plan of Care Review    Plan of Care Reviewed With patient  -KR     Progress no change  -KR     Outcome Evaluation Pt refused all cueing and assist provided by PT this session, as well as further participation in PT following return to bed from bathroom. Pt able to ambulate back to bed with supervision assist, however continues to demo L knee instability with ambulation. Continue to progress PT POC as able.  -KR       Row Name 04/14/25 1147          Vital Signs    Pre Patient Position Sitting  -KR     Intra Patient Position Standing  -KR     Post Patient Position Supine  -KR       [facility] Name 04/14/25 1147          Positioning and Restraints    Pre-Treatment Position bathroom  -KR     Post Treatment Position bed  -KR     In Bed notified nsg;fowlers;call light within reach;encouraged to call for assist;exit alarm on;side rails up x2  -KR               User Key  (r) = Recorded By, (t) = Taken By, (c) = Cosigned By      Initials Name Provider Type    Nat Ugalde PT Physical Therapist                   Outcome Measures       Row Name  04/14/25 1149          How much help from another person do you currently need...    Turning from your back to your side while in flat bed without using bedrails? 4  -KR     Moving from lying on back to sitting on the side of a flat bed without bedrails? 4  -KR     Moving to and from a bed to a chair (including a wheelchair)? 3  -KR     Standing up from a chair using your arms (e.g., wheelchair, bedside chair)? 4  -KR     Climbing 3-5 steps with a railing? 2  -KR     To walk in hospital room? 3  -KR     AM-PAC 6 Clicks Score (PT) 20  -KR     Highest Level of Mobility Goal 6 --> Walk 10 steps or more  -KR               User Key  (r) = Recorded By, (t) = Taken By, (c) = Cosigned By      Initials Name Provider Type    Nat Ugalde, PT Physical Therapist                                 Physical Therapy Education       Title: PT OT SLP Therapies (In Progress)       Topic: Physical Therapy (In Progress)       Point: Mobility training (In Progress)       Learning Progress Summary            Patient Acceptance, E, RT,NR by KR at 4/14/2025 1149    Acceptance, E, NR by KR at 4/11/2025 1512    Acceptance, E, NR by AC at 4/10/2025 1405    Acceptance, E, NR by KR at 4/9/2025 0934                      Point: Home exercise program (Not Started)       Learner Progress:  Not documented in this visit.              Point: Body mechanics (In Progress)       Learning Progress Summary            Patient Acceptance, E, RT,NR by KR at 4/14/2025 1149    Acceptance, E, NR by KR at 4/11/2025 1512    Acceptance, E, NR by AC at 4/10/2025 1405    Acceptance, E, NR by KR at 4/9/2025 0934                      Point: Precautions (In Progress)       Learning Progress Summary            Patient Acceptance, E, RT,NR by KR at 4/14/2025 1149    Acceptance, E, NR by KR at 4/11/2025 1512    Acceptance, E, NR by AC at 4/10/2025 1405    Acceptance, E, NR by KR at 4/9/2025 0934                                      User Key       Initials Effective  Dates Name Provider Type Discipline    KR 12/30/22 -  Nat Drummond, PT Physical Therapist PT    AC 07/11/24 -  Alexus Perez PT Physical Therapist PT                  PT Recommendation and Plan  Planned Therapy Interventions (PT): balance training, bed mobility training, gait training, home exercise program, neuromuscular re-education, patient/family education, postural re-education, transfer training, stretching, strengthening, stair training, ROM (range of motion), orthotic fitting/training  Progress: no change  Outcome Evaluation: Pt refused all cueing and assist provided by PT this session, as well as further participation in PT following return to bed from bathroom. Pt able to ambulate back to bed with supervision assist, however continues to demo L knee instability with ambulation. Continue to progress PT POC as able.     Time Calculation:   PT Evaluation Complexity  History, PT Evaluation Complexity: 3 or more personal factors and/or comorbidities  Examination of Body Systems (PT Eval Complexity): total of 4 or more elements  Clinical Presentation (PT Evaluation Complexity): evolving  Clinical Decision Making (PT Evaluation Complexity): moderate complexity  Overall Complexity (PT Evaluation Complexity): moderate complexity     PT Charges       Row Name 04/14/25 1149             Time Calculation    Start Time 1119  -KR      PT Received On 04/14/25  -KR         Timed Charges    46851 - PT Therapeutic Activity Minutes 23  -KR         Total Minutes    Timed Charges Total Minutes 23  -KR       Total Minutes 23  -KR                User Key  (r) = Recorded By, (t) = Taken By, (c) = Cosigned By      Initials Name Provider Type    KR Nat Drummond PT Physical Therapist                  Therapy Charges for Today       Code Description Service Date Service Provider Modifiers Qty    59904393097 HC PT THERAPEUTIC ACT EA 15 MIN 4/14/2025 Nat Drummond, PT GP 2            PT G-Codes  Outcome Measure Options: AM-PAC 6  Clicks Daily Activity (OT)  AM-PAC 6 Clicks Score (PT): 20  AM-PAC 6 Clicks Score (OT): 18  PT Discharge Summary  Anticipated Discharge Disposition (PT): inpatient rehabilitation facility    Nat Drummond, PT  2025      Electronically signed by Nat Drummond, PT at 25 1150          Occupational Therapy Notes (most recent note)        Douglas Avery, OT at 25 0848          Patient Name: Thomas Perez  : 1950    MRN: [id number]                              Today's Date: 2025       Admit Date: 2025    Visit Dx:     ICD-10-CM ICD-9-CM   1. Generalized weakness  R53.1 780.79   2. Fall in home, initial encounter  W19.XXXA E888.9    Y92.009 E849.0   3. Yeast UTI  B37.49 112.2     Patient Active Problem List   Diagnosis    Type 2 diabetes mellitus with hyperglycemia, with long-term current use of insulin    Tobacco abuse    Coronary artery disease involving native coronary artery of native heart with angina pectoris    Anemia    CKD (chronic kidney disease) stage 3, GFR 30-59 ml/min    PAD (peripheral artery disease)    Medical non-compliance    COPD (chronic obstructive pulmonary disease)    Bilateral lower extremity edema    Hypertensive emergency    Likely type II NSTEMI from hypertensive emergency    Abnormal nuclear stress test    Moderate protein-calorie malnutrition    Type 2 myocardial infarction    Failure to thrive in adult     Past Medical History:   Diagnosis Date    Back injury     Diabetes mellitus     History of angina     Hypertension      Past Surgical History:   Procedure Laterality Date    CORONARY STENT PLACEMENT      EMBOLECTOMY      FASCIOTOMY        General Information       Row Name 25 1017          OT Time and Intention    Document Type evaluation  -CS     Mode of Treatment occupational therapy  -CS       Row Name 25 1017          General Information    Patient Profile Reviewed yes  -CS     Existing Precautions/Restrictions fall;other (see  comments);oxygen therapy device and L/min  chronic B foot drop  -CS     Barriers to Rehab medically complex  -CS       Row Name 04/09/25 1017          Cognition    Orientation Status (Cognition) oriented x 3  -CS       Row Name 04/09/25 1017          Safety Issues/Impairments Affecting Functional Mobility    Safety Issues Affecting Function (Mobility) insight into deficits/self-awareness;safety precaution awareness;awareness of need for assistance;safety precautions follow-through/compliance;judgment  -CS     Impairments Affecting Function (Mobility) balance;endurance/activity tolerance;strength;motor control;sensation/sensory awareness  -CS               User Key  (r) = Recorded By, (t) = Taken By, (c) = Cosigned By      Initials Name Provider Type    CS Douglas Avery OT Occupational Therapist                     Mobility/ADL's       Row Name 04/09/25 1018          Bed Mobility    Bed Mobility supine-sit;scooting/bridging  -     Scooting/Bridging [place] (Bed Mobility) contact guard;verbal cues  -     Supine-Sit [place] (Bed Mobility) contact guard;verbal cues  -CS     Assistive Device (Bed Mobility) bed rails;head of bed elevated  -CS     Comment, (Bed Mobility) no dizziness reported, impulsive with need to stand  -       Row Name 04/09/25 1018          Transfers    Transfers sit-stand transfer  -       Row Name 04/09/25 1018          Bed-Chair Transfer    Bed-Chair [place] (Transfers) minimum assist (75% patient effort);2 person assist  -[facility] Name 04/09/25 1018          Sit-Stand Transfer    Sit-Stand [place] (Transfers) minimum assist (75% patient effort);2 person assist  -[facility] Name 04/09/25 1018          Activities of Daily Living    BADL Assessment/Intervention lower body dressing;grooming;feeding  -[facility] Name 04/09/25 1018          Lower Body Dressing Assessment/Training    [place] Level (Lower Body Dressing) don;pants/bottoms;contact guard assist   -CS     Position (Lower Body Dressing) supported standing;unsupported sitting  -CS     Comment, (Lower Body Dressing) CGA for dynamic sit-stand balance during pants pullup  -CS       Row Name 04/09/25 1018          Grooming Assessment/Training    [place] Level (Grooming) wash face, hands;set up  -CS     Position (Grooming) supported sitting  -CS       Row Name 04/09/25 1018          Self-Feeding Assessment/Training    [place] Level (Feeding) finger foods;liquids to mouth;prepare tray/open items;scoop food and bring to mouth;independent  -CS     Position (Feeding) supported sitting  -CS               User Key  (r) = Recorded By, (t) = Taken By, (c) = Cosigned By      Initials Name Provider Type    CS Douglas Avery, OT Occupational Therapist                   Obj/Interventions       Row Name 04/09/25 1021          Sensory Assessment (Somatosensory)    Sensory Assessment (Somatosensory) bilateral UE  -CS     Left LE Sensory Assessment general sensation;light touch awareness;light touch localization;intact  -       Row Name 04/09/25 1021          Vision Assessment/Intervention    Visual Impairment/Limitations blurry vision  -CS     Vision Assessment Comment reports blurred and impaired vision, presents heavy lidded and lethargic, tracking intact and full to confrontation, able to read time on monitor across room  -CS       Row Name 04/09/25 1021          Range of Motion Comprehensive    General Range of Motion bilateral upper extremity ROM WFL  -       Row Name 04/09/25 1021          Strength Comprehensive (MMT)    General Manual Muscle Testing (MMT) Assessment upper extremity strength deficits identified  -CS     Comment, General Manual Muscle Testing (MMT) Assessment BUE grossly 4/5, symmetrical this date  -       Row Name 04/09/25 1021          Motor Skills    Motor Skills coordination;functional endurance  -CS     Coordination bilateral;upper extremity;bimanual skills;WFL  -CS     Functional  Endurance requiring supplemental O2 this date for O2 sats > 90%  -CS       Row Name 04/09/25 1021          Balance    Balance Assessment sitting static balance;sitting dynamic balance;standing static balance;standing dynamic balance  -CS     Static Sitting Balance standby assist  -CS     Dynamic Sitting Balance contact guard  -CS     Position, Sitting Balance unsupported;sitting edge of bed  -CS     Static Standing Balance contact guard  -CS     Dynamic Standing Balance minimal assist;1 person to manage equipment;1-person assist  -CS     Position/Device Used, Standing Balance unsupported  -CS     Balance Interventions standing;sit to stand;sitting;occupation based/functional task  -CS               User Key  (r) = Recorded By, (t) = Taken By, (c) = Cosigned By      Initials Name Provider Type    CS Douglas Avery, OT Occupational Therapist                   Goals/Plan       Row Name 04/09/25 1030          Transfer Goal 1 (OT)    Activity/Assistive Device (Transfer Goal 1, OT) bed-to-chair/chair-to-bed;toilet  -CS     [place] Level/Cues Needed (Transfer Goal 1, OT) modified independence  -CS     Strategies/Barriers (Transfers Goal 1, OT) AD recs per PT  -CS     Progress/Outcome (Transfer Goal 1, OT) new goal  -CS       Row Name 04/09/25 1030          Dressing Goal 1 (OT)    Activity/Device (Dressing Goal 1, OT) lower body dressing  -CS     [place]/Cues Needed (Dressing Goal 1, OT) independent  -CS     Time Frame (Dressing Goal 1, OT) short term goal (STG);5 days  -CS     Progress/Outcome (Dressing Goal 1, OT) goal ongoing  -CS       Row Name 04/09/25 1030          Grooming Goal 1 (OT)    Activity/Device (Grooming Goal 1, OT) hair care;oral care;wash face, hands  -CS     [place] (Grooming Goal 1, OT) supervision required  -CS     Time Frame (Grooming Goal 1, OT) short term goal (STG);5 days  -CS     Strategies/Barriers (Grooming Goal 1, OT) sinkside  -CS     Progress/Outcome (Grooming Goal 1, OT) goal  ongoing  -CS       Row Name 04/09/25 1030          Strength Goal 1 (OT)    Time Frame (Strength Goal 1, OT) long term goal (LTG)  -CS     Strategies/Barriers (Strength Goal 1, OT) Increase gross BUE strength 1/2 muscle grade to promote safety/Ind with ADLs and related transfers  -CS     Progress/Outcome (Strength Goal 1, OT) goal ongoing  -CS       Row Name 04/09/25 1030          Therapy Assessment/Plan (OT)    Planned Therapy Interventions (OT) activity tolerance training;functional balance retraining;occupation/activity based interventions;ROM/therapeutic exercise;strengthening exercise;transfer/mobility retraining;patient/caregiver education/training;neuromuscular control/coordination retraining;cognitive/visual perception retraining;BADL retraining;adaptive equipment training  -CS               User Key  (r) = Recorded By, (t) = Taken By, (c) = Cosigned By      Initials Name Provider Type    CS Douglas Avery, OT Occupational Therapist                   Clinical Impression       Row Name 04/09/25 1023          Plan of Care Review    Plan of Care Reviewed With patient  -CS     Progress no change  -CS     Outcome Evaluation Pt presents with recent falls, generalized weakness, standing balance deficit, and poor endurance warranting IP OT services to promote return to PLOF. Poor safety awareness and inconsistencies observed with performance/effort. Rec d/c to IRF for best functional outcomes.  -CS       Row Name 04/09/25 1023          Therapy Assessment/Plan (OT)    Patient/Family Therapy Goal Statement (OT) return to baseline, no more falls  -CS     Rehab Potential (OT) good  -CS     Criteria for Skilled Therapeutic Interventions Met (OT) yes;meets criteria;skilled treatment is necessary  -CS     Therapy Frequency (OT) daily  -CS     Predicted Duration of Therapy Intervention (OT) 7 days  -CS       Row Name 04/09/25 1023          Therapy Plan Review/Discharge Plan (OT)    Anticipated Discharge Disposition (OT)  inpatient rehabilitation facility  -       Row Name 04/09/25 1023          Vital Signs    Pre Systolic BP Rehab --  RN cleared for eval  -CS     O2 Delivery Pre Treatment room air  -CS     O2 Delivery Intra Treatment room air  -CS     O2 Delivery Post Treatment room air  -CS     Pre Patient Position Supine  -CS     Intra Patient Position Standing  -CS     Post Patient Position Sitting  -CS       Row Name 04/09/25 1023          Positioning and Restraints    Pre-Treatment Position in bed  -CS     Post Treatment Position chair  -CS     In Chair notified nsg;reclined;sitting;call light within reach;encouraged to call for assist;exit alarm on;on mechanical lift sling;waffle cushion  -               User Key  (r) = Recorded By, (t) = Taken By, (c) = Cosigned By      Initials Name Provider Type     Douglas Avery, OT Occupational Therapist                   Outcome Measures       Row Name 04/09/25 1032          How much help from another is currently needed...    Putting on and taking off regular lower body clothing? 3  -CS     Bathing (including washing, rinsing, and drying) 2  -CS     Toileting (which includes using toilet bed pan or urinal) 3  -CS     Putting on and taking off regular upper body clothing 3  -CS     Taking care of personal grooming (such as brushing teeth) 3  -CS     Eating meals 4  -CS     AM-PAC 6 Clicks Score (OT) 18  -CS       Row Name 04/09/25 0933          How much help from another person do you currently need...    Turning from your back to your side while in flat bed without using bedrails? 4  -KR     Moving from lying on back to sitting on the side of a flat bed without bedrails? 3  -KR     Moving to and from a bed to a chair (including a wheelchair)? 3  -KR     Standing up from a chair using your arms (e.g., wheelchair, bedside chair)? 3  -KR     Climbing 3-5 steps with a railing? 2  -KR     To walk in hospital room? 2  -KR     AM-PAC 6 Clicks Score (PT) 17  -KR     Highest Level of  Mobility Goal 5 --> Static standing  -KR       Row Name 04/09/25 1032 04/09/25 0933       Functional Assessment    Outcome Measure Options AM-PAC 6 Clicks Daily Activity (OT)  -CS AM-PAC 6 Clicks Basic Mobility (PT)  -KR              User Key  (r) = Recorded By, (t) = Taken By, (c) = Cosigned By      Initials Name Provider Type     Douglas Avery OT Occupational Therapist    Nat Ugalde PT Physical Therapist                    Occupational Therapy Education       Title: PT OT SLP Therapies (In Progress)       Topic: Occupational Therapy (Done)       Point: ADL training (Done)       Learning Progress Summary            Patient Acceptance, E,D, VU,DU by  at 4/9/2025 1032                      Point: Home exercise program (Done)       Learning Progress Summary            Patient Acceptance, E,D, VU,DU by  at 4/9/2025 1032                      Point: Precautions (Done)       Learning Progress Summary            Patient Acceptance, E,D, VU,DU by  at 4/9/2025 1032                      Point: Body mechanics (Done)       Learning Progress Summary            Patient Acceptance, E,D, VU,DU by  at 4/9/2025 1032                                      User Key       Initials Effective Dates Name Provider Type Discipline     06/16/21 -  Douglas Avery OT Occupational Therapist OT                  OT Recommendation and Plan  Planned Therapy Interventions (OT): activity tolerance training, functional balance retraining, occupation/activity based interventions, ROM/therapeutic exercise, strengthening exercise, transfer/mobility retraining, patient/caregiver education/training, neuromuscular control/coordination retraining, cognitive/visual perception retraining, BADL retraining, adaptive equipment training  Therapy Frequency (OT): daily  Plan of Care Review  Plan of Care Reviewed With: patient  Progress: no change  Outcome Evaluation: Pt presents with recent falls, generalized weakness, standing balance  deficit, and poor endurance warranting IP OT services to promote return to PLOF. Poor safety awareness and inconsistencies observed with performance/effort. Rec d/c to IRF for best functional outcomes.     Time Calculation:   Evaluation Complexity (OT)  Review Occupational Profile/Medical/Therapy History Complexity: expanded/moderate complexity  Assessment, Occupational Performance/Identification of Deficit Complexity: 1-3 performance deficits  Clinical Decision Making Complexity (OT): problem focused assessment/low complexity  Overall Complexity of Evaluation (OT): low complexity     Time Calculation- OT       Row Name 04/09/25 1032             Time Calculation- OT    OT Start Time 0848  -CS      OT Received On 04/09/25  -CS      OT Goal Re-Cert Due Date 04/19/25  -CS         Untimed Charges    OT Eval/Re-eval Minutes 47  -CS         Total Minutes    Untimed Charges Total Minutes 47  -CS       Total Minutes 47  -CS                User Key  (r) = Recorded By, (t) = Taken By, (c) = Cosigned By      Initials Name Provider Type    CS Douglas Avery, OT Occupational Therapist                  Therapy Charges for Today       Code Description Service Date Service Provider Modifiers Qty    [id number]  OT EVAL LOW COMPLEXITY 4 4/9/2025 Douglas Avery OT GO 1                 Douglas Avery OT  4/9/2025    Electronically signed by Douglas Avery OT at 04/09/25 1033

## 2025-04-17 VITALS
HEIGHT: 71 IN | SYSTOLIC BLOOD PRESSURE: 168 MMHG | HEART RATE: 62 BPM | TEMPERATURE: 97.7 F | WEIGHT: 167 LBS | OXYGEN SATURATION: 98 % | RESPIRATION RATE: 14 BRPM | DIASTOLIC BLOOD PRESSURE: 68 MMHG | BODY MASS INDEX: 23.38 KG/M2

## 2025-04-17 NOTE — DISCHARGE SUMMARY
[facility] Medicine Services  ELOPEMENT AGAINST MEDICAL ADVICE    Patient Name: Thomas Perez  : 1950  MRN: [id number]    Date of Admission: 2025  Date of Elopement:  2025  Primary Care Physician: Brock Randolph PA    Consults       Date and Time Order Name Status Description    3/25/2025  9:14 AM Inpatient Nephrology Consult Completed     3/22/2025  7:53 AM Inpatient Cardiology Consult Completed           Hospital Course     Presenting Problem:   Failure to thrive in adult [R62.7]  Weakness [R53.1]  FTT (failure to thrive) in adult [R62.7]    Active Hospital Problems    Diagnosis  POA    **Failure to thrive in adult [R62.7]  Yes    FTT (failure to thrive) in adult [R62.7]  Yes    Weakness [R53.1]  Yes      Resolved Hospital Problems   No resolved problems to display.          Hospital Course:  Thomas Perez is a 75 y.o. male with PMH significant for HTN, HLD, CAD s/p RCA stent (), PAD s/p LLE fasciotomy/embolectomy, COPD, poorly-controlled DMII, CKD 3 and medication noncompliance. Recently admitted to [facility] 3/20-25 for hypertensive emergency, type II NSTEMI and MAGUI. Patient has been non compliant with all medication, labs, accuchecks over the past week. Has refused vital signs as well. Signed out AMA as we could not let him outside to smoke     Lower extremity weakness / falls   - At baseline, has BLE foot drop and ambulates with a walker. Family has had difficulty caring for him for some time  - PT/OT following - insurance denied for [facility]. Family pursuing LTC placement however, patient unhappy to be in the hospital declining all care     Pyuria / urinary frequency   - Patient reported increased urinary frequency  - UA concerning for UTI. Urine culture no growth  - s/p IV Rocephin x 1 dose - patient refused further doses      MAGUI on CKD 3  Hyperkalemia, resolved  - Baseline creatinine appears to be 1.8-2.1  - Creatinine 2.43 on 4/10  -  Improved to 1.84 by 4/13      Insulin-dependent DMII  - Hgb A1c 7.9% on 3/20/25  - Refusing insulin and accuchecks      Elevated troponins, chronic  CAD status post RCA stent 2010  PAD status post embolectomy and fasciotomy  Hypertension  Chronic chest pain  Hx hypertensive urgency   - Patient has cardiology follow-up outpatient for continued discussions about outpatient cardiac cath  - Have resumed home ASA, Amlodipine, Carvedilol, Hydralazine and Isosorbide- but refuses to take  - Noncompliant with medications here     Chronic anemia   - No signs of bleeding   -Previous studies consistent with anemia of chronic disease likely from CKD  - Continue to monitor     Medical noncompliance  - Complicated by very poor health literacy     HLD,   GERD,  Chronic pain, stopped gabapentin  COPD/tobacco use, encouraged cessat    **Patient left AMA prior to completion of evaluation and management**      Day of Discharge     HPI: does not want medical management. Declines all medication. Tells me he would like to be DNR/ comfort and go smoke  **Patient left AMA prior to completion of evaluation and management**    Vital Signs:         Physical Exam (if applicable):  Constitutional: No acute distress, awake, alert- up on bedside  HENT: NCAT, mucous membranes moist  Respiratory: Clear to auscultation bilaterally, respiratory effort normal   Cardiovascular: RRR  Gastrointestinal: Positive bowel sounds, soft, nontender, nondistended  Musculoskeletal: No bilateral ankle edema  Psychiatric: Appropriate affect, cooperative  Neurologic: Oriented x 3, MARIN, speech clear  Skin:- surgical scarring BLE      Discharge Details     Discharge Disposition:  **Patient left AMA prior to completion of evaluation and management, therefore discharge planning remains incomplete including absence of any needed discharging medications, testing arrangements or follow up unless otherwise specified**    No future appointments.          Marybeth Craven,  APRN  04/17/25

## 2025-04-17 NOTE — PLAN OF CARE
Problem: Adult Inpatient Plan of Care  Goal: Plan of Care Review  Outcome: Progressing  Goal: Patient-Specific Goal (Individualized)  Outcome: Progressing  Goal: Absence of Hospital-Acquired Illness or Injury  Outcome: Progressing  Intervention: Identify and Manage Fall Risk  Recent Flowsheet Documentation  Taken 4/17/2025 0400 by Rochelle Mast RN  Safety Promotion/Fall Prevention:   activity supervised   assistive device/personal items within reach   clutter free environment maintained   fall prevention program maintained   lighting adjusted   nonskid shoes/slippers when out of bed   room organization consistent   safety round/check completed  Taken 4/17/2025 0200 by Rochelle Mast RN  Safety Promotion/Fall Prevention:   activity supervised   assistive device/personal items within reach   clutter free environment maintained   fall prevention program maintained   lighting adjusted   nonskid shoes/slippers when out of bed   room organization consistent   safety round/check completed  Taken 4/17/2025 0000 by Rochelle Mast RN  Safety Promotion/Fall Prevention:   activity supervised   assistive device/personal items within reach   clutter free environment maintained   fall prevention program maintained   lighting adjusted   nonskid shoes/slippers when out of bed   room organization consistent   safety round/check completed  Taken 4/16/2025 2200 by Rochelle Mast RN  Safety Promotion/Fall Prevention:   activity supervised   assistive device/personal items within reach   clutter free environment maintained   fall prevention program maintained   lighting adjusted   nonskid shoes/slippers when out of bed   room organization consistent   safety round/check completed  Taken 4/16/2025 2000 by Rochelle Mast RN  Safety Promotion/Fall Prevention:   activity supervised   assistive device/personal items within reach   clutter free environment maintained   fall prevention program maintained    lighting adjusted   nonskid shoes/slippers when out of bed   room organization consistent   safety round/check completed  Intervention: Prevent Skin Injury  Recent Flowsheet Documentation  Taken 4/17/2025 0400 by Rochelle Mast RN  Body Position: position changed independently  Skin Protection: incontinence pads utilized  Taken 4/17/2025 0200 by Rochelle Mast RN  Body Position: position changed independently  Skin Protection: incontinence pads utilized  Taken 4/17/2025 0000 by Rochelle Mast RN  Body Position: position changed independently  Skin Protection: incontinence pads utilized  Taken 4/16/2025 2200 by Rochelle Mast RN  Body Position: position changed independently  Skin Protection: incontinence pads utilized  Taken 4/16/2025 2000 by Rochelle Mast RN  Body Position: position changed independently  Skin Protection: incontinence pads utilized  Intervention: Prevent and Manage VTE (Venous Thromboembolism) Risk  Recent Flowsheet Documentation  Taken 4/16/2025 2000 by Rochelle Mast RN  VTE Prevention/Management: patient refused intervention  Intervention: Prevent Infection  Recent Flowsheet Documentation  Taken 4/17/2025 0400 by Rochelle Mast RN  Infection Prevention:   equipment surfaces disinfected   hand hygiene promoted   personal protective equipment utilized  Taken 4/17/2025 0200 by Rochelle Mast RN  Infection Prevention:   equipment surfaces disinfected   hand hygiene promoted   personal protective equipment utilized  Taken 4/17/2025 0000 by Rochelle Mast RN  Infection Prevention:   equipment surfaces disinfected   hand hygiene promoted   personal protective equipment utilized  Taken 4/16/2025 2200 by Rochelle Mast RN  Infection Prevention:   equipment surfaces disinfected   hand hygiene promoted   personal protective equipment utilized  Taken 4/16/2025 2000 by Rochelle Mast RN  Infection Prevention:   equipment surfaces  disinfected   hand hygiene promoted   personal protective equipment utilized  Goal: Optimal Comfort and Wellbeing  Outcome: Progressing  Intervention: Provide Person-Centered Care  Recent Flowsheet Documentation  Taken 4/16/2025 2000 by Rochelle Mast RN  Trust Relationship/Rapport:   care explained   questions encouraged  Goal: Readiness for Transition of Care  Outcome: Progressing     Problem: Comorbidity Management  Goal: Blood Glucose Level Within Target Range  Outcome: Progressing  Intervention: Monitor and Manage Glycemia  Recent Flowsheet Documentation  Taken 4/17/2025 0400 by Rochelle Mast RN  Medication Review/Management: medications reviewed  Taken 4/17/2025 0200 by Rochelle Mast RN  Medication Review/Management: medications reviewed  Taken 4/17/2025 0000 by Rochelle Mast RN  Medication Review/Management: medications reviewed  Taken 4/16/2025 2200 by Rochelle Mast RN  Medication Review/Management: medications reviewed  Taken 4/16/2025 2000 by Rochelle Mast RN  Medication Review/Management: medications reviewed  Goal: Blood Pressure in Desired Range  Outcome: Progressing  Intervention: Maintain Blood Pressure Management  Recent Flowsheet Documentation  Taken 4/17/2025 0400 by Rochelle Mast RN  Medication Review/Management: medications reviewed  Taken 4/17/2025 0200 by Rochelle Mast RN  Medication Review/Management: medications reviewed  Taken 4/17/2025 0000 by Rochelle Mast RN  Medication Review/Management: medications reviewed  Taken 4/16/2025 2200 by Rochelle Mast RN  Medication Review/Management: medications reviewed  Taken 4/16/2025 2000 by Rochelle Mast RN  Medication Review/Management: medications reviewed     Problem: Skin Injury Risk Increased  Goal: Skin Health and Integrity  Outcome: Progressing  Intervention: Optimize Skin Protection  Recent Flowsheet Documentation  Taken 4/17/2025 0400 by Rochelle Mast  RN  Activity Management: activity encouraged  Pressure Reduction Techniques:   frequent weight shift encouraged   weight shift assistance provided  Head of Bed (HOB) Positioning: [facility] elevated  Pressure Reduction Devices: pressure-redistributing mattress utilized  Skin Protection: incontinence pads utilized  Taken 4/17/2025 0200 by Rochelle Mast RN  Pressure Reduction Techniques:   frequent weight shift encouraged   weight shift assistance provided  Head of Bed (HOB) Positioning: [facility] elevated  Pressure Reduction Devices: pressure-redistributing mattress utilized  Skin Protection: incontinence pads utilized  Taken 4/17/2025 0000 by Rochelle Mast RN  Activity Management: activity encouraged  Pressure Reduction Techniques:   frequent weight shift encouraged   weight shift assistance provided  Head of Bed (HOB) Positioning: [facility] elevated  Pressure Reduction Devices: pressure-redistributing mattress utilized  Skin Protection: incontinence pads utilized  Taken 4/16/2025 2200 by Rochelle Mast RN  Activity Management:   ambulated outside room   ambulated in room  Pressure Reduction Techniques:   frequent weight shift encouraged   weight shift assistance provided  Head of Bed (HOB) Positioning: [facility] elevated  Pressure Reduction Devices: pressure-redistributing mattress utilized  Skin Protection: incontinence pads utilized  Taken 4/16/2025 2000 by Rochelle Mast RN  Activity Management: (Patient ambulated down to the nurses station and back  to his room) ambulated outside room  Pressure Reduction Techniques:   frequent weight shift encouraged   weight shift assistance provided  Head of Bed (HOB) Positioning: [facility] elevated  Pressure Reduction Devices: pressure-redistributing mattress utilized  Skin Protection: incontinence pads utilized     Problem: Fall Injury Risk  Goal: Absence of Fall and Fall-Related Injury  Outcome: Progressing  Intervention: Identify and Manage Contributors  Recent Flowsheet  Documentation  Taken 4/17/2025 0400 by Rochelle Mast RN  Medication Review/Management: medications reviewed  Self-Care Promotion:   independence encouraged   BADL personal objects within reach  Taken 4/17/2025 0200 by Rochelle Mast RN  Medication Review/Management: medications reviewed  Self-Care Promotion:   independence encouraged   BADL personal objects within reach  Taken 4/17/2025 0000 by Rochelle Mast RN  Medication Review/Management: medications reviewed  Self-Care Promotion:   independence encouraged   BADL personal objects within reach  Taken 4/16/2025 2200 by Rochelle Mast RN  Medication Review/Management: medications reviewed  Self-Care Promotion:   independence encouraged   BADL personal objects within reach  Taken 4/16/2025 2000 by Rochelle Mast RN  Medication Review/Management: medications reviewed  Self-Care Promotion:   independence encouraged   BADL personal objects within reach  Intervention: Promote Injury-Free Environment  Recent Flowsheet Documentation  Taken 4/17/2025 0400 by Rochelle Mast RN  Safety Promotion/Fall Prevention:   activity supervised   assistive device/personal items within reach   clutter free environment maintained   fall prevention program maintained   lighting adjusted   nonskid shoes/slippers when out of bed   room organization consistent   safety round/check completed  Taken 4/17/2025 0200 by Rochelle Mast RN  Safety Promotion/Fall Prevention:   activity supervised   assistive device/personal items within reach   clutter free environment maintained   fall prevention program maintained   lighting adjusted   nonskid shoes/slippers when out of bed   room organization consistent   safety round/check completed  Taken 4/17/2025 0000 by Rochelle Mast RN  Safety Promotion/Fall Prevention:   activity supervised   assistive device/personal items within reach   clutter free environment maintained   fall prevention program  maintained   lighting adjusted   nonskid shoes/slippers when out of bed   room organization consistent   safety round/check completed  Taken 4/16/2025 2200 by Rochelle Mast RN  Safety Promotion/Fall Prevention:   activity supervised   assistive device/personal items within reach   clutter free environment maintained   fall prevention program maintained   lighting adjusted   nonskid shoes/slippers when out of bed   room organization consistent   safety round/check completed  Taken 4/16/2025 2000 by Rochelle Mast RN  Safety Promotion/Fall Prevention:   activity supervised   assistive device/personal items within reach   clutter free environment maintained   fall prevention program maintained   lighting adjusted   nonskid shoes/slippers when out of bed   room organization consistent   safety round/check completed     Problem: UTI (Urinary Tract Infection)  Goal: Improved Infection Symptoms  Outcome: Progressing     Problem: [facility]  Goal: Collaboration with the Healthcare Team  Outcome: Progressing  Intervention: [facility] [place]  Recent Flowsheet Documentation  Taken 4/16/2025 2000 by Rochelle Mast RN  Supportive Measures: active listening utilized  Family/Support System Care: self-care encouraged   Goal Outcome Evaluation:

## 2025-04-17 NOTE — CASE MANAGEMENT/SOCIAL WORK
Continued Stay Note  [facility]     Patient Name: Thomas Perez  MRN: [id number]  Today's Date: 4/17/2025    Admit Date: 4/8/2025    Plan: Placement   Discharge Plan       Row Name 04/17/25 1046       Plan    Plan Placement    Plan Comments Left voicemail to follow up on referral to [facility].  Recie[facility] denials from [facility], [place], [facility] and [facility].  Referral refaxed to [facility] at [phone].  Referral faxed to Marjan with [facility] at [phone].  Referral also faxed to all facilities in [facility].  Spoke to Daksha with [facility].  They may be able to offer patient a bed at one of their facilities pending financial review.  CM will continue to follow.                   Discharge Codes    No documentation.                 Expected Discharge Date and Time       Expected Discharge Date Expected Discharge Time    Apr 21, 2025               Li Cox RN

## 2025-04-17 NOTE — NURSING NOTE
PT decided he wants to sign out AMA. Answered orientation questions correctly. Dr. Leary notified. Arlyn, unit director at bedside and discussed risks of signing out AMA and attempted to get patient to stay until safe d/c plan is in place but he refused. Charge RN also at bedside.